# Patient Record
Sex: FEMALE | Race: WHITE | NOT HISPANIC OR LATINO | Employment: OTHER | URBAN - NONMETROPOLITAN AREA
[De-identification: names, ages, dates, MRNs, and addresses within clinical notes are randomized per-mention and may not be internally consistent; named-entity substitution may affect disease eponyms.]

---

## 2017-01-24 ENCOUNTER — OFFICE VISIT (OUTPATIENT)
Dept: FAMILY MEDICINE | Facility: CLINIC | Age: 77
End: 2017-01-24
Payer: COMMERCIAL

## 2017-01-24 VITALS
SYSTOLIC BLOOD PRESSURE: 162 MMHG | HEART RATE: 89 BPM | WEIGHT: 169 LBS | OXYGEN SATURATION: 98 % | TEMPERATURE: 97.6 F | BODY MASS INDEX: 28.85 KG/M2 | HEIGHT: 64 IN | DIASTOLIC BLOOD PRESSURE: 90 MMHG

## 2017-01-24 DIAGNOSIS — N64.4 BREAST PAIN: ICD-10-CM

## 2017-01-24 DIAGNOSIS — M94.0 COSTOCHONDRITIS: Primary | ICD-10-CM

## 2017-01-24 DIAGNOSIS — I10 ESSENTIAL HYPERTENSION: ICD-10-CM

## 2017-01-24 DIAGNOSIS — R07.89 CHEST WALL PAIN: ICD-10-CM

## 2017-01-24 PROCEDURE — 99214 OFFICE O/P EST MOD 30 MIN: CPT | Performed by: FAMILY MEDICINE

## 2017-01-24 NOTE — PROGRESS NOTES
SUBJECTIVE:                                                    Yudelka Beckett is a 76 year old female who presents to clinic today for the following health issues:      Hypertension Follow-up      Outpatient blood pressures are being checked at home.  Results are normal.    Low Salt Diet: not monitoring salt     Is on diltiazem.   BP Readings from Last 6 Encounters:   01/24/17 168/96   11/10/16 158/86   10/03/16 132/80   08/22/16 139/80   07/07/16 148/86   06/29/16 177/86   She reports that her BPs at home are usually in the 130s/80s. She thinks her machine is correctly calibrated.       Amount of exercise or physical activity: active    Problems taking medications regularly: No    Medication side effects: none  Diet: regular (no restrictions)    Follow up rt sided chest pain    CT scan was negative in November.   FINDINGS: No bony or soft tissue abnormality is seen involving the  right chest wall. Mild atelectasis is seen at the lung bases. There is  a 3 mm noncalcified right middle lobe pulmonary nodule on image #36.  There is a 4 mm noncalcified subpleural area of nodularity at the left  lung base on image #42. Both of these noncalcified nodules are stable  dating back to 5/1/2013 which suggests a benign entity. No enlarged  lymph nodes are demonstrated. The thoracic aorta is normal in caliber.  A subcentimeter area of amorphus calcification involving the cortex of  the upper pole of the right kidney is unchanged.                                                                     IMPRESSION:  Stable chest CT with no specific reason for right chest  wall pain seen.     LAWRENCE CANTU MD    She rolled onto her back from her left side the other night she felt a very intense pain, but when she returned to her original position, the pain subsided. The pain is in her rib area. She says that the pain is almost constant now, but the severity waxes and wanes. It huts when she pushes on her ribs, especially just  "to the right of her sternum. Feels like she was \"punched from inside.\"  She has not rested the muscles in her chest for a long time. She is lifting wood and other heavy objects year round daily. She also has a history of trauma to her chest when her dog jumped on her a year ago.   She takes 1 ibuprofen twice a day, but sometimes takes 2 if the pain is particularly bad that day.     Mole on face-  She says she has had it since last spring and thinks it is getting better. We have discussed biopsying it before, but she just wants to let it be. She says that it occasionally bleeds when she lays on it.    Problem list and histories reviewed & adjusted, as indicated.  Additional history: as documented    BP Readings from Last 3 Encounters:   01/24/17 168/96   11/10/16 158/86   10/03/16 132/80    Wt Readings from Last 3 Encounters:   01/24/17 76.658 kg (169 lb)   11/10/16 77.565 kg (171 lb)   10/03/16 77.565 kg (171 lb)           ROS:  C: NEGATIVE for fever, chills, change in weight  E/M: NEGATIVE for ear, mouth and throat problems  R: NEGATIVE for significant cough or SOB  CV: NEGATIVE for chest pain, palpitations or peripheral edema  GI: NEGATIVE for nausea, abdominal pain, heartburn, or change in bowel habits  : negative for, dysuria, frequency, hematuria, hesitancy and incontinence  MS: POSITIVE for chest pain, particularly on the right side.     OBJECTIVE:                                                    /96 mmHg  Pulse 89  Temp(Src) 97.6  F (36.4  C) (Tympanic)  Ht 1.613 m (5' 3.5\")  Wt 76.658 kg (169 lb)  BMI 29.46 kg/m2  SpO2 98%  Body mass index is 29.46 kg/(m^2).  GENERAL: healthy, alert and no distress  RESP: lungs clear to auscultation - no rales, rhonchi or wheezes  BREAST: normal without masses or nipple discharge and no palpable axillary masses or adenopathy. Tenderness just under the nipple line bilaterally that seems to be in the chest wall underneath the breast.   CV: regular rate and " rhythm, normal S1 S2, no S3 or S4, 1-2/6 systolic ejection murmer best heard at the upper sternal boarder, no click or rub,   ABDOMEN: soft, nontender, no hepatosplenomegaly, no masses   MS: no gross musculoskeletal defects noted, no edema  SKIN: 1.2cm x 8mm raised erythematous dome on left cheek.  HENT:hearing aids in place, upper and lower plates.     ASSESSMENT/PLAN:                                                      ASSESSMENT:  1. Costochondritis    2. Chest wall pain    3. Breast pain    4. Essential hypertension        PLAN:  Orders Placed This Encounter     MA Diagnostic Digital Bilateral   Discussed possibilities of biopsying and removing lesion on cheek. Discussed risks of skin cancer. Patient is undetermined on a plan of action, but will consider all possibilities discussed.     The pain seems to be musculoskeletal, but I have ordered a mammogram just in case. I also discussed possibility of doing a bone scan after mammogram if it comes back normal. She seemed hesitant, but will consider it.   Start: 2:03 PM  End: 2:42 PM  30 min spent with patient, greater than 50% in counseling and coordination of care.      Patient Instructions     Diagnostic mammogram and ultrasound in [place]  [phone]  Consider bone scan  Information on costochondritis given.     This document serves as a record of the services and decisions personally performed and made by Azalea Tobar MD. It was created on her behalf by Nadira Mcgill, a trained medical scribe. The creation of this document is based the provider's statements to the medical scribe.  Nadira Mcgill 2:03 PM 1/24/2017    Provider:   The information in this document, created by the medical scribe for me, accurately reflects the services I personally performed and the decisions made by me. I have reviewed and approved this document for accuracy prior to leaving the patient care area.  Azalea Tobar MD 2:03 PM 1/24/2017    Azalea Tobar MD  [place]  [facility]

## 2017-01-24 NOTE — PATIENT INSTRUCTIONS
Diagnostic mammogram and ultrasound in [place]  [phone]  Consider bone scan    Chest Wall Pain: Costochondritis    The chest pain that you have had today is caused by costochondritis. This condition is caused by an inflammation of the cartilage joining your ribs to your breastbone. It is not caused by heart or lung problems. The inflammation may have been brought on by a blow to the chest, lifting heavy objects, intense exercise, or an illness that made you cough and sneeze. It often occurs during times of emotional stress. It can be painful, but it is not dangerous. It usually goes away in 1 to 2 weeks. But it may happen again. Rarely, a more serious condition may cause symptoms similar to costochondritis. That s why it s important to watch for the warning signs listed below.  Home care  Follow these guidelines when caring for yourself at home:    If you feel that emotional stress is a cause of your condition, try to figure out the sources of that stress. It may not be obvious! Learn ways to deal with the stress in your life. This can include regular exercise, muscle relaxation, meditation, or simply taking time out for yourself. For more information about this, talk with your health care provider. Or go to your local library and look at books on  stress reduction.     You may use acetaminophen or ibuprofen to control pain, unless another pain medicine was prescribed. If you have liver disease or ever had a stomach ulcer, talk with your health care provider before using these medicines.    You can also help ease pain by using a hot, wet compress or heating pad. Use this with or without a medicated skin cream that helps relieves pain.    Do stretching exercise as advised by your provider.    Take any prescribed medicines as directed.  Follow-up care  Follow up with your health care provider, or as advised, if you do not start to get better in the next 2 days.  When to seek medical advice  Call your health  care provider right away if any of these occur:    A change in the type of pain. Call if it feels different, becomes more serious, lasts longer, or spreads into your shoulder, arm, neck, jaw, or back.    Shortness of breath or pain gets worse when you breathe    Weakness, dizziness, or fainting    Cough with dark-colored sputum (phlegm) or blood    Abdominal pain    Dark red or black stools    Fever of 100.4 F (38 C) or higher, or as directed by your health care provider    3979-0566 [employer]. [address] [place] [address]. All rights reserved. This information is not intended as a substitute for professional medical care. Always follow your healthcare professional's instructions.        Costochondritis  Costochondritis is inflammation of a rib or the cartilage that connects a rib to your breastbone (sternum). It causes tenderness, and sometimes chest pain may be sharp or aching, or it may feel like pressure. Pain may get worse with deep breathing, movement, or exercise. In some cases, the pain is mistaken for a heart attack. Despite this, the condition is not serious. Read on to learn more about the condition and how it can be treated.    What causes costochondritis?  The cause of costochondritis is not completely clear, but it may happen after a chest injury, chest infection or coughing episode. Some physical activities can sometimes lead to costochondritis. Large-breasted women may be more likely to have the condition. Often, the reason for the inflammation is unknown.  Diagnosing costochondritis  There is no test for costochrondritis. The condition is diagnosed by the symptoms you have. In some cases, tests are done to rule out more serious problems. These tests may include imaging tests such as chest X-ray or CT scan.  Treating costochondritis  If an underlying cause is found, treatment for that will likely relieve the problem. Costochondritis often goes away on its own. The course  of the condition varies from person to person. It usually lasts from weeks to months. In some cases, mild symptoms continue for months to years. To ease symptoms:    Take medications as directed. These relieve pain and swelling. Ibuprofen or other NSAIDs are often recommended. In some cases, you may be given prescription medication, such as muscle relaxants.    Avoid activities that put stress on the chest or spine.    Apply a heating pad (set to warm, not to high, heat) to the breastbone several times a day.    Perform stretching exercises as directed  Call the health care provider right away if you have any of the following:    Pain that is not relieved by medication    Shortness of breath    Lightheadedness, dizziness, or fainting    Feeling of irregular heartbeat or fast pulse  Anyone with chest pain should see a doctor, especially those who are older and may be at risk for heart disease.     8543-3382 [employer]. [address] [place] [address], [place] [address]. All rights reserved. This information is not intended as a substitute for professional medical care. Always follow your healthcare professional's instructions.

## 2017-01-24 NOTE — MR AVS SNAPSHOT
After Visit Summary   1/24/2017    Yudelka Beckett    MRN: [id number]           Patient Information     Date Of Birth          1940        Visit Information        Provider Department      1/24/2017 1:40 PM Azalea Tobar MD [facility]        Today's Diagnoses     Costochondritis    -  1     Chest wall pain         Breast pain           Care Instructions      Diagnostic mammogram and ultrasound in [place]  [phone]  Consider bone scan    Chest Wall Pain: Costochondritis    The chest pain that you have had today is caused by costochondritis. This condition is caused by an inflammation of the cartilage joining your ribs to your breastbone. It is not caused by heart or lung problems. The inflammation may have been brought on by a blow to the chest, lifting heavy objects, intense exercise, or an illness that made you cough and sneeze. It often occurs during times of emotional stress. It can be painful, but it is not dangerous. It usually goes away in 1 to 2 weeks. But it may happen again. Rarely, a more serious condition may cause symptoms similar to costochondritis. That s why it s important to watch for the warning signs listed below.  Home care  Follow these guidelines when caring for yourself at home:    If you feel that emotional stress is a cause of your condition, try to figure out the sources of that stress. It may not be obvious! Learn ways to deal with the stress in your life. This can include regular exercise, muscle relaxation, meditation, or simply taking time out for yourself. For more information about this, talk with your health care provider. Or go to your local library and look at books on  stress reduction.     You may use acetaminophen or ibuprofen to control pain, unless another pain medicine was prescribed. If you have liver disease or ever had a stomach ulcer, talk with your health care provider before using these medicines.    You can also help  ease pain by using a hot, wet compress or heating pad. Use this with or without a medicated skin cream that helps relieves pain.    Do stretching exercise as advised by your provider.    Take any prescribed medicines as directed.  Follow-up care  Follow up with your health care provider, or as advised, if you do not start to get better in the next 2 days.  When to seek medical advice  Call your health care provider right away if any of these occur:    A change in the type of pain. Call if it feels different, becomes more serious, lasts longer, or spreads into your shoulder, arm, neck, jaw, or back.    Shortness of breath or pain gets worse when you breathe    Weakness, dizziness, or fainting    Cough with dark-colored sputum (phlegm) or blood    Abdominal pain    Dark red or black stools    Fever of 100.4 F (38 C) or higher, or as directed by your health care provider    6981-2616 [employer]. [address] [place] [address]. All rights reserved. This information is not intended as a substitute for professional medical care. Always follow your healthcare professional's instructions.        Costochondritis  Costochondritis is inflammation of a rib or the cartilage that connects a rib to your breastbone (sternum). It causes tenderness, and sometimes chest pain may be sharp or aching, or it may feel like pressure. Pain may get worse with deep breathing, movement, or exercise. In some cases, the pain is mistaken for a heart attack. Despite this, the condition is not serious. Read on to learn more about the condition and how it can be treated.    What causes costochondritis?  The cause of costochondritis is not completely clear, but it may happen after a chest injury, chest infection or coughing episode. Some physical activities can sometimes lead to costochondritis. Large-breasted women may be more likely to have the condition. Often, the reason for the inflammation is unknown.  Diagnosing  costochondritis  There is no test for costochrondritis. The condition is diagnosed by the symptoms you have. In some cases, tests are done to rule out more serious problems. These tests may include imaging tests such as chest X-ray or CT scan.  Treating costochondritis  If an underlying cause is found, treatment for that will likely relieve the problem. Costochondritis often goes away on its own. The course of the condition varies from person to person. It usually lasts from weeks to months. In some cases, mild symptoms continue for months to years. To ease symptoms:    Take medications as directed. These relieve pain and swelling. Ibuprofen or other NSAIDs are often recommended. In some cases, you may be given prescription medication, such as muscle relaxants.    Avoid activities that put stress on the chest or spine.    Apply a heating pad (set to warm, not to high, heat) to the breastbone several times a day.    Perform stretching exercises as directed  Call the health care provider right away if you have any of the following:    Pain that is not relieved by medication    Shortness of breath    Lightheadedness, dizziness, or fainting    Feeling of irregular heartbeat or fast pulse  Anyone with chest pain should see a doctor, especially those who are older and may be at risk for heart disease.     4583-5400 [employer]. [address] [place] [address], [place] [id number]. All rights reserved. This information is not intended as a substitute for professional medical care. Always follow your healthcare professional's instructions.              Follow-ups after your visit        Future tests that were ordered for you today     Open Future Orders        Priority Expected Expires Ordered    MA Diagnostic Digital Bilateral Routine  1/24/2018 1/24/2017            Who to contact     If you have questions or need follow up information about today's clinic visit or your schedule please contact [facility]  "directly at [phone].  Normal or non-critical lab and imaging results will be communicated to you by MyChart, letter or phone within 4 business days after the clinic has received the results. If you do not hear from us within 7 days, please contact the clinic through [employer] or phone. If you have a critical or abnormal lab result, we will notify you by phone as soon as possible.  Submit refill requests through [employer] or call your pharmacy and they will forward the refill request to us. Please allow 3 business days for your refill to be completed.          Additional Information About Your Visit        [employer] Information     [employer] lets you send messages to your doctor, view your test results, renew your prescriptions, schedule appointments and more. To sign up, go to [email or web address] . Click on \"Log in\" on the left side of the screen, which will take you to the Welcome page. Then click on \"Sign up Now\" on the right side of the page.     You will be asked to enter the access code listed below, as well as some personal information. Please follow the directions to create your username and password.     Your access code is: [email or web address]  Expires: 2017  2:32 PM     Your access code will  in 90 days. If you need help or a new code, please call your [place] clinic or [phone].        Care EveryWhere ID     This is your Care EveryWhere ID. This could be used by other organizations to access your [facility] medical records  [id number]        Your Vitals Were     Pulse Temperature Height BMI (Body Mass Index) Pulse Oximetry       89 97.6  F (36.4  C) (Tympanic) 5' 3.5\" (1.613 m) 29.46 kg/m2 98%        Blood Pressure from Last 3 Encounters:   17 168/96   11/10/16 158/86   10/03/16 132/80    Weight from Last 3 Encounters:   17 169 lb (76.658 kg)   11/10/16 171 lb (77.565 kg)   10/03/16 171 lb (77.565 kg)               Primary Care Provider Office Phone # Fax #    Azalea Tobar MD " [phone]       [facility] [address] [facility] [address]        Thank you!     Thank you for choosing [facility]  for your care. Our goal is always to provide you with excellent care. Hearing back from our patients is one way we can continue to improve our services. Please take a few minutes to complete the written survey that you may receive in the mail after your visit with us. Thank you!             Your Updated Medication List - Protect others around you: Learn how to safely use, store and throw away your medicines at www.disposemymeds.org.          This list is accurate as of: 1/24/17  2:32 PM.  Always use your most recent med list.                   Brand Name Dispense Instructions for use    albuterol 108 (90 BASE) MCG/ACT Inhaler    PROAIR HFA/PROVENTIL HFA/VENTOLIN HFA    1 Inhaler    Inhale 1-2 puffs into the lungs every 4 hours as needed for shortness of breath / dyspnea       atorvastatin 20 MG tablet    LIPITOR    90 tablet    Take 1 tablet (20 mg) by mouth daily       diltiazem 240 MG 24 hr capsule     90 capsule    Take 1 capsule (240 mg) by mouth daily       esomeprazole 40 MG CR capsule    nexIUM    90 capsule    Take 1 capsule (40 mg) by mouth every morning (before breakfast) One hour before meals fill when needed       fluticasone 110 MCG/ACT Inhaler    FLOVENT HFA    1 Inhaler    2 puffs BID       IBUPROFEN      3 times daily       ipratropium 17 MCG/ACT Inhaler    ATROVENT HFA    1 Inhaler    Inhale 2 puffs into the lungs 4 times daily as needed       levothyroxine 88 MCG tablet    SYNTHROID/LEVOTHROID    90 tablet    Take 1 tablet (88 mcg) by mouth daily       lisinopril 20 MG tablet    PRINIVIL/ZESTRIL    90 tablet    Take 1 tablet (20 mg) by mouth daily Fill when needed

## 2017-01-24 NOTE — NURSING NOTE
"Chief Complaint   Patient presents with     Chest Pain     F/U rt sided chest pain     Hypertension     /96 mmHg  Pulse 89  Temp(Src) 97.6  F (36.4  C) (Tympanic)  Ht 5' 3.5\" (1.613 m)  Wt 169 lb (76.658 kg)  BMI 29.46 kg/m2  SpO2 98% Estimated body mass index is 29.46 kg/(m^2) as calculated from the following:    Height as of this encounter: 5' 3.5\" (1.613 m).    Weight as of this encounter: 169 lb (76.658 kg).  bp completed using cuff size: regular        "

## 2017-01-30 ENCOUNTER — OFFICE VISIT (OUTPATIENT)
Dept: FAMILY MEDICINE | Facility: CLINIC | Age: 77
End: 2017-01-30
Payer: COMMERCIAL

## 2017-01-30 VITALS
WEIGHT: 169 LBS | BODY MASS INDEX: 29.46 KG/M2 | TEMPERATURE: 98 F | HEART RATE: 94 BPM | SYSTOLIC BLOOD PRESSURE: 152 MMHG | OXYGEN SATURATION: 98 % | DIASTOLIC BLOOD PRESSURE: 78 MMHG

## 2017-01-30 DIAGNOSIS — D48.5 NEOPLASM OF UNCERTAIN BEHAVIOR OF SKIN: Primary | ICD-10-CM

## 2017-01-30 PROCEDURE — 88305 TISSUE EXAM BY PATHOLOGIST: CPT | Performed by: FAMILY MEDICINE

## 2017-01-30 PROCEDURE — 11312 SHAVE SKIN LESION 1.1-2.0 CM: CPT | Performed by: FAMILY MEDICINE

## 2017-01-30 NOTE — NURSING NOTE
"Chief Complaint   Patient presents with     Derm Problem     shave biopsy from face     /78 mmHg  Pulse 94  Temp(Src) 98  F (36.7  C) (Tympanic)  Wt 169 lb (76.658 kg)  SpO2 98% Estimated body mass index is 29.46 kg/(m^2) as calculated from the following:    Height as of 1/24/17: 5' 3.5\" (1.613 m).    Weight as of this encounter: 169 lb (76.658 kg).  bp completed using cuff size: regular        "

## 2017-01-30 NOTE — PROGRESS NOTES
SUBJECTIVE:                                                    Yudelka Beckett is a 76 year old female who presents to clinic today for the following health issues:    Shave biopsy on face- large raised dome on left check    /78 mmHg  Pulse 94  Temp(Src) 98  F (36.7  C) (Tympanic)  Wt 76.658 kg (169 lb)  SpO2 98%  Body mass index is 29.46 kg/(m^2).   General: healthy, alert, no distress  Skin: 1.1cm x. 7 cm raised dome on left check.      Procedure note: Shave biopsy: Gone over benefits and risks, including bleeding, infection and scarring, patient wishes to proceed. The area(s) were then injected with 1% lidocaine 0% epi for anesthesia using a 30 gauge needle. Then, employing the usual sterile preparation and technique, a partial shave biopsy was taken of the lesion. Good hemostasis was achieved with gauze and a pressure Band-Aid.. Patient tolerated procedure well without any complications. Wound care discussed. Pathology pending.       This document serves as a record of the services and decisions personally performed and made by Azalea Tobar MD. It was created on her behalf by Nadira Mcgill, a trained medical scribe. The creation of this document is based the provider's statements to the medical scribe.  Nadira Mcgill 1:00 PM 1/30/2017    Provider:   The information in this document, created by the medical scribe for me, accurately reflects the services I personally performed and the decisions made by me. I have reviewed and approved this document for accuracy prior to leaving the patient care area.  Azalea Tobar MD 1:00 PM 1/30/2017    Azalea Tobar MD  [facility]

## 2017-01-30 NOTE — MR AVS SNAPSHOT
After Visit Summary   1/30/2017    Yudelka Beckett    MRN: [id number]           Patient Information     Date Of Birth          1940        Visit Information        Provider Department      1/30/2017 12:40 PM Azalea Tobar MD [facility]        Care Instructions    Wound Care Instructions    1.  Keep area dry today.    2.  Starting tomorrow wash gently with soap and water once daily.      3.  Apply Vaseline or antibiotic ointment to the area and cover with a bandage if desired.  Do not let it dry out and form a scab as this will make the resulting scar more noticeable.    4. Protect the area from sun for up to one year afterward as the scar is continuing to remodel.  Sun exposure will also make the resulting scar more noticeable.    5.  Call if the area is very red, tender, has a discharge or is very itchy while healing, or if you have any other questions.  These may be signs of early infection or allergy.          Follow-ups after your visit        Your next 10 appointments already scheduled     Feb 07, 2017  1:30 PM   MA DIAGNOSTIC DIGITAL BILATERAL with WYMA1   [facility] ([facility])    [address] [place] [address]   [phone]           Do not use any powder, lotion or deodorant under your arms or on your breast. If you do, we will ask you to remove it before your exam.  Wear comfortable, two-piece clothing.  If you have any allergies, tell your care team.  Bring any previous mammograms from other facilities or have them mailed to the breast center.            Feb 07, 2017  2:00 PM   US BREAST LUKAS CMPL 4 QUAD with WYUS1   [facility] ([facility])    [address] [place] [address] [phone]           Please bring a list of your medicines (including vitamins, minerals and over-the-counter drugs). Also, tell your doctor about any allergies you may have. Wear comfortable  "clothes and leave your valuables at home.  You do not need to do anything special to prepare for your exam.  Please call the Imaging Department at your exam site with any questions.              Who to contact     If you have questions or need follow up information about today's clinic visit or your schedule please contact [facility] directly at [phone].  Normal or non-critical lab and imaging results will be communicated to you by MyChart, letter or phone within 4 business days after the clinic has received the results. If you do not hear from us within 7 days, please contact the clinic through [employer] or phone. If you have a critical or abnormal lab result, we will notify you by phone as soon as possible.  Submit refill requests through [employer] or call your pharmacy and they will forward the refill request to us. Please allow 3 business days for your refill to be completed.          Additional Information About Your Visit        [employer] Information     [employer] lets you send messages to your doctor, view your test results, renew your prescriptions, schedule appointments and more. To sign up, go to [email or web address] . Click on \"Log in\" on the left side of the screen, which will take you to the Welcome page. Then click on \"Sign up Now\" on the right side of the page.     You will be asked to enter the access code listed below, as well as some personal information. Please follow the directions to create your username and password.     Your access code is: [email or web address]  Expires: 2017  2:32 PM     Your access code will  in 90 days. If you need help or a new code, please call your [place] clinic or [phone].        Care EveryWhere ID     This is your Care EveryWhere ID. This could be used by other organizations to access your [place] medical records  [id number]        Your Vitals Were     Pulse Temperature Pulse Oximetry             94 98  F (36.7  C) (Tympanic) 98%          " Blood Pressure from Last 3 Encounters:   01/30/17 152/78   01/24/17 162/90   11/10/16 158/86    Weight from Last 3 Encounters:   01/30/17 169 lb (76.658 kg)   01/24/17 169 lb (76.658 kg)   11/10/16 171 lb (77.565 kg)              Today, you had the following     No orders found for display       Primary Care Provider Office Phone # Fax #    Azalea Tobar [phone]       [facility] [address] [facility] [address]        Thank you!     Thank you for choosing [facility]  for your care. Our goal is always to provide you with excellent care. Hearing back from our patients is one way we can continue to improve our services. Please take a few minutes to complete the written survey that you may receive in the mail after your visit with us. Thank you!             Your Updated Medication List - Protect others around you: Learn how to safely use, store and throw away your medicines at www.disposemymeds.org.          This list is accurate as of: 1/30/17  1:18 PM.  Always use your most recent med list.                   Brand Name Dispense Instructions for use    albuterol 108 (90 BASE) MCG/ACT Inhaler    PROAIR HFA/PROVENTIL HFA/VENTOLIN HFA    1 Inhaler    Inhale 1-2 puffs into the lungs every 4 hours as needed for shortness of breath / dyspnea       atorvastatin 20 MG tablet    LIPITOR    90 tablet    Take 1 tablet (20 mg) by mouth daily       diltiazem 240 MG 24 hr capsule     90 capsule    Take 1 capsule (240 mg) by mouth daily       esomeprazole 40 MG CR capsule    nexIUM    90 capsule    Take 1 capsule (40 mg) by mouth every morning (before breakfast) One hour before meals fill when needed       fluticasone 110 MCG/ACT Inhaler    FLOVENT HFA    1 Inhaler    2 puffs BID       IBUPROFEN      3 times daily       ipratropium 17 MCG/ACT Inhaler    ATROVENT HFA    1 Inhaler    Inhale 2 puffs into the lungs 4 times daily as needed       levothyroxine 88 MCG tablet     SYNTHROID/LEVOTHROID    90 tablet    Take 1 tablet (88 mcg) by mouth daily       lisinopril 20 MG tablet    PRINIVIL/ZESTRIL    90 tablet    Take 1 tablet (20 mg) by mouth daily Fill when needed

## 2017-01-30 NOTE — PATIENT INSTRUCTIONS
Wound Care Instructions    1.  Keep area dry today.    2.  Starting tomorrow wash gently with soap and water once daily.      3.  Apply Vaseline or antibiotic ointment to the area and cover with a bandage if desired.  Do not let it dry out and form a scab as this will make the resulting scar more noticeable.    4. Protect the area from sun for up to one year afterward as the scar is continuing to remodel.  Sun exposure will also make the resulting scar more noticeable.    5.  Call if the area is very red, tender, has a discharge or is very itchy while healing, or if you have any other questions.  These may be signs of early infection or allergy.

## 2017-02-01 LAB — COPATH REPORT: NORMAL

## 2017-02-27 ENCOUNTER — HOSPITAL ENCOUNTER (OUTPATIENT)
Dept: MAMMOGRAPHY | Facility: CLINIC | Age: 77
Discharge: HOME | End: 2017-02-27
Attending: FAMILY MEDICINE | Admitting: FAMILY MEDICINE
Payer: MEDICARE

## 2017-02-27 DIAGNOSIS — N64.4 BREAST PAIN: ICD-10-CM

## 2017-02-27 PROCEDURE — G0204 DX MAMMO INCL CAD BI: HCPCS

## 2017-03-01 ENCOUNTER — TELEPHONE (OUTPATIENT)
Dept: FAMILY MEDICINE | Facility: CLINIC | Age: 77
End: 2017-03-01

## 2017-03-01 DIAGNOSIS — J45.20 MILD INTERMITTENT ASTHMA, UNCOMPLICATED: ICD-10-CM

## 2017-03-01 RX ORDER — ALBUTEROL SULFATE 90 UG/1
2 AEROSOL, METERED RESPIRATORY (INHALATION) EVERY 6 HOURS
Qty: 1 INHALER | Refills: 5 | Status: SHIPPED | OUTPATIENT
Start: 2017-03-01 | End: 2017-10-20

## 2017-03-01 NOTE — TELEPHONE ENCOUNTER
[facility] is requesting a Formulary alternative for Pro Air  Ventolin  Please Call [phone]  [facility]

## 2017-03-02 NOTE — TELEPHONE ENCOUNTER
Pt is calling back.  She said that she needs an exemption for the Pro Air Inhaler.  She don't want no other inhaler.  The number to [facility] is [phone].  Ofe Light

## 2017-03-02 NOTE — TELEPHONE ENCOUNTER
Pt calling back to see what is going on with her inhaler.  She has not heard anything yet.  Ofe Light

## 2017-03-03 NOTE — TELEPHONE ENCOUNTER
Pt notified and understood. It was taken care of. Pt does have the medication.  [facility]

## 2017-03-29 ENCOUNTER — ALLIED HEALTH/NURSE VISIT (OUTPATIENT)
Dept: FAMILY MEDICINE | Facility: CLINIC | Age: 77
End: 2017-03-29
Payer: COMMERCIAL

## 2017-03-29 VITALS — SYSTOLIC BLOOD PRESSURE: 138 MMHG | HEART RATE: 88 BPM | DIASTOLIC BLOOD PRESSURE: 74 MMHG

## 2017-03-29 DIAGNOSIS — I10 ESSENTIAL HYPERTENSION: Primary | ICD-10-CM

## 2017-03-29 PROCEDURE — 99207 ZZC NO CHARGE NURSE ONLY: CPT

## 2017-03-29 NOTE — MR AVS SNAPSHOT
"              After Visit Summary   3/29/2017    Yudelka Beckett    MRN: [id number]           Patient Information     Date Of Birth          1940        Visit Information        Provider Department      3/29/2017 11:00 AM SACHA WELLINGTON RN [facility]        Today's Diagnoses     Essential hypertension    -  1       Follow-ups after your visit        Who to contact     If you have questions or need follow up information about today's clinic visit or your schedule please contact [facility] directly at [phone].  Normal or non-critical lab and imaging results will be communicated to you by MyChart, letter or phone within 4 business days after the clinic has received the results. If you do not hear from us within 7 days, please contact the clinic through [employer] or phone. If you have a critical or abnormal lab result, we will notify you by phone as soon as possible.  Submit refill requests through [employer] or call your pharmacy and they will forward the refill request to us. Please allow 3 business days for your refill to be completed.          Additional Information About Your Visit        MyChart Information     [employer] lets you send messages to your doctor, view your test results, renew your prescriptions, schedule appointments and more. To sign up, go to [email or web address] . Click on \"Log in\" on the left side of the screen, which will take you to the Welcome page. Then click on \"Sign up Now\" on the right side of the page.     You will be asked to enter the access code listed below, as well as some personal information. Please follow the directions to create your username and password.     Your access code is: [email or web address]  Expires: 2017  3:32 PM     Your access code will  in 90 days. If you need help or a new code, please call your [facility] or [phone].        Care EveryWhere ID     This is your Care EveryWhere ID. This could be used by other " organizations to access your [place] medical records  [id number]        Your Vitals Were     Pulse                   88            Blood Pressure from Last 3 Encounters:   03/29/17 138/74   01/30/17 152/78   01/24/17 162/90    Weight from Last 3 Encounters:   01/30/17 169 lb (76.7 kg)   01/24/17 169 lb (76.7 kg)   11/10/16 171 lb (77.6 kg)              Today, you had the following     No orders found for display       Primary Care Provider Office Phone # Fax #    Azalea Tobar [phone]       [facility] [address] [facility] [address]        Thank you!     Thank you for choosing [facility]  for your care. Our goal is always to provide you with excellent care. Hearing back from our patients is one way we can continue to improve our services. Please take a few minutes to complete the written survey that you may receive in the mail after your visit with us. Thank you!             Your Updated Medication List - Protect others around you: Learn how to safely use, store and throw away your medicines at www.disposemymeds.org.          This list is accurate as of: 3/29/17 11:19 AM.  Always use your most recent med list.                   Brand Name Dispense Instructions for use    albuterol 108 (90 BASE) MCG/ACT Inhaler    PROAIR HFA/PROVENTIL HFA/VENTOLIN HFA    1 Inhaler    Inhale 2 puffs into the lungs every 6 hours       atorvastatin 20 MG tablet    LIPITOR    90 tablet    Take 1 tablet (20 mg) by mouth daily       diltiazem 240 MG 24 hr capsule     90 capsule    Take 1 capsule (240 mg) by mouth daily       esomeprazole 40 MG CR capsule    nexIUM    90 capsule    Take 1 capsule (40 mg) by mouth every morning (before breakfast) One hour before meals fill when needed       fluticasone 110 MCG/ACT Inhaler    FLOVENT HFA    1 Inhaler    2 puffs BID       IBUPROFEN      3 times daily       ipratropium 17 MCG/ACT Inhaler    ATROVENT HFA    1 Inhaler    Inhale 2 puffs  into the lungs 4 times daily as needed       levothyroxine 88 MCG tablet    SYNTHROID/LEVOTHROID    90 tablet    Take 1 tablet (88 mcg) by mouth daily       lisinopril 20 MG tablet    PRINIVIL/ZESTRIL    90 tablet    Take 1 tablet (20 mg) by mouth daily Fill when needed

## 2017-03-29 NOTE — NURSING NOTE
Yudelka Beckett is a 76 year old female who comes in today for a Blood Pressure check because of ongoing blood pressure monitoring.    Brought machine from home and it matches exactly what we get here in clinic today    Vitals /74, pulse 88 a regular cuff was used.    Patient is taking medication as prescribed  Patient is tolerating medications well.  Patient is monitoring Blood Pressure at home.  Average readings if yes are 130/76    Current complaints: none    Disposition: follow-up as indicated by MD/AP

## 2017-05-03 ENCOUNTER — OFFICE VISIT (OUTPATIENT)
Dept: DERMATOLOGY | Facility: CLINIC | Age: 77
End: 2017-05-03
Payer: COMMERCIAL

## 2017-05-03 ENCOUNTER — TELEPHONE (OUTPATIENT)
Dept: DERMATOLOGY | Facility: CLINIC | Age: 77
End: 2017-05-03

## 2017-05-03 VITALS — SYSTOLIC BLOOD PRESSURE: 150 MMHG | DIASTOLIC BLOOD PRESSURE: 83 MMHG | HEART RATE: 96 BPM | OXYGEN SATURATION: 99 %

## 2017-05-03 DIAGNOSIS — C44.111: ICD-10-CM

## 2017-05-03 DIAGNOSIS — D18.00 ANGIOMA: ICD-10-CM

## 2017-05-03 DIAGNOSIS — L82.1 SK (SEBORRHEIC KERATOSIS): ICD-10-CM

## 2017-05-03 DIAGNOSIS — L81.4 LENTIGO: ICD-10-CM

## 2017-05-03 DIAGNOSIS — C44.319 BASAL CELL CARCINOMA OF LEFT CHEEK: Primary | ICD-10-CM

## 2017-05-03 PROCEDURE — 88331 PATH CONSLTJ SURG 1 BLK 1SPC: CPT | Mod: 59 | Performed by: DERMATOLOGY

## 2017-05-03 PROCEDURE — 99203 OFFICE O/P NEW LOW 30 MIN: CPT | Mod: 25 | Performed by: DERMATOLOGY

## 2017-05-03 PROCEDURE — 11100 ZZHC BIOPSY SKIN/SUBQ/MUC MEM, SINGLE LESION: CPT | Mod: 59 | Performed by: DERMATOLOGY

## 2017-05-03 PROCEDURE — 13132 CMPLX RPR F/C/C/M/N/AX/G/H/F: CPT | Performed by: DERMATOLOGY

## 2017-05-03 PROCEDURE — 17311 MOHS 1 STAGE H/N/HF/G: CPT | Performed by: DERMATOLOGY

## 2017-05-03 NOTE — PATIENT INSTRUCTIONS
Sutured Wound Care     [facility]: [phone]    [facility]: [phone]    Lower left cheek          ? No strenuous activity for 48 hours. Resume moderate activity in 48 hours. No heavy exercising until you are seen for follow up in one week.     ? Take Tylenol as needed for discomfort.                         ? Do not drink alcoholic beverages for 48 hours.     ? Keep the pressure bandage in place for 24 hours. If the bandage becomes blood tinged or loose, reinforce it with gauze and tape.        (Refer to the reverse side of this page for management of bleeding).    ? Remove pressure bandage in 24 hours     ? Leave the flat bandage in place until your follow up appointment.    ? Keep the bandage dry. Wash around it carefully.    ? If the tape becomes soiled or starts to come off, reinforce it with additional paper tape.    ? Do not smoke for 3 weeks; smoking is detrimental to wound healing.    ? It is normal to have swelling and bruising around the surgical site. The bruising will fade in approximately 10-14 days. Elevate the area to reduce swelling.    ? Numbness, itchiness and sensitivity to temperature changes can occur after surgery and may take up to 18 months to normalize.      POSSIBLE COMPLICATIONS    BLEEDIN. Leave the bandage in place.  2. Use tightly rolled up gauze or a cloth to apply direct pressure over the bandage for 20   minutes.  3. Reapply pressure for an additional 20 minutes if necessary  4. Call the office or go to the nearest emergency room if pressure fails to stop the bleeding.  5. Use additional gauze and tape to maintain pressure once the bleeding has stopped.        PAIN:    1. Post operative pain should slowly get better, never worse.  2. A severe increase in pain may indicate a problem. Call the office if this occurs.    In case of emergency phone:Dr [place] [phone]      Wound Care Instructions     FOR SUPERFICIAL WOUNDS     [facility]  [place] [phone]    [facility] [phone]  Top of left cheek                       AFTER 24 HOURS YOU SHOULD REMOVE THE BANDAGE AND BEGIN DAILY DRESSING CHANGES AS FOLLOWS:     1) Remove Dressing.     2) Clean and dry the area with tap water using a Q-tip or sterile gauze pad.     3) Apply Vaseline, Aquaphor, Polysporin ointment or Bacitracin ointment over entire wound.  Do NOT use Neosporin ointment.     4) Cover the wound with a band-aid, or a sterile non-stick gauze pad and micropore paper tape      REPEAT THESE INSTRUCTIONS AT LEAST ONCE A DAY UNTIL THE WOUND HAS COMPLETELY HEALED.    It is an old wives tale that a wound heals better when it is exposed to air and allowed to dry out. The wound will heal faster with a better cosmetic result if it is kept moist with ointment and covered with a bandage.    **Do not let the wound dry out.**      Supplies Needed:      *Cotton tipped applicators (Q-tips)    *Polysporin Ointment or Bacitracin Ointment (NOT NEOSPORIN)    *Band-aids or non-stick gauze pads and micropore paper tape.      PATIENT INFORMATION:    During the healing process you will notice a number of changes. All wounds develop a small halo of redness surrounding the wound.  This means healing is occurring. Severe itching with extensive redness usually indicates sensitivity to the ointment or bandage tape used to dress the wound.  You should call our office if this develops.      Swelling  and/or discoloration around your surgical site is common, particularly when performed around the eye.    All wounds normally drain.  The larger the wound the more drainage there will be.  After 7-10 days, you will notice the wound beginning to shrink and new skin will begin to grow.  The wound is healed when you can see skin has formed over the entire area.  A healed wound has a healthy, shiny look to the surface and is red to dark pink in color to normalize.  Wounds may take approximately 4-6 weeks to  heal.  Larger wounds may take 6-8 weeks.  After the wound is healed you may discontinue dressing changes.    You may experience a sensation of tightness as your wound heals. This is normal and will gradually subside.    Your healed wound may be sensitive to temperature changes. This sensitivity improves with time, but if you re having a lot of discomfort, try to avoid temperature extremes.    Patients frequently experience itching after their wound appears to have healed because of the continue healing under the skin.  Plain Vaseline will help relieve the itching.        POSSIBLE COMPLICATIONS    BLEEDIN. Leave the bandage in place.  7. Use tightly rolled up gauze or a cloth to apply direct pressure over the bandage for 30  minutes.  8. Reapply pressure for an additional 30 minutes if necessary  9. Use additional gauze and tape to maintain pressure once the bleeding has stopped.

## 2017-05-03 NOTE — TELEPHONE ENCOUNTER
"Patient notified. Patient verbalized understanding. She stated: \"I was told that at the appointment and I need to schedule Mohs surgery..\" She will need to call back to schedule Mohs surgery. Advised she will need a  due to location of lesion. Sharmila Ramos RN    "

## 2017-05-03 NOTE — MR AVS SNAPSHOT
After Visit Summary   5/3/2017    Yudelka Beckett    MRN: [id number]           Patient Information     Date Of Birth          1940        Visit Information        Provider Department      5/3/2017 8:00 AM Wilmer Mcclure MD [facility]        Today's Diagnoses     Basal cell carcinoma of left cheek    -  1    Basal cell carcinoma of left orbit        SK (seborrheic keratosis)        Lentigo        Angioma          Care Instructions        Sutured Wound Care     [facility]: [phone]    [facility]: [phone]    Lower left cheek          ? No strenuous activity for 48 hours. Resume moderate activity in 48 hours. No heavy exercising until you are seen for follow up in one week.     ? Take Tylenol as needed for discomfort.                         ? Do not drink alcoholic beverages for 48 hours.     ? Keep the pressure bandage in place for 24 hours. If the bandage becomes blood tinged or loose, reinforce it with gauze and tape.        (Refer to the reverse side of this page for management of bleeding).    ? Remove pressure bandage in 24 hours     ? Leave the flat bandage in place until your follow up appointment.    ? Keep the bandage dry. Wash around it carefully.    ? If the tape becomes soiled or starts to come off, reinforce it with additional paper tape.    ? Do not smoke for 3 weeks; smoking is detrimental to wound healing.    ? It is normal to have swelling and bruising around the surgical site. The bruising will fade in approximately 10-14 days. Elevate the area to reduce swelling.    ? Numbness, itchiness and sensitivity to temperature changes can occur after surgery and may take up to 18 months to normalize.      POSSIBLE COMPLICATIONS    BLEEDIN. Leave the bandage in place.  2. Use tightly rolled up gauze or a cloth to apply direct pressure over the bandage for 20   minutes.  3. Reapply pressure for an additional 20 minutes if  necessary  4. Call the office or go to the nearest emergency room if pressure fails to stop the bleeding.  5. Use additional gauze and tape to maintain pressure once the bleeding has stopped.        PAIN:    1. Post operative pain should slowly get better, never worse.  2. A severe increase in pain may indicate a problem. Call the office if this occurs.    In case of emergency phone:Dr Mcclure [phone]      Wound Care Instructions     FOR SUPERFICIAL WOUNDS     [facility] [phone]    [facility] [phone]  Top of left cheek                       AFTER 24 HOURS YOU SHOULD REMOVE THE BANDAGE AND BEGIN DAILY DRESSING CHANGES AS FOLLOWS:     1) Remove Dressing.     2) Clean and dry the area with tap water using a Q-tip or sterile gauze pad.     3) Apply Vaseline, Aquaphor, Polysporin ointment or Bacitracin ointment over entire wound.  Do NOT use Neosporin ointment.     4) Cover the wound with a band-aid, or a sterile non-stick gauze pad and micropore paper tape      REPEAT THESE INSTRUCTIONS AT LEAST ONCE A DAY UNTIL THE WOUND HAS COMPLETELY HEALED.    It is an old wives tale that a wound heals better when it is exposed to air and allowed to dry out. The wound will heal faster with a better cosmetic result if it is kept moist with ointment and covered with a bandage.    **Do not let the wound dry out.**      Supplies Needed:      *Cotton tipped applicators (Q-tips)    *Polysporin Ointment or Bacitracin Ointment (NOT NEOSPORIN)    *Band-aids or non-stick gauze pads and micropore paper tape.      PATIENT INFORMATION:    During the healing process you will notice a number of changes. All wounds develop a small halo of redness surrounding the wound.  This means healing is occurring. Severe itching with extensive redness usually indicates sensitivity to the ointment or bandage tape used to dress the wound.  You should call our office if this develops.      Swelling  and/or discoloration  around your surgical site is common, particularly when performed around the eye.    All wounds normally drain.  The larger the wound the more drainage there will be.  After 7-10 days, you will notice the wound beginning to shrink and new skin will begin to grow.  The wound is healed when you can see skin has formed over the entire area.  A healed wound has a healthy, shiny look to the surface and is red to dark pink in color to normalize.  Wounds may take approximately 4-6 weeks to heal.  Larger wounds may take 6-8 weeks.  After the wound is healed you may discontinue dressing changes.    You may experience a sensation of tightness as your wound heals. This is normal and will gradually subside.    Your healed wound may be sensitive to temperature changes. This sensitivity improves with time, but if you re having a lot of discomfort, try to avoid temperature extremes.    Patients frequently experience itching after their wound appears to have healed because of the continue healing under the skin.  Plain Vaseline will help relieve the itching.        POSSIBLE COMPLICATIONS    BLEEDIN. Leave the bandage in place.  7. Use tightly rolled up gauze or a cloth to apply direct pressure over the bandage for 30  minutes.  8. Reapply pressure for an additional 30 minutes if necessary  9. Use additional gauze and tape to maintain pressure once the bleeding has stopped.          Follow-ups after your visit        Who to contact     If you have questions or need follow up information about today's clinic visit or your schedule please contact [facility] directly at [phone].  Normal or non-critical lab and imaging results will be communicated to you by MyChart, letter or phone within 4 business days after the clinic has received the results. If you do not hear from us within 7 days, please contact the clinic through MyChart or phone. If you have a critical or abnormal lab result, we will notify you by phone  "as soon as possible.  Submit refill requests through [employer] or call your pharmacy and they will forward the refill request to us. Please allow 3 business days for your refill to be completed.          Additional Information About Your Visit        [employer] Information     [employer] lets you send messages to your doctor, view your test results, renew your prescriptions, schedule appointments and more. To sign up, go to [email or web address] . Click on \"Log in\" on the left side of the screen, which will take you to the Welcome page. Then click on \"Sign up Now\" on the right side of the page.     You will be asked to enter the access code listed below, as well as some personal information. Please follow the directions to create your username and password.     Your access code is: [email or web address]  Expires: 2017 10:50 AM     Your access code will  in 90 days. If you need help or a new code, please call your [facility] clinic or [phone].        Care EveryWhere ID     This is your Care EveryWhere ID. This could be used by other organizations to access your [facility] medical records  [id number]        Your Vitals Were     Pulse Pulse Oximetry                96 99%           Blood Pressure from Last 3 Encounters:   17 150/83   17 138/74   17 152/78    Weight from Last 3 Encounters:   17 76.7 kg (169 lb)   17 76.7 kg (169 lb)   11/10/16 77.6 kg (171 lb)              We Performed the Following     BIOPSY SKIN/SUBQ/MUC MEM, SINGLE LESION     CL FROZEN SECTION FIRST SPEC     MOHS HEAD/NCK/HND/FT/GEN 1ST STAGE UP T0 5 BLOCKS     REPAIR COMPLEX, WOUND HEAD/AXIL/GEN/HND/FT 2.6-7.5 CM        Primary Care Provider Office Phone # Fax #    Azalea Tobar [phone]       [facility] [address] [facility] [address]        Thank you!     Thank you for choosing [facility]  for your care. Our goal is always to provide you with excellent care. Hearing " back from our patients is one way we can continue to improve our services. Please take a few minutes to complete the written survey that you may receive in the mail after your visit with us. Thank you!             Your Updated Medication List - Protect others around you: Learn how to safely use, store and throw away your medicines at www.disposemymeds.org.          This list is accurate as of: 5/3/17 10:55 AM.  Always use your most recent med list.                   Brand Name Dispense Instructions for use    albuterol 108 (90 BASE) MCG/ACT Inhaler    PROAIR HFA/PROVENTIL HFA/VENTOLIN HFA    1 Inhaler    Inhale 2 puffs into the lungs every 6 hours       atorvastatin 20 MG tablet    LIPITOR    90 tablet    Take 1 tablet (20 mg) by mouth daily       diltiazem 240 MG 24 hr capsule     90 capsule    Take 1 capsule (240 mg) by mouth daily       esomeprazole 40 MG CR capsule    nexIUM    90 capsule    Take 1 capsule (40 mg) by mouth every morning (before breakfast) One hour before meals fill when needed       fluticasone 110 MCG/ACT Inhaler    FLOVENT HFA    1 Inhaler    2 puffs BID       IBUPROFEN      3 times daily       ipratropium 17 MCG/ACT Inhaler    ATROVENT HFA    1 Inhaler    Inhale 2 puffs into the lungs 4 times daily as needed       levothyroxine 88 MCG tablet    SYNTHROID/LEVOTHROID    90 tablet    Take 1 tablet (88 mcg) by mouth daily       lisinopril 20 MG tablet    PRINIVIL/ZESTRIL    90 tablet    Take 1 tablet (20 mg) by mouth daily Fill when needed

## 2017-05-03 NOTE — TELEPHONE ENCOUNTER
----- Message from Wilmer Mcclure MD sent at 5/3/2017 10:50 AM CDT -----    L inferior orbit basal cell carcinoma schedule

## 2017-05-03 NOTE — PROGRESS NOTES
Yudelka Beckett is a 76 year old year old female patient here today in consultation for basal cell carcinoma on left cheek by Dr. Tobar.  Patient states this has been present for a while.  Location  Cheek .  Patient reports the following symptoms:  Not healing .  Patient reports the following previous treatments none.  Patient reports the following modifying factors none.  Associated symptoms: none.  Patient has no other skin complaints today.  Remainder of the HPI, Meds, PMH, Allergies, FH, and SH was reviewed in chart.    Pertinent Hx:   Non-melanoma skin cancer   Past Medical History:   Diagnosis Date     Basal cell carcinoma      Esophageal reflux      Other and unspecified hyperlipidemia      Unspecified essential hypertension      Unspecified hypothyroidism        Past Surgical History:   Procedure Laterality Date     COLONOSCOPY  2/7/2011    COLONOSCOPY performed by BHARTI DUNHAM at [facility]        Family History   Problem Relation Age of Onset     CEREBROVASCULAR DISEASE Mother      HEART DISEASE Father      CANCER Maternal Grandmother      DIABETES Paternal Grandmother        Social History     Social History     Marital status:      Spouse name: N/A     Number of children: N/A     Years of education: N/A     Occupational History     Not on file.     Social History Main Topics     Smoking status: Former Smoker     Years: 15.00     Types: Cigarettes     Quit date: 1/1/1985     Smokeless tobacco: Never Used     Alcohol use No     Drug use: No     Sexual activity: No     Other Topics Concern      Service No     Blood Transfusions No     Caffeine Concern No     Occupational Exposure No     Hobby Hazards No     Sleep Concern No     Stress Concern Yes     just todays visit     Weight Concern No     Special Diet No     Back Care No     Exercise Yes     lots of farm work     Bike Helmet No     NA     Seat Belt No     Self-Exams Yes     sometimes     Parent/Sibling W/ Cabg, Mi Or  Angioplasty Before 65f 55m? No     Social History Narrative       Outpatient Encounter Prescriptions as of 5/3/2017   Medication Sig Dispense Refill     albuterol (PROAIR HFA/PROVENTIL HFA/VENTOLIN HFA) 108 (90 BASE) MCG/ACT Inhaler Inhale 2 puffs into the lungs every 6 hours 1 Inhaler 5     ipratropium (ATROVENT HFA) 17 MCG/ACT inhaler Inhale 2 puffs into the lungs 4 times daily as needed 1 Inhaler 11     diltiazem 240 MG 24 hr ER capsule Take 1 capsule (240 mg) by mouth daily 90 capsule 3     atorvastatin (LIPITOR) 20 MG tablet Take 1 tablet (20 mg) by mouth daily 90 tablet 3     esomeprazole (NEXIUM) 40 MG capsule Take 1 capsule (40 mg) by mouth every morning (before breakfast) One hour before meals fill when needed 90 capsule 3     levothyroxine (SYNTHROID, LEVOTHROID) 88 MCG tablet Take 1 tablet (88 mcg) by mouth daily 90 tablet 3     lisinopril (PRINIVIL,ZESTRIL) 20 MG tablet Take 1 tablet (20 mg) by mouth daily Fill when needed 90 tablet 3     fluticasone (FLOVENT HFA) 110 MCG/ACT inhaler 2 puffs BID 1 Inhaler prn     IBUPROFEN 3 times daily        No facility-administered encounter medications on file as of 5/3/2017.              Review Of Systems  Skin: As above  Eyes: negative  Ears/Nose/Throat: negative  Respiratory: No shortness of breath, dyspnea on exertion, cough, or hemoptysis  Cardiovascular: negative  Gastrointestinal: negative  Genitourinary: negative  Musculoskeletal: negative  Neurologic: negative  Psychiatric: negative  Hematologic/Lymphatic/Immunologic: negative  Endocrine: negative      O:   NAD, WDWN, Alert & Oriented, Mood & Affect wnl, Vitals stable   Here today alone   /83  Pulse 96  SpO2 99%   General appearance azeem ii   Vitals stable   Alert, oriented and in no acute distress      Following lymph nodes palpated: Occipital, Cervical, Supraclavicular no lad   L cheek 1.1cm red plaque   L inferior orbit 5mm pink scaly papule    Stuck on papules and brown macules on trunk and ext     Red papules on trunk         The remainder of the full exam was unremarkable; the following areas were examined:  conjunctiva/lids, oral mucosa, neck, peripheral vascular system, abdomen, lymph nodes, digits/nails, eccrine and apocrine glands, scalp/hair, face, neck, chest, abdomen, buttocks, back, RUE, LUE, RLE, LLE       Eyes: Conjunctivae/lids:Normal     ENT: Lips, buccal mucosa, tongue: normal    MSK:Normal    Cardiovascular: peripheral edema none    Pulm: Breathing Normal    Lymph Nodes: No Head and Neck Lymphadenopathy     Neuro/Psych: Orientation:Normal; Mood/Affect:Normal      MICRO:   L inferior orbit:Orthokeratosis of epidermis with a proliferation of nests of basaloid cells, with peripheral palisading and a haphazard arrangement in the center extending into the dermis, forming nodules.  The tumor cells have hyperchromatic nuclei. Poor cytoplasm and intercellular bridging.    A/P:  1. L inf orbit r/o basal cell carcinoma   TANGENTIAL BIOPSY IN HOUSE:  After consent, anesthesia with LEC and prep, tangential excision performed and dx above confirmed with frozen section histology.  No complications and routine wound care.  Patient told result basal cell carcinoma schedule    2. Basal cell carcinoma cheek  2. Seborrheic keratosis, lentigo, angioma  .      BENIGN LESIONS DISCUSSED WITH PATIENT:  I discussed the specifics of tumor, prognosis, and genetics of benign lesions.  I explained that treatment of these lesions would be purely cosmetic and not medically neccessary.  I discussed with patient different removal options including excision, cautery and /or laser.      Nature and genetics of benign skin lesions dicussed with patient.  Signs and Symptoms of skin cancer discussed with patient.  ABCDEs of melanoma reviewed with patient.  Patient encouraged to perform monthly skin exams.  UV precautions reviewed with patient.  Skin care regimen reviewed with patient: Eliminate harsh soaps, i.e. Dial, zest, irsih  spring; Mild soaps such as Cetaphil or Dove sensitive skin, avoid hot or cold showers, aggressive use of emollients including vanicream, cetaphil or cerave discussed with patient.    Risks of non-melanoma skin cancer discussed with patient   Return to clinic bext appt    PROCEDURE NOTE  L cheek basal cell carcinoma   MOHS:   Location    After PGACAC discussed with patient, decision for Mohs surgery was made. Indication for Mohs was Location. Patient confirmed the site with Dr. Mcclure.  After anesthesia with LEC, the tumor was excised using standard Mohs technique in 1 stages(s).  CLEAR MARGINS OBTAINED and Final defect size was 2 cm.       REPAIR COMPLEX: Because of the tightness of the surrounding skin and Because of the size and full thickness nature of the defect, a complex closure was planned. After LEC anesthesia and prep, Burow's triangles were excised in the relaxed skin tension lines. The wound edges were widely undermined by dissection in the subcutaneous plane until adequate tissue mobility was obtained. Hemostasis was obtained. The wound edges were closed in a layered fashion using Vicryl and Fast Absorbing Plain Gut sutures. Postoperative length was 4.9 cm.   EBL minimal; complications none; wound care routine.  The patient was discharged in good condition and will return in one week for wound evaluation.

## 2017-05-03 NOTE — NURSING NOTE
"Initial /83  Pulse 96  SpO2 99% Estimated body mass index is 29.47 kg/(m^2) as calculated from the following:    Height as of 1/24/17: 1.613 m (5' 3.5\").    Weight as of 1/30/17: 76.7 kg (169 lb). .      "

## 2017-05-10 ENCOUNTER — ALLIED HEALTH/NURSE VISIT (OUTPATIENT)
Dept: DERMATOLOGY | Facility: CLINIC | Age: 77
End: 2017-05-10
Payer: COMMERCIAL

## 2017-05-10 DIAGNOSIS — Z48.01 ENCOUNTER FOR CHANGE OR REMOVAL OF SURGICAL WOUND DRESSING: Primary | ICD-10-CM

## 2017-05-10 PROCEDURE — 99207 ZZC NO CHARGE NURSE ONLY: CPT

## 2017-05-10 NOTE — NURSING NOTE
Pt returned to clinic for post surgery 1 week follow up bandage change. Pt has no complaints, denies pain. Bandage removed from left cheek, area cleansed with normal saline. Site is healing and wound edges approximating well. Reapplied new steri strips and paper tape.    Advised to watch for signs/sx of infection; spreading redness, drainage, odor, fever. Call or report promptly to clinic. Pt given written instructions and informed to rtc as needed. Patient verbalized understanding.

## 2017-05-10 NOTE — PATIENT INSTRUCTIONS
WOUND CARE INSTRUCTIONS  for  ONE WEEK AFTER SURGERY          1) Leave flat bandage on your skin for one week after today s bandage change.  2) In one week when you remove the bandage, you may resume your regular skin care routine, including washing with mild soap and water, applying moisturizer, make-up and sunscreen.    3) If there are any open or bleeding areas at the incision/graft site you should begin to cover the area with a bandage daily as follows:    1) Clean and dry the area with plain tap water using a Q-tip or sterile gauze pad.  2) Apply Polysporin or Bacitracin ointment to the open area.  3) Cover the wound with a band-aid or a sterile non-stick gauze pad and micropore paper tape.             *Once the bandages are removed, the scar will be red and firm (especially in the lip/chin area). This is normal and will fade in time. It might take 6-12 months for this to happen.     *Massaging the area will help the scar soften and fade quicker. Begin to massage the area one month after the bandages have been removed. To massage apply pressure directly and firmly over the scar with the fingertips and move in a circular motion. Massage the area for a few minutes several times a day. Continue to massage the site for several months.    *Approximately 6-8 weeks after surgery it is not uncommon to see the formation of  tender pimple-like  bump along the scar. This is normal. As the scar continues to mature and the stitches underneath the skin begin to dissolve, this might occur. Do not pick or squeeze, this will resolve on it s own. Should one break open producing a small amount of drainage, apply Polysporin or Bacitracin ointment a few times a day until the wound is completely healed.    *Numbness in the surgical area is expected. It might take 12-18 months for the feeling to return to normal. During this time sensations of itchiness, tingling and occasional sharp pains might be noted. These feelings are normal  and will subside once the nerves have completely healed.         IN CASE OF EMERGENCY: Dr Mcclure [phone]     If you were seen in [place] call: [phone]    If you were seen in [place] call: [phone]

## 2017-05-10 NOTE — MR AVS SNAPSHOT
After Visit Summary   5/10/2017    Yudelka Beckett    MRN: [id number]           Patient Information     Date Of Birth          1940        Visit Information        Provider Department      5/10/2017 1:45 PM Ede Alvarado [facility]        Care Instructions    WOUND CARE INSTRUCTIONS  for  ONE WEEK AFTER SURGERY          1) Leave flat bandage on your skin for one week after today s bandage change.  2) In one week when you remove the bandage, you may resume your regular skin care routine, including washing with mild soap and water, applying moisturizer, make-up and sunscreen.    3) If there are any open or bleeding areas at the incision/graft site you should begin to cover the area with a bandage daily as follows:    1) Clean and dry the area with plain tap water using a Q-tip or sterile gauze pad.  2) Apply Polysporin or Bacitracin ointment to the open area.  3) Cover the wound with a band-aid or a sterile non-stick gauze pad and micropore paper tape.             *Once the bandages are removed, the scar will be red and firm (especially in the lip/chin area). This is normal and will fade in time. It might take 6-12 months for this to happen.     *Massaging the area will help the scar soften and fade quicker. Begin to massage the area one month after the bandages have been removed. To massage apply pressure directly and firmly over the scar with the fingertips and move in a circular motion. Massage the area for a few minutes several times a day. Continue to massage the site for several months.    *Approximately 6-8 weeks after surgery it is not uncommon to see the formation of  tender pimple-like  bump along the scar. This is normal. As the scar continues to mature and the stitches underneath the skin begin to dissolve, this might occur. Do not pick or squeeze, this will resolve on it s own. Should one break open producing a small amount of drainage, apply Polysporin or  "Bacitracin ointment a few times a day until the wound is completely healed.    *Numbness in the surgical area is expected. It might take 12-18 months for the feeling to return to normal. During this time sensations of itchiness, tingling and occasional sharp pains might be noted. These feelings are normal and will subside once the nerves have completely healed.         IN CASE OF EMERGENCY: Dr Mcclure [phone]     If you were seen in [place] call: [phone]    If you were seen in [place] call: [phone]            Follow-ups after your visit        Your next 10 appointments already scheduled     May 19, 2017  1:20 PM CDT   SHORT with Azalea Tobar MD   [facility] ([facility])    [facility] [address] [place] [id number] [phone]              Who to contact     If you have questions or need follow up information about today's clinic visit or your schedule please contact [facility] directly at [phone].  Normal or non-critical lab and imaging results will be communicated to you by Advestigohart, letter or phone within 4 business days after the clinic has received the results. If you do not hear from us within 7 days, please contact the clinic through Advestigohart or phone. If you have a critical or abnormal lab result, we will notify you by phone as soon as possible.  Submit refill requests through Walldress or call your pharmacy and they will forward the refill request to us. Please allow 3 business days for your refill to be completed.          Additional Information About Your Visit        Walldress Information     Walldress lets you send messages to your doctor, view your test results, renew your prescriptions, schedule appointments and more. To sign up, go to [email or web address] . Click on \"Log in\" on the left side of the screen, which will take you to the Welcome page. Then click on \"Sign up Now\" on the right side of the page.     You will be asked to " enter the access code listed below, as well as some personal information. Please follow the directions to create your username and password.     Your access code is: [email or web address]  Expires: 2017 10:50 AM     Your access code will  in 90 days. If you need help or a new code, please call your [facility] clinic or [phone].        Care EveryWhere ID     This is your Care EveryWhere ID. This could be used by other organizations to access your [facility] medical records  [id number]         Blood Pressure from Last 3 Encounters:   17 150/83   17 138/74   17 152/78    Weight from Last 3 Encounters:   17 76.7 kg (169 lb)   17 76.7 kg (169 lb)   11/10/16 77.6 kg (171 lb)              Today, you had the following     No orders found for display       Primary Care Provider Office Phone # Fax #    Azalea Tobar [phone]       [facility] [address] [place] [address]        Thank you!     Thank you for choosing [facility]  for your care. Our goal is always to provide you with excellent care. Hearing back from our patients is one way we can continue to improve our services. Please take a few minutes to complete the written survey that you may receive in the mail after your visit with us. Thank you!             Your Updated Medication List - Protect others around you: Learn how to safely use, store and throw away your medicines at www.disposemymeds.org.          This list is accurate as of: 5/10/17  1:57 PM.  Always use your most recent med list.                   Brand Name Dispense Instructions for use    albuterol 108 (90 BASE) MCG/ACT Inhaler    PROAIR HFA/PROVENTIL HFA/VENTOLIN HFA    1 Inhaler    Inhale 2 puffs into the lungs every 6 hours       atorvastatin 20 MG tablet    LIPITOR    90 tablet    Take 1 tablet (20 mg) by mouth daily       diltiazem 240 MG 24 hr capsule     90 capsule    Take 1 capsule (240 mg) by mouth daily        esomeprazole 40 MG CR capsule    nexIUM    90 capsule    Take 1 capsule (40 mg) by mouth every morning (before breakfast) One hour before meals fill when needed       fluticasone 110 MCG/ACT Inhaler    FLOVENT HFA    1 Inhaler    2 puffs BID       IBUPROFEN      3 times daily       ipratropium 17 MCG/ACT Inhaler    ATROVENT HFA    1 Inhaler    Inhale 2 puffs into the lungs 4 times daily as needed       levothyroxine 88 MCG tablet    SYNTHROID/LEVOTHROID    90 tablet    Take 1 tablet (88 mcg) by mouth daily       lisinopril 20 MG tablet    PRINIVIL/ZESTRIL    90 tablet    Take 1 tablet (20 mg) by mouth daily Fill when needed

## 2017-05-19 ENCOUNTER — OFFICE VISIT (OUTPATIENT)
Dept: FAMILY MEDICINE | Facility: CLINIC | Age: 77
End: 2017-05-19
Payer: COMMERCIAL

## 2017-05-19 VITALS
HEART RATE: 73 BPM | DIASTOLIC BLOOD PRESSURE: 69 MMHG | OXYGEN SATURATION: 99 % | BODY MASS INDEX: 29.99 KG/M2 | WEIGHT: 172 LBS | SYSTOLIC BLOOD PRESSURE: 139 MMHG | TEMPERATURE: 97.4 F

## 2017-05-19 DIAGNOSIS — E03.9 ACQUIRED HYPOTHYROIDISM: ICD-10-CM

## 2017-05-19 DIAGNOSIS — I10 ESSENTIAL HYPERTENSION WITH GOAL BLOOD PRESSURE LESS THAN 140/90: ICD-10-CM

## 2017-05-19 DIAGNOSIS — J45.20 MILD INTERMITTENT ASTHMA, UNCOMPLICATED: ICD-10-CM

## 2017-05-19 DIAGNOSIS — K21.9 GASTROESOPHAGEAL REFLUX DISEASE WITHOUT ESOPHAGITIS: ICD-10-CM

## 2017-05-19 DIAGNOSIS — E78.5 HYPERLIPIDEMIA LDL GOAL <130: ICD-10-CM

## 2017-05-19 LAB
ALBUMIN SERPL-MCNC: 3.9 G/DL (ref 3.4–5)
ALP SERPL-CCNC: 165 U/L (ref 40–150)
ALT SERPL W P-5'-P-CCNC: 18 U/L (ref 0–50)
ANION GAP SERPL CALCULATED.3IONS-SCNC: 10 MMOL/L (ref 3–14)
AST SERPL W P-5'-P-CCNC: 16 U/L (ref 0–45)
BILIRUB SERPL-MCNC: 0.4 MG/DL (ref 0.2–1.3)
BUN SERPL-MCNC: 22 MG/DL (ref 7–30)
CALCIUM SERPL-MCNC: 9.3 MG/DL (ref 8.5–10.1)
CHLORIDE SERPL-SCNC: 104 MMOL/L (ref 94–109)
CO2 SERPL-SCNC: 24 MMOL/L (ref 20–32)
CREAT SERPL-MCNC: 0.76 MG/DL (ref 0.52–1.04)
ERYTHROCYTE [DISTWIDTH] IN BLOOD BY AUTOMATED COUNT: 13.6 % (ref 10–15)
GFR SERPL CREATININE-BSD FRML MDRD: 74 ML/MIN/1.7M2
GLUCOSE SERPL-MCNC: 102 MG/DL (ref 70–99)
HCT VFR BLD AUTO: 37 % (ref 35–47)
HGB BLD-MCNC: 12.3 G/DL (ref 11.7–15.7)
MCH RBC QN AUTO: 29.4 PG (ref 26.5–33)
MCHC RBC AUTO-ENTMCNC: 33.2 G/DL (ref 31.5–36.5)
MCV RBC AUTO: 89 FL (ref 78–100)
PLATELET # BLD AUTO: 364 10E9/L (ref 150–450)
POTASSIUM SERPL-SCNC: 3.7 MMOL/L (ref 3.4–5.3)
PROT SERPL-MCNC: 8.1 G/DL (ref 6.8–8.8)
RBC # BLD AUTO: 4.18 10E12/L (ref 3.8–5.2)
SODIUM SERPL-SCNC: 138 MMOL/L (ref 133–144)
TSH SERPL DL<=0.05 MIU/L-ACNC: 3.35 MU/L (ref 0.4–4)
WBC # BLD AUTO: 9.3 10E9/L (ref 4–11)

## 2017-05-19 PROCEDURE — 99214 OFFICE O/P EST MOD 30 MIN: CPT | Performed by: FAMILY MEDICINE

## 2017-05-19 PROCEDURE — 36415 COLL VENOUS BLD VENIPUNCTURE: CPT | Performed by: FAMILY MEDICINE

## 2017-05-19 PROCEDURE — 85027 COMPLETE CBC AUTOMATED: CPT | Performed by: FAMILY MEDICINE

## 2017-05-19 PROCEDURE — 84443 ASSAY THYROID STIM HORMONE: CPT | Performed by: FAMILY MEDICINE

## 2017-05-19 PROCEDURE — 80053 COMPREHEN METABOLIC PANEL: CPT | Performed by: FAMILY MEDICINE

## 2017-05-19 RX ORDER — FLUTICASONE PROPIONATE 110 UG/1
AEROSOL, METERED RESPIRATORY (INHALATION)
Qty: 1 INHALER | Status: SHIPPED | OUTPATIENT
Start: 2017-05-19 | End: 2018-04-12

## 2017-05-19 RX ORDER — DILTIAZEM HYDROCHLORIDE 240 MG/1
240 CAPSULE, EXTENDED RELEASE ORAL DAILY
Qty: 90 CAPSULE | Refills: 3 | Status: SHIPPED | OUTPATIENT
Start: 2017-05-19 | End: 2018-04-12

## 2017-05-19 RX ORDER — ATORVASTATIN CALCIUM 20 MG/1
20 TABLET, FILM COATED ORAL DAILY
Qty: 90 TABLET | Refills: 3 | Status: SHIPPED | OUTPATIENT
Start: 2017-05-19 | End: 2018-04-12

## 2017-05-19 RX ORDER — LISINOPRIL 20 MG/1
20 TABLET ORAL DAILY
Qty: 90 TABLET | Refills: 3 | Status: SHIPPED | OUTPATIENT
Start: 2017-05-19 | End: 2018-04-12

## 2017-05-19 RX ORDER — LEVOTHYROXINE SODIUM 88 UG/1
88 TABLET ORAL DAILY
Qty: 90 TABLET | Refills: 3 | Status: SHIPPED | OUTPATIENT
Start: 2017-05-19 | End: 2018-04-12

## 2017-05-19 RX ORDER — ESOMEPRAZOLE MAGNESIUM 40 MG/1
40 CAPSULE, DELAYED RELEASE ORAL
Qty: 90 CAPSULE | Refills: 3 | Status: SHIPPED | OUTPATIENT
Start: 2017-05-19 | End: 2018-04-12

## 2017-05-19 NOTE — PATIENT INSTRUCTIONS
Control Inhaler: Flovent (take daily)  Rescue Inhaler: Albuterol (take as needed)  Rescue or Control: Atrovent (may take daily during bad periods)    Labs today

## 2017-05-19 NOTE — PROGRESS NOTES
SUBJECTIVE:                                                    Yudelka Beckett is a 76 year old female who presents to clinic today for the following health issues:    Hyperlipidemia Follow-Up      Rate your low fat/cholesterol diet?: not monitoring fat    Taking statin?  Yes, no muscle aches from statin    Other lipid medications/supplements?:  none     Recent Labs   Lab Test  12/05/16   1057  10/27/15   1103  09/25/14   1102   CHOL  171  177  189   HDL  47*  50*  48*   LDL  88  90  101   TRIG  180*  184*  201*   CHOLHDLRATIO   --   3.5  3.9   Atorvastatin 20 mg      Hypertension Follow-up      Outpatient blood pressures are being checked at home.  Results are 131/78.    Low Salt Diet: not monitoring salt     BP Readings from Last 6 Encounters:   05/19/17 139/69   05/03/17 150/83   03/29/17 138/74   01/30/17 152/78   01/24/17 162/90   11/10/16 158/86   Diltiazem, lisinopril 20 mg      Asthma Follow-Up  Was ACT completed today?    Yes    ACT Total Scores 11/18/2016   ACT TOTAL SCORE -   ASTHMA ER VISITS -   ASTHMA HOSPITALIZATIONS -   ACT TOTAL SCORE (Goal Greater than or Equal to 20) 18   In the past 12 months, how many times did you visit the emergency room for your asthma without being admitted to the hospital? 0   In the past 12 months, how many times were you hospitalized overnight because of your asthma? 0   Recent asthma triggers that patient is dealing with: smoke, pollens and mold    Albuterol inhaler, Atrovent , Flovent  Her asthma is bad when it is humid and when the pollens are out, but since it has been raining, she feels much better.   She uses her Atrovent and albuterol daily. She only uses her Flovent if she gets really sick or her asthma is really bad.       Hypothyroidism Follow-up      Since last visit, patient describes the following symptoms: Weight stable, no hair loss, no skin changes, no constipation, no loose stools     TSH   Date Value Ref Range Status   05/19/2017 3.35 0.40 -  4.00 mU/L Final   05/20/2016 2.93 0.40 - 4.00 mU/L Final   05/14/2015 2.46 0.40 - 4.00 mU/L Final   05/20/2014 3.90 0.4 - 5.0 mU/L Final   02/11/2013 2.54 0.4 - 5.0 mU/L Final   Levothyroxine 88 mcg    Reflux- esomeprazole 40 mg  Her stomach has been doing well.      Amount of exercise or physical activity: None    Problems taking medications regularly: No    Medication side effects: none    Diet: regular (no restrictions)    Basal carcinoma-  She saw dermatology and had her carcinoma removed. She has a large scar, but that spot it gone. However, the dermatologist identified another cancer spot below her left eye that she will have removed.    Problem list and histories reviewed & adjusted, as indicated.  Additional history: as documented    BP Readings from Last 3 Encounters:   05/19/17 139/69   05/03/17 150/83   03/29/17 138/74    Wt Readings from Last 3 Encounters:   05/19/17 172 lb (78 kg)   01/30/17 169 lb (76.7 kg)   01/24/17 169 lb (76.7 kg)         Reviewed and updated as needed this visit by clinical staff  Tobacco  Allergies  Med Hx  Surg Hx  Fam Hx  Soc Hx      ROS:  CONSTITUTIONAL:NEGATIVE for fever, chills, change in weight  INTEGUMENTARY/SKIN: NEGATIVE for scar on face, basal cell below left eye  RESP:NEGATIVE for significant cough or SOB, POSITIVE for Hx asthma  CV: NEGATIVE for chest pain, palpitations or peripheral edema  GI: NEGATIVE for nausea, abdominal pain, heartburn, or change in bowel habits  : negative for, dysuria, frequency, hematuria, hesitancy and incontinence  MUSCULOSKELETAL: POSITIVE  for knee pain    OBJECTIVE:                                                    /69  Pulse 73  Temp 97.4  F (36.3  C) (Tympanic)  Wt 172 lb (78 kg)  SpO2 99%  BMI 29.99 kg/m2  Body mass index is 29.99 kg/(m^2).  GENERAL: healthy, alert and no distress  NECK: no adenopathy, no asymmetry, masses, or scars and thyroid normal to palpation  RESP: lungs clear to auscultation - no rales,  rhonchi or wheezes  CV: regular rate and rhythm, normal S1 S2, no S3 or S4, 1-2/6 systolic ejection murmur, no click or rub  ABDOMEN: soft, nontender, no hepatosplenomegaly, no masses  MS: no gross musculoskeletal defects noted, no edema  SKIN: 5 mm x 4 mm pink, slightly domed shape with a little bit of a pitunder left eye.        ASSESSMENT/PLAN:                                                    Yudelka was seen today for hypertension, thyroid disease and asthma.    Diagnoses and all orders for this visit:    Essential hypertension with goal blood pressure less than 140/90  -     lisinopril (PRINIVIL/ZESTRIL) 20 MG tablet; Take 1 tablet (20 mg) by mouth daily Fill when needed  -     diltiazem 240 MG 24 hr capsule; Take 1 capsule (240 mg) by mouth daily  -     Comprehensive metabolic panel  -     CBC with platelets    Mild intermittent asthma, uncomplicated  -     fluticasone (FLOVENT HFA) 110 MCG/ACT Inhaler; 2 puffs BID    Gastroesophageal reflux disease without esophagitis  -     esomeprazole (NEXIUM) 40 MG CR capsule; Take 1 capsule (40 mg) by mouth every morning (before breakfast) One hour before meals fill when needed    Acquired hypothyroidism  -     levothyroxine (SYNTHROID/LEVOTHROID) 88 MCG tablet; Take 1 tablet (88 mcg) by mouth daily  -     TSH    Hyperlipidemia LDL goal <130  -     atorvastatin (LIPITOR) 20 MG tablet; Take 1 tablet (20 mg) by mouth daily  -     Comprehensive metabolic panel      Discussed the benefits of Singulair for her asthma and allergies, but she does not want to take any more pills at this time.     Patient Instructions   Control Inhaler: Flovent (take daily)  Rescue Inhaler: Albuterol (take as needed)  Rescue or Control: Atrovent (may take daily during bad periods)    Labs today      This document serves as a record of the services and decisions personally performed and made by Azalea Tobar MD. It was created on her behalf by Nadira Mcgill, a trained medical scribe. The  creation of this document is based the provider's statements to the medical scribe.  Nadira Artem 1:44 PM 5/19/2017    Provider:   The information in this document, created by the medical scribe for me, accurately reflects the services I personally performed and the decisions made by me. I have reviewed and approved this document for accuracy prior to leaving the patient care area.  Azalea Tobar MD 1:44 PM 5/19/2017    Azalea Tobar MD  Formerly named [facility]

## 2017-05-19 NOTE — LETTER
[facility]  [address] [facility] [address]  Phone: [phone]    May 23, 2017    Yudelka YOUNG Sujit  [address] [facility] [address]    Dear Ms. Beckett,    I am writing to inform you of the results of the laboratory tests you had done recently.     Your glucose, or blood sugar, a test for diabetes, was 102. Normal is 60-99 if fasting.  Hemoglobin measures the amount of red blood cells carrying oxygen to the body's tissues. Yours was normal.  Electrolytes, including sodium, potassium, chloride, bicarbonate and calcium are all normal salts in the bloodstream. Yours are all normal.  Urea nitrogen and creatinine are kidney function tests. Yours are normal.  ALT and AST are liver function tests. Yours are normal. Another liver function test is the alkaline phosphatase, yours is usually mildly elevated, but less than it was, so we'll keep watching that.   The TSH looks at thyroid function.  Yours was normal.    It is my pleasure to participate in your health care needs. Please feel free to call if any questions or concerns.      Sincerely,        Azalea Meehan MD

## 2017-05-19 NOTE — NURSING NOTE
"Chief Complaint   Patient presents with     Hypertension     recheck     Thyroid Disease     recheck     Asthma     recheck       Initial /69  Pulse 73  Temp 97.4  F (36.3  C) (Tympanic)  Wt 172 lb (78 kg)  SpO2 99%  BMI 29.99 kg/m2 Estimated body mass index is 29.99 kg/(m^2) as calculated from the following:    Height as of 1/24/17: 5' 3.5\" (1.613 m).    Weight as of this encounter: 172 lb (78 kg).  Medication Reconciliation: complete    "

## 2017-05-19 NOTE — MR AVS SNAPSHOT
"              After Visit Summary   5/19/2017    Yudelka Beckett    MRN: [id number]           Patient Information     Date Of Birth          1940        Visit Information        Provider Department      5/19/2017 1:20 PM Azalea Tobar MD [facility]        Today's Diagnoses     Essential hypertension with goal blood pressure less than 140/90        Mild intermittent asthma, uncomplicated        Gastroesophageal reflux disease without esophagitis        Acquired hypothyroidism        Hyperlipidemia LDL goal <130          Care Instructions    Control Inhaler: Flovent (take daily)  Rescue Inhaler: Albuterol (take as needed)  Rescue or Control: Atrovent (may take daily during bad periods)    Labs today          Follow-ups after your visit        Who to contact     If you have questions or need follow up information about today's clinic visit or your schedule please contact [facility] directly at [phone].  Normal or non-critical lab and imaging results will be communicated to you by ReVerahart, letter or phone within 4 business days after the clinic has received the results. If you do not hear from us within 7 days, please contact the clinic through MyChart or phone. If you have a critical or abnormal lab result, we will notify you by phone as soon as possible.  Submit refill requests through DBi Services or call your pharmacy and they will forward the refill request to us. Please allow 3 business days for your refill to be completed.          Additional Information About Your Visit        ReVerahart Information     DBi Services lets you send messages to your doctor, view your test results, renew your prescriptions, schedule appointments and more. To sign up, go to [email or web address] . Click on \"Log in\" on the left side of the screen, which will take you to the Welcome page. Then click on \"Sign up Now\" on the right side of the page.     You will be asked to enter the access " code listed below, as well as some personal information. Please follow the directions to create your username and password.     Your access code is: [email or web address]  Expires: 2017 10:50 AM     Your access code will  in 90 days. If you need help or a new code, please call your [facility] or [phone].        Care EveryWhere ID     This is your Care EveryWhere ID. This could be used by other organizations to access your [facility] medical records  [id number]        Your Vitals Were     Pulse Temperature Pulse Oximetry BMI (Body Mass Index)          73 97.4  F (36.3  C) (Tympanic) 99% 29.99 kg/m2         Blood Pressure from Last 3 Encounters:   17 139/69   17 150/83   17 138/74    Weight from Last 3 Encounters:   17 172 lb (78 kg)   17 169 lb (76.7 kg)   17 169 lb (76.7 kg)              We Performed the Following     Comprehensive metabolic panel     TSH          Where to get your medicines      These medications were sent to [facility] [address] [address]     Phone:  [phone]     atorvastatin 20 MG tablet    diltiazem 240 MG 24 hr capsule    esomeprazole 40 MG CR capsule    fluticasone 110 MCG/ACT Inhaler    levothyroxine 88 MCG tablet    lisinopril 20 MG tablet          Primary Care Provider Office Phone # Fax #    Azalea Tobar [phone]       [address] [address]        Thank you!     Thank you for choosing [facility]  for your care. Our goal is always to provide you with excellent care. Hearing back from our patients is one way we can continue to improve our services. Please take a few minutes to complete the written survey that you may receive in the mail after your visit with us. Thank you!             Your Updated Medication List - Protect others around you: Learn how to safely use, store and throw away your  medicines at www.disposemymeds.org.          This list is accurate as of: 5/19/17  2:00 PM.  Always use your most recent med list.                   Brand Name Dispense Instructions for use    albuterol 108 (90 BASE) MCG/ACT Inhaler    PROAIR HFA/PROVENTIL HFA/VENTOLIN HFA    1 Inhaler    Inhale 2 puffs into the lungs every 6 hours       atorvastatin 20 MG tablet    LIPITOR    90 tablet    Take 1 tablet (20 mg) by mouth daily       diltiazem 240 MG 24 hr capsule     90 capsule    Take 1 capsule (240 mg) by mouth daily       esomeprazole 40 MG CR capsule    nexIUM    90 capsule    Take 1 capsule (40 mg) by mouth every morning (before breakfast) One hour before meals fill when needed       fluticasone 110 MCG/ACT Inhaler    FLOVENT HFA    1 Inhaler    2 puffs BID       IBUPROFEN      3 times daily       ipratropium 17 MCG/ACT Inhaler    ATROVENT HFA    1 Inhaler    Inhale 2 puffs into the lungs 4 times daily as needed       levothyroxine 88 MCG tablet    SYNTHROID/LEVOTHROID    90 tablet    Take 1 tablet (88 mcg) by mouth daily       lisinopril 20 MG tablet    PRINIVIL/ZESTRIL    90 tablet    Take 1 tablet (20 mg) by mouth daily Fill when needed

## 2017-10-20 DIAGNOSIS — J45.20 MILD INTERMITTENT ASTHMA, UNCOMPLICATED: ICD-10-CM

## 2017-10-20 RX ORDER — IPRATROPIUM BROMIDE 17 UG/1
AEROSOL, METERED RESPIRATORY (INHALATION)
Qty: 12.9 G | Refills: 1 | Status: SHIPPED | OUTPATIENT
Start: 2017-10-20 | End: 2018-03-01

## 2017-10-20 NOTE — TELEPHONE ENCOUNTER
Refilled per RN protocol and per Dr. Tobar's last office visit on 5-19-17 with approval for patient ok to use during bad periods. RYAN Acuña

## 2017-10-20 NOTE — TELEPHONE ENCOUNTER
Pt is completely out requesting to be filled today.    Atrovent       Last Written Prescription Date: 8/22/16  Last Fill Quantity: 1, # refills: 11    Last Office Visit with G, P or [facility] prescribing provider:  5/19/17 Dr. Shefali Torres [facility]     Future Office Visit:       Date of Last Asthma Action Plan Letter:   Asthma Action Plan Q1 Year    Topic Date Due     Asthma Action Plan - yearly  06/29/2017      Asthma Control Test:   ACT Total Scores 11/18/2016   ACT TOTAL SCORE -   ASTHMA ER VISITS -   ASTHMA HOSPITALIZATIONS -   ACT TOTAL SCORE (Goal Greater than or Equal to 20) 18   In the past 12 months, how many times did you visit the emergency room for your asthma without being admitted to the hospital? 0   In the past 12 months, how many times were you hospitalized overnight because of your asthma? 0       Date of Last Spirometry Test:   No results found for this or any previous visit.

## 2017-10-23 ENCOUNTER — OFFICE VISIT (OUTPATIENT)
Dept: DERMATOLOGY | Facility: CLINIC | Age: 77
End: 2017-10-23
Payer: COMMERCIAL

## 2017-10-23 VITALS — HEART RATE: 87 BPM | OXYGEN SATURATION: 96 % | DIASTOLIC BLOOD PRESSURE: 73 MMHG | SYSTOLIC BLOOD PRESSURE: 131 MMHG

## 2017-10-23 DIAGNOSIS — C44.111: Primary | ICD-10-CM

## 2017-10-23 PROCEDURE — 13152 CMPLX RPR E/N/E/L 2.6-7.5 CM: CPT | Performed by: DERMATOLOGY

## 2017-10-23 PROCEDURE — 17311 MOHS 1 STAGE H/N/HF/G: CPT | Performed by: DERMATOLOGY

## 2017-10-23 NOTE — MR AVS SNAPSHOT
After Visit Summary   10/23/2017    Yudelka Beckett    MRN: [id number]           Patient Information     Date Of Birth          1940        Visit Information        Provider Department      10/23/2017 8:00 AM Wilmer Mcclure MD [facility]        Today's Diagnoses     Basal cell carcinoma of orbit, left    -  1      Care Instructions      Sutured Wound Care     [facility]: [phone]    [facility]: [phone]          ? No strenuous activity for 48 hours. Resume moderate activity in 48 hours. No heavy exercising until you are seen for follow up in one week.     ? Take Tylenol as needed for discomfort.                         ? Do not drink alcoholic beverages for 48 hours.     ? Keep the pressure bandage in place for 24 hours. If the bandage becomes blood tinged or loose, reinforce it with gauze and tape.        (Refer to the reverse side of this page for management of bleeding).    ? Remove pressure bandage in 24 hours     ? Leave the flat bandage in place until your follow up appointment.    ? Keep the bandage dry. Wash around it carefully.    ? If the tape becomes soiled or starts to come off, reinforce it with additional paper tape.    ? Do not smoke for 3 weeks; smoking is detrimental to wound healing.    ? It is normal to have swelling and bruising around the surgical site. The bruising will fade in approximately 10-14 days. Elevate the area to reduce swelling.    ? Numbness, itchiness and sensitivity to temperature changes can occur after surgery and may take up to 18 months to normalize.      POSSIBLE COMPLICATIONS    BLEEDIN. Leave the bandage in place.  2. Use tightly rolled up gauze or a cloth to apply direct pressure over the bandage for 20   minutes.  3. Reapply pressure for an additional 20 minutes if necessary  4. Call the office or go to the nearest emergency room if pressure fails to stop the bleeding.  5. Use additional  "gauze and tape to maintain pressure once the bleeding has stopped.        PAIN:    1. Post operative pain should slowly get better, never worse.  2. A severe increase in pain may indicate a problem. Call the office if this occurs.    In case of emergency phone:Dr Mcclure [phone]              Follow-ups after your visit        Who to contact     If you have questions or need follow up information about today's clinic visit or your schedule please contact [facility] directly at [phone].  Normal or non-critical lab and imaging results will be communicated to you by [employer], letter or phone within 4 business days after the clinic has received the results. If you do not hear from us within 7 days, please contact the clinic through [employer] or phone. If you have a critical or abnormal lab result, we will notify you by phone as soon as possible.  Submit refill requests through [employer] or call your pharmacy and they will forward the refill request to us. Please allow 3 business days for your refill to be completed.          Additional Information About Your Visit        [employer] Information     [employer] lets you send messages to your doctor, view your test results, renew your prescriptions, schedule appointments and more. To sign up, go to [email or web address] . Click on \"Log in\" on the left side of the screen, which will take you to the Welcome page. Then click on \"Sign up Now\" on the right side of the page.     You will be asked to enter the access code listed below, as well as some personal information. Please follow the directions to create your username and password.     Your access code is: [email or web address]  Expires: 2018 10:33 AM     Your access code will  in 90 days. If you need help or a new code, please call your [facility] or [phone].        Care EveryWhere ID     This is your Care EveryWhere ID. This could be used by other organizations to access your [facility] medical " records  [id number]        Your Vitals Were     Pulse Pulse Oximetry                87 96%           Blood Pressure from Last 3 Encounters:   10/23/17 131/73   05/19/17 139/69   05/03/17 150/83    Weight from Last 3 Encounters:   05/19/17 78 kg (172 lb)   01/30/17 76.7 kg (169 lb)   01/24/17 76.7 kg (169 lb)              We Performed the Following     MOHS HEAD/NCK/HND/FT/GEN 1ST STAGE UP T0 5 BLOCKS     REPAIR COMPLEX, WOUND LID/NOSE/EAR/LIP 2.6-7.5 CM        Primary Care Provider Office Phone # Fax #    Azalea Tobar [phone]       [address] [place] [address]        Equal Access to Services     MISHA WU : Hadii nolan lundberg hadasho Soomaali, waaxda luqadaha, qaybta kaalmada adeegyada, eileen hampton . So [facility] [phone].    ATENCIÓN: Si habla español, tiene a pa disposición servicios gratuitos de asistencia lingüística. Llame al [phone].    We comply with applicable federal civil rights laws and [place] laws. We do not discriminate on the basis of race, color, national origin, age, disability, sex, sexual orientation, or gender identity.            Thank you!     Thank you for choosing [facility]  for your care. Our goal is always to provide you with excellent care. Hearing back from our patients is one way we can continue to improve our services. Please take a few minutes to complete the written survey that you may receive in the mail after your visit with us. Thank you!             Your Updated Medication List - Protect others around you: Learn how to safely use, store and throw away your medicines at www.disposemymeds.org.          This list is accurate as of: 10/23/17 11:15 AM.  Always use your most recent med list.                   Brand Name Dispense Instructions for use Diagnosis    albuterol 108 (90 BASE) MCG/ACT Inhaler    PROAIR HFA/PROVENTIL HFA/VENTOLIN HFA    1 Inhaler    Inhale 2 puffs into the lungs every 6 hours    Mild  intermittent asthma, uncomplicated       atorvastatin 20 MG tablet    LIPITOR    90 tablet    Take 1 tablet (20 mg) by mouth daily    Hyperlipidemia LDL goal <130       ATROVENT HFA 17 MCG/ACT Inhaler   Generic drug:  ipratropium     12.9 g    INHALE 2 PUFFS INTO THE LUNGS FOUR TIMES A DAY AS NEEDED    Mild intermittent asthma, uncomplicated       diltiazem 240 MG 24 hr capsule     90 capsule    Take 1 capsule (240 mg) by mouth daily    Essential hypertension with goal blood pressure less than 140/90       esomeprazole 40 MG CR capsule    nexIUM    90 capsule    Take 1 capsule (40 mg) by mouth every morning (before breakfast) One hour before meals fill when needed    Gastroesophageal reflux disease without esophagitis       fluticasone 110 MCG/ACT Inhaler    FLOVENT HFA    1 Inhaler    2 puffs BID    Mild intermittent asthma, uncomplicated       IBUPROFEN      3 times daily        levothyroxine 88 MCG tablet    SYNTHROID/LEVOTHROID    90 tablet    Take 1 tablet (88 mcg) by mouth daily    Acquired hypothyroidism       lisinopril 20 MG tablet    PRINIVIL/ZESTRIL    90 tablet    Take 1 tablet (20 mg) by mouth daily Fill when needed    Essential hypertension with goal blood pressure less than 140/90

## 2017-10-23 NOTE — PATIENT INSTRUCTIONS
Sutured Wound Care     [facility]: [phone]    [facility]: [phone]          ? No strenuous activity for 48 hours. Resume moderate activity in 48 hours. No heavy exercising until you are seen for follow up in one week.     ? Take Tylenol as needed for discomfort.                         ? Do not drink alcoholic beverages for 48 hours.     ? Keep the pressure bandage in place for 24 hours. If the bandage becomes blood tinged or loose, reinforce it with gauze and tape.        (Refer to the reverse side of this page for management of bleeding).    ? Remove pressure bandage in 24 hours     ? Leave the flat bandage in place until your follow up appointment.    ? Keep the bandage dry. Wash around it carefully.    ? If the tape becomes soiled or starts to come off, reinforce it with additional paper tape.    ? Do not smoke for 3 weeks; smoking is detrimental to wound healing.    ? It is normal to have swelling and bruising around the surgical site. The bruising will fade in approximately 10-14 days. Elevate the area to reduce swelling.    ? Numbness, itchiness and sensitivity to temperature changes can occur after surgery and may take up to 18 months to normalize.      POSSIBLE COMPLICATIONS    BLEEDIN. Leave the bandage in place.  2. Use tightly rolled up gauze or a cloth to apply direct pressure over the bandage for 20   minutes.  3. Reapply pressure for an additional 20 minutes if necessary  4. Call the office or go to the nearest emergency room if pressure fails to stop the bleeding.  5. Use additional gauze and tape to maintain pressure once the bleeding has stopped.        PAIN:    1. Post operative pain should slowly get better, never worse.  2. A severe increase in pain may indicate a problem. Call the office if this occurs.    In case of emergency phone:Dr Mcclure [phone]

## 2017-10-23 NOTE — LETTER
10/23/2017         RE: Yudelka Beckett  [address] [facility] [id number]        Dear Colleague,    Thank you for referring your patient, Yudelka Beckett, to the [facility]. Please see a copy of my visit note below.    Yudelka Beckett is a 77 year old year old female patient here today for evaluation and managment of basal cell carcinoma onleft orbit. Patient reports the following modifying factors none.  Associated symptoms: none.  Patient has no other skin complaints today.  Remainder of the HPI, Meds, PMH, Allergies, FH, and SH was reviewed in chart.      Past Medical History:   Diagnosis Date     Basal cell carcinoma      Esophageal reflux      Other and unspecified hyperlipidemia      Unspecified essential hypertension      Unspecified hypothyroidism        Past Surgical History:   Procedure Laterality Date     COLONOSCOPY  2/7/2011    COLONOSCOPY performed by BHARTI DUNHAM at [facility]        Family History   Problem Relation Age of Onset     CEREBROVASCULAR DISEASE Mother      HEART DISEASE Father      CANCER Maternal Grandmother      DIABETES Paternal Grandmother        Social History     Social History     Marital status:      Spouse name: N/A     Number of children: N/A     Years of education: N/A     Occupational History     Not on file.     Social History Main Topics     Smoking status: Former Smoker     Years: 15.00     Types: Cigarettes     Quit date: 1/1/1985     Smokeless tobacco: Never Used     Alcohol use No     Drug use: No     Sexual activity: No     Other Topics Concern      Service No     Blood Transfusions No     Caffeine Concern No     Occupational Exposure No     Hobby Hazards No     Sleep Concern No     Stress Concern Yes     just todays visit     Weight Concern No     Special Diet No     Back Care No     Exercise Yes     lots of farm work     Bike Helmet No     NA     Seat Belt No     Self-Exams Yes     sometimes      Parent/Sibling W/ Cabg, Mi Or Angioplasty Before 65f 55m? No     Social History Narrative       Outpatient Encounter Prescriptions as of 10/23/2017   Medication Sig Dispense Refill     ATROVENT HFA 17 MCG/ACT Inhaler INHALE 2 PUFFS INTO THE LUNGS FOUR TIMES A DAY AS NEEDED 12.9 g 1     fluticasone (FLOVENT HFA) 110 MCG/ACT Inhaler 2 puffs BID 1 Inhaler prn     lisinopril (PRINIVIL/ZESTRIL) 20 MG tablet Take 1 tablet (20 mg) by mouth daily Fill when needed 90 tablet 3     esomeprazole (NEXIUM) 40 MG CR capsule Take 1 capsule (40 mg) by mouth every morning (before breakfast) One hour before meals fill when needed 90 capsule 3     levothyroxine (SYNTHROID/LEVOTHROID) 88 MCG tablet Take 1 tablet (88 mcg) by mouth daily 90 tablet 3     atorvastatin (LIPITOR) 20 MG tablet Take 1 tablet (20 mg) by mouth daily 90 tablet 3     diltiazem 240 MG 24 hr capsule Take 1 capsule (240 mg) by mouth daily 90 capsule 3     albuterol (PROAIR HFA/PROVENTIL HFA/VENTOLIN HFA) 108 (90 BASE) MCG/ACT Inhaler Inhale 2 puffs into the lungs every 6 hours 1 Inhaler 5     IBUPROFEN 3 times daily        No facility-administered encounter medications on file as of 10/23/2017.              Review Of Systems  Skin: As above  Eyes: negative  Ears/Nose/Throat: negative  Respiratory: No shortness of breath, dyspnea on exertion, cough, or hemoptysis  Cardiovascular: negative  Gastrointestinal: negative  Genitourinary: negative  Musculoskeletal: negative  Neurologic: negative  Psychiatric: negative  Hematologic/Lymphatic/Immunologic: negative  Endocrine: negative      O:   NAD, WDWN, Alert & Oriented, Mood & Affect wnl, Vitals stable   Here today alone   /73  Pulse 87  SpO2 96%   General appearance normal   Vitals stable   Alert, oriented and in no acute distress     L OFS 5mm pink pearly papule      Eyes: Conjunctivae/lids:Normal     ENT: Lips, buccal mucosa, tongue: normal    MSK:Normal    Cardiovascular: peripheral edema none    Pulm: Breathing  Normal    Neuro/Psych: Orientation:Normal; Mood/Affect:Normal      A/P:  1. L orbit basal cell carcinoma   MOHS:   Location    After PGACAC discussed with patient, decision for Mohs surgery was made. Indication for Mohs was Location. Patient confirmed the site with Dr. Mcclure.  After anesthesia with LEC, the tumor was excised using standard Mohs technique in 1 stages(s).  CLEAR MARGINS OBTAINED and Final defect size was 1 cm.       REPAIR COMPLEX: Because of the tightness of the surrounding skin and Because of the size and full thickness nature of the defect, a complex closure was planned. After LEC anesthesia and prep, Burow's triangles were excised in the relaxed skin tension lines. The wound edges were widely undermined by dissection in the subcutaneous plane until adequate tissue mobility was obtained. Hemostasis was obtained. The wound edges were closed in a layered fashion using Vicryl and Fast Absorbing Plain Gut sutures. Postoperative length was 3.2 cm.   EBL minimal; complications none; wound care routine.  The patient was discharged in good condition and will return in one week for wound evaluation.    BENIGN LESIONS DISCUSSED WITH PATIENT:  I discussed the specifics of tumor, prognosis, and genetics of benign lesions.  I explained that treatment of these lesions would be purely cosmetic and not medically neccessary.  I discussed with patient different removal options including excision, cautery and /or laser.      Nature and genetics of benign skin lesions dicussed with patient.  Signs and Symptoms of skin cancer discussed with patient.  Patient encouraged to perform monthly skin exams.  UV precautions reviewed with patient.  Skin care regimen reviewed with patient: Eliminate harsh soaps, i.e. Dial, zest, irsih spring; Mild soaps such as Cetaphil or Dove sensitive skin, avoid hot or cold showers, aggressive use of emollients including vanicream, cetaphil or cerave discussed with patient.    Risks of  non-melanoma skin cancer discussed with patient   Return to clinic 6 months      Again, thank you for allowing me to participate in the care of your patient.        Sincerely,        Wilmer Mcclure MD

## 2017-10-23 NOTE — NURSING NOTE
"Initial /73  Pulse 87  SpO2 96% Estimated body mass index is 29.99 kg/(m^2) as calculated from the following:    Height as of 1/24/17: 1.613 m (5' 3.5\").    Weight as of 5/19/17: 78 kg (172 lb). .    Farzaneh Holt LPN    "

## 2017-10-23 NOTE — PROGRESS NOTES
Yudelka Beckett is a 77 year old year old female patient here today for evaluation and managment of basal cell carcinoma onleft orbit. Patient reports the following modifying factors none.  Associated symptoms: none.  Patient has no other skin complaints today.  Remainder of the HPI, Meds, PMH, Allergies, FH, and SH was reviewed in chart.      Past Medical History:   Diagnosis Date     Basal cell carcinoma      Esophageal reflux      Other and unspecified hyperlipidemia      Unspecified essential hypertension      Unspecified hypothyroidism        Past Surgical History:   Procedure Laterality Date     COLONOSCOPY  2/7/2011    COLONOSCOPY performed by BHARTI DUNHAM at [facility]        Family History   Problem Relation Age of Onset     CEREBROVASCULAR DISEASE Mother      HEART DISEASE Father      CANCER Maternal Grandmother      DIABETES Paternal Grandmother        Social History     Social History     Marital status:      Spouse name: N/A     Number of children: N/A     Years of education: N/A     Occupational History     Not on file.     Social History Main Topics     Smoking status: Former Smoker     Years: 15.00     Types: Cigarettes     Quit date: 1/1/1985     Smokeless tobacco: Never Used     Alcohol use No     Drug use: No     Sexual activity: No     Other Topics Concern      Service No     Blood Transfusions No     Caffeine Concern No     Occupational Exposure No     Hobby Hazards No     Sleep Concern No     Stress Concern Yes     just todays visit     Weight Concern No     Special Diet No     Back Care No     Exercise Yes     lots of farm work     Bike Helmet No     NA     Seat Belt No     Self-Exams Yes     sometimes     Parent/Sibling W/ Cabg, Mi Or Angioplasty Before 65f 55m? No     Social History Narrative       Outpatient Encounter Prescriptions as of 10/23/2017   Medication Sig Dispense Refill     ATROVENT HFA 17 MCG/ACT Inhaler INHALE 2 PUFFS INTO THE LUNGS FOUR TIMES A DAY  AS NEEDED 12.9 g 1     fluticasone (FLOVENT HFA) 110 MCG/ACT Inhaler 2 puffs BID 1 Inhaler prn     lisinopril (PRINIVIL/ZESTRIL) 20 MG tablet Take 1 tablet (20 mg) by mouth daily Fill when needed 90 tablet 3     esomeprazole (NEXIUM) 40 MG CR capsule Take 1 capsule (40 mg) by mouth every morning (before breakfast) One hour before meals fill when needed 90 capsule 3     levothyroxine (SYNTHROID/LEVOTHROID) 88 MCG tablet Take 1 tablet (88 mcg) by mouth daily 90 tablet 3     atorvastatin (LIPITOR) 20 MG tablet Take 1 tablet (20 mg) by mouth daily 90 tablet 3     diltiazem 240 MG 24 hr capsule Take 1 capsule (240 mg) by mouth daily 90 capsule 3     albuterol (PROAIR HFA/PROVENTIL HFA/VENTOLIN HFA) 108 (90 BASE) MCG/ACT Inhaler Inhale 2 puffs into the lungs every 6 hours 1 Inhaler 5     IBUPROFEN 3 times daily        No facility-administered encounter medications on file as of 10/23/2017.              Review Of Systems  Skin: As above  Eyes: negative  Ears/Nose/Throat: negative  Respiratory: No shortness of breath, dyspnea on exertion, cough, or hemoptysis  Cardiovascular: negative  Gastrointestinal: negative  Genitourinary: negative  Musculoskeletal: negative  Neurologic: negative  Psychiatric: negative  Hematologic/Lymphatic/Immunologic: negative  Endocrine: negative      O:   NAD, WDWN, Alert & Oriented, Mood & Affect wnl, Vitals stable   Here today alone   /73  Pulse 87  SpO2 96%   General appearance normal   Vitals stable   Alert, oriented and in no acute distress     L OFS 5mm pink pearly papule      Eyes: Conjunctivae/lids:Normal     ENT: Lips, buccal mucosa, tongue: normal    MSK:Normal    Cardiovascular: peripheral edema none    Pulm: Breathing Normal    Neuro/Psych: Orientation:Normal; Mood/Affect:Normal      A/P:  1. L orbit basal cell carcinoma   MOHS:   Location    After PGACAC discussed with patient, decision for Mohs surgery was made. Indication for Mohs was Location. Patient confirmed the site  with Dr. Mcclure.  After anesthesia with LEC, the tumor was excised using standard Mohs technique in 1 stages(s).  CLEAR MARGINS OBTAINED and Final defect size was 1 cm.       REPAIR COMPLEX: Because of the tightness of the surrounding skin and Because of the size and full thickness nature of the defect, a complex closure was planned. After LEC anesthesia and prep, Burow's triangles were excised in the relaxed skin tension lines. The wound edges were widely undermined by dissection in the subcutaneous plane until adequate tissue mobility was obtained. Hemostasis was obtained. The wound edges were closed in a layered fashion using Vicryl and Fast Absorbing Plain Gut sutures. Postoperative length was 3.2 cm.   EBL minimal; complications none; wound care routine.  The patient was discharged in good condition and will return in one week for wound evaluation.    BENIGN LESIONS DISCUSSED WITH PATIENT:  I discussed the specifics of tumor, prognosis, and genetics of benign lesions.  I explained that treatment of these lesions would be purely cosmetic and not medically neccessary.  I discussed with patient different removal options including excision, cautery and /or laser.      Nature and genetics of benign skin lesions dicussed with patient.  Signs and Symptoms of skin cancer discussed with patient.  Patient encouraged to perform monthly skin exams.  UV precautions reviewed with patient.  Skin care regimen reviewed with patient: Eliminate harsh soaps, i.e. Dial, zest, irsih spring; Mild soaps such as Cetaphil or Dove sensitive skin, avoid hot or cold showers, aggressive use of emollients including vanicream, cetaphil or cerave discussed with patient.    Risks of non-melanoma skin cancer discussed with patient   Return to clinic 6 months

## 2017-10-30 ENCOUNTER — ALLIED HEALTH/NURSE VISIT (OUTPATIENT)
Dept: DERMATOLOGY | Facility: CLINIC | Age: 77
End: 2017-10-30
Payer: COMMERCIAL

## 2017-10-30 DIAGNOSIS — Z48.01 ENCOUNTER FOR CHANGE OR REMOVAL OF SURGICAL WOUND DRESSING: Primary | ICD-10-CM

## 2017-10-30 PROCEDURE — 99207 ZZC NO CHARGE NURSE ONLY: CPT

## 2017-10-30 NOTE — MR AVS SNAPSHOT
After Visit Summary   10/30/2017    Yudelka Beckett    MRN: [id number]           Patient Information     Date Of Birth          1940        Visit Information        Provider Department      10/30/2017 1:30 PM [facility]        Care Instructions    WOUND CARE INSTRUCTIONS  for  ONE WEEK AFTER SURGERY          1) Leave flat bandage on your skin for one week after today s bandage change.  2) In one week when you remove the bandage, you may resume your regular skin care routine, including washing with mild soap and water, applying moisturizer, make-up and sunscreen.    3) If there are any open or bleeding areas at the incision/graft site you should begin to cover the area with a bandage daily as follows:    1) Clean and dry the area with plain tap water using a Q-tip or sterile gauze pad.  2) Apply Polysporin or Bacitracin ointment to the open area.  3) Cover the wound with a band-aid or a sterile non-stick gauze pad and micropore paper tape.             *Once the bandages are removed, the scar will be red and firm (especially in the lip/chin area). This is normal and will fade in time. It might take 6-12 months for this to happen.     *Massaging the area will help the scar soften and fade quicker. Begin to massage the area one month after the bandages have been removed. To massage apply pressure directly and firmly over the scar with the fingertips and move in a circular motion. Massage the area for a few minutes several times a day. Continue to massage the site for several months.    *Approximately 6-8 weeks after surgery it is not uncommon to see the formation of  tender pimple-like  bump along the scar. This is normal. As the scar continues to mature and the stitches underneath the skin begin to dissolve, this might occur. Do not pick or squeeze, this will resolve on it s own. Should one break open producing a small amount of drainage, apply Polysporin or  "Bacitracin ointment a few times a day until the wound is completely healed.    *Numbness in the surgical area is expected. It might take 12-18 months for the feeling to return to normal. During this time sensations of itchiness, tingling and occasional sharp pains might be noted. These feelings are normal and will subside once the nerves have completely healed.         IN CASE OF EMERGENCY: Dr Mcclure [phone]     If you were seen in [place] call: [phone]    If you were seen in [place] call: [phone]                  Follow-ups after your visit        Who to contact     If you have questions or need follow up information about today's clinic visit or your schedule please contact [facility] directly at [phone].  Normal or non-critical lab and imaging results will be communicated to you by [employer], letter or phone within 4 business days after the clinic has received the results. If you do not hear from us within 7 days, please contact the clinic through MyChart or phone. If you have a critical or abnormal lab result, we will notify you by phone as soon as possible.  Submit refill requests through [employer] or call your pharmacy and they will forward the refill request to us. Please allow 3 business days for your refill to be completed.          Additional Information About Your Visit        [employer] Information     [employer] lets you send messages to your doctor, view your test results, renew your prescriptions, schedule appointments and more. To sign up, go to [email or web address] . Click on \"Log in\" on the left side of the screen, which will take you to the Welcome page. Then click on \"Sign up Now\" on the right side of the page.     You will be asked to enter the access code listed below, as well as some personal information. Please follow the directions to create your username and password.     Your access code is: [email or web address]  Expires: 1/21/2018 10:33 AM     Your access code will "  in 90 days. If you need help or a new code, please call your [place] clinic or [phone].        Care EveryWhere ID     This is your Care EveryWhere ID. This could be used by other organizations to access your [place] medical records  [id number]         Blood Pressure from Last 3 Encounters:   10/23/17 131/73   17 139/69   17 150/83    Weight from Last 3 Encounters:   17 78 kg (172 lb)   17 76.7 kg (169 lb)   17 76.7 kg (169 lb)              Today, you had the following     No orders found for display       Primary Care Provider Office Phone # Fax #    Azalea Tobar [phone] [address] [place] [address]        Equal Access to Services     MISHA WU : Hadii nolan lundberg hadasho Soomaali, waaxda luqadaha, qaybta kaalmada adeegyada, eileen hampton . So [facility] [phone].    ATENCIÓN: Si habla español, tiene a pa disposición servicios gratuitos de asistencia lingüística. Kim al [phone].    We comply with applicable federal civil rights laws and [place] laws. We do not discriminate on the basis of race, color, national origin, age, disability, sex, sexual orientation, or gender identity.            Thank you!     Thank you for choosing [facility]  for your care. Our goal is always to provide you with excellent care. Hearing back from our patients is one way we can continue to improve our services. Please take a few minutes to complete the written survey that you may receive in the mail after your visit with us. Thank you!             Your Updated Medication List - Protect others around you: Learn how to safely use, store and throw away your medicines at www.disposemymeds.org.          This list is accurate as of: 10/30/17  1:47 PM.  Always use your most recent med list.                   Brand Name Dispense Instructions for use Diagnosis    atorvastatin 20 MG tablet    LIPITOR    90 tablet    Take  1 tablet (20 mg) by mouth daily    Hyperlipidemia LDL goal <130       ATROVENT HFA 17 MCG/ACT Inhaler   Generic drug:  ipratropium     12.9 g    INHALE 2 PUFFS INTO THE LUNGS FOUR TIMES A DAY AS NEEDED    Mild intermittent asthma, uncomplicated       diltiazem 240 MG 24 hr capsule     90 capsule    Take 1 capsule (240 mg) by mouth daily    Essential hypertension with goal blood pressure less than 140/90       esomeprazole 40 MG CR capsule    nexIUM    90 capsule    Take 1 capsule (40 mg) by mouth every morning (before breakfast) One hour before meals fill when needed    Gastroesophageal reflux disease without esophagitis       fluticasone 110 MCG/ACT Inhaler    FLOVENT HFA    1 Inhaler    2 puffs BID    Mild intermittent asthma, uncomplicated       IBUPROFEN      3 times daily        levothyroxine 88 MCG tablet    SYNTHROID/LEVOTHROID    90 tablet    Take 1 tablet (88 mcg) by mouth daily    Acquired hypothyroidism       lisinopril 20 MG tablet    PRINIVIL/ZESTRIL    90 tablet    Take 1 tablet (20 mg) by mouth daily Fill when needed    Essential hypertension with goal blood pressure less than 140/90       VENTOLIN  (90 BASE) MCG/ACT Inhaler   Generic drug:  albuterol     18 g    INHALE TWO PUFFS  INTO THE LUNGS EVERY 6 HOURS    Mild intermittent asthma, uncomplicated

## 2017-10-30 NOTE — PATIENT INSTRUCTIONS
WOUND CARE INSTRUCTIONS  for  ONE WEEK AFTER SURGERY          1) Leave flat bandage on your skin for one week after today s bandage change.  2) In one week when you remove the bandage, you may resume your regular skin care routine, including washing with mild soap and water, applying moisturizer, make-up and sunscreen.    3) If there are any open or bleeding areas at the incision/graft site you should begin to cover the area with a bandage daily as follows:    1) Clean and dry the area with plain tap water using a Q-tip or sterile gauze pad.  2) Apply Polysporin or Bacitracin ointment to the open area.  3) Cover the wound with a band-aid or a sterile non-stick gauze pad and micropore paper tape.             *Once the bandages are removed, the scar will be red and firm (especially in the lip/chin area). This is normal and will fade in time. It might take 6-12 months for this to happen.     *Massaging the area will help the scar soften and fade quicker. Begin to massage the area one month after the bandages have been removed. To massage apply pressure directly and firmly over the scar with the fingertips and move in a circular motion. Massage the area for a few minutes several times a day. Continue to massage the site for several months.    *Approximately 6-8 weeks after surgery it is not uncommon to see the formation of  tender pimple-like  bump along the scar. This is normal. As the scar continues to mature and the stitches underneath the skin begin to dissolve, this might occur. Do not pick or squeeze, this will resolve on it s own. Should one break open producing a small amount of drainage, apply Polysporin or Bacitracin ointment a few times a day until the wound is completely healed.    *Numbness in the surgical area is expected. It might take 12-18 months for the feeling to return to normal. During this time sensations of itchiness, tingling and occasional sharp pains might be noted. These feelings are normal  and will subside once the nerves have completely healed.         IN CASE OF EMERGENCY: Dr Mcclure [phone]     If you were seen in [place] call: [phone]    If you were seen in [place] call: [phone]

## 2017-10-30 NOTE — PROGRESS NOTES
Pt returned to clinic for post surgery 1 week follow up bandage change. Pt has no complaints, denies pain. Bandage removed left orbit, area cleansed with normal saline. Site is healing and wound edges approximating well. Reapplied new steri strips and paper tape.    Advised to watch for signs/sx of infection; spreading redness, drainage, odor, fever. Call or report promptly to clinic. Pt given written instructions and informed to rtc as needed. Patient verbalized understanding.     Kasey Marroquin, CMA

## 2018-01-12 DIAGNOSIS — J45.20 MILD INTERMITTENT ASTHMA, UNCOMPLICATED: ICD-10-CM

## 2018-01-12 RX ORDER — ALBUTEROL SULFATE 90 UG/1
AEROSOL, METERED RESPIRATORY (INHALATION)
Qty: 18 G | Refills: 0 | Status: SHIPPED | OUTPATIENT
Start: 2018-01-12 | End: 2018-02-22

## 2018-01-12 NOTE — TELEPHONE ENCOUNTER
"Requested Prescriptions   Pending Prescriptions Disp Refills     VENTOLIN  (90 BASE) MCG/ACT Inhaler [Pharmacy Med Name: VENTOLIN HFA 108MCG/ACT AERS] 18 g 1     Sig: INHALE TWO PUFFS  INTO THE LUNGS EVERY 6 HOURS    Asthma Maintenance Inhalers - Anticholinergics Failed    1/12/2018  2:46 PM       Failed - Asthma control test score is 20 or greater in last 6 months    Please review ACT score.          Failed - Recent (6 mo) or future visit with authorizing provider's specialty    Patient had office visit in the last 6 months or has a visit in the next 30 days with authorizing provider.  See \"Patient Info\" tab in inbasket, or \"Choose Columns\" in Meds & Orders section of the refill encounter.          Passed - Patient is age 12 years or older        ACT Total Scores 7/3/2012 5/20/2016 11/18/2016   ACT TOTAL SCORE 19 - -   ASTHMA ER VISITS 0 = None - -   ASTHMA HOSPITALIZATIONS 0 = None - -   ACT TOTAL SCORE (Goal Greater than or Equal to 20) - 17 18   In the past 12 months, how many times did you visit the emergency room for your asthma without being admitted to the hospital? - 0 0   In the past 12 months, how many times were you hospitalized overnight because of your asthma? - 0 0     Last Written Prescription Date:  10/23/2017  Last Fill Quantity: 18,  # refills: 1   Last Office Visit with [facility], [facility] or  [facility] prescribing provider:  5/19/2017   Future Office Visit:       "

## 2018-02-22 DIAGNOSIS — J45.20 MILD INTERMITTENT ASTHMA, UNCOMPLICATED: ICD-10-CM

## 2018-02-22 NOTE — TELEPHONE ENCOUNTER
"Requested Prescriptions   Pending Prescriptions Disp Refills     VENTOLIN  (90 BASE) MCG/ACT Inhaler [Pharmacy Med Name: VENTOLIN HFA 108MCG/ACT AERS] 18 g 0     Sig: INHALE 2 PUFFS INTO THE LUNGS EVERY 6 HOURS    Asthma Maintenance Inhalers - Anticholinergics Failed    2/22/2018 12:05 PM       Failed - Asthma control test score is 20 or greater in last 6 months    Please review ACT score.     ACT Total Scores 7/3/2012 5/20/2016 11/18/2016   ACT TOTAL SCORE 19 - -   ASTHMA ER VISITS 0 = None - -   ASTHMA HOSPITALIZATIONS 0 = None - -   ACT TOTAL SCORE (Goal Greater than or Equal to 20) - 17 18   In the past 12 months, how many times did you visit the emergency room for your asthma without being admitted to the hospital? - 0 0   In the past 12 months, how many times were you hospitalized overnight because of your asthma? - 0 0              Failed - Recent (6 mo) or future visit with authorizing provider's specialty    Patient had office visit in the last 6 months or has a visit in the next 30 days with authorizing provider.  See \"Patient Info\" tab in inbasket, or \"Choose Columns\" in Meds & Orders section of the refill encounter.      Last Written Prescription Date:  1/12/18  Last Fill Quantity: 18g,  # refills: 0  Last office visit: 5/19/2017 with prescribing provider:  5/19/17   Future Office Visit:           Passed - Patient is age 12 years or older          "

## 2018-02-23 RX ORDER — ALBUTEROL SULFATE 90 UG/1
AEROSOL, METERED RESPIRATORY (INHALATION)
Qty: 18 G | Refills: 0 | Status: SHIPPED | OUTPATIENT
Start: 2018-02-23 | End: 2018-04-12

## 2018-03-01 DIAGNOSIS — J45.20 MILD INTERMITTENT ASTHMA, UNCOMPLICATED: ICD-10-CM

## 2018-04-12 ENCOUNTER — OFFICE VISIT (OUTPATIENT)
Dept: FAMILY MEDICINE | Facility: CLINIC | Age: 78
End: 2018-04-12
Payer: COMMERCIAL

## 2018-04-12 VITALS
TEMPERATURE: 98.7 F | RESPIRATION RATE: 16 BRPM | WEIGHT: 169 LBS | BODY MASS INDEX: 28.85 KG/M2 | SYSTOLIC BLOOD PRESSURE: 154 MMHG | HEART RATE: 88 BPM | HEIGHT: 64 IN | DIASTOLIC BLOOD PRESSURE: 76 MMHG

## 2018-04-12 DIAGNOSIS — I10 ESSENTIAL HYPERTENSION WITH GOAL BLOOD PRESSURE LESS THAN 140/90: ICD-10-CM

## 2018-04-12 DIAGNOSIS — J45.20 MILD INTERMITTENT ASTHMA, UNCOMPLICATED: ICD-10-CM

## 2018-04-12 DIAGNOSIS — K21.9 GASTROESOPHAGEAL REFLUX DISEASE WITHOUT ESOPHAGITIS: ICD-10-CM

## 2018-04-12 DIAGNOSIS — N90.7 LABIAL CYST: ICD-10-CM

## 2018-04-12 DIAGNOSIS — R01.1 HEART MURMUR: ICD-10-CM

## 2018-04-12 DIAGNOSIS — E78.5 HYPERLIPIDEMIA LDL GOAL <130: ICD-10-CM

## 2018-04-12 DIAGNOSIS — E03.9 ACQUIRED HYPOTHYROIDISM: ICD-10-CM

## 2018-04-12 DIAGNOSIS — Z00.00 ROUTINE GENERAL MEDICAL EXAMINATION AT A HEALTH CARE FACILITY: Primary | ICD-10-CM

## 2018-04-12 LAB
ALBUMIN SERPL-MCNC: 3.8 G/DL (ref 3.4–5)
ALP SERPL-CCNC: 176 U/L (ref 40–150)
ALT SERPL W P-5'-P-CCNC: 18 U/L (ref 0–50)
ANION GAP SERPL CALCULATED.3IONS-SCNC: 8 MMOL/L (ref 3–14)
AST SERPL W P-5'-P-CCNC: 13 U/L (ref 0–45)
BILIRUB SERPL-MCNC: 0.4 MG/DL (ref 0.2–1.3)
BUN SERPL-MCNC: 24 MG/DL (ref 7–30)
CALCIUM SERPL-MCNC: 9.2 MG/DL (ref 8.5–10.1)
CHLORIDE SERPL-SCNC: 104 MMOL/L (ref 94–109)
CO2 SERPL-SCNC: 25 MMOL/L (ref 20–32)
CREAT SERPL-MCNC: 0.76 MG/DL (ref 0.52–1.04)
ERYTHROCYTE [DISTWIDTH] IN BLOOD BY AUTOMATED COUNT: 13.3 % (ref 10–15)
GFR SERPL CREATININE-BSD FRML MDRD: 73 ML/MIN/1.7M2
GLUCOSE SERPL-MCNC: 102 MG/DL (ref 70–99)
HCT VFR BLD AUTO: 38.2 % (ref 35–47)
HGB BLD-MCNC: 12.3 G/DL (ref 11.7–15.7)
MCH RBC QN AUTO: 29.1 PG (ref 26.5–33)
MCHC RBC AUTO-ENTMCNC: 32.2 G/DL (ref 31.5–36.5)
MCV RBC AUTO: 90 FL (ref 78–100)
PLATELET # BLD AUTO: 341 10E9/L (ref 150–450)
POTASSIUM SERPL-SCNC: 3.8 MMOL/L (ref 3.4–5.3)
PROT SERPL-MCNC: 7.9 G/DL (ref 6.8–8.8)
RBC # BLD AUTO: 4.23 10E12/L (ref 3.8–5.2)
SODIUM SERPL-SCNC: 137 MMOL/L (ref 133–144)
TSH SERPL DL<=0.005 MIU/L-ACNC: 1.7 MU/L (ref 0.4–4)
WBC # BLD AUTO: 8.1 10E9/L (ref 4–11)

## 2018-04-12 PROCEDURE — 85027 COMPLETE CBC AUTOMATED: CPT | Performed by: FAMILY MEDICINE

## 2018-04-12 PROCEDURE — 84443 ASSAY THYROID STIM HORMONE: CPT | Performed by: FAMILY MEDICINE

## 2018-04-12 PROCEDURE — 80053 COMPREHEN METABOLIC PANEL: CPT | Performed by: FAMILY MEDICINE

## 2018-04-12 PROCEDURE — G0438 PPPS, INITIAL VISIT: HCPCS | Performed by: FAMILY MEDICINE

## 2018-04-12 PROCEDURE — 36415 COLL VENOUS BLD VENIPUNCTURE: CPT | Performed by: FAMILY MEDICINE

## 2018-04-12 PROCEDURE — 99213 OFFICE O/P EST LOW 20 MIN: CPT | Mod: 25 | Performed by: FAMILY MEDICINE

## 2018-04-12 RX ORDER — FLUTICASONE PROPIONATE 110 UG/1
AEROSOL, METERED RESPIRATORY (INHALATION)
Qty: 1 INHALER | Status: SHIPPED | OUTPATIENT
Start: 2018-04-12 | End: 2019-04-11

## 2018-04-12 RX ORDER — ALBUTEROL SULFATE 90 UG/1
AEROSOL, METERED RESPIRATORY (INHALATION)
Qty: 18 G | Refills: 11 | Status: SHIPPED | OUTPATIENT
Start: 2018-04-12 | End: 2019-04-11

## 2018-04-12 RX ORDER — LISINOPRIL 20 MG/1
20 TABLET ORAL DAILY
Qty: 90 TABLET | Refills: 3 | Status: SHIPPED | OUTPATIENT
Start: 2018-04-12 | End: 2019-04-11

## 2018-04-12 RX ORDER — ATORVASTATIN CALCIUM 20 MG/1
20 TABLET, FILM COATED ORAL DAILY
Qty: 90 TABLET | Refills: 3 | Status: SHIPPED | OUTPATIENT
Start: 2018-04-12 | End: 2019-04-11

## 2018-04-12 RX ORDER — ESOMEPRAZOLE MAGNESIUM 40 MG/1
40 CAPSULE, DELAYED RELEASE ORAL
Qty: 90 CAPSULE | Refills: 3 | Status: SHIPPED | OUTPATIENT
Start: 2018-04-12 | End: 2019-07-30

## 2018-04-12 RX ORDER — DILTIAZEM HYDROCHLORIDE 240 MG/1
240 CAPSULE, EXTENDED RELEASE ORAL DAILY
Qty: 90 CAPSULE | Refills: 3 | Status: SHIPPED | OUTPATIENT
Start: 2018-04-12 | End: 2019-04-11

## 2018-04-12 RX ORDER — LEVOTHYROXINE SODIUM 88 UG/1
88 TABLET ORAL DAILY
Qty: 90 TABLET | Refills: 3 | Status: SHIPPED | OUTPATIENT
Start: 2018-04-12 | End: 2019-04-11

## 2018-04-12 NOTE — LETTER
My Asthma Action Plan  Name: Yudelka Beckett   YOB: 1940  Date: 4/12/2018   My doctor: Azalea Tobar MD   My clinic: [facility]        My Control Medicine: { :016128}  My Rescue Medicine: { :122213}  {AAP include Oral Steroid:402077} My Asthma Severity: { :803544}  Avoid your asthma triggers: { :787071}  smoke  pollens  mold     {Is patient a child or adult?:930903}       GREEN ZONE   Good Control    I feel good    No cough or wheeze    Can work, sleep and play without asthma symptoms       Take your asthma control medicine every day.     1. If exercise triggers your asthma, take your rescue medication    15 minutes before exercise or sports, and    During exercise if you have asthma symptoms  2. Spacer to use with inhaler: If you have a spacer, make sure to use it with your inhaler             YELLOW ZONE Getting Worse  I have ANY of these:    I do not feel good    Cough or wheeze    Chest feels tight    Wake up at night   1. Keep taking your Green Zone medications  2. Start taking your rescue medicine:    every 20 minutes for up to 1 hour. Then every 4 hours for 24-48 hours.  3. If you stay in the Yellow Zone for more than 12-24 hours, contact your doctor.  4. If you do not return to the Green Zone in 12-24 hours or you get worse, start taking your oral steroid medicine if prescribed by your provider.           RED ZONE Medical Alert - Get Help  I have ANY of these:    I feel awful    Medicine is not helping    Breathing getting harder    Trouble walking or talking    Nose opens wide to breathe       1. Take your rescue medicine NOW  2. If your provider has prescribed an oral steroid medicine, start taking it NOW  3. Call your doctor NOW  4. If you are still in the Red Zone after 20 minutes and you have not reached your doctor:    Take your rescue medicine again and    Call 911 or go to the emergency room right away    See your regular doctor within 2 weeks of an  Emergency Room or Urgent Care visit for follow-up treatment.          Annual Reminders:  Meet with Asthma Educator,  Flu Shot in the Fall, consider Pneumonia Vaccination for patients with asthma (aged 19 and older).    Pharmacy: [facility] [address]                      Asthma Triggers  How To Control Things That Make Your Asthma Worse    Triggers are things that make your asthma worse.  Look at the list below to help you find your triggers and what you can do about them.  You can help prevent asthma flare-ups by staying away from your triggers.      Trigger                                                          What you can do   Cigarette Smoke  Tobacco smoke can make asthma worse. Do not allow smoking in your home, car or around you.  Be sure no one smokes at a child s day care or school.  If you smoke, ask your health care provider for ways to help you quit.  Ask family members to quit too.  Ask your health care provider for a referral to Quit Plan to help you quit smoking, or call 2-007-730-PLAN.     Colds, Flu, Bronchitis  These are common triggers of asthma. Wash your hands often.  Don t touch your eyes, nose or mouth.  Get a flu shot every year.     Dust Mites  These are tiny bugs that live in cloth or carpet. They are too small to see. Wash sheets and blankets in hot water every week.   Encase pillows and mattress in dust mite proof covers.  Avoid having carpet if you can. If you have carpet, vacuum weekly.   Use a dust mask and HEPA vacuum.   Pollen and Outdoor Mold  Some people are allergic to trees, grass, or weed pollen, or molds. Try to keep your windows closed.  Limit time out doors when pollen count is high.   Ask you health care provider about taking medicine during allergy season.     Animal Dander  Some people are allergic to skin flakes, urine or saliva from pets with fur or feathers. Keep pets with fur or feathers out of your home.    If you can t keep  the pet outdoors, then keep the pet out of your bedroom.  Keep the bedroom door closed.  Keep pets off cloth furniture and away from stuffed toys.     Mice, Rats, and Cockroaches  Some people are allergic to the waste from these pests.   Cover food and garbage.  Clean up spills and food crumbs.  Store grease in the refrigerator.   Keep food out of the bedroom.   Indoor Mold  This can be a trigger if your home has high moisture. Fix leaking faucets, pipes, or other sources of water.   Clean moldy surfaces.  Dehumidify basement if it is damp and smelly.   Smoke, Strong Odors, and Sprays  These can reduce air quality. Stay away from strong odors and sprays, such as perfume, powder, hair spray, paints, smoke incense, paint, cleaning products, candles and new carpet.   Exercise or Sports  Some people with asthma have this trigger. Be active!  Ask your doctor about taking medicine before sports or exercise to prevent symptoms.    Warm up for 5-10 minutes before and after sports or exercise.     Other Triggers of Asthma  Cold air:  Cover your nose and mouth with a scarf.  Sometimes laughing or crying can be a trigger.  Some medicines and food can trigger asthma.

## 2018-04-12 NOTE — MR AVS SNAPSHOT
After Visit Summary   4/12/2018    Yudelka Beckett    MRN: [id number]           Patient Information     Date Of Birth          1940        Visit Information        Provider Department      4/12/2018 1:00 PM Azalea Tobar MD [facility]        Today's Diagnoses     Routine general medical examination at a health care facility    -  1    Mild intermittent asthma, uncomplicated        Essential hypertension with goal blood pressure less than 140/90        Gastroesophageal reflux disease without esophagitis        Acquired hypothyroidism        Hyperlipidemia LDL goal <130        Heart murmur        Labial cyst          Care Instructions    Consider taking a baby aspirin     echocardiogram [phone]    Preventive Health Recommendations    Female Ages 65 +    Yearly exam:     See your health care provider every year in order to  o Review health changes.   o Discuss preventive care.    o Review your medicines if your doctor has prescribed any.      You no longer need a yearly Pap test unless you've had an abnormal Pap test in the past 10 years. If you have vaginal symptoms, such as bleeding or discharge, be sure to talk with your provider about a Pap test.      Every 1 to 2 years, have a mammogram.  If you are over 69, talk with your health care provider about whether or not you want to continue having screening mammograms.      Every 10 years, have a colonoscopy. Or, have a yearly FIT test (stool test). These exams will check for colon cancer.       Have a cholesterol test every 5 years, or more often if your doctor advises it.       Have a diabetes test (fasting glucose) every three years. If you are at risk for diabetes, you should have this test more often.       At age 65, have a bone density scan (DEXA) to check for osteoporosis (brittle bone disease).    Shots:    Get a flu shot each year.    Get a tetanus shot every 10 years.    Talk to your doctor about your  pneumonia vaccines. There are now two you should receive - Pneumovax (PPSV 23) and Prevnar (PCV 13).    Talk to your doctor about the shingles vaccine.    Talk to your doctor about the hepatitis B vaccine.    Nutrition:     Eat at least 5 servings of fruits and vegetables each day.      Eat whole-grain bread, whole-wheat pasta and brown rice instead of white grains and rice.      Talk to your provider about Calcium and Vitamin D.     Lifestyle    Exercise at least 150 minutes a week (30 minutes a day, 5 days a week). This will help you control your weight and prevent disease.      Limit alcohol to one drink per day.      No smoking.       Wear sunscreen to prevent skin cancer.       See your dentist twice a year for an exam and cleaning.      See your eye doctor every 1 to 2 years to screen for conditions such as glaucoma, macular degeneration and cataracts.          Follow-ups after your visit        Future tests that were ordered for you today     Open Future Orders        Priority Expected Expires Ordered    Echocardiogram Complete Routine  4/12/2019 4/12/2018            Who to contact     If you have questions or need follow up information about today's clinic visit or your schedule please contact [facility] directly at [phone].  Normal or non-critical lab and imaging results will be communicated to you by [employer], letter or phone within 4 business days after the clinic has received the results. If you do not hear from us within 7 days, please contact the clinic through [employer] or phone. If you have a critical or abnormal lab result, we will notify you by phone as soon as possible.  Submit refill requests through [employer] or call your pharmacy and they will forward the refill request to us. Please allow 3 business days for your refill to be completed.          Additional Information About Your Visit        [employer] Information     [employer] lets you send messages to your doctor, view your test  "results, renew your prescriptions, schedule appointments and more. To sign up, go to [email or web address] . Click on \"Log in\" on the left side of the screen, which will take you to the Welcome page. Then click on \"Sign up Now\" on the right side of the page.     You will be asked to enter the access code listed below, as well as some personal information. Please follow the directions to create your username and password.     Your access code is: [email or web address]  Expires: 2018  1:50 PM     Your access code will  in 90 days. If you need help or a new code, please call your [facility] clinic or [phone].        Care EveryWhere ID     This is your Care EveryWhere ID. This could be used by other organizations to access your [facility] medical records  [id number]        Your Vitals Were     Pulse Temperature Respirations Height BMI (Body Mass Index)       88 98.7  F (37.1  C) (Tympanic) 16 5' 4\" (1.626 m) 29.01 kg/m2        Blood Pressure from Last 3 Encounters:   18 154/76   10/23/17 131/73   17 139/69    Weight from Last 3 Encounters:   18 169 lb (76.7 kg)   17 172 lb (78 kg)   17 169 lb (76.7 kg)              We Performed the Following     CBC with platelets     Comprehensive metabolic panel (BMP + Alb, Alk Phos, ALT, AST, Total. Bili, TP)     Lipid panel reflex to direct LDL Fasting     TSH          Today's Medication Changes          These changes are accurate as of 18  1:50 PM.  If you have any questions, ask your nurse or doctor.               These medicines have changed or have updated prescriptions.        Dose/Directions    albuterol 108 (90 Base) MCG/ACT Inhaler   Commonly known as:  VENTOLIN HFA   This may have changed:  See the new instructions.   Used for:  Mild intermittent asthma, uncomplicated   Changed by:  Azalea Tobar MD        INHALE 2 PUFFS INTO THE LUNGS EVERY 6 HOURS   Quantity:  18 g   Refills:  11            Where to get your medicines    "   These medications were sent to [facility] [place] - [address] [address]     Phone:  [phone]     albuterol 108 (90 Base) MCG/ACT Inhaler    atorvastatin 20 MG tablet    diltiazem 240 MG 24 hr capsule    esomeprazole 40 MG CR capsule    fluticasone 110 MCG/ACT Inhaler    levothyroxine 88 MCG tablet    lisinopril 20 MG tablet                Primary Care Provider Office Phone # Fax #    Azalea Tobar [phone]       [address] [place] [address]        Equal Access to Services     [facility]: Hadii aad ku hadasho Soomaali, waaxda luqadaha, qaybta kaalmada adeegyada, eileen william hayaan vanessa hampton . So [facility] [phone].    ATENCIÓN: Si habla español, tiene a pa disposición servicios gratuitos de asistencia lingüística. [facility] [phone].    We comply with applicable federal civil rights laws and [place] laws. We do not discriminate on the basis of race, color, national origin, age, disability, sex, sexual orientation, or gender identity.            Thank you!     Thank you for choosing [facility]  for your care. Our goal is always to provide you with excellent care. Hearing back from our patients is one way we can continue to improve our services. Please take a few minutes to complete the written survey that you may receive in the mail after your visit with us. Thank you!             Your Updated Medication List - Protect others around you: Learn how to safely use, store and throw away your medicines at www.disposemymeds.org.          This list is accurate as of 4/12/18  1:50 PM.  Always use your most recent med list.                   Brand Name Dispense Instructions for use Diagnosis    albuterol 108 (90 Base) MCG/ACT Inhaler    VENTOLIN HFA    18 g    INHALE 2 PUFFS INTO THE LUNGS EVERY 6 HOURS    Mild intermittent asthma, uncomplicated       atorvastatin 20 MG tablet    LIPITOR    90 tablet     Take 1 tablet (20 mg) by mouth daily    Hyperlipidemia LDL goal <130       diltiazem 240 MG 24 hr capsule     90 capsule    Take 1 capsule (240 mg) by mouth daily    Essential hypertension with goal blood pressure less than 140/90       esomeprazole 40 MG CR capsule    nexIUM    90 capsule    Take 1 capsule (40 mg) by mouth every morning (before breakfast) One hour before meals fill when needed    Gastroesophageal reflux disease without esophagitis       fluticasone 110 MCG/ACT Inhaler    FLOVENT HFA    1 Inhaler    2 puffs BID    Mild intermittent asthma, uncomplicated       IBUPROFEN      3 times daily        ipratropium 17 MCG/ACT Inhaler    ATROVENT HFA    12.9 g    Inhale 2 puffs into the lungs 4 times daily Prn. DUE FOR APPOINTMENT MAY 2018. NO FURTHER REFILLS    Mild intermittent asthma, uncomplicated       levothyroxine 88 MCG tablet    SYNTHROID/LEVOTHROID    90 tablet    Take 1 tablet (88 mcg) by mouth daily    Acquired hypothyroidism       lisinopril 20 MG tablet    PRINIVIL/ZESTRIL    90 tablet    Take 1 tablet (20 mg) by mouth daily Fill when needed    Essential hypertension with goal blood pressure less than 140/90

## 2018-04-12 NOTE — PATIENT INSTRUCTIONS
Consider taking a baby aspirin     echocardiogram [phone]    Preventive Health Recommendations    Female Ages 65 +    Yearly exam:     See your health care provider every year in order to  o Review health changes.   o Discuss preventive care.    o Review your medicines if your doctor has prescribed any.      You no longer need a yearly Pap test unless you've had an abnormal Pap test in the past 10 years. If you have vaginal symptoms, such as bleeding or discharge, be sure to talk with your provider about a Pap test.      Every 1 to 2 years, have a mammogram.  If you are over 69, talk with your health care provider about whether or not you want to continue having screening mammograms.      Every 10 years, have a colonoscopy. Or, have a yearly FIT test (stool test). These exams will check for colon cancer.       Have a cholesterol test every 5 years, or more often if your doctor advises it.       Have a diabetes test (fasting glucose) every three years. If you are at risk for diabetes, you should have this test more often.       At age 65, have a bone density scan (DEXA) to check for osteoporosis (brittle bone disease).    Shots:    Get a flu shot each year.    Get a tetanus shot every 10 years.    Talk to your doctor about your pneumonia vaccines. There are now two you should receive - Pneumovax (PPSV 23) and Prevnar (PCV 13).    Talk to your doctor about the shingles vaccine.    Talk to your doctor about the hepatitis B vaccine.    Nutrition:     Eat at least 5 servings of fruits and vegetables each day.      Eat whole-grain bread, whole-wheat pasta and brown rice instead of white grains and rice.      Talk to your provider about Calcium and Vitamin D.     Lifestyle    Exercise at least 150 minutes a week (30 minutes a day, 5 days a week). This will help you control your weight and prevent disease.      Limit alcohol to one drink per day.      No smoking.       Wear sunscreen to prevent skin cancer.       See  your dentist twice a year for an exam and cleaning.      See your eye doctor every 1 to 2 years to screen for conditions such as glaucoma, macular degeneration and cataracts.

## 2018-04-12 NOTE — NURSING NOTE
"Chief Complaint   Patient presents with     Wellness Visit       Initial /76  Pulse 88  Temp 98.7  F (37.1  C) (Tympanic)  Resp 16  Ht 5' 4\" (1.626 m)  Wt 169 lb (76.7 kg)  BMI 29.01 kg/m2 Estimated body mass index is 29.01 kg/(m^2) as calculated from the following:    Height as of this encounter: 5' 4\" (1.626 m).    Weight as of this encounter: 169 lb (76.7 kg).  Medication Reconciliation: complete    "

## 2018-04-12 NOTE — PROGRESS NOTES
SUBJECTIVE:   Yudelka Beckett is a 77 year old female who presents for Preventive Visit.    Are you in the first 12 months of your Medicare Part B coverage?  No    Healthy Habits:    Do you get at least three servings of calcium containing foods daily (dairy, green leafy vegetables, etc.)? yes    Amount of exercise or daily activities, outside of work: 6-7 day(s) per week    Problems taking medications regularly No    Medication side effects: No    Have you had an eye exam in the past two years? yes    Do you see a dentist twice per year? yes    Do you have sleep apnea, excessive snoring or daytime drowsiness?no      Ability to successfully perform activities of daily living: Yes, no assistance needed    Home safety:  none identified     Hearing impairment: No    Fall risk:  Fallen 2 or more times in the past year?: No  Any fall with injury in the past year?: No      COGNITIVE SCREEN  1) Repeat 3 items (Banana, Sunrise, Chair)    2) Clock draw: NORMAL  3) 3 item recall: Recalls 3 objects  Results: 3 items recalled: COGNITIVE IMPAIRMENT LESS LIKELY    Mini-CogTM Copyright [employer]. Licensed by the author for use in [employer]; reprinted with permission (nuris@[facility]). All rights reserved.        Hyperlipidemia Follow-Up      Rate your low fat/cholesterol diet?: not monitoring fat    Taking statin?  Yes, no muscle aches from statin    Other lipid medications/supplements?:  none  Recent Labs   Lab Test  12/05/16   1057  10/27/15   1103  09/25/14   1102   CHOL  171  177  189   HDL  47*  50*  48*   LDL  88  90  101   TRIG  180*  184*  201*   CHOLHDLRATIO   --   3.5  3.9        Hypertension Follow-up      Outpatient blood pressures are not being checked.    Low Salt Diet: no added salt  On lisinopril, diltiazem  BP Readings from Last 6 Encounters:   04/12/18 154/76   10/23/17 131/73   05/19/17 139/69   05/03/17 150/83   03/29/17 138/74   01/30/17 152/78      Asthma Follow-Up    Was ACT completed  today?    Yes    ACT Total Scores 4/12/2018   ACT TOTAL SCORE -   ASTHMA ER VISITS -   ASTHMA HOSPITALIZATIONS -   ACT TOTAL SCORE (Goal Greater than or Equal to 20) 15   In the past 12 months, how many times did you visit the emergency room for your asthma without being admitted to the hospital? 0   In the past 12 months, how many times were you hospitalized overnight because of your asthma? 0       Recent asthma triggers that patient is dealing with: None    gastroesophageal reflux disease-in Nexium, has tried off of it, did not do well.     Hypothyroidism Follow-up      Since last visit, patient describes the following symptoms: Weight stable, no hair loss, no skin changes, no constipation, no loose stools  TSH   Date Value Ref Range Status   04/12/2018 1.70 0.40 - 4.00 mU/L Final   ]   On replacement    Vaginal pimple-a few weeks of a pimple on her vagina, can get irritated    palpitations summer she had palpitations for a night, nothing since no chest pain or shortness of breath with it.       Reviewed and updated as needed this visit by clinical staff  Tobacco  Allergies  Meds  Med Hx  Surg Hx  Fam Hx  Soc Hx        Reviewed and updated as needed this visit by Provider        Social History   Substance Use Topics     Smoking status: Former Smoker     Years: 15.00     Types: Cigarettes     Quit date: 1/1/1985     Smokeless tobacco: Never Used     Alcohol use No       If you drink alcohol do you typically have >3 drinks per day or >7 drinks per week? Not Applicable                        Today's PHQ-2 Score:   PHQ-2 ( 1999 [employer]) 4/12/2018 5/19/2017   Q1: Little interest or pleasure in doing things 0 0   Q2: Feeling down, depressed or hopeless 0 0   PHQ-2 Score 0 0       Do you feel safe in your environment - Yes    Do you have a Health Care Directive?: No: Advance care planning was reviewed with patient; patient declined at this time.    Current providers sharing in care for this patient include:  "  Patient Care Team:  Azalea Tobar MD as PCP - General (Family Practice)    The following health maintenance items are reviewed in Epic and correct as of today:  Health Maintenance   Topic Date Due     PNEUMOCOCCAL (1 of 2 - PCV13) 09/24/2006     ASTHMA ACTION PLAN Q1 YR  06/29/2017     ADVANCE DIRECTIVE PLANNING Q5 YRS  02/11/2018     INFLUENZA VACCINE (SYSTEM ASSIGNED)  09/01/2018     ASTHMA CONTROL TEST Q6 MOS  10/12/2018     TSH Q1 YEAR  04/12/2019     FALL RISK ASSESSMENT  04/12/2019     LIPID SCREEN Q5 YR FEMALE (SYSTEM ASSIGNED)  12/05/2021     TETANUS IMMUNIZATION (SYSTEM ASSIGNED)  05/20/2026     DEXA SCAN SCREENING (SYSTEM ASSIGNED)  Completed     BP Readings from Last 3 Encounters:   04/12/18 154/76   10/23/17 131/73   05/19/17 139/69    Wt Readings from Last 3 Encounters:   04/12/18 169 lb (76.7 kg)   05/19/17 172 lb (78 kg)   01/30/17 169 lb (76.7 kg)                    Mammogram Screening: Patient over age 75, has elected to continue with mammography screening.    ROS:  CONSTITUTIONAL: NEGATIVE for fever, chills, change in weight  ENT/MOUTH: NEGATIVE for ear, mouth and throat problems  RESP: NEGATIVE for significant cough or SOB  CV: NEGATIVE for chest pain, palpitations or peripheral edema  GI: NEGATIVE for nausea, abdominal pain, heartburn, or change in bowel habits  : No dysuria, urinary frequency or urgency. Positive for lesion.    OBJECTIVE:   /76  Pulse 88  Temp 98.7  F (37.1  C) (Tympanic)  Resp 16  Ht 5' 4\" (1.626 m)  Wt 169 lb (76.7 kg)  BMI 29.01 kg/m2 Estimated body mass index is 29.01 kg/(m^2) as calculated from the following:    Height as of this encounter: 5' 4\" (1.626 m).    Weight as of this encounter: 169 lb (76.7 kg).  EXAM:   GENERAL: healthy, alert and no distress  EYES: Eyes grossly normal to inspection, PERRL and conjunctivae and sclerae normal  HENT: ear canals and TM's normal, nose and mouth without ulcers or lesions  NECK: no adenopathy, no asymmetry, " masses, or scars and thyroid normal to palpation  RESP: lungs clear to auscultation - no rales, rhonchi or wheezes  BREAST: normal without masses, tenderness or nipple discharge and no palpable axillary masses or adenopathy  CV: regular rate and rhythm, normal S1 S2, no S3 or S4, 2/6 systolic ejection murmur LUSB, click or rub, no peripheral edema and peripheral pulses strong  ABDOMEN: soft, nontender, no hepatosplenomegaly, no masses and bowel sounds normal   (female): normal female external genitalia with small white firm subcutaneous cyst in left labia  MS: no gross musculoskeletal defects noted, no edema  SKIN: no suspicious lesions or rashes  NEURO: Normal strength and tone, mentation intact and speech normal  PSYCH: mentation appears normal, affect normal/bright    ASSESSMENT / PLAN:   Yudelka was seen today for wellness visit.    Diagnoses and all orders for this visit:    Routine general medical examination at a health care facility    Mild intermittent asthma, uncomplicated  -     albuterol (VENTOLIN HFA) 108 (90 Base) MCG/ACT Inhaler; INHALE 2 PUFFS INTO THE LUNGS EVERY 6 HOURS  -     fluticasone (FLOVENT HFA) 110 MCG/ACT Inhaler; 2 puffs BID    Essential hypertension with goal blood pressure less than 140/90  -     lisinopril (PRINIVIL/ZESTRIL) 20 MG tablet; Take 1 tablet (20 mg) by mouth daily Fill when needed  -     diltiazem 240 MG 24 hr capsule; Take 1 capsule (240 mg) by mouth daily  -     Comprehensive metabolic panel (BMP + Alb, Alk Phos, ALT, AST, Total. Bili, TP)  -     CBC with platelets    Gastroesophageal reflux disease without esophagitis  -     esomeprazole (NEXIUM) 40 MG CR capsule; Take 1 capsule (40 mg) by mouth every morning (before breakfast) One hour before meals fill when needed    Acquired hypothyroidism  -     levothyroxine (SYNTHROID/LEVOTHROID) 88 MCG tablet; Take 1 tablet (88 mcg) by mouth daily  -     TSH    Hyperlipidemia LDL goal <130  -     atorvastatin (LIPITOR) 20 MG  "tablet; Take 1 tablet (20 mg) by mouth daily  -     Cancel: Lipid panel reflex to direct LDL Fasting  -     Lipid panel reflex to direct LDL Fasting; Future    Heart murmur  -     Echocardiogram Complete; Future    Labial cyst  Reassurance given      End of Life Planning:  Patient currently has an advanced directive: Yes.  Practitioner is supportive of decision.    COUNSELING:  Reviewed preventive health counseling, as reflected in patient instructions       Regular exercise       Healthy diet/nutrition       Vision screening       Hearing screening       Dental care       Osteoporosis Prevention/Bone Health        Estimated body mass index is 29.01 kg/(m^2) as calculated from the following:    Height as of this encounter: 5' 4\" (1.626 m).    Weight as of this encounter: 169 lb (76.7 kg).       reports that she quit smoking about 33 years ago. Her smoking use included Cigarettes. She quit after 15.00 years of use. She has never used smokeless tobacco.      Appropriate preventive services were discussed with this patient, including applicable screening as appropriate for cardiovascular disease, diabetes, osteopenia/osteoporosis, and glaucoma.  As appropriate for age/gender, discussed screening for colorectal cancer, prostate cancer, breast cancer, and cervical cancer. Checklist reviewing preventive services available has been given to the patient.    Reviewed patients plan of care and provided an AVS. The Basic Care Plan (routine screening as documented in Health Maintenance) for Yudelka meets the Care Plan requirement. This Care Plan has been established and reviewed with the Patient.    Counseling Resources:  ATP IV Guidelines  Pooled Cohorts Equation Calculator  Breast Cancer Risk Calculator  FRAX Risk Assessment  ICSI Preventive Guidelines  Dietary Guidelines for Americans, 2010  USDA's MyPlate  ASA Prophylaxis  Lung CA Screening    Azalea Tobar MD  [facility]  "

## 2018-04-13 ASSESSMENT — ASTHMA QUESTIONNAIRES: ACT_TOTALSCORE: 15

## 2018-05-17 DIAGNOSIS — E78.5 HYPERLIPIDEMIA LDL GOAL <130: ICD-10-CM

## 2018-05-17 PROCEDURE — 36415 COLL VENOUS BLD VENIPUNCTURE: CPT | Performed by: FAMILY MEDICINE

## 2018-05-17 PROCEDURE — 80061 LIPID PANEL: CPT | Performed by: FAMILY MEDICINE

## 2018-05-18 LAB
CHOLEST SERPL-MCNC: 193 MG/DL
HDLC SERPL-MCNC: 50 MG/DL
LDLC SERPL CALC-MCNC: 114 MG/DL
NONHDLC SERPL-MCNC: 143 MG/DL
TRIGL SERPL-MCNC: 146 MG/DL

## 2018-05-25 ENCOUNTER — HOSPITAL ENCOUNTER (OUTPATIENT)
Dept: CARDIOLOGY | Facility: CLINIC | Age: 78
Discharge: HOME | End: 2018-05-25
Attending: FAMILY MEDICINE | Admitting: FAMILY MEDICINE
Payer: MEDICARE

## 2018-05-25 DIAGNOSIS — R01.1 HEART MURMUR: ICD-10-CM

## 2018-05-25 PROCEDURE — 93306 TTE W/DOPPLER COMPLETE: CPT

## 2018-05-25 PROCEDURE — 93306 TTE W/DOPPLER COMPLETE: CPT | Mod: 26 | Performed by: INTERNAL MEDICINE

## 2018-07-22 DIAGNOSIS — J45.20 MILD INTERMITTENT ASTHMA, UNCOMPLICATED: ICD-10-CM

## 2018-07-23 NOTE — TELEPHONE ENCOUNTER
"Requested Prescriptions   Pending Prescriptions Disp Refills     ATROVENT HFA 17 MCG/ACT Inhaler [Pharmacy Med Name: ATROVENT HFA 17MCG/ACT AERS] 12.9 g 1     Sig: INHALE 2 PUFFS INTO THE LUNGS FOUR TIMES DAILY AS NEEDED DUE FOR APPOINTMENT MAY 2018 NO FURTHER REFILLS    Asthma Maintenance Inhalers - Anticholinergics Failed    7/22/2018  4:53 PM       Failed - Asthma control assessment score within normal limits in last 6 months    Please review ACT score.     ACT Total Scores 5/20/2016 11/18/2016 4/12/2018   ACT TOTAL SCORE - - -   ASTHMA ER VISITS - - -   ASTHMA HOSPITALIZATIONS - - -   ACT TOTAL SCORE (Goal Greater than or Equal to 20) 17 18 15   In the past 12 months, how many times did you visit the emergency room for your asthma without being admitted to the hospital? 0 0 0   In the past 12 months, how many times were you hospitalized overnight because of your asthma? 0 0 0              Passed - Patient is age 12 years or older       Passed - Recent (6 mo) or future (30 days) visit within the authorizing provider's specialty    Patient had office visit in the last 6 months or has a visit in the next 30 days with authorizing provider or within the authorizing provider's specialty.  See \"Patient Info\" tab in inbasket, or \"Choose Columns\" in Meds & Orders section of the refill encounter.            Last Written Prescription Date:  3/1/18  Last Fill Quantity: 12.9g ,  # refills: 1   Last office visit: 4/12/2018 with prescribing provider:     Future Office Visit:      "

## 2018-07-24 RX ORDER — IPRATROPIUM BROMIDE 17 UG/1
AEROSOL, METERED RESPIRATORY (INHALATION)
Qty: 1 INHALER | Refills: 0 | Status: SHIPPED | OUTPATIENT
Start: 2018-07-24 | End: 2018-07-31

## 2018-07-25 ENCOUNTER — TELEPHONE (OUTPATIENT)
Dept: FAMILY MEDICINE | Facility: CLINIC | Age: 78
End: 2018-07-25

## 2018-07-25 NOTE — TELEPHONE ENCOUNTER
Central scheduling transferred patient to get apt with Dr Tobar. Pt needs 40 minute apt. Pt says she is having heart irregularities. Please call back to triage.

## 2018-07-25 NOTE — TELEPHONE ENCOUNTER
Pt States her palpitations have been going on for several months. In the last month it has been worse. No chest pain or other symptoms but pt would like to talk to Dr Ochoa about it. She would like an appointment with Dr Ochoa. Schedule is full next week. Pt would like to be seen sooner. Please advise. Veena Day RN

## 2018-07-31 ENCOUNTER — OFFICE VISIT (OUTPATIENT)
Dept: FAMILY MEDICINE | Facility: CLINIC | Age: 78
End: 2018-07-31
Payer: COMMERCIAL

## 2018-07-31 VITALS
SYSTOLIC BLOOD PRESSURE: 124 MMHG | WEIGHT: 169 LBS | BODY MASS INDEX: 29.01 KG/M2 | OXYGEN SATURATION: 98 % | HEART RATE: 84 BPM | DIASTOLIC BLOOD PRESSURE: 78 MMHG | TEMPERATURE: 97.7 F | RESPIRATION RATE: 16 BRPM

## 2018-07-31 DIAGNOSIS — E03.8 OTHER SPECIFIED HYPOTHYROIDISM: ICD-10-CM

## 2018-07-31 DIAGNOSIS — J45.20 MILD INTERMITTENT ASTHMA, UNCOMPLICATED: ICD-10-CM

## 2018-07-31 DIAGNOSIS — R00.2 HEART PALPITATIONS: Primary | ICD-10-CM

## 2018-07-31 PROCEDURE — 99214 OFFICE O/P EST MOD 30 MIN: CPT | Performed by: FAMILY MEDICINE

## 2018-07-31 PROCEDURE — 93000 ELECTROCARDIOGRAM COMPLETE: CPT | Performed by: FAMILY MEDICINE

## 2018-07-31 NOTE — PROGRESS NOTES
"  SUBJECTIVE:   Yudelka Beckett is a 77 year old female who presents to clinic today for the following health issues:      Asthma Follow-Up    Was ACT completed today?    Yes    ACT Total Scores 4/12/2018   ACT TOTAL SCORE -   ASTHMA ER VISITS -   ASTHMA HOSPITALIZATIONS -   ACT TOTAL SCORE (Goal Greater than or Equal to 20) 15   In the past 12 months, how many times did you visit the emergency room for your asthma without being admitted to the hospital? 0   In the past 12 months, how many times were you hospitalized overnight because of your asthma? 0       Recent asthma triggers that patient is dealing with: humidity        Amount of exercise or physical activity: yard work    Problems taking medications regularly: No    Medication side effects: none    Diet: regular (no restrictions)    Heart palpitations      Onset: 1 month    Description (location/character/radiation/duration): heart palpitations on and off daily    Intensity:  moderate    Accompanying signs and symptoms:        Shortness of breath: YES       Sweating: no        Nausea/vomitting: no        Palpitations: YES       Other (fevers/chills/cough/heartburn/lightheadedness): YES-     History (similar episodes/previous evaluation): yes    Precipitating or alleviating factors:       Worse with exertion: no        Worse with breathing: no        Related to eating: no        Better with burping: no     Therapies tried and outcome: None    Several weeks of palpitations. No chest pain. Some shortness of breath as her asthma is worse but thinks that is a separate issue. Palpitations vary in length and timing.   Can have a second of where things look, feel, and sound different. \"Hard to explain\".   She remembers having afib on a monitor years ago.   Is on diltiazem and lisinopril.     Echocardiogram done 5/25/2018 shows an EF normal at 55-60%.  Aortic valve with some aortic valve sclerosis but no significant aortic stenosis.    Hypothyroidism-on " replacement  TSH   Date Value Ref Range Status   04/12/2018 1.70 0.40 - 4.00 mU/L Final        Problem list and histories reviewed & adjusted, as indicated.  Additional history: as documented    BP Readings from Last 3 Encounters:   07/31/18 124/78   04/12/18 154/76   10/23/17 131/73    Wt Readings from Last 3 Encounters:   07/31/18 169 lb (76.7 kg)   04/12/18 169 lb (76.7 kg)   05/19/17 172 lb (78 kg)               Reviewed and updated as needed this visit by clinical staff  Tobacco  Allergies  Meds       Reviewed and updated as needed this visit by Provider         ROS:  CONSTITUTIONAL: NEGATIVE for fever, chills, change in weight  ENT/MOUTH: NEGATIVE for ear, mouth and throat problems  RESP: NEGATIVE for significant cough or SOB  CV: NEGATIVE for chest pain, palpitations or peripheral edema  GI: NEGATIVE for nausea, abdominal pain, heartburn, or change in bowel habits  : No dysuria, urinary frequency or urgency.     OBJECTIVE:                                                    /78  Pulse 84  Temp 97.7  F (36.5  C) (Tympanic)  Resp 16  Wt 169 lb (76.7 kg)  SpO2 98%  BMI 29.01 kg/m2  Body mass index is 29.01 kg/(m^2).  GENERAL: healthy, alert, well nourished, well hydrated, no distress  NECK: no tenderness, no adenopathy, no asymmetry, no masses, no stiffness; thyroid- normal to palpation  RESP: lungs clear to auscultation - no rales, no rhonchi, no wheezes  CV: regular rates and rhythm, normal S1 S2, no S3 or S4 and no murmur, no click or rub -  MS: extremities- no gross deformities noted, no edema    EKG-NSR. Some LVH nonspecific ST changes     ASSESSMENT/PLAN:                                                      ASSESSMENT:  1. Heart palpitations    2. Mild intermittent asthma, uncomplicated    3. Other specified hypothyroidism        PLAN:  Orders Placed This Encounter     ipratropium (ATROVENT HFA) 17 MCG/ACT Inhaler     zio patch, consider cardiology consult    Patient Instructions   Let's do  the Zio patch next  [phone]         Azalea Tobar MD  [facility]

## 2018-07-31 NOTE — NURSING NOTE
"Chief Complaint   Patient presents with     Palpitations     steady heart palpitations x 1 month     Asthma     getting worse        Initial /78  Pulse 84  Temp 97.7  F (36.5  C) (Tympanic)  Resp 16  Wt 169 lb (76.7 kg)  SpO2 98%  BMI 29.01 kg/m2 Estimated body mass index is 29.01 kg/(m^2) as calculated from the following:    Height as of 4/12/18: 5' 4\" (1.626 m).    Weight as of this encounter: 169 lb (76.7 kg).      Health Maintenance that is potentially due pending provider review:  NONE    n/a    Is there anyone who you would like to be able to receive your results? No  If yes have patient fill out LIGIA    "

## 2018-07-31 NOTE — MR AVS SNAPSHOT
After Visit Summary   7/31/2018    Yudelka Beckett    MRN: [id number]           Patient Information     Date Of Birth          1940        Visit Information        Provider Department      7/31/2018 2:20 PM Azalea Tobar MD [facility]        Today's Diagnoses     Heart palpitations    -  1    Mild intermittent asthma, uncomplicated        Other specified hypothyroidism          Care Instructions    Let's do the Zio patch next  [phone]    Cut down on the caffeine          Follow-ups after your visit        Future tests that were ordered for you today     Open Future Orders        Priority Expected Expires Ordered    Zio Patch Holter Routine  9/14/2018 7/31/2018            Who to contact     If you have questions or need follow up information about today's clinic visit or your schedule please contact [facility] directly at [phone].  Normal or non-critical lab and imaging results will be communicated to you by "[employer]hart, letter or phone within 4 business days after the clinic has received the results. If you do not hear from us within 7 days, please contact the clinic through "[employer]hart or phone. If you have a critical or abnormal lab result, we will notify you by phone as soon as possible.  Submit refill requests through [employer] or call your pharmacy and they will forward the refill request to us. Please allow 3 business days for your refill to be completed.          Additional Information About Your Visit        MyChart Information     [employer] gives you secure access to your electronic health record. If you see a primary care provider, you can also send messages to your care team and make appointments. If you have questions, please call your primary care clinic.  If you do not have a primary care provider, please call [phone] and they will assist you.        Care EveryWhere ID     This is your Care EveryWhere ID. This could be used by  other organizations to access your [place] medical records  [id number]        Your Vitals Were     Pulse Temperature Respirations Pulse Oximetry BMI (Body Mass Index)       84 97.7  F (36.5  C) (Tympanic) 16 98% 29.01 kg/m2        Blood Pressure from Last 3 Encounters:   07/31/18 124/78   04/12/18 154/76   10/23/17 131/73    Weight from Last 3 Encounters:   07/31/18 169 lb (76.7 kg)   04/12/18 169 lb (76.7 kg)   05/19/17 172 lb (78 kg)              We Performed the Following     EKG 12-lead complete w/read - Clinics          Today's Medication Changes          These changes are accurate as of 7/31/18  2:52 PM.  If you have any questions, ask your nurse or doctor.               These medicines have changed or have updated prescriptions.        Dose/Directions    ipratropium 17 MCG/ACT Inhaler   Commonly known as:  ATROVENT HFA   This may have changed:  See the new instructions.   Used for:  Mild intermittent asthma, uncomplicated   Changed by:  Azalea Tobar MD        Dose:  2 puff   Inhale 2 puffs into the lungs 4 times daily as needed for wheezing   Quantity:  1 Inhaler   Refills:  3            Where to get your medicines      These medications were sent to [facility] [address] [address]     Phone:  [phone]     ipratropium 17 MCG/ACT Inhaler                Primary Care Provider Office Phone # Fax #    Azalea Tobar [phone]       [address] [place] [address]        Equal Access to Services     MISHA WU : Hadii nolan ku hadasho Soomaali, waaxda luqadaha, qaybta kaalmada adeegyada, waxay ididayan garcia. So [facility] [phone].    ATENCIÓN: Si habla español, tiene a pa disposición servicios gratuitos de asistencia lingüística. Llame al [phone].    We comply with applicable federal civil rights laws and [place] laws. We do not discriminate on the basis of race, color, national  origin, age, disability, sex, sexual orientation, or gender identity.            Thank you!     Thank you for choosing [facility]  for your care. Our goal is always to provide you with excellent care. Hearing back from our patients is one way we can continue to improve our services. Please take a few minutes to complete the written survey that you may receive in the mail after your visit with us. Thank you!             Your Updated Medication List - Protect others around you: Learn how to safely use, store and throw away your medicines at www.disposemymeds.org.          This list is accurate as of 7/31/18  2:52 PM.  Always use your most recent med list.                   Brand Name Dispense Instructions for use Diagnosis    albuterol 108 (90 Base) MCG/ACT Inhaler    VENTOLIN HFA    18 g    INHALE 2 PUFFS INTO THE LUNGS EVERY 6 HOURS    Mild intermittent asthma, uncomplicated       atorvastatin 20 MG tablet    LIPITOR    90 tablet    Take 1 tablet (20 mg) by mouth daily    Hyperlipidemia LDL goal <130       diltiazem 240 MG 24 hr capsule     90 capsule    Take 1 capsule (240 mg) by mouth daily    Essential hypertension with goal blood pressure less than 140/90       esomeprazole 40 MG CR capsule    nexIUM    90 capsule    Take 1 capsule (40 mg) by mouth every morning (before breakfast) One hour before meals fill when needed    Gastroesophageal reflux disease without esophagitis       fluticasone 110 MCG/ACT Inhaler    FLOVENT HFA    1 Inhaler    2 puffs BID    Mild intermittent asthma, uncomplicated       IBUPROFEN      3 times daily        ipratropium 17 MCG/ACT Inhaler    ATROVENT HFA    1 Inhaler    Inhale 2 puffs into the lungs 4 times daily as needed for wheezing    Mild intermittent asthma, uncomplicated       levothyroxine 88 MCG tablet    SYNTHROID/LEVOTHROID    90 tablet    Take 1 tablet (88 mcg) by mouth daily    Acquired hypothyroidism       lisinopril 20 MG tablet    PRINIVIL/ZESTRIL     90 tablet    Take 1 tablet (20 mg) by mouth daily Fill when needed    Essential hypertension with goal blood pressure less than 140/90

## 2018-08-01 ASSESSMENT — ASTHMA QUESTIONNAIRES: ACT_TOTALSCORE: 12

## 2018-08-03 ENCOUNTER — HOSPITAL ENCOUNTER (OUTPATIENT)
Dept: CARDIOLOGY | Facility: CLINIC | Age: 78
Discharge: HOME | End: 2018-08-03
Attending: FAMILY MEDICINE | Admitting: FAMILY MEDICINE
Payer: MEDICARE

## 2018-08-03 DIAGNOSIS — R00.2 HEART PALPITATIONS: ICD-10-CM

## 2018-08-03 PROCEDURE — 0296T ZIO PATCH HOLTER: CPT

## 2018-08-03 PROCEDURE — 0298T ZZC EXT ECG > 48HR TO 21 DAY REVIEW AND INTERPRETATN: CPT | Performed by: INTERNAL MEDICINE

## 2019-04-11 ENCOUNTER — OFFICE VISIT (OUTPATIENT)
Dept: FAMILY MEDICINE | Facility: CLINIC | Age: 79
End: 2019-04-11
Payer: COMMERCIAL

## 2019-04-11 VITALS
BODY MASS INDEX: 29.41 KG/M2 | HEIGHT: 63 IN | HEART RATE: 90 BPM | OXYGEN SATURATION: 98 % | DIASTOLIC BLOOD PRESSURE: 80 MMHG | TEMPERATURE: 99.4 F | SYSTOLIC BLOOD PRESSURE: 139 MMHG | RESPIRATION RATE: 16 BRPM | WEIGHT: 166 LBS

## 2019-04-11 DIAGNOSIS — G62.9 PERIPHERAL POLYNEUROPATHY: ICD-10-CM

## 2019-04-11 DIAGNOSIS — E03.9 ACQUIRED HYPOTHYROIDISM: ICD-10-CM

## 2019-04-11 DIAGNOSIS — M54.9 UPPER BACK PAIN ON RIGHT SIDE: ICD-10-CM

## 2019-04-11 DIAGNOSIS — E78.5 HYPERLIPIDEMIA LDL GOAL <130: ICD-10-CM

## 2019-04-11 DIAGNOSIS — Z00.01 ENCOUNTER FOR GENERAL ADULT MEDICAL EXAMINATION WITH ABNORMAL FINDINGS: Primary | ICD-10-CM

## 2019-04-11 DIAGNOSIS — K21.9 GASTROESOPHAGEAL REFLUX DISEASE WITHOUT ESOPHAGITIS: ICD-10-CM

## 2019-04-11 DIAGNOSIS — I10 ESSENTIAL HYPERTENSION WITH GOAL BLOOD PRESSURE LESS THAN 140/90: ICD-10-CM

## 2019-04-11 DIAGNOSIS — J45.20 MILD INTERMITTENT ASTHMA, UNCOMPLICATED: ICD-10-CM

## 2019-04-11 LAB
ALBUMIN SERPL-MCNC: 4.1 G/DL (ref 3.4–5)
ALP SERPL-CCNC: 187 U/L (ref 40–150)
ALT SERPL W P-5'-P-CCNC: 19 U/L (ref 0–50)
ANION GAP SERPL CALCULATED.3IONS-SCNC: 7 MMOL/L (ref 3–14)
AST SERPL W P-5'-P-CCNC: 10 U/L (ref 0–45)
BILIRUB SERPL-MCNC: 0.4 MG/DL (ref 0.2–1.3)
BUN SERPL-MCNC: 21 MG/DL (ref 7–30)
CALCIUM SERPL-MCNC: 9.8 MG/DL (ref 8.5–10.1)
CHLORIDE SERPL-SCNC: 105 MMOL/L (ref 94–109)
CHOLEST SERPL-MCNC: 213 MG/DL
CO2 SERPL-SCNC: 27 MMOL/L (ref 20–32)
CREAT SERPL-MCNC: 0.8 MG/DL (ref 0.52–1.04)
ERYTHROCYTE [DISTWIDTH] IN BLOOD BY AUTOMATED COUNT: 13.2 % (ref 10–15)
GFR SERPL CREATININE-BSD FRML MDRD: 71 ML/MIN/{1.73_M2}
GLUCOSE SERPL-MCNC: 101 MG/DL (ref 70–99)
HCT VFR BLD AUTO: 39.4 % (ref 35–47)
HDLC SERPL-MCNC: 47 MG/DL
HGB BLD-MCNC: 12.7 G/DL (ref 11.7–15.7)
LDLC SERPL CALC-MCNC: 118 MG/DL
MCH RBC QN AUTO: 29 PG (ref 26.5–33)
MCHC RBC AUTO-ENTMCNC: 32.2 G/DL (ref 31.5–36.5)
MCV RBC AUTO: 90 FL (ref 78–100)
NONHDLC SERPL-MCNC: 166 MG/DL
PLATELET # BLD AUTO: 355 10E9/L (ref 150–450)
POTASSIUM SERPL-SCNC: 4.1 MMOL/L (ref 3.4–5.3)
PROT SERPL-MCNC: 8.5 G/DL (ref 6.8–8.8)
RBC # BLD AUTO: 4.38 10E12/L (ref 3.8–5.2)
SODIUM SERPL-SCNC: 139 MMOL/L (ref 133–144)
TRIGL SERPL-MCNC: 238 MG/DL
TSH SERPL DL<=0.005 MIU/L-ACNC: 2.02 MU/L (ref 0.4–4)
VIT B12 SERPL-MCNC: 453 PG/ML (ref 193–986)
WBC # BLD AUTO: 9 10E9/L (ref 4–11)

## 2019-04-11 PROCEDURE — 82607 VITAMIN B-12: CPT | Performed by: FAMILY MEDICINE

## 2019-04-11 PROCEDURE — 85027 COMPLETE CBC AUTOMATED: CPT | Performed by: FAMILY MEDICINE

## 2019-04-11 PROCEDURE — 00000402 ZZHCL STATISTIC TOTAL PROTEIN: Performed by: FAMILY MEDICINE

## 2019-04-11 PROCEDURE — 80053 COMPREHEN METABOLIC PANEL: CPT | Performed by: FAMILY MEDICINE

## 2019-04-11 PROCEDURE — 80061 LIPID PANEL: CPT | Performed by: FAMILY MEDICINE

## 2019-04-11 PROCEDURE — 84443 ASSAY THYROID STIM HORMONE: CPT | Performed by: FAMILY MEDICINE

## 2019-04-11 PROCEDURE — 84165 PROTEIN E-PHORESIS SERUM: CPT | Performed by: FAMILY MEDICINE

## 2019-04-11 PROCEDURE — 99214 OFFICE O/P EST MOD 30 MIN: CPT | Mod: 25 | Performed by: FAMILY MEDICINE

## 2019-04-11 PROCEDURE — 99397 PER PM REEVAL EST PAT 65+ YR: CPT | Performed by: FAMILY MEDICINE

## 2019-04-11 PROCEDURE — 36415 COLL VENOUS BLD VENIPUNCTURE: CPT | Performed by: FAMILY MEDICINE

## 2019-04-11 RX ORDER — ALBUTEROL SULFATE 90 UG/1
AEROSOL, METERED RESPIRATORY (INHALATION)
Qty: 18 G | Refills: 11 | Status: SHIPPED | OUTPATIENT
Start: 2019-04-11 | End: 2020-05-20

## 2019-04-11 RX ORDER — ATORVASTATIN CALCIUM 20 MG/1
20 TABLET, FILM COATED ORAL DAILY
Qty: 90 TABLET | Refills: 3 | Status: SHIPPED | OUTPATIENT
Start: 2019-04-11 | End: 2020-04-24

## 2019-04-11 RX ORDER — LISINOPRIL 20 MG/1
20 TABLET ORAL DAILY
Qty: 90 TABLET | Refills: 3 | Status: SHIPPED | OUTPATIENT
Start: 2019-04-11 | End: 2020-04-24

## 2019-04-11 RX ORDER — LEVOTHYROXINE SODIUM 88 UG/1
88 TABLET ORAL DAILY
Qty: 90 TABLET | Refills: 3 | Status: SHIPPED | OUTPATIENT
Start: 2019-04-11 | End: 2020-04-24

## 2019-04-11 RX ORDER — FLUTICASONE PROPIONATE 110 UG/1
AEROSOL, METERED RESPIRATORY (INHALATION)
Qty: 1 INHALER | Status: SHIPPED | OUTPATIENT
Start: 2019-04-11 | End: 2020-08-25

## 2019-04-11 RX ORDER — ESOMEPRAZOLE MAGNESIUM 40 MG/1
40 CAPSULE, DELAYED RELEASE ORAL
Qty: 90 CAPSULE | Refills: 3 | Status: CANCELLED | OUTPATIENT
Start: 2019-04-11

## 2019-04-11 RX ORDER — DILTIAZEM HYDROCHLORIDE 240 MG/1
240 CAPSULE, EXTENDED RELEASE ORAL DAILY
Qty: 90 CAPSULE | Refills: 3 | Status: SHIPPED | OUTPATIENT
Start: 2019-04-11 | End: 2020-04-24

## 2019-04-11 ASSESSMENT — ASTHMA QUESTIONNAIRES
QUESTION_5 LAST FOUR WEEKS HOW WOULD YOU RATE YOUR ASTHMA CONTROL: WELL CONTROLLED
QUESTION_4 LAST FOUR WEEKS HOW OFTEN HAVE YOU USED YOUR RESCUE INHALER OR NEBULIZER MEDICATION (SUCH AS ALBUTEROL): ONE OR TWO TIMES PER DAY
QUESTION_1 LAST FOUR WEEKS HOW MUCH OF THE TIME DID YOUR ASTHMA KEEP YOU FROM GETTING AS MUCH DONE AT WORK, SCHOOL OR AT HOME: NONE OF THE TIME
ACUTE_EXACERBATION_TODAY: NO
QUESTION_3 LAST FOUR WEEKS HOW OFTEN DID YOUR ASTHMA SYMPTOMS (WHEEZING, COUGHING, SHORTNESS OF BREATH, CHEST TIGHTNESS OR PAIN) WAKE YOU UP AT NIGHT OR EARLIER THAN USUAL IN THE MORNING: NOT AT ALL
QUESTION_2 LAST FOUR WEEKS HOW OFTEN HAVE YOU HAD SHORTNESS OF BREATH: ONCE OR TWICE A WEEK
ACT_TOTALSCORE: 20

## 2019-04-11 ASSESSMENT — ENCOUNTER SYMPTOMS
BREAST MASS: 0
WEAKNESS: 0
FREQUENCY: 0
HEMATOCHEZIA: 0
PARESTHESIAS: 0
DIZZINESS: 0
NUMBNESS: 1
SHORTNESS OF BREATH: 0
HEADACHES: 0
HEMATURIA: 0
NERVOUS/ANXIOUS: 0
SORE THROAT: 0
PALPITATIONS: 0
MYALGIAS: 0
DIARRHEA: 0
FEVER: 0
DYSURIA: 0
ABDOMINAL PAIN: 0
NAUSEA: 0
CONSTIPATION: 0
CHILLS: 0
COUGH: 0
EYE PAIN: 0

## 2019-04-11 ASSESSMENT — MIFFLIN-ST. JEOR: SCORE: 1207.1

## 2019-04-11 ASSESSMENT — ACTIVITIES OF DAILY LIVING (ADL): CURRENT_FUNCTION: NO ASSISTANCE NEEDED

## 2019-04-11 NOTE — NURSING NOTE
"Chief Complaint   Patient presents with     Physical     Derm Problem     spot on back     Numbness     Toes       Initial /80 (BP Location: Right arm)   Pulse 90   Temp 99.4  F (37.4  C) (Tympanic)   Resp 16   Ht 1.6 m (5' 3\")   Wt 75.3 kg (166 lb)   SpO2 98%   BMI 29.41 kg/m   Estimated body mass index is 29.41 kg/m  as calculated from the following:    Height as of this encounter: 1.6 m (5' 3\").    Weight as of this encounter: 75.3 kg (166 lb).    Patient presents to the clinic using No DME    Health Maintenance that is potentially due pending provider review:  NONE    n/a    Is there anyone who you would like to be able to receive your results? No  If yes have patient fill out LIGIA    "

## 2019-04-11 NOTE — PATIENT INSTRUCTIONS
Use one of these over the counter products instead of Nexium:     Omeprazole (Prilosec) 20 mg   Lansoprazole (Prevacid)15 mg  Pantoprazole (Prontoniix) 20 mg    Nexium may be over the counter as well, you can try the lower dose first of 20 mg    All of the above can be taken one in am, one at night if one doesn't work for you    Labs today    Consider chest x-ray

## 2019-04-11 NOTE — PROGRESS NOTES
"SUBJECTIVE:   Yudelka Beckett is a 78 year old female who presents for Preventive Visit.      Are you in the first 12 months of your Medicare coverage?  No    Healthy Habits:     In general, how would you rate your overall health?  Good    Frequency of exercise:  6-7 days/week    Duration of exercise:  45-60 minutes    Do you usually eat at least 4 servings of fruit and vegetables a day, include whole grains    & fiber and avoid regularly eating high fat or \"junk\" foods?  No    Taking medications regularly:  Yes    Medication side effects:  None    Ability to successfully perform activities of daily living:  No assistance needed    Home Safety:  No safety concerns identified    Hearing Impairment:  No hearing concerns    In the past 6 months, have you been bothered by leaking of urine?  No    In general, how would you rate your overall mental or emotional health?  Good      PHQ-2 Total Score: 0    Additional concerns today:  No  Numbness   Associated symptoms include numbness. Pertinent negatives include no abdominal pain, chest pain, chills, congestion, coughing, fever, headaches, myalgias, nausea, rash, sore throat or weakness.     Do you feel safe in your environment? Yes    Do you have a Health Care Directive? No: Advance care planning was reviewed with patient; patient declined at this time.      Fall risk       Cognitive Screening   1) Repeat 3 items (Leader, Season, Table)    2) Clock draw: NORMAL  3) 3 item recall: Recalls 3 objects  Results: 3 items recalled: COGNITIVE IMPAIRMENT LESS LIKELY    Mini-CogTM Copyright [place]. Licensed by the author for use in [facility]; reprinted with permission (nuris@.[facility]). All rights reserved.      Do you have sleep apnea, excessive snoring or daytime drowsiness?: no    Reviewed and updated as needed this visit by clinical staff  Tobacco  Allergies  Meds  Med Hx  Surg Hx  Fam Hx  Soc Hx        Reviewed and updated as needed this visit by " Provider  Tobacco  Allergies  Meds  Problems  Med Hx  Surg Hx  Fam Hx        Social History     Tobacco Use     Smoking status: Former Smoker     Years: 15.00     Types: Cigarettes     Last attempt to quit: 1985     Years since quittin.3     Smokeless tobacco: Never Used   Substance Use Topics     Alcohol use: No         Alcohol Use 2019   Prescreen: >3 drinks/day or >7 drinks/week? Not Applicable   Prescreen: >3 drinks/day or >7 drinks/week? -       Hypertension-  On lisinopril, diltiazem.   BP Readings from Last 6 Encounters:   19 139/80   18 124/78   18 154/76   10/23/17 131/73   17 139/69   17 150/83      Hyperlipidemia-on lipitor  Recent Labs   Lab Test 18  1108 16  1057 10/27/15  1103 14  1102   CHOL 193 171 177 189   HDL 50 47* 50* 48*   * 88 90 101   TRIG 146 180* 184* 201*   CHOLHDLRATIO  --   --  3.5 3.9     hypothyroidism -on replacement  TSH   Date Value Ref Range Status   2019 2.02 0.40 - 4.00 mU/L Final      Area on back-  Has numbness in to her right arm and hand. Happens mostly with laying down, doesn't wake her. Into the whole hand. When gets up it goes away. Also has carpal tunnel.   No neck pain. Has an area on her back to the right of the spine below shoulder blade. Was very pruritic, then moved to higher area, still pruritic, and skin hurts to touch. That has been like that for a few weeks. Sometimes itches so terribly, has used lotion and hydrocortisone cream.     Toes are numb-all the time. Started years ago with the right big toe, then has slowly progressed to be all the toes    Legs can feel that they are vibrating inside and feel cold, although they feel warm to the touch.     Has a bad shoulder on the right as well. That is long standing.     Current providers sharing in care for this patient include:   Patient Care Team:  Azalea Tobar MD as PCP - General (Family Practice)  Azalea Tobar MD as  Assigned PCP    The following health maintenance items are reviewed in Epic and correct as of today:  Health Maintenance   Topic Date Due     ZOSTER IMMUNIZATION (1 of 2) 09/24/1991     ASTHMA ACTION PLAN Q1 YR  06/29/2017     ADVANCE DIRECTIVE PLANNING Q5 YRS  02/11/2018     INFLUENZA VACCINE (1) 09/01/2018     MEDICARE ANNUAL WELLNESS VISIT  04/12/2019     FALL RISK ASSESSMENT  04/12/2019     ASTHMA CONTROL TEST Q6 MOS  10/11/2019     TSH Q1 YEAR  04/11/2020     PHQ-2 Q1 YR  04/11/2020     LIPID SCREEN Q5 YR FEMALE (SYSTEM ASSIGNED)  04/11/2024     DTAP/TDAP/TD IMMUNIZATION (4 - Td) 05/20/2026     DEXA SCAN SCREENING (SYSTEM ASSIGNED)  Completed     IPV IMMUNIZATION  Aged Out     MENINGITIS IMMUNIZATION  Aged Out     BP Readings from Last 3 Encounters:   04/11/19 139/80   07/31/18 124/78   04/12/18 154/76    Wt Readings from Last 3 Encounters:   04/11/19 75.3 kg (166 lb)   07/31/18 76.7 kg (169 lb)   04/12/18 76.7 kg (169 lb)             Review of Systems   Constitutional: Negative for chills and fever.   HENT: Negative for congestion, ear pain, hearing loss and sore throat.    Eyes: Negative for pain and visual disturbance.   Respiratory: Negative for cough and shortness of breath.    Cardiovascular: Negative for chest pain, palpitations and peripheral edema.   Gastrointestinal: Negative for abdominal pain, constipation, diarrhea, hematochezia and nausea.   Breasts:  Negative for tenderness, breast mass and discharge.   Genitourinary: Negative for dysuria, frequency, genital sores, hematuria, pelvic pain, urgency, vaginal bleeding and vaginal discharge.   Musculoskeletal: Negative for myalgias.   Skin: Negative for rash.   Neurological: Positive for numbness. Negative for dizziness, weakness, headaches and paresthesias.   Psychiatric/Behavioral: Negative for mood changes. The patient is not nervous/anxious.        OBJECTIVE:   /80 (BP Location: Right arm)   Pulse 90   Temp 99.4  F (37.4  C) (Tympanic)    "Resp 16   Ht 1.6 m (5' 3\")   Wt 75.3 kg (166 lb)   SpO2 98%   BMI 29.41 kg/m   Estimated body mass index is 29.41 kg/m  as calculated from the following:    Height as of this encounter: 1.6 m (5' 3\").    Weight as of this encounter: 75.3 kg (166 lb).  Physical Exam  GENERAL: healthy, alert and no distress  EYES: Eyes grossly normal to inspection, PERRL and conjunctivae and sclerae normal  HENT: ear canals and TM's normal, nose and mouth without ulcers or lesions  NECK: no adenopathy, no asymmetry, masses, or scars and thyroid normal to palpation  RESP: lungs clear to auscultation - no rales, rhonchi or wheezes  CV: regular rate and rhythm, normal S1 S2, no S3 or S4, no murmur, click or rub, no peripheral edema and peripheral pulses strong  ABDOMEN: soft, nontender, no hepatosplenomegaly, no masses and bowel sounds normal  MS: no gross musculoskeletal defects noted, no edema  SKIN: no suspicious lesions or rashes, area of pruritis on upper back is normal  NEURO: Normal strength and tone, mentation intact and speech normal, she has reduced sensation to monofilament exam of toes  PSYCH: mentation appears normal, affect normal/bright      ASSESSMENT / PLAN:   Yudelka was seen today for physical, derm problem and numbness.    Diagnoses and all orders for this visit:    Encounter for general adult medical examination with abnormal findings    Mild intermittent asthma, uncomplicated  -     albuterol (VENTOLIN HFA) 108 (90 Base) MCG/ACT inhaler; INHALE 2 PUFFS INTO THE LUNGS EVERY 6 HOURS  -     fluticasone (FLOVENT HFA) 110 MCG/ACT inhaler; 2 puffs BID  -     ipratropium (ATROVENT HFA) 17 MCG/ACT inhaler; Inhale 2 puffs into the lungs 4 times daily as needed for wheezing    Hyperlipidemia LDL goal <130  -     atorvastatin (LIPITOR) 20 MG tablet; Take 1 tablet (20 mg) by mouth daily  -     Lipid panel reflex to direct LDL Non-fasting    Essential hypertension with goal blood pressure less than 140/90  -     diltiazem ER " "(DILT-XR) 240 MG 24 hr ER beaded capsule; Take 1 capsule (240 mg) by mouth daily  -     lisinopril (PRINIVIL/ZESTRIL) 20 MG tablet; Take 1 tablet (20 mg) by mouth daily Fill when needed  -     CBC with platelets  -     Comprehensive metabolic panel    Gastroesophageal reflux disease without esophagitis    Acquired hypothyroidism  -     levothyroxine (SYNTHROID/LEVOTHROID) 88 MCG tablet; Take 1 tablet (88 mcg) by mouth daily  -     TSH    Peripheral polyneuropathy  -     Vitamin B12  -     Protein electrophoresis    Upper back pain on right side  -     XR Chest 2 Views; Future      Recommended chest x-ray but she had to be somewhere, so will do it in [place]  [facility]   Consider neurology consult  Not sure what the pruritis is about, nothing seen on exam. Can contniue cortisone cream, will check labs    End of Life Planning:  Patient currently has an advanced directive: Yes.  Practitioner is supportive of decision.    COUNSELING:  Reviewed preventive health counseling, as reflected in patient instructions    BP Readings from Last 1 Encounters:   04/11/19 139/80     Estimated body mass index is 29.41 kg/m  as calculated from the following:    Height as of this encounter: 1.6 m (5' 3\").    Weight as of this encounter: 75.3 kg (166 lb).       reports that she quit smoking about 34 years ago. Her smoking use included cigarettes. She quit after 15.00 years of use. She has never used smokeless tobacco.      Appropriate preventive services were discussed with this patient, including applicable screening as appropriate for cardiovascular disease, diabetes, osteopenia/osteoporosis, and glaucoma.  As appropriate for age/gender, discussed screening for colorectal cancer, prostate cancer, breast cancer, and cervical cancer. Checklist reviewing preventive services available has been given to the patient.    Reviewed patients plan of care and provided an AVS. The Basic Care Plan (routine screening as documented in Health " Maintenance) for Yudelka meets the Care Plan requirement. This Care Plan has been established and reviewed with the Patient.    Counseling Resources:  ATP IV Guidelines  Pooled Cohorts Equation Calculator  Breast Cancer Risk Calculator  FRAX Risk Assessment  ICSI Preventive Guidelines  Dietary Guidelines for Americans, 2010  USDA's MyPlate  ASA Prophylaxis  Lung CA Screening    zAalea Tobar MD  [facility]    Identified Health Risks:

## 2019-04-12 LAB
ALBUMIN SERPL ELPH-MCNC: 4.4 G/DL (ref 3.7–5.1)
ALPHA1 GLOB SERPL ELPH-MCNC: 0.3 G/DL (ref 0.2–0.4)
ALPHA2 GLOB SERPL ELPH-MCNC: 0.9 G/DL (ref 0.5–0.9)
B-GLOBULIN SERPL ELPH-MCNC: 1.1 G/DL (ref 0.6–1)
GAMMA GLOB SERPL ELPH-MCNC: 1.1 G/DL (ref 0.7–1.6)
M PROTEIN SERPL ELPH-MCNC: 0 G/DL
PROT PATTERN SERPL ELPH-IMP: ABNORMAL

## 2019-04-12 ASSESSMENT — ASTHMA QUESTIONNAIRES: ACT_TOTALSCORE: 20

## 2019-07-30 ENCOUNTER — OFFICE VISIT (OUTPATIENT)
Dept: FAMILY MEDICINE | Facility: CLINIC | Age: 79
End: 2019-07-30
Payer: COMMERCIAL

## 2019-07-30 VITALS
RESPIRATION RATE: 20 BRPM | TEMPERATURE: 98.9 F | HEART RATE: 91 BPM | DIASTOLIC BLOOD PRESSURE: 80 MMHG | SYSTOLIC BLOOD PRESSURE: 128 MMHG | OXYGEN SATURATION: 96 % | BODY MASS INDEX: 28.7 KG/M2 | WEIGHT: 162 LBS | HEIGHT: 63 IN

## 2019-07-30 DIAGNOSIS — E78.5 HYPERLIPIDEMIA LDL GOAL <130: ICD-10-CM

## 2019-07-30 DIAGNOSIS — E03.9 ACQUIRED HYPOTHYROIDISM: ICD-10-CM

## 2019-07-30 DIAGNOSIS — K21.9 GASTROESOPHAGEAL REFLUX DISEASE WITHOUT ESOPHAGITIS: ICD-10-CM

## 2019-07-30 DIAGNOSIS — H25.89 OTHER AGE-RELATED CATARACT OF LEFT EYE: ICD-10-CM

## 2019-07-30 DIAGNOSIS — J45.20 MILD INTERMITTENT ASTHMA, UNCOMPLICATED: ICD-10-CM

## 2019-07-30 DIAGNOSIS — Z01.818 PREOP GENERAL PHYSICAL EXAM: Primary | ICD-10-CM

## 2019-07-30 DIAGNOSIS — I10 ESSENTIAL HYPERTENSION WITH GOAL BLOOD PRESSURE LESS THAN 140/90: ICD-10-CM

## 2019-07-30 PROCEDURE — 99397 PER PM REEVAL EST PAT 65+ YR: CPT | Performed by: FAMILY MEDICINE

## 2019-07-30 PROCEDURE — 99214 OFFICE O/P EST MOD 30 MIN: CPT | Mod: 25 | Performed by: FAMILY MEDICINE

## 2019-07-30 PROCEDURE — 99207 C PAF COMPLETED  NO CHARGE: CPT | Performed by: FAMILY MEDICINE

## 2019-07-30 ASSESSMENT — MIFFLIN-ST. JEOR: SCORE: 1188.96

## 2019-07-30 NOTE — PROGRESS NOTES
[facility]  [address] [place] [id number]  [phone]  Dept: [phone]    PRE-OP EVALUATION:  Today's date: 2019    Yudelka Beckett (: 1940) presents for pre-operative evaluation assessment as requested by Dr. Song.  She requires evaluation and anesthesia risk assessment prior to undergoing surgery/procedure for treatment of left eye catarct .    Proposed Surgery/ Procedure: Left Cataract  Date of Surgery/ Procedure: 2019  Time of Surgery/ Procedure:   Hospital/Surgical Facility: [facility]  Fax number for surgical facility: [phone]  Primary Physician: Azalea Tobar  Type of Anesthesia Anticipated: to be determined    Patient has a Health Care Directive or Living Will:  NO    1. NO - Do you have a history of heart attack, stroke, stent, bypass or surgery on an artery in the head, neck, heart or legs?  2. YES- Do you ever have any pain or discomfort in your chest? rare  3. NO - Do you have a history of  Heart Failure?  4. NO - Are you troubled by shortness of breath when: walking on the level, up a slight hill or at night?  5. NO - Do you currently have a cold, bronchitis or other respiratory infection?  6. NO - Do you have a cough, shortness of breath or wheezing?  7. NO - Do you sometimes get pains in the calves of your legs when you walk?  8. NO - Do you or anyone in your family have previous history of blood clots?  9. NO - Do you or does anyone in your family have a serious bleeding problem such as prolonged bleeding following surgeries or cuts?  10. NO - Have you ever had problems with anemia or been told to take iron pills?  11. NO - Have you had any abnormal blood loss such as black, tarry or bloody stools, or abnormal vaginal bleeding?  12. NO - Have you ever had a blood transfusion?  13. NO - Have you or any of your relatives ever had problems with anesthesia?  14. NO - Do you have sleep apnea, excessive snoring or  daytime drowsiness?  15. NO - Do you have any prosthetic heart valves?  16. NO - Do you have prosthetic joints?  17. NO - Is there any chance that you may be pregnant?      HPI:     HPI related to upcoming procedure: blurry vision in left eye for years, progressively worsening, last year much worse      HYPERLIPIDEMIA - Patient has a long history of significant Hyperlipidemia requiring medication for treatment with recent good control. Patient reports no problems or side effects with the medication.   Recent Labs   Lab Test 04/11/19  1415 05/17/18  1108  10/27/15  1103 09/25/14  1102   CHOL 213* 193   < > 177 189   HDL 47* 50   < > 50* 48*   * 114*   < > 90 101   TRIG 238* 146   < > 184* 201*   CHOLHDLRATIO  --   --   --  3.5 3.9    < > = values in this interval not displayed.      HYPERTENSION - Patient has longstanding history of HTN , currently denies any symptoms referable to elevated blood pressure. Specifically denies chest pain, palpitations, dyspnea, orthopnea, PND or peripheral edema. Blood pressure readings have been in normal range. Current medication regimen is as listed below. Patient denies any side effects of medication.   On diltiazem and lisinopril    HYPOTHYROIDISM - Patient has a longstanding history of chronic Hypothyroidism. Patient has been doing well, noting no tremor, insomnia, hair loss or changes in skin texture. Continues to take medications as directed, without adverse reactions or side effects. Last TSH   Lab Results   Component Value Date    TSH 2.02 04/11/2019   .    gastroesophageal reflux disease-on Nexium, no problems.     Asthma-has been well controlled. On Atrovent and flovent and albuterol, uses less than before    MEDICAL HISTORY:     Patient Active Problem List    Diagnosis Date Noted     Other idiopathic scoliosis 08/22/2016     Priority: Medium     Varicose veins of both lower extremities 07/07/2016     Priority: Medium     Undiagnosed cardiac murmurs 05/14/2015      "Priority: Medium     Advanced directives, counseling/discussion 02/11/2013     Priority: Medium     Discussed advance care planning with patient; however, patient declined at this time. 2/11/2013          Scoliosis 02/11/2013     Priority: Medium     Carpal tunnel syndrome 08/25/2011     Priority: Medium     Positive EMG 10/2011, saw Dr Galindo who recommended surgical release. Patient did not go through with it.        Cervical radiculopathy 08/25/2011     Priority: Medium     PMR (polymyalgia rheumatica) (H) 08/25/2011     Priority: Medium     Colon stricture (H) 02/15/2011     Priority: Medium     HYPERLIPIDEMIA LDL GOAL <130 10/31/2010     Priority: Medium     S/P hysterectomy 02/10/2010     Priority: Medium     Done for irregular bleeding in the 1970's.  No cancer.  Has had one ovary removed due to tubal pregnancy, she thinks it was the R.  She thinks she still has the L ovary.  \"they left one, I think it was the left.\"       Elevated serum alkaline phosphatase level 08/27/2009     Priority: Medium     Has had work-up with normal RUQ ultrasound and negative ELP, Hep A, Hep C ab, Hep B Ag, normal LFTs- 2004 by Internal medicine       Nephrolithiasis 06/11/2008     Priority: Medium     8/07- ureteroscopy with lasar lithotripsy for large 15 mm stone, stent placed at [facility]. Has 6 mm left lower kidney pole calculus- pt has elected to watch. Sees Dr. Lutz at [facility].        Abnormal glucose 09/03/2007     Priority: Medium     Problem list name updated by automated process. Provider to review       Essential hypertension 05/18/2005     Priority: Medium     Problem list name updated by automated process. Provider to review       Esophageal reflux 05/18/2005     Priority: Medium     EGD 11/02 showing small hiatal hernia       Hypothyroidism 05/18/2005     Priority: Medium     Problem list name updated by automated process. Provider to review       Mild intermittent asthma 05/18/2005     Priority: Medium      Past " "Medical History:   Diagnosis Date     Basal cell carcinoma      Esophageal reflux      Other and unspecified hyperlipidemia      Unspecified essential hypertension      Unspecified hypothyroidism      Past Surgical History:   Procedure Laterality Date     COLONOSCOPY  2011    COLONOSCOPY performed by BHARTI DUNHAM at [facility]     Current Outpatient Medications   Medication Sig Dispense Refill     albuterol (VENTOLIN HFA) 108 (90 Base) MCG/ACT inhaler INHALE 2 PUFFS INTO THE LUNGS EVERY 6 HOURS 18 g 11     atorvastatin (LIPITOR) 20 MG tablet Take 1 tablet (20 mg) by mouth daily 90 tablet 3     diltiazem ER (DILT-XR) 240 MG 24 hr ER beaded capsule Take 1 capsule (240 mg) by mouth daily 90 capsule 3     esomeprazole (NEXIUM) 40 MG CR capsule Take 1 capsule (40 mg) by mouth every morning (before breakfast) One hour before meals fill when needed 90 capsule 3     fluticasone (FLOVENT HFA) 110 MCG/ACT inhaler 2 puffs BID 1 Inhaler prn     IBUPROFEN 3 times daily        ipratropium (ATROVENT HFA) 17 MCG/ACT inhaler Inhale 2 puffs into the lungs 4 times daily as needed for wheezing 1 Inhaler 3     levothyroxine (SYNTHROID/LEVOTHROID) 88 MCG tablet Take 1 tablet (88 mcg) by mouth daily 90 tablet 3     lisinopril (PRINIVIL/ZESTRIL) 20 MG tablet Take 1 tablet (20 mg) by mouth daily Fill when needed 90 tablet 3     OTC products: None, except as noted above    Allergies   Allergen Reactions     Epinephrine Palpitations     Gets \"cold, jittery, feels weird\"     Ancef [Cefazolin Sodium]      Augmentin [Aspartame]      Levaquin [Levofloxacin Hemihydrate]      Seasonal Allergies      Sulfa Drugs       Latex Allergy: NO    Social History     Tobacco Use     Smoking status: Former Smoker     Years: 15.00     Types: Cigarettes     Last attempt to quit: 1985     Years since quittin.5     Smokeless tobacco: Never Used   Substance Use Topics     Alcohol use: No     History   Drug Use No       Yudelka Beckett is a " 78 year old female who presents for Preventive Visit.      Are you in the first 12 months of your Medicare coverage?  No    HPI  Do you feel safe in your environment? Yes    Do you have a Health Care Directive? No: Advance care planning was reviewed with patient; patient declined at this time.      Fall risk  Fallen 2 or more times in the past year?: No  Any fall with injury in the past year?: No    Cognitive Screening   1) Repeat 3 items (Leader, Season, Table)    2) Clock draw: NORMAL  3) 3 item recall: Recalls 2 objects   Results: NORMAL clock, 1-2 items recalled: COGNITIVE IMPAIRMENT LESS LIKELY    Mini-CogTM Copyright S [place]. Licensed by the author for use in [facility]; reprinted with permission (nuris@[facility]). All rights reserved.      Do you have sleep apnea, excessive snoring or daytime drowsiness?: no    Reviewed and updated as needed this visit by clinical staff  Tobacco  Allergies  Meds  Problems  Med Hx  Surg Hx  Fam Hx  Soc Hx          Reviewed and updated as needed this visit by Provider  Tobacco  Allergies  Meds  Problems  Med Hx  Surg Hx  Fam Hx        Social History     Tobacco Use     Smoking status: Former Smoker     Years: 15.00     Types: Cigarettes     Last attempt to quit: 1985     Years since quittin.5     Smokeless tobacco: Never Used   Substance Use Topics     Alcohol use: No         Alcohol Use 2019   Prescreen: >3 drinks/day or >7 drinks/week? Not Applicable   Prescreen: >3 drinks/day or >7 drinks/week? -       Current providers sharing in care for this patient include:   Patient Care Team:  Azalea Tobar MD as PCP - General (Family Practice)  Azalea Tobar MD as Assigned PCP    The following health maintenance items are reviewed in Epic and correct as of today:  Health Maintenance   Topic Date Due     ZOSTER IMMUNIZATION (1 of 2) 1991     ASTHMA ACTION PLAN  2017     ADVANCE CARE PLANNING  2018     FALL RISK  "ASSESSMENT  04/12/2019     INFLUENZA VACCINE (1) 09/01/2019     ASTHMA CONTROL TEST  10/11/2019     MEDICARE ANNUAL WELLNESS VISIT  04/11/2020     TSH W/FREE T4 REFLEX  04/11/2020     LIPID  04/11/2024     DTAP/TDAP/TD IMMUNIZATION (4 - Td) 05/20/2026     DEXA  Completed     PHQ-2  Completed     IPV IMMUNIZATION  Aged Out     MENINGITIS IMMUNIZATION  Aged Out     BP Readings from Last 3 Encounters:   07/30/19 128/80   04/11/19 139/80   07/31/18 124/78    Wt Readings from Last 3 Encounters:   07/30/19 73.5 kg (162 lb)   04/11/19 75.3 kg (166 lb)   07/31/18 76.7 kg (169 lb)           Review of Systems  CONSTITUTIONAL: NEGATIVE for fever, chills, change in weight  ENT/MOUTH: NEGATIVE for ear, mouth and throat problems  RESP: NEGATIVE for significant cough or SOB  CV: NEGATIVE for recent chest pain, palpitations or peripheral edema  Twice a year gets a little pain in left chest for a minute. Is able to walk and care for horses without trouble  GI: NEGATIVE for nausea, abdominal pain, heartburn, or change in bowel habits  : No dysuria, urinary frequency or urgency.     OBJECTIVE:   /80 (BP Location: Right arm)   Pulse 91   Temp 98.9  F (37.2  C) (Tympanic)   Resp 20   Ht 1.6 m (5' 3\")   Wt 73.5 kg (162 lb)   SpO2 96%   BMI 28.70 kg/m   Estimated body mass index is 28.7 kg/m  as calculated from the following:    Height as of this encounter: 1.6 m (5' 3\").    Weight as of this encounter: 73.5 kg (162 lb).  Physical Exam  General: appears well, no distress  HEENT: TMs and canals negative bilaterally, oropharynx with no erythema, no exudate  Neck: supple, no adenopathy  Heart: regular rate and rhythm, normal S1S2, no murmur  Lungs: clear to ascultation   Abd: soft, nontender, no HSM, no mass or distention   Ext: no edema    Diagnostic Test Results:  Labs reviewed in Epic      End of Life Planning:  Patient currently has an advanced directive: Yes.  Practitioner is supportive of " "decision.    COUNSELING:  Reviewed preventive health counseling, as reflected in patient instructions    Estimated body mass index is 28.7 kg/m  as calculated from the following:    Height as of this encounter: 1.6 m (5' 3\").    Weight as of this encounter: 73.5 kg (162 lb).         reports that she quit smoking about 34 years ago. Her smoking use included cigarettes. She quit after 15.00 years of use. She has never used smokeless tobacco.      Appropriate preventive services were discussed with this patient, including applicable screening as appropriate for cardiovascular disease, diabetes, osteopenia/osteoporosis, and glaucoma.  As appropriate for age/gender, discussed screening for colorectal cancer, prostate cancer, breast cancer, and cervical cancer. Checklist reviewing preventive services available has been given to the patient.    Reviewed patients plan of care and provided an AVS. The Basic Care Plan (routine screening as documented in Health Maintenance) for Yudelka meets the Care Plan requirement. This Care Plan has been established and reviewed with the Patient.    DIAGNOSTICS:   No labs or EKG required for low risk surgery (cataract, skin procedure, breast biopsy, etc)    Recent Labs   Lab Test 04/11/19  1415 04/12/18  1408  08/11/11  1109   HGB 12.7 12.3   < >  --     341   < >  --     137   < >  --    POTASSIUM 4.1 3.8   < >  --    CR 0.80 0.76   < >  --    A1C  --   --   --  6.4*    < > = values in this interval not displayed.        IMPRESSION:   Reason for surgery/procedure: cataract    The proposed surgical procedure is considered LOW risk.    REVISED CARDIAC RISK INDEX  The patient has the following serious cardiovascular risks for perioperative complications such as (MI, PE, VFib and 3  AV Block):  No serious cardiac risks  INTERPRETATION: 0 risks: Class I (very low risk - 0.4% complication rate)    The patient has the following additional risks for perioperative complications:  No " identified additional risks      ICD-10-CM    1. Preop general physical exam Z01.818    2. Other age-related cataract of left eye H25.89    3. Gastroesophageal reflux disease without esophagitis K21.9 esomeprazole (NEXIUM) 40 MG DR capsule   4. Mild intermittent asthma, uncomplicated J45.20    5. Hyperlipidemia LDL goal <130 E78.5    6. Essential hypertension with goal blood pressure less than 140/90 I10    7. Acquired hypothyroidism E03.9        RECOMMENDATIONS:       --Patient is to take all scheduled medications on the day of surgery EXCEPT for modifications listed below.    APPROVAL GIVEN to proceed with proposed procedure, without further diagnostic evaluation       Signed Electronically by: Azalea Tobar MD    Copy of this evaluation report is provided to requesting physician.    [facility] Preop Guidelines    Revised Cardiac Risk Index

## 2019-07-30 NOTE — NURSING NOTE
"Chief Complaint   Patient presents with     Pre-Op Exam     cataracts left eye       Initial /80 (BP Location: Right arm)   Pulse 91   Temp 98.9  F (37.2  C) (Tympanic)   Resp 20   Ht 1.6 m (5' 3\")   Wt 73.5 kg (162 lb)   SpO2 96%   BMI 28.70 kg/m   Estimated body mass index is 28.7 kg/m  as calculated from the following:    Height as of this encounter: 1.6 m (5' 3\").    Weight as of this encounter: 73.5 kg (162 lb).    Patient presents to the clinic using No DME    Health Maintenance that is potentially due pending provider review:  NONE    n/a    Is there anyone who you would like to be able to receive your results? No  If yes have patient fill out LIGIA    "

## 2019-11-05 ENCOUNTER — APPOINTMENT (OUTPATIENT)
Dept: GENERAL RADIOLOGY | Facility: CLINIC | Age: 79
End: 2019-11-05
Attending: NURSE PRACTITIONER
Payer: COMMERCIAL

## 2019-11-05 ENCOUNTER — HOSPITAL ENCOUNTER (EMERGENCY)
Facility: CLINIC | Age: 79
Discharge: HOME | End: 2019-11-05
Attending: NURSE PRACTITIONER | Admitting: NURSE PRACTITIONER
Payer: COMMERCIAL

## 2019-11-05 VITALS
DIASTOLIC BLOOD PRESSURE: 92 MMHG | RESPIRATION RATE: 16 BRPM | TEMPERATURE: 98.9 F | HEIGHT: 64 IN | OXYGEN SATURATION: 97 % | HEART RATE: 99 BPM | BODY MASS INDEX: 27.81 KG/M2 | SYSTOLIC BLOOD PRESSURE: 143 MMHG

## 2019-11-05 DIAGNOSIS — S80.11XA HEMATOMA OF RIGHT LOWER EXTREMITY, INITIAL ENCOUNTER: ICD-10-CM

## 2019-11-05 DIAGNOSIS — S80.12XA HEMATOMA OF LEFT LOWER EXTREMITY, INITIAL ENCOUNTER: ICD-10-CM

## 2019-11-05 PROCEDURE — 99213 OFFICE O/P EST LOW 20 MIN: CPT | Mod: Z6 | Performed by: NURSE PRACTITIONER

## 2019-11-05 PROCEDURE — G0463 HOSPITAL OUTPT CLINIC VISIT: HCPCS | Performed by: NURSE PRACTITIONER

## 2019-11-05 PROCEDURE — 73590 X-RAY EXAM OF LOWER LEG: CPT | Mod: 50

## 2019-11-05 ASSESSMENT — ENCOUNTER SYMPTOMS
WEAKNESS: 0
NUMBNESS: 0

## 2019-11-05 NOTE — ED AVS SNAPSHOT
[facility]  [address] [facility] [address]  Phone:  [phone]  Fax:  [phone]                                    Yudelka Beckett   MRN: [id number]    Department:  [facility] Emergency Department   Date of Visit:  11/5/2019           After Visit Summary Signature Page    I have received my discharge instructions, and my questions have been answered. I have discussed any challenges I see with this plan with the nurse or doctor.    ..........................................................................................................................................  Patient/Patient Representative Signature      ..........................................................................................................................................  Patient Representative Print Name and Relationship to Patient    ..................................................               ................................................  Date                                   Time    ..........................................................................................................................................  Reviewed by Signature/Title    ...................................................              ..............................................  Date                                               Time          22EPIC Rev 08/18

## 2019-11-06 NOTE — DISCHARGE INSTRUCTIONS
Ice packs to lower legs (on for 10-15 minutes, off for at least 60 minutes between).  Elevate legs as much as possible.  Ibuprofen 600 mg every 6 hours as needed for pain. (take with food).  Tylenol 650 mg every 6 hours as needed for pain.  Ok to use topical creams.    Return to the emergency department if you are not tolerating the pain, unable to weight bear, or getting worse in any way.

## 2019-11-06 NOTE — ED PROVIDER NOTES
"  History     Chief Complaint   Patient presents with     Fall     HPI  Yudelka Beckett is a 79 year old female who presents to urgent care for evaluation of injury to bilateral lower extremities.  Patient was in the horse barn at home and stumbled over a rake landing on her knees/shins.  This occurred 4 hours ago. Denies any LOC or hitting her head. She was able to get up and ambulate into the house.  Increased swelling and pain of the proximal bilateral shins.  Patient denies being on any blood thinner medication.  Patient is able to bear weight and ambulate.    Allergies:  Allergies   Allergen Reactions     Epinephrine Palpitations     Gets \"cold, jittery, feels weird\"     Ancef [Cefazolin Sodium]      Augmentin [Aspartame]      Levaquin [Levofloxacin Hemihydrate]      Prednisolone      Seasonal Allergies      Sulfa Drugs        Problem List:    Patient Active Problem List    Diagnosis Date Noted     Other idiopathic scoliosis 08/22/2016     Priority: Medium     Varicose veins of both lower extremities 07/07/2016     Priority: Medium     Undiagnosed cardiac murmurs 05/14/2015     Priority: Medium     Advanced directives, counseling/discussion 02/11/2013     Priority: Medium     Discussed advance care planning with patient; however, patient declined at this time. 2/11/2013          Scoliosis 02/11/2013     Priority: Medium     Carpal tunnel syndrome 08/25/2011     Priority: Medium     Positive EMG 10/2011, saw Dr Galindo who recommended surgical release. Patient did not go through with it.        Cervical radiculopathy 08/25/2011     Priority: Medium     PMR (polymyalgia rheumatica) (H) 08/25/2011     Priority: Medium     Colon stricture (H) 02/15/2011     Priority: Medium     HYPERLIPIDEMIA LDL GOAL <130 10/31/2010     Priority: Medium     S/P hysterectomy 02/10/2010     Priority: Medium     Done for irregular bleeding in the 1970's.  No cancer.  Has had one ovary removed due to tubal pregnancy, she " "thinks it was the R.  She thinks she still has the L ovary.  \"they left one, I think it was the left.\"       Elevated serum alkaline phosphatase level 2009     Priority: Medium     Has had work-up with normal RUQ ultrasound and negative ELP, Hep A, Hep C ab, Hep B Ag, normal LFTs- 2004 by Internal medicine       Nephrolithiasis 2008     Priority: Medium     - ureteroscopy with lasar lithotripsy for large 15 mm stone, stent placed at [facility]. Has 6 mm left lower kidney pole calculus- pt has elected to watch. Sees Dr. Lutz at [facility].        Abnormal glucose 2007     Priority: Medium     Problem list name updated by automated process. Provider to review       Essential hypertension 2005     Priority: Medium     Problem list name updated by automated process. Provider to review       Esophageal reflux 2005     Priority: Medium     EGD  showing small hiatal hernia       Hypothyroidism 2005     Priority: Medium     Problem list name updated by automated process. Provider to review       Mild intermittent asthma 2005     Priority: Medium        Past Medical History:    Past Medical History:   Diagnosis Date     Basal cell carcinoma      Esophageal reflux      Other and unspecified hyperlipidemia      Unspecified essential hypertension      Unspecified hypothyroidism        Past Surgical History:    Past Surgical History:   Procedure Laterality Date     COLONOSCOPY  2011    COLONOSCOPY performed by BHARTI DUNHAM at [facility]       Family History:    Family History   Problem Relation Age of Onset     Cerebrovascular Disease Mother      Heart Disease Father      Cancer Maternal Grandmother      Diabetes Paternal Grandmother        Social History:  Marital Status:   [2]  Social History     Tobacco Use     Smoking status: Former Smoker     Years: 15.00     Types: Cigarettes     Last attempt to quit: 1985     Years since quittin.8     Smokeless tobacco: " "Never Used   Substance Use Topics     Alcohol use: No     Drug use: No        Medications:    albuterol (VENTOLIN HFA) 108 (90 Base) MCG/ACT inhaler  atorvastatin (LIPITOR) 20 MG tablet  diltiazem ER (DILT-XR) 240 MG 24 hr ER beaded capsule  esomeprazole (NEXIUM) 20 MG DR capsule  fluticasone (FLOVENT HFA) 110 MCG/ACT inhaler  IBUPROFEN  ipratropium (ATROVENT HFA) 17 MCG/ACT inhaler  levothyroxine (SYNTHROID/LEVOTHROID) 88 MCG tablet  lisinopril (PRINIVIL/ZESTRIL) 20 MG tablet          Review of Systems   Musculoskeletal:        Lower extremity swelling and pain bilaterally.   Neurological: Negative for weakness and numbness.       Physical Exam   BP: (!) 143/92  Pulse: 99  Temp: 98.9  F (37.2  C)  Resp: 16  Height: 162.6 cm (5' 4\")  SpO2: 97 %      Physical Exam  Constitutional:       General: She is in acute distress.      Appearance: She is not ill-appearing.   HENT:      Head: Normocephalic and atraumatic.   Cardiovascular:      Rate and Rhythm: Normal rate.   Pulmonary:      Effort: Pulmonary effort is normal.   Musculoskeletal:      Right knee: She exhibits normal range of motion and no swelling. No tenderness found.      Left knee: She exhibits normal range of motion and no swelling. No tenderness found.      Right lower leg: She exhibits tenderness and swelling.      Left lower leg: She exhibits tenderness and swelling.        Legs:       Comments: significant swelling and tenderness (hematomas) to the proximal bilateral lower extremities (tib/fib)   Neurological:      General: No focal deficit present.      Mental Status: She is alert and oriented to person, place, and time.         ED Course        Procedures             Results for orders placed or performed during the hospital encounter of 11/05/19 (from the past 24 hour(s))   XR Tibia & Fibula Bilateral 2 Views    Narrative    TIBIA AND FIBULA BILATERAL TWO VIEW 11/5/2019 8:25 PM     HISTORY: Fall. Proximal anterior pain and swelling.    COMPARISON: " None.      Impression    IMPRESSION:     Right tibia/fibula: No acute bony abnormality. Degenerative changes at  the right knee.    Left tibia/fibula: No acute or significant chronic bony abnormality.    IBRAHIMA MONIQUE MD       Medications - No data to display    Assessments & Plan (with Medical Decision Making)   79 year old female who fell in her horse barn today landing on her knees/shins. Presented to urgent care for evaluation of pain and swelling of bilateral proximal shins. Pt is ambulatory. Very prominent hematomas to both lower extremities just beneath both knees. Bilateral tib/fib reveals no acute bony abnormality. Discussed treatment of contusions/hematomas with patient. Patient declines ace wraps/compression as she does not think she can tolerate pressure over the area of swelling.      Plan as follows:  Ice packs to lower legs (on for 10-15 minutes, off for at least 60 minutes between).  Elevate legs as much as possible.  Ibuprofen 600 mg every 6 hours as needed for pain. (take with food).  Tylenol 650 mg every 6 hours as needed for pain.  Ok to use topical creams.    Return to the emergency department if you are not tolerating the pain, unable to weight bear, or getting worse in any way.    I have reviewed the nursing notes.    I have reviewed the findings, diagnosis, plan and need for follow up with the patient.      Discharge Medication List as of 11/5/2019  8:45 PM          Final diagnoses:   Hematoma of right lower extremity, initial encounter   Hematoma of left lower extremity, initial encounter       11/5/2019   [facility] EMERGENCY DEPARTMENT     Ginny Noonan APRN CNP  11/05/19 [id number]

## 2020-02-08 ENCOUNTER — HEALTH MAINTENANCE LETTER (OUTPATIENT)
Age: 80
End: 2020-02-08

## 2020-05-18 DIAGNOSIS — J45.20 MILD INTERMITTENT ASTHMA, UNCOMPLICATED: ICD-10-CM

## 2020-05-18 NOTE — LETTER
[facility]  [address] [place] [address]  Phone: [phone]  Fax: [phone]       May 20, 2020         Yudelka Beckett  [address] [facility] [address]            Dear Yudelka:    We are concerned about your health care.  We recently provided you with medication refills.  Many medications require routine follow-up with your doctor.    Your prescription(s) have been refilled for albuterol inhaler so you may have time for the above noted follow-up. Please call to schedule soon so we can assure you have an appointment before your next refills are needed.    Thank you,      Azalea Meehan MD/ Sandra Gibson RN

## 2020-05-20 RX ORDER — ALBUTEROL SULFATE 90 UG/1
AEROSOL, METERED RESPIRATORY (INHALATION)
Qty: 1 INHALER | Refills: 0 | Status: SHIPPED | OUTPATIENT
Start: 2020-05-20 | End: 2020-07-15

## 2020-06-25 ENCOUNTER — OFFICE VISIT (OUTPATIENT)
Dept: FAMILY MEDICINE | Facility: CLINIC | Age: 80
End: 2020-06-25
Payer: COMMERCIAL

## 2020-06-25 VITALS
DIASTOLIC BLOOD PRESSURE: 82 MMHG | SYSTOLIC BLOOD PRESSURE: 164 MMHG | RESPIRATION RATE: 20 BRPM | TEMPERATURE: 98.4 F | HEIGHT: 63 IN | HEART RATE: 91 BPM | BODY MASS INDEX: 28.17 KG/M2 | OXYGEN SATURATION: 97 % | WEIGHT: 159 LBS

## 2020-06-25 DIAGNOSIS — K21.9 GASTROESOPHAGEAL REFLUX DISEASE WITHOUT ESOPHAGITIS: ICD-10-CM

## 2020-06-25 DIAGNOSIS — M54.42 ACUTE LEFT-SIDED LOW BACK PAIN WITH LEFT-SIDED SCIATICA: ICD-10-CM

## 2020-06-25 DIAGNOSIS — E78.5 HYPERLIPIDEMIA LDL GOAL <130: ICD-10-CM

## 2020-06-25 DIAGNOSIS — I10 ESSENTIAL HYPERTENSION WITH GOAL BLOOD PRESSURE LESS THAN 140/90: Primary | ICD-10-CM

## 2020-06-25 DIAGNOSIS — E03.9 ACQUIRED HYPOTHYROIDISM: ICD-10-CM

## 2020-06-25 LAB
ALBUMIN SERPL-MCNC: 3.8 G/DL (ref 3.4–5)
ALP SERPL-CCNC: 188 U/L (ref 40–150)
ALT SERPL W P-5'-P-CCNC: 18 U/L (ref 0–50)
ANION GAP SERPL CALCULATED.3IONS-SCNC: 4 MMOL/L (ref 3–14)
AST SERPL W P-5'-P-CCNC: 13 U/L (ref 0–45)
BASOPHILS # BLD AUTO: 0.1 10E9/L (ref 0–0.2)
BASOPHILS NFR BLD AUTO: 0.9 %
BILIRUB SERPL-MCNC: 0.4 MG/DL (ref 0.2–1.3)
BUN SERPL-MCNC: 25 MG/DL (ref 7–30)
CALCIUM SERPL-MCNC: 9.7 MG/DL (ref 8.5–10.1)
CHLORIDE SERPL-SCNC: 104 MMOL/L (ref 94–109)
CHOLEST SERPL-MCNC: 202 MG/DL
CO2 SERPL-SCNC: 28 MMOL/L (ref 20–32)
CREAT SERPL-MCNC: 0.9 MG/DL (ref 0.52–1.04)
DIFFERENTIAL METHOD BLD: NORMAL
EOSINOPHIL # BLD AUTO: 0.5 10E9/L (ref 0–0.7)
EOSINOPHIL NFR BLD AUTO: 6.7 %
ERYTHROCYTE [DISTWIDTH] IN BLOOD BY AUTOMATED COUNT: 13.5 % (ref 10–15)
GFR SERPL CREATININE-BSD FRML MDRD: 61 ML/MIN/{1.73_M2}
GLUCOSE SERPL-MCNC: 100 MG/DL (ref 70–99)
HCT VFR BLD AUTO: 37.6 % (ref 35–47)
HDLC SERPL-MCNC: 49 MG/DL
HGB BLD-MCNC: 12.1 G/DL (ref 11.7–15.7)
LDLC SERPL CALC-MCNC: 106 MG/DL
LYMPHOCYTES # BLD AUTO: 2.3 10E9/L (ref 0.8–5.3)
LYMPHOCYTES NFR BLD AUTO: 29 %
MCH RBC QN AUTO: 28.7 PG (ref 26.5–33)
MCHC RBC AUTO-ENTMCNC: 32.2 G/DL (ref 31.5–36.5)
MCV RBC AUTO: 89 FL (ref 78–100)
MONOCYTES # BLD AUTO: 0.8 10E9/L (ref 0–1.3)
MONOCYTES NFR BLD AUTO: 9.5 %
NEUTROPHILS # BLD AUTO: 4.4 10E9/L (ref 1.6–8.3)
NEUTROPHILS NFR BLD AUTO: 53.9 %
NONHDLC SERPL-MCNC: 153 MG/DL
PLATELET # BLD AUTO: 362 10E9/L (ref 150–450)
POTASSIUM SERPL-SCNC: 4.4 MMOL/L (ref 3.4–5.3)
PROT SERPL-MCNC: 8.2 G/DL (ref 6.8–8.8)
RBC # BLD AUTO: 4.22 10E12/L (ref 3.8–5.2)
SODIUM SERPL-SCNC: 136 MMOL/L (ref 133–144)
TRIGL SERPL-MCNC: 235 MG/DL
TSH SERPL DL<=0.005 MIU/L-ACNC: 3.51 MU/L (ref 0.4–4)
WBC # BLD AUTO: 8.1 10E9/L (ref 4–11)

## 2020-06-25 PROCEDURE — 80053 COMPREHEN METABOLIC PANEL: CPT | Performed by: FAMILY MEDICINE

## 2020-06-25 PROCEDURE — 85025 COMPLETE CBC W/AUTO DIFF WBC: CPT | Performed by: FAMILY MEDICINE

## 2020-06-25 PROCEDURE — 99214 OFFICE O/P EST MOD 30 MIN: CPT | Performed by: FAMILY MEDICINE

## 2020-06-25 PROCEDURE — 36415 COLL VENOUS BLD VENIPUNCTURE: CPT | Performed by: FAMILY MEDICINE

## 2020-06-25 PROCEDURE — 80061 LIPID PANEL: CPT | Performed by: FAMILY MEDICINE

## 2020-06-25 PROCEDURE — 84443 ASSAY THYROID STIM HORMONE: CPT | Performed by: FAMILY MEDICINE

## 2020-06-25 RX ORDER — DILTIAZEM HYDROCHLORIDE 240 MG/1
240 CAPSULE, EXTENDED RELEASE ORAL DAILY
Qty: 90 CAPSULE | Refills: 3 | Status: SHIPPED | OUTPATIENT
Start: 2020-06-25 | End: 2021-08-09

## 2020-06-25 RX ORDER — LEVOTHYROXINE SODIUM 88 UG/1
88 TABLET ORAL DAILY
Qty: 90 TABLET | Refills: 3 | Status: SHIPPED | OUTPATIENT
Start: 2020-06-25 | End: 2021-08-31

## 2020-06-25 RX ORDER — LISINOPRIL 20 MG/1
20 TABLET ORAL DAILY
Qty: 90 TABLET | Refills: 3 | Status: SHIPPED | OUTPATIENT
Start: 2020-06-25 | End: 2021-08-02

## 2020-06-25 RX ORDER — ATORVASTATIN CALCIUM 20 MG/1
20 TABLET, FILM COATED ORAL DAILY
Qty: 90 TABLET | Refills: 3 | Status: SHIPPED | OUTPATIENT
Start: 2020-06-25 | End: 2020-07-02

## 2020-06-25 ASSESSMENT — MIFFLIN-ST. JEOR: SCORE: 1170.35

## 2020-06-25 NOTE — PROGRESS NOTES
Subjective     Yudelka Beckett is a 79 year old female who presents to clinic today for the following health issues:    HPI   Hypertension Follow-up      Do you check your blood pressure regularly outside of the clinic? No     Are you following a low salt diet? Yes    Are your blood pressures ever more than 140 on the top number (systolic) OR more   than 90 on the bottom number (diastolic), for example 140/90? No  BP Readings from Last 6 Encounters:   06/25/20 (!) 164/82   11/05/19 (!) 143/92   07/30/19 128/80   04/11/19 139/80   07/31/18 124/78   04/12/18 154/76      On diltiazem, lisinopril  At home blood pressures are better  She has white coat hypertension   Doesn't want to change her doses    Hypothyroidism Follow-up      Since last visit, patient describes the following symptoms: Weight stable, no hair loss, no skin changes, no constipation, no loose stools  On levothyroxine   TSH   Date Value Ref Range Status   06/25/2020 3.51 0.40 - 4.00 mU/L Final        hyperlipidemia   On statin  Recent Labs   Lab Test 04/11/19  1415 05/17/18  1108  10/27/15  1103 09/25/14  1102   CHOL 213* 193   < > 177 189   HDL 47* 50   < > 50* 48*   * 114*   < > 90 101   TRIG 238* 146   < > 184* 201*   CHOLHDLRATIO  --   --   --  3.5 3.9    < > = values in this interval not displayed.        Left buttock/back pain x 1 month  Left buttock pain, moves around from low back to bottom of buttock, can go down back of leg  Sitting bothers the most  Ibuprofen helps, takes 400 mg bid  Last xray in 2011 showed scoliosis and degenerative disc disease then    gastroesophageal reflux disease   On PPI, doing well    BP Readings from Last 3 Encounters:   06/25/20 (!) 164/82   11/05/19 (!) 143/92   07/30/19 128/80    Wt Readings from Last 3 Encounters:   06/25/20 72.1 kg (159 lb)   07/30/19 73.5 kg (162 lb)   04/11/19 75.3 kg (166 lb)            Reviewed and updated as needed this visit by Provider  Tobacco  Allergies  Meds   "Problems  Med Hx  Surg Hx  Fam Hx            Review of Systems   ROS: 5 point ROS negative except as noted above in HPI, including Gen., Resp., CV, GI &  system review.         Objective    BP (!) 164/82 (BP Location: Right arm)   Pulse 91   Temp 98.4  F (36.9  C) (Tympanic)   Resp 20   Ht 1.6 m (5' 3\")   Wt 72.1 kg (159 lb)   SpO2 97%   BMI 28.17 kg/m    Body mass index is 28.17 kg/m .  Physical Exam   GENERAL: healthy, alert and no distress  NECK: no adenopathy, no asymmetry, masses, or scars and thyroid normal to palpation  RESP: lungs clear to auscultation - no rales, rhonchi or wheezes  CV: regular rate and rhythm, normal S1 S2, no S3 or S4, no murmur, click or rub, no peripheral edema and peripheral pulses strong  ABDOMEN: soft, nontender, no hepatosplenomegaly, no masses and bowel sounds normal  MS: antalgic gait, right knee deformity, full range of motion of left hip, has good range of motion of back, pain is in low back to posterior thigh, no weakness in lower extremities    Diagnostic Test Results:  Labs reviewed in Epic        Assessment & Plan     1. Essential hypertension with goal blood pressure less than 140/90  Uncertain control  - lisinopril (ZESTRIL) 20 MG tablet; Take 1 tablet (20 mg) by mouth daily Fill when needed  Dispense: 90 tablet; Refill: 3  - diltiazem ER (DILT-XR) 240 MG 24 hr ER beaded capsule; Take 1 capsule (240 mg) by mouth daily  Dispense: 90 capsule; Refill: 3  - Comprehensive metabolic panel  - CBC with platelets and differential    2. Acquired hypothyroidism  controlled  - levothyroxine (SYNTHROID/LEVOTHROID) 88 MCG tablet; Take 1 tablet (88 mcg) by mouth daily  Dispense: 90 tablet; Refill: 3  - TSH    3. Hyperlipidemia LDL goal <130  On statin  - atorvastatin (LIPITOR) 20 MG tablet; Take 1 tablet (20 mg) by mouth daily  Dispense: 90 tablet; Refill: 3  - Lipid panel reflex to direct LDL Non-fasting    4. Gastroesophageal reflux disease without esophagitis  Doing well " "on PPI  Continue omeprazole    5. Acute left-sided low back pain with left-sided sciatica  New  Offered PHYSICAL THERAPY, she declines.   Lidoderm patches, ibuprofen and tylemol  Will monitor for now       Patient Instructions   Try the lidoderm patches 4%, on 12 hrs    Try tylenol 500 mg 2 up to three times a day if needed    Consider physical therapy for the back         BMI:   Estimated body mass index is 28.17 kg/m  as calculated from the following:    Height as of this encounter: 1.6 m (5' 3\").    Weight as of this encounter: 72.1 kg (159 lb).       Return in about 6 months (around 12/25/2020) for recheck back pain.    Azalea Tobar MD  [facility]    [email or web address]://dps.[place].gov/divisions/dvs/forms-[email or web address]    "

## 2020-06-25 NOTE — PATIENT INSTRUCTIONS
Try the lidoderm patches 4%, on 12 hrs    Try tylenol 500 mg 2 up to three times a day if needed    Consider physical therapy for the back

## 2020-06-25 NOTE — LETTER
July 2, 2020      Yudelka Beckett  [address] [address] [facility] [address]      Dear ,    We are writing to inform you of your test results.    Your glucose, or blood sugar, a test for diabetes, was 100. Normal is 60-99.  Hemoglobin measures the amount of red blood cells carrying oxygen to the body's tissues. Yours was normal.  Electrolytes, including sodium, potassium, chloride, bicarbonate and calcium are all normal salts in the bloodstream. Yours are all normal.  Urea nitrogen and creatinine are kidney function tests. Yours are normal.  ALT and AST are liver function tests. Yours are normal.  The TSH looks at thyroid function.  Yours was normal.  Your cholesterol was fine.      Resulted Orders   Comprehensive metabolic panel   Result Value Ref Range    Sodium 136 133 - 144 mmol/L    Potassium 4.4 3.4 - 5.3 mmol/L    Chloride 104 94 - 109 mmol/L    Carbon Dioxide 28 20 - 32 mmol/L    Anion Gap 4 3 - 14 mmol/L    Glucose 100 (H) 70 - 99 mg/dL      Comment:      Fasting specimen    Urea Nitrogen 25 7 - 30 mg/dL    Creatinine 0.90 0.52 - 1.04 mg/dL    GFR Estimate 61 >60 mL/min/[1.73_m2]      Comment:      Non  GFR Calc  Starting 12/18/2018, serum creatinine based estimated GFR (eGFR) will be   calculated using the Chronic Kidney Disease Epidemiology Collaboration   (CKD-EPI) equation.      GFR Estimate If Black 71 >60 mL/min/[1.73_m2]      Comment:       GFR Calc  Starting 12/18/2018, serum creatinine based estimated GFR (eGFR) will be   calculated using the Chronic Kidney Disease Epidemiology Collaboration   (CKD-EPI) equation.      Calcium 9.7 8.5 - 10.1 mg/dL    Bilirubin Total 0.4 0.2 - 1.3 mg/dL    Albumin 3.8 3.4 - 5.0 g/dL    Protein Total 8.2 6.8 - 8.8 g/dL    Alkaline Phosphatase 188 (H) 40 - 150 U/L    ALT 18 0 - 50 U/L    AST 13 0 - 45 U/L   TSH   Result Value Ref Range    TSH 3.51 0.40 - 4.00 mU/L   Lipid panel reflex to direct LDL Non-fasting    Result Value Ref Range    Cholesterol 202 (H) <200 mg/dL      Comment:      Desirable:       <200 mg/dl    Triglycerides 235 (H) <150 mg/dL      Comment:      Borderline high:  150-199 mg/dl  High:             200-499 mg/dl  Very high:       >499 mg/dl  Fasting specimen      HDL Cholesterol 49 (L) >49 mg/dL    LDL Cholesterol Calculated 106 (H) <100 mg/dL      Comment:      Above desirable:  100-129 mg/dl  Borderline High:  130-159 mg/dL  High:             160-189 mg/dL  Very high:       >189 mg/dl      Non HDL Cholesterol 153 (H) <130 mg/dL      Comment:      Above Desirable:  130-159 mg/dl  Borderline high:  160-189 mg/dl  High:             190-219 mg/dl  Very high:       >219 mg/dl     CBC with platelets and differential   Result Value Ref Range    WBC 8.1 4.0 - 11.0 10e9/L    RBC Count 4.22 3.8 - 5.2 10e12/L    Hemoglobin 12.1 11.7 - 15.7 g/dL    Hematocrit 37.6 35.0 - 47.0 %    MCV 89 78 - 100 fl    MCH 28.7 26.5 - 33.0 pg    MCHC 32.2 31.5 - 36.5 g/dL    RDW 13.5 10.0 - 15.0 %    Platelet Count 362 150 - 450 10e9/L    % Neutrophils 53.9 %    % Lymphocytes 29.0 %    % Monocytes 9.5 %    % Eosinophils 6.7 %    % Basophils 0.9 %    Absolute Neutrophil 4.4 1.6 - 8.3 10e9/L    Absolute Lymphocytes 2.3 0.8 - 5.3 10e9/L    Absolute Monocytes 0.8 0.0 - 1.3 10e9/L    Absolute Eosinophils 0.5 0.0 - 0.7 10e9/L    Absolute Basophils 0.1 0.0 - 0.2 10e9/L    Diff Method Automated Method        If you have any questions or concerns, please call the clinic at the number listed above.       Sincerely,        Azalea Tobar MD

## 2020-06-26 ASSESSMENT — ASTHMA QUESTIONNAIRES: ACT_TOTALSCORE: 21

## 2020-07-02 DIAGNOSIS — E78.5 HYPERLIPIDEMIA LDL GOAL <130: ICD-10-CM

## 2020-07-02 RX ORDER — ATORVASTATIN CALCIUM 40 MG/1
40 TABLET, FILM COATED ORAL DAILY
Qty: 90 TABLET | Refills: 3 | Status: SHIPPED | OUTPATIENT
Start: 2020-07-02 | End: 2021-08-02

## 2020-07-14 DIAGNOSIS — J45.20 MILD INTERMITTENT ASTHMA, UNCOMPLICATED: ICD-10-CM

## 2020-07-15 RX ORDER — ALBUTEROL SULFATE 90 UG/1
AEROSOL, METERED RESPIRATORY (INHALATION)
Qty: 8.5 G | Refills: 3 | Status: SHIPPED | OUTPATIENT
Start: 2020-07-15 | End: 2021-01-26

## 2020-08-25 ENCOUNTER — OFFICE VISIT (OUTPATIENT)
Dept: FAMILY MEDICINE | Facility: CLINIC | Age: 80
End: 2020-08-25
Payer: COMMERCIAL

## 2020-08-25 VITALS
BODY MASS INDEX: 28.17 KG/M2 | HEIGHT: 63 IN | SYSTOLIC BLOOD PRESSURE: 136 MMHG | OXYGEN SATURATION: 97 % | WEIGHT: 159 LBS | TEMPERATURE: 98.1 F | DIASTOLIC BLOOD PRESSURE: 80 MMHG | RESPIRATION RATE: 20 BRPM | HEART RATE: 90 BPM

## 2020-08-25 DIAGNOSIS — I10 ESSENTIAL HYPERTENSION WITH GOAL BLOOD PRESSURE LESS THAN 140/90: ICD-10-CM

## 2020-08-25 DIAGNOSIS — E03.9 ACQUIRED HYPOTHYROIDISM: ICD-10-CM

## 2020-08-25 DIAGNOSIS — H25.019 CORTICAL AGE-RELATED CATARACT, UNSPECIFIED LATERALITY: ICD-10-CM

## 2020-08-25 DIAGNOSIS — Z01.818 PREOP GENERAL PHYSICAL EXAM: Primary | ICD-10-CM

## 2020-08-25 DIAGNOSIS — E78.5 HYPERLIPIDEMIA LDL GOAL <130: ICD-10-CM

## 2020-08-25 DIAGNOSIS — J45.20 MILD INTERMITTENT ASTHMA, UNCOMPLICATED: ICD-10-CM

## 2020-08-25 PROCEDURE — 99214 OFFICE O/P EST MOD 30 MIN: CPT | Performed by: FAMILY MEDICINE

## 2020-08-25 ASSESSMENT — MIFFLIN-ST. JEOR: SCORE: 1170.35

## 2020-08-25 NOTE — PROGRESS NOTES
[facility]  [address] [place] [address]  Phone: [phone]  Fax: [phone]  Primary Provider: Aga Ochoa  Pre-op Performing Provider: AGA OCHOA    PREOPERATIVE EVALUATION:  Today's date: 8/25/2020    Yudelka Beckett is a 79 year old female who presents for a preoperative evaluation.    Surgical Information:  No flowsheet data found.  Fax number for surgical facility: ***  Type of Anesthesia Anticipated: to be determined    Subjective     HPI related to upcoming procedure: ***  No flowsheet data found.  Patient does not have a Health Care Directive or Living Will: Discussed advance care planning with patient; however, patient declined at this time.    RX monitoring program ([place]) reviewed:  reviewed- no concerns  {Review [place] for all patients per [employer]: [email or web address]}  {Chronic problem details (Optional):122851}    Review of Systems  {ROS Preop Choices:847270}    Patient Active Problem List    Diagnosis Date Noted     Other idiopathic scoliosis 08/22/2016     Priority: Medium     Varicose veins of both lower extremities 07/07/2016     Priority: Medium     Undiagnosed cardiac murmurs 05/14/2015     Priority: Medium     Advanced directives, counseling/discussion 02/11/2013     Priority: Medium     Discussed advance care planning with patient; however, patient declined at this time. 2/11/2013          Scoliosis 02/11/2013     Priority: Medium     Carpal tunnel syndrome 08/25/2011     Priority: Medium     Positive EMG 10/2011, saw Dr Galindo who recommended surgical release. Patient did not go through with it.        Cervical radiculopathy 08/25/2011     Priority: Medium     Colon stricture (H) 02/15/2011     Priority: Medium     HYPERLIPIDEMIA LDL GOAL <130 10/31/2010     Priority: Medium     S/P hysterectomy 02/10/2010     Priority: Medium     Done for irregular bleeding in the 1970's.  No cancer.  Has had one ovary  "removed due to tubal pregnancy, she thinks it was the R.  She thinks she still has the L ovary.  \"they left one, I think it was the left.\"       Elevated serum alkaline phosphatase level 08/27/2009     Priority: Medium     Has had work-up with normal RUQ ultrasound and negative ELP, Hep A, Hep C ab, Hep B Ag, normal LFTs- 2004 by Internal medicine       Nephrolithiasis 06/11/2008     Priority: Medium     8/07- ureteroscopy with lasar lithotripsy for large 15 mm stone, stent placed at [facility]. Has 6 mm left lower kidney pole calculus- pt has elected to watch. Sees Dr. Lutz at [facility].        Abnormal glucose 09/03/2007     Priority: Medium     Problem list name updated by automated process. Provider to review       Essential hypertension with goal blood pressure less than 140/90 05/18/2005     Priority: Medium     Problem list name updated by automated process. Provider to review       Esophageal reflux 05/18/2005     Priority: Medium     EGD 11/02 showing small hiatal hernia       Hypothyroidism 05/18/2005     Priority: Medium     Problem list name updated by automated process. Provider to review       Mild intermittent asthma 05/18/2005     Priority: Medium      Past Medical History:   Diagnosis Date     Basal cell carcinoma      Esophageal reflux      Other and unspecified hyperlipidemia      Unspecified essential hypertension      Unspecified hypothyroidism      Past Surgical History:   Procedure Laterality Date     COLONOSCOPY  2/7/2011    COLONOSCOPY performed by BHARTI DUNHAM at [facility]     Current Outpatient Medications   Medication Sig Dispense Refill     albuterol (PROAIR HFA) 108 (90 Base) MCG/ACT inhaler INHALE TWO PUFFS BY MOUTH EVERY 6 HOURS (NEED TO BE SEEN IN CLINIC FOR FURTHER REFILLS) 8.5 g 3     atorvastatin (LIPITOR) 40 MG tablet Take 1 tablet (40 mg) by mouth daily 90 tablet 3     diltiazem ER (DILT-XR) 240 MG 24 hr ER beaded capsule Take 1 capsule (240 mg) by mouth daily 90 capsule 3     " "esomeprazole (NEXIUM) 20 MG DR capsule Take 1 capsule (20 mg) by mouth every morning (before breakfast) One hour before meals fill when needed       IBUPROFEN 3 times daily        ipratropium (ATROVENT HFA) 17 MCG/ACT inhaler Inhale 2 puffs into the lungs 4 times daily as needed for wheezing 1 Inhaler 3     levothyroxine (SYNTHROID/LEVOTHROID) 88 MCG tablet Take 1 tablet (88 mcg) by mouth daily 90 tablet 3     lisinopril (ZESTRIL) 20 MG tablet Take 1 tablet (20 mg) by mouth daily Fill when needed 90 tablet 3       Allergies   Allergen Reactions     Epinephrine Palpitations     Gets \"cold, jittery, feels weird\"     Ancef [Cefazolin Sodium]      Augmentin [Aspartame]      Levaquin [Levofloxacin Hemihydrate]      Prednisolone      Seasonal Allergies      Sulfa Drugs         Social History     Tobacco Use     Smoking status: Former Smoker     Years: 15.00     Types: Cigarettes     Last attempt to quit: 1985     Years since quittin.6     Smokeless tobacco: Never Used   Substance Use Topics     Alcohol use: No     {FAMILY HISTORY (Optional):657367956}  History   Drug Use No            Objective   /80 (BP Location: Right arm)   Pulse 90   Temp 98.1  F (36.7  C) (Tympanic)   Resp 20   Ht 1.6 m (5' 3\")   Wt 72.1 kg (159 lb)   SpO2 97%   BMI 28.17 kg/m    Physical Exam  {EXAM Preop Choices:115532}    Recent Labs   Lab Test 20  1450 19  1415   HGB 12.1 12.7    355    139   POTASSIUM 4.4 4.1   CR 0.90 0.80        PRE-OP Diagnostics:  {LABS:598679}  {EK}    {Provider  Link to PREOP SmartSet :780829}     Assessment & Plan   The proposed surgical procedure is considered {HIGH=major cardiovascular or procedures requiring prolonged anesthesia >4 hours or large fluid shifts;    INTERMEDIATE=abdominal, most orthopedic and intrathoracic surgery; LOW= endoscopy, cataract and breast surgery:500891} risk.    REVISED CARDIAC RISK INDEX  The patient has the following serious " "cardiovascular risks for perioperative complications:  {PREOP REVISED CARDIAC RISK INDEX (RCRI):162839::\"No serious cardiac risks = 0 points\"}    INTERPRETATION: {REVISED CARDIAC RISK INTERPRETATION:536127}    {Dia Picklist :592320}    The patient has the following additional risks and recommendations for perioperative complications:    {IMPORTANT - Conditions - complete carefully!!:230890}     MEDICATION INSTRUCTIONS:  {IMPORTANT - Medications:948341}    RECOMMENDATION:  {IMPORTANT - Approval:885504::\"APPROVAL GIVEN to proceed with proposed procedure, without further diagnostic evaluation.\"}    No follow-ups on file.    Signed Electronically by: Azalea Tobar MD    Copy of this evaluation report is provided to requesting physician.    Preop [facility] Preop Guidelines    Revised Cardiac Risk Index  "

## 2020-08-25 NOTE — NURSING NOTE
"Chief Complaint   Patient presents with     Pre-Op Exam     Cataracts       Initial /80 (BP Location: Right arm)   Pulse 90   Temp 98.1  F (36.7  C) (Tympanic)   Resp 20   Ht 1.6 m (5' 3\")   Wt 72.1 kg (159 lb)   SpO2 97%   BMI 28.17 kg/m   Estimated body mass index is 28.17 kg/m  as calculated from the following:    Height as of this encounter: 1.6 m (5' 3\").    Weight as of this encounter: 72.1 kg (159 lb).    Patient presents to the clinic using No DME    Health Maintenance that is potentially due pending provider review:  NONE    n/a    Is there anyone who you would like to be able to receive your results? No  If yes have patient fill out LIGIA    "

## 2020-08-25 NOTE — PROGRESS NOTES
[facility]  [address] [place] [id number]  [phone]  Dept: [phone]    PRE-OP EVALUATION:  Today's date: 2020    Yudelka Beckett (: 1940) presents for pre-operative evaluation assessment as requested by [facility] .  She requires evaluation and anesthesia risk assessment prior to undergoing surgery/procedure for treatment of Cataracts .    Proposed Surgery/ Procedure: Cataracts  Date of Surgery/ Procedure: 9/15/2020  Time of Surgery/ Procedure:   Hospital/Surgical Facility: [facility]  Surgery Fax Number: [phone]  Primary Physician: Azalea Ochoa  Type of Anesthesia Anticipated: Topical    Preoperative Questionnaire:   No - Have you ever had a heart attack or stroke?  No - Have you ever had surgery on your heart or blood vessels, such as a stent, coronary (heart) bypass, or surgery on an artery in the head, neck, heart, or legs?  No - Do you have chest pain when you are physically active?  No - Do you have a history of heart failure?  No - Do you currently have a cold, bronchitis, or symptoms of other respiratory (head and chest) infections?  No - Do you have a cough, shortness of breath, or wheezing?  No - Do you or anyone in your family have a history of blood clots?  No - Do you or anyone in your family have a serious bleeding problem, such as long-lasting bleeding after surgeries or cuts?  No - Have you ever had anemia or been told to take iron pills?  No - Have you had any abnormal blood loss such as black, tarry or bloody stools, or abnormal vaginal bleeding?  No - Have you ever had a blood transfusion?  Yes - Are you willing to have a blood transfusion if it is medically needed before, during, or after your surgery?  No - Have you or anyone in your family ever had problems with anesthesia (sedation for surgery)?  No - Do you have sleep apnea, excessive snoring, or daytime drowsiness?   No - Do you have any artifical heart valves  or other implanted medical devices, such as a pacemaker, defibrillator, or continuous glucose monitor?  No - Do you have any artifical joints?  No - Are you allergic to latex?  No - Is there any chance that you may be pregnant?    Patient has a Health Care Directive or Living Will:  NO    HPI:     HPI related to upcoming procedure: had left done last Sept, now blurry vision x months in the right       ASTHMA - Patient has a longstanding history of mild Asthma . Patient has been doing well overall noting NO SYMPTOMS and uses albuterol and ipratropium as needed, she stopped her Flovent, is without adverse reactions or side effects.     HYPERLIPIDEMIA - Patient has a long history of significant Hyperlipidemia requiring medication for treatment with recent good control. Patient reports no problems or side effects with the medication.   Recent Labs   Lab Test 06/25/20  1450 04/11/19  1415  10/27/15  1103 09/25/14  1102   CHOL 202* 213*   < > 177 189   HDL 49* 47*   < > 50* 48*   * 118*   < > 90 101   TRIG 235* 238*   < > 184* 201*   CHOLHDLRATIO  --   --   --  3.5 3.9    < > = values in this interval not displayed.      HYPERTENSION - Patient has longstanding history of HTN , currently denies any symptoms referable to elevated blood pressure. Specifically denies chest pain, palpitations, dyspnea, orthopnea, PND or peripheral edema. Blood pressure readings have been in normal range. Current medication regimen is as listed below. Patient denies any side effects of medication.   On diltiazem and lisinopril  BP Readings from Last 6 Encounters:   08/25/20 136/80   06/25/20 (!) 164/82   11/05/19 (!) 143/92   07/30/19 128/80   04/11/19 139/80   07/31/18 124/78        HYPOTHYROIDISM - Patient has a longstanding history of chronic Hypothyroidism. Patient has been doing well, noting no tremor, insomnia, hair loss or changes in skin texture. Continues to take medications as directed, without adverse reactions or side  "effects. Last TSH   Lab Results   Component Value Date    TSH 3.51 06/25/2020   .        MEDICAL HISTORY:     Patient Active Problem List    Diagnosis Date Noted     Other idiopathic scoliosis 08/22/2016     Priority: Medium     Varicose veins of both lower extremities 07/07/2016     Priority: Medium     Undiagnosed cardiac murmurs 05/14/2015     Priority: Medium     Advanced directives, counseling/discussion 02/11/2013     Priority: Medium     Discussed advance care planning with patient; however, patient declined at this time. 2/11/2013          Scoliosis 02/11/2013     Priority: Medium     Carpal tunnel syndrome 08/25/2011     Priority: Medium     Positive EMG 10/2011, saw Dr Galindo who recommended surgical release. Patient did not go through with it.        Cervical radiculopathy 08/25/2011     Priority: Medium     Colon stricture (H) 02/15/2011     Priority: Medium     HYPERLIPIDEMIA LDL GOAL <130 10/31/2010     Priority: Medium     S/P hysterectomy 02/10/2010     Priority: Medium     Done for irregular bleeding in the 1970's.  No cancer.  Has had one ovary removed due to tubal pregnancy, she thinks it was the R.  She thinks she still has the L ovary.  \"they left one, I think it was the left.\"       Elevated serum alkaline phosphatase level 08/27/2009     Priority: Medium     Has had work-up with normal RUQ ultrasound and negative ELP, Hep A, Hep C ab, Hep B Ag, normal LFTs- 2004 by Internal medicine       Nephrolithiasis 06/11/2008     Priority: Medium     8/07- ureteroscopy with lasar lithotripsy for large 15 mm stone, stent placed at [facility]. Has 6 mm left lower kidney pole calculus- pt has elected to watch. Sees Dr. Lutz at [facility].        Abnormal glucose 09/03/2007     Priority: Medium     Problem list name updated by automated process. Provider to review       Essential hypertension with goal blood pressure less than 140/90 05/18/2005     Priority: Medium     Problem list name updated by automated " "process. Provider to review       Esophageal reflux 05/18/2005     Priority: Medium     EGD 11/02 showing small hiatal hernia       Hypothyroidism 05/18/2005     Priority: Medium     Problem list name updated by automated process. Provider to review       Mild intermittent asthma 05/18/2005     Priority: Medium      Past Medical History:   Diagnosis Date     Basal cell carcinoma      Esophageal reflux      Other and unspecified hyperlipidemia      Unspecified essential hypertension      Unspecified hypothyroidism      Past Surgical History:   Procedure Laterality Date     COLONOSCOPY  2/7/2011    COLONOSCOPY performed by BHARTI DUNHAM at [facility]     Current Outpatient Medications   Medication Sig Dispense Refill     albuterol (PROAIR HFA) 108 (90 Base) MCG/ACT inhaler INHALE TWO PUFFS BY MOUTH EVERY 6 HOURS (NEED TO BE SEEN IN CLINIC FOR FURTHER REFILLS) 8.5 g 3     atorvastatin (LIPITOR) 40 MG tablet Take 1 tablet (40 mg) by mouth daily 90 tablet 3     diltiazem ER (DILT-XR) 240 MG 24 hr ER beaded capsule Take 1 capsule (240 mg) by mouth daily 90 capsule 3     esomeprazole (NEXIUM) 20 MG DR capsule Take 1 capsule (20 mg) by mouth every morning (before breakfast) One hour before meals fill when needed       IBUPROFEN 3 times daily        ipratropium (ATROVENT HFA) 17 MCG/ACT inhaler Inhale 2 puffs into the lungs 4 times daily as needed for wheezing 1 Inhaler 3     levothyroxine (SYNTHROID/LEVOTHROID) 88 MCG tablet Take 1 tablet (88 mcg) by mouth daily 90 tablet 3     lisinopril (ZESTRIL) 20 MG tablet Take 1 tablet (20 mg) by mouth daily Fill when needed 90 tablet 3     OTC products: None, except as noted above    Allergies   Allergen Reactions     Epinephrine Palpitations     Gets \"cold, jittery, feels weird\"     Ancef [Cefazolin Sodium]      Augmentin [Aspartame]      Levaquin [Levofloxacin Hemihydrate]      Prednisolone      Seasonal Allergies      Sulfa Drugs       Latex Allergy: NO    Social History " "    Tobacco Use     Smoking status: Former Smoker     Years: 15.00     Types: Cigarettes     Last attempt to quit: 1985     Years since quittin.6     Smokeless tobacco: Never Used   Substance Use Topics     Alcohol use: No     History   Drug Use No       REVIEW OF SYSTEMS:   CONSTITUTIONAL: NEGATIVE for fever, chills, change in weight  ENT/MOUTH: NEGATIVE for ear, mouth and throat problems  RESP: NEGATIVE for significant cough or SOB  CV: NEGATIVE for chest pain, palpitations or peripheral edema  GI: NEGATIVE for nausea, abdominal pain, heartburn, or change in bowel habits  : No dysuria, urinary frequency or urgency.     EXAM:   /80 (BP Location: Right arm)   Pulse 90   Temp 98.1  F (36.7  C) (Tympanic)   Resp 20   Ht 1.6 m (5' 3\")   Wt 72.1 kg (159 lb)   SpO2 97%   BMI 28.17 kg/m    GENERAL APPEARANCE: healthy, alert and no distress  EYES: Eyes grossly normal to inspection, PERRL and conjunctivae and sclerae normal  HENT: ear canals and TM's normal and nose and mouth without ulcers or lesions  RESP: lungs clear to auscultation - no rales, rhonchi or wheezes  CV: regular rate and rhythm, normal S1 S2, no S3 or S4 and no murmur, click or rub   ABDOMEN: soft, nontender, no HSM or masses and bowel sounds normal  NEURO: Normal strength and tone, sensory exam grossly normal, mentation intact and speech normal    DIAGNOSTICS:   No labs or EKG required for low risk surgery (cataract, skin procedure, breast biopsy, etc)    Recent Labs   Lab Test 20  1450 19  1415   HGB 12.1 12.7    355    139   POTASSIUM 4.4 4.1   CR 0.90 0.80        IMPRESSION:   Reason for surgery/procedure: cataract    The proposed surgical procedure is considered LOW risk.    REVISED CARDIAC RISK INDEX  The patient has the following serious cardiovascular risks for perioperative complications such as (MI, PE, VFib and 3  AV Block):  No serious cardiac risks  INTERPRETATION: 0 risks: Class I (very low risk " - 0.4% complication rate)    The patient has the following additional risks for perioperative complications:  No identified additional risks      ICD-10-CM    1. Preop general physical exam  Z01.818    2. Cortical age-related cataract, unspecified laterality  H25.019    3. Mild intermittent asthma, uncomplicated  J45.20    4. Essential hypertension with goal blood pressure less than 140/90  I10    5. Acquired hypothyroidism  E03.9    6. Hyperlipidemia LDL goal <130  E78.5        RECOMMENDATIONS:         --Patient is to take all scheduled medications on the day of surgery     APPROVAL GIVEN to proceed with proposed procedure, without further diagnostic evaluation       Signed Electronically by: Azalea Tobar MD    Copy of this evaluation report is provided to requesting physician.    [place] Preop Guidelines    Revised Cardiac Risk Index

## 2020-08-25 NOTE — PATIENT INSTRUCTIONS
"  Preparing for Your Surgery  Getting started  A surgery nurse will call you to review your health history and instructions. They will give you an arrival time based on your scheduled surgery time.  Please be ready to share the following:    Your doctor's clinic name and phone number    Your medical, surgical and anesthesia history    A list of allergies and sensitivities    A list of medicines, including herbal treatments and over-the-counter drugs    Whether the patient has a legal guardian (ask how to send us the papers in advance)  If your child is having surgery, please ask for a copy of Preparing for Your Child's Surgery.    Preparing for surgery    Within 30 days of surgery: Have an exam at your family clinic (primary care clinic), or go to a pre-operative clinic. This exam is called a \"History and Physical,\" or H&P.    At your H&P exam, talk to your care team about all medicines you take. If you need to stop any medicines before surgery, ask when to start taking them again.  ? We do this for your safety. Many medicines can make you bleed too much during surgery. Some change how well surgery (anesthesia) drugs work.    Call your insurance company to see what it will and won't pay for. Ask if they need to pre-approve the surgery. (If no insurance, call [phone].)    Call your surgeon's clinic if there's any change in your health. This includes signs of a cold or flu (sore throat, runny nose, cough, rash, fever). It also includes a scrape or scratch near the surgery site.    If you have questions on the day of surgery, call your surgery center.  Eating and drinking guidelines  For your safety: Unless your surgeon tells you otherwise, follow the guidelines below.    Eat and drink as usual until 8 hours before surgery. After that, no food or milk.    Drink clear liquids until 2 hours before surgery. These are liquids you can see through, like water, Gatorade and Propel Water. You may also have black coffee " and tea (no cream or milk).    Nothing by mouth within 2 hours of surgery. This includes gum, candy and breath mints.    Stop alcohol the midnight before surgery.    If your family clinic tells you to take medicine on the morning of surgery, it's okay to take it with a sip of water.  Preventing infection    Shower or bathe the night before and morning of your surgery. Follow the instructions your clinic gave you. (If no instructions, use regular soap.)    Don't shave or clip hair near your surgery site. This can lead to skin infection.    Don't smoke the morning of surgery. Smoking increases the risk of infection. You may chew nicotine gum up to 2 hours before surgery. A nicotine patch is okay.  ? Note: Some surgeries require you to completely quit smoking and nicotine. Check with your surgeon.    Your care team will make every effort to keep you safe from infection. We will:  ? Clean our hands often with soap and water (or an alcohol-based hand rub).  ? Clean the skin at your surgery site with a special soap that kills germs. We'll also remove hair from the site as needed.  ? Wear special hair covers, masks, gowns and gloves during surgery.  ? Give antibiotic medicine, if prescribed. Not all surgeries need antibiotics.  What to bring on the day of surgery    Photo ID and insurance card    Copy of your health care directive, if you have one    Glasses and hearing aides (bring cases)  ? You can't wear contacts during surgery    Inhaler and eye drops, if you use them (tell us about these when you arrive)    CPAP machine or breathing device, if you use them    A few personal items, if spending the night    If you have . . .  ? A pacemaker or ICD (cardiac defibrillator): Bring the ID card.  ? An implanted stimulator: Bring the remote control.  ? A legal guardian: Bring a copy of the certified (court-stamped) guardianship papers.  Please remove any jewelry, including body piercings. Leave jewelry and other valuables at  "home.  If you're going home the day of surgery  Important: If you don't follow the rules below, we must cancel your surgery.     Arrange for someone to drive you home after surgery. You may not drive, take a taxi or take public transportation by yourself (unless you'll have local anesthesia only).    Arrange for a responsible adult to stay with you overnight. If you don't, we may keep you in the hospital overnight, and you may need to pay the costs yourself.  Questions?   If you have any questions for your care team, list them here: _________________________________________________________________________________________________________________________________________________________________________________________________________________________________________________________________________________________________________________________  For informational purposes only. Not to replace the advice of your health care provider. Copyright   7457-2863 [employer] All rights reserved. Clinically reviewed by Spring Chiu MD. [employer] [id number] - REV 07/19.    Preparing for Your Surgery  Getting started  A surgery nurse will call you to review your health history and instructions. They will give you an arrival time based on your scheduled surgery time.  Please be ready to share the following:    Your doctor's clinic name and phone number    Your medical, surgical and anesthesia history    A list of allergies and sensitivities    A list of medicines, including herbal treatments and over-the-counter drugs    Whether the patient has a legal guardian (ask how to send us the papers in advance)  If your child is having surgery, please ask for a copy of Preparing for Your Child's Surgery.    Preparing for surgery    Within 30 days of surgery: Have an exam at your family clinic (primary care clinic), or go to a pre-operative clinic. This exam is called a \"History and Physical,\" or H&P.    At your H&P exam, talk to " your care team about all medicines you take. If you need to stop any medicines before surgery, ask when to start taking them again.  ? We do this for your safety. Many medicines can make you bleed too much during surgery. Some change how well surgery (anesthesia) drugs work.    Call your insurance company to see what it will and won't pay for. Ask if they need to pre-approve the surgery. (If no insurance, call [phone].)    Call your surgeon's clinic if there's any change in your health. This includes signs of a cold or flu (sore throat, runny nose, cough, rash, fever). It also includes a scrape or scratch near the surgery site.    If you have questions on the day of surgery, call your surgery center.  Eating and drinking guidelines  For your safety: Unless your surgeon tells you otherwise, follow the guidelines below.    Eat and drink as usual until 8 hours before surgery. After that, no food or milk.    Drink clear liquids until 2 hours before surgery. These are liquids you can see through, like water, Gatorade and Propel Water. You may also have black coffee and tea (no cream or milk).    Nothing by mouth within 2 hours of surgery. This includes gum, candy and breath mints.    Stop alcohol the midnight before surgery.    If your family clinic tells you to take medicine on the morning of surgery, it's okay to take it with a sip of water.  Preventing infection    Shower or bathe the night before and morning of your surgery. Follow the instructions your clinic gave you. (If no instructions, use regular soap.)    Don't shave or clip hair near your surgery site. This can lead to skin infection.    Don't smoke the morning of surgery. Smoking increases the risk of infection. You may chew nicotine gum up to 2 hours before surgery. A nicotine patch is okay.  ? Note: Some surgeries require you to completely quit smoking and nicotine. Check with your surgeon.    Your care team will make every effort to keep you safe  from infection. We will:  ? Clean our hands often with soap and water (or an alcohol-based hand rub).  ? Clean the skin at your surgery site with a special soap that kills germs. We'll also remove hair from the site as needed.  ? Wear special hair covers, masks, gowns and gloves during surgery.  ? Give antibiotic medicine, if prescribed. Not all surgeries need antibiotics.  What to bring on the day of surgery    Photo ID and insurance card    Copy of your health care directive, if you have one    Glasses and hearing aides (bring cases)  ? You can't wear contacts during surgery    Inhaler and eye drops, if you use them (tell us about these when you arrive)    CPAP machine or breathing device, if you use them    A few personal items, if spending the night    If you have . . .  ? A pacemaker or ICD (cardiac defibrillator): Bring the ID card.  ? An implanted stimulator: Bring the remote control.  ? A legal guardian: Bring a copy of the certified (court-stamped) guardianship papers.  Please remove any jewelry, including body piercings. Leave jewelry and other valuables at home.  If you're going home the day of surgery  Important: If you don't follow the rules below, we must cancel your surgery.     Arrange for someone to drive you home after surgery. You may not drive, take a taxi or take public transportation by yourself (unless you'll have local anesthesia only).    Arrange for a responsible adult to stay with you overnight. If you don't, we may keep you in the hospital overnight, and you may need to pay the costs yourself.  Questions?   If you have any questions for your care team, list them here: _________________________________________________________________________________________________________________________________________________________________________________________________________________________________________________________________________________________________________________________  For  "informational purposes only. Not to replace the advice of your health care provider. Copyright   3944-6641 [employer] [facility]. All rights reserved. Clinically reviewed by Spring Chiu MD. [employer] [id number] - REV 07/19.    Preparing for Your Surgery  Getting started  A surgery nurse will call you to review your health history and instructions. They will give you an arrival time based on your scheduled surgery time.  Please be ready to share the following:    Your doctor's clinic name and phone number    Your medical, surgical and anesthesia history    A list of allergies and sensitivities    A list of medicines, including herbal treatments and over-the-counter drugs    Whether the patient has a legal guardian (ask how to send us the papers in advance)  If your child is having surgery, please ask for a copy of Preparing for Your Child's Surgery.    Preparing for surgery    Within 30 days of surgery: Have an exam at your family clinic (primary care clinic), or go to a pre-operative clinic. This exam is called a \"History and Physical,\" or H&P.    At your H&P exam, talk to your care team about all medicines you take. If you need to stop any medicines before surgery, ask when to start taking them again.  ? We do this for your safety. Many medicines can make you bleed too much during surgery. Some change how well surgery (anesthesia) drugs work.    Call your insurance company to see what it will and won't pay for. Ask if they need to pre-approve the surgery. (If no insurance, call [phone].)    Call your surgeon's clinic if there's any change in your health. This includes signs of a cold or flu (sore throat, runny nose, cough, rash, fever). It also includes a scrape or scratch near the surgery site.    If you have questions on the day of surgery, call your surgery center.  Eating and drinking guidelines  For your safety: Unless your surgeon tells you otherwise, follow the guidelines below.    Eat and drink as " usual until 8 hours before surgery. After that, no food or milk.    Drink clear liquids until 2 hours before surgery. These are liquids you can see through, like water, Gatorade and Propel Water. You may also have black coffee and tea (no cream or milk).    Nothing by mouth within 2 hours of surgery. This includes gum, candy and breath mints.    Stop alcohol the midnight before surgery.    If your family clinic tells you to take medicine on the morning of surgery, it's okay to take it with a sip of water.  Preventing infection    Shower or bathe the night before and morning of your surgery. Follow the instructions your clinic gave you. (If no instructions, use regular soap.)    Don't shave or clip hair near your surgery site. This can lead to skin infection.    Don't smoke the morning of surgery. Smoking increases the risk of infection. You may chew nicotine gum up to 2 hours before surgery. A nicotine patch is okay.  ? Note: Some surgeries require you to completely quit smoking and nicotine. Check with your surgeon.    Your care team will make every effort to keep you safe from infection. We will:  ? Clean our hands often with soap and water (or an alcohol-based hand rub).  ? Clean the skin at your surgery site with a special soap that kills germs. We'll also remove hair from the site as needed.  ? Wear special hair covers, masks, gowns and gloves during surgery.  ? Give antibiotic medicine, if prescribed. Not all surgeries need antibiotics.  What to bring on the day of surgery    Photo ID and insurance card    Copy of your health care directive, if you have one    Glasses and hearing aides (bring cases)  ? You can't wear contacts during surgery    Inhaler and eye drops, if you use them (tell us about these when you arrive)    CPAP machine or breathing device, if you use them    A few personal items, if spending the night    If you have . . .  ? A pacemaker or ICD (cardiac defibrillator): Bring the ID card.  ? An  implanted stimulator: Bring the remote control.  ? A legal guardian: Bring a copy of the certified (court-stamped) guardianship papers.  Please remove any jewelry, including body piercings. Leave jewelry and other valuables at home.  If you're going home the day of surgery  Important: If you don't follow the rules below, we must cancel your surgery.     Arrange for someone to drive you home after surgery. You may not drive, take a taxi or take public transportation by yourself (unless you'll have local anesthesia only).    Arrange for a responsible adult to stay with you overnight. If you don't, we may keep you in the hospital overnight, and you may need to pay the costs yourself.  Questions?   If you have any questions for your care team, list them here: _________________________________________________________________________________________________________________________________________________________________________________________________________________________________________________________________________________________________________________________  For informational purposes only. Not to replace the advice of your health care provider. Copyright   6481-0289 [facility]. All rights reserved. Clinically reviewed by Spring Chiu MD. [employer] [id number] - REV 07/19.    Preparing for Your Surgery  Getting started  A surgery nurse will call you to review your health history and instructions. They will give you an arrival time based on your scheduled surgery time.  Please be ready to share the following:    Your doctor's clinic name and phone number    Your medical, surgical and anesthesia history    A list of allergies and sensitivities    A list of medicines, including herbal treatments and over-the-counter drugs    Whether the patient has a legal guardian (ask how to send us the papers in advance)  If your child is having surgery, please ask for a copy of Preparing for Your Child's Surgery.   "  Preparing for surgery    Within 30 days of surgery: Have an exam at your family clinic (primary care clinic), or go to a pre-operative clinic. This exam is called a \"History and Physical,\" or H&P.    At your H&P exam, talk to your care team about all medicines you take. If you need to stop any medicines before surgery, ask when to start taking them again.  ? We do this for your safety. Many medicines can make you bleed too much during surgery. Some change how well surgery (anesthesia) drugs work.    Call your insurance company to see what it will and won't pay for. Ask if they need to pre-approve the surgery. (If no insurance, call [phone].)    Call your surgeon's clinic if there's any change in your health. This includes signs of a cold or flu (sore throat, runny nose, cough, rash, fever). It also includes a scrape or scratch near the surgery site.    If you have questions on the day of surgery, call your surgery center.  Eating and drinking guidelines  For your safety: Unless your surgeon tells you otherwise, follow the guidelines below.    Eat and drink as usual until 8 hours before surgery. After that, no food or milk.    Drink clear liquids until 2 hours before surgery. These are liquids you can see through, like water, Gatorade and Propel Water. You may also have black coffee and tea (no cream or milk).    Nothing by mouth within 2 hours of surgery. This includes gum, candy and breath mints.    Stop alcohol the midnight before surgery.    If your family clinic tells you to take medicine on the morning of surgery, it's okay to take it with a sip of water.  Preventing infection    Shower or bathe the night before and morning of your surgery. Follow the instructions your clinic gave you. (If no instructions, use regular soap.)    Don't shave or clip hair near your surgery site. This can lead to skin infection.    Don't smoke the morning of surgery. Smoking increases the risk of infection. You may chew " nicotine gum up to 2 hours before surgery. A nicotine patch is okay.  ? Note: Some surgeries require you to completely quit smoking and nicotine. Check with your surgeon.    Your care team will make every effort to keep you safe from infection. We will:  ? Clean our hands often with soap and water (or an alcohol-based hand rub).  ? Clean the skin at your surgery site with a special soap that kills germs. We'll also remove hair from the site as needed.  ? Wear special hair covers, masks, gowns and gloves during surgery.  ? Give antibiotic medicine, if prescribed. Not all surgeries need antibiotics.  What to bring on the day of surgery    Photo ID and insurance card    Copy of your health care directive, if you have one    Glasses and hearing aides (bring cases)  ? You can't wear contacts during surgery    Inhaler and eye drops, if you use them (tell us about these when you arrive)    CPAP machine or breathing device, if you use them    A few personal items, if spending the night    If you have . . .  ? A pacemaker or ICD (cardiac defibrillator): Bring the ID card.  ? An implanted stimulator: Bring the remote control.  ? A legal guardian: Bring a copy of the certified (court-stamped) guardianship papers.  Please remove any jewelry, including body piercings. Leave jewelry and other valuables at home.  If you're going home the day of surgery  Important: If you don't follow the rules below, we must cancel your surgery.     Arrange for someone to drive you home after surgery. You may not drive, take a taxi or take public transportation by yourself (unless you'll have local anesthesia only).    Arrange for a responsible adult to stay with you overnight. If you don't, we may keep you in the hospital overnight, and you may need to pay the costs yourself.  Questions?   If you have any questions for your care team, list them here:  "_________________________________________________________________________________________________________________________________________________________________________________________________________________________________________________________________________________________________________________________  For informational purposes only. Not to replace the advice of your health care provider. Copyright   8188-2132 [facility]. All rights reserved. Clinically reviewed by Spring Chiu MD. [employer] [id number] - REV 07/19.    Preparing for Your Surgery  Getting started  A surgery nurse will call you to review your health history and instructions. They will give you an arrival time based on your scheduled surgery time.  Please be ready to share the following:    Your doctor's clinic name and phone number    Your medical, surgical and anesthesia history    A list of allergies and sensitivities    A list of medicines, including herbal treatments and over-the-counter drugs    Whether the patient has a legal guardian (ask how to send us the papers in advance)  If your child is having surgery, please ask for a copy of Preparing for Your Child's Surgery.    Preparing for surgery    Within 30 days of surgery: Have an exam at your family clinic (primary care clinic), or go to a pre-operative clinic. This exam is called a \"History and Physical,\" or H&P.    At your H&P exam, talk to your care team about all medicines you take. If you need to stop any medicines before surgery, ask when to start taking them again.  ? We do this for your safety. Many medicines can make you bleed too much during surgery. Some change how well surgery (anesthesia) drugs work.    Call your insurance company to see what it will and won't pay for. Ask if they need to pre-approve the surgery. (If no insurance, call [phone].)    Call your surgeon's clinic if there's any change in your health. This includes signs of a cold or flu (sore " throat, runny nose, cough, rash, fever). It also includes a scrape or scratch near the surgery site.    If you have questions on the day of surgery, call your surgery center.  Eating and drinking guidelines  For your safety: Unless your surgeon tells you otherwise, follow the guidelines below.    Eat and drink as usual until 8 hours before surgery. After that, no food or milk.    Drink clear liquids until 2 hours before surgery. These are liquids you can see through, like water, Gatorade and Propel Water. You may also have black coffee and tea (no cream or milk).    Nothing by mouth within 2 hours of surgery. This includes gum, candy and breath mints.    Stop alcohol the midnight before surgery.    If your family clinic tells you to take medicine on the morning of surgery, it's okay to take it with a sip of water.  Preventing infection    Shower or bathe the night before and morning of your surgery. Follow the instructions your clinic gave you. (If no instructions, use regular soap.)    Don't shave or clip hair near your surgery site. This can lead to skin infection.    Don't smoke the morning of surgery. Smoking increases the risk of infection. You may chew nicotine gum up to 2 hours before surgery. A nicotine patch is okay.  ? Note: Some surgeries require you to completely quit smoking and nicotine. Check with your surgeon.    Your care team will make every effort to keep you safe from infection. We will:  ? Clean our hands often with soap and water (or an alcohol-based hand rub).  ? Clean the skin at your surgery site with a special soap that kills germs. We'll also remove hair from the site as needed.  ? Wear special hair covers, masks, gowns and gloves during surgery.  ? Give antibiotic medicine, if prescribed. Not all surgeries need antibiotics.  What to bring on the day of surgery    Photo ID and insurance card    Copy of your health care directive, if you have one    Glasses and hearing aides (bring  cases)  ? You can't wear contacts during surgery    Inhaler and eye drops, if you use them (tell us about these when you arrive)    CPAP machine or breathing device, if you use them    A few personal items, if spending the night    If you have . . .  ? A pacemaker or ICD (cardiac defibrillator): Bring the ID card.  ? An implanted stimulator: Bring the remote control.  ? A legal guardian: Bring a copy of the certified (court-stamped) guardianship papers.  Please remove any jewelry, including body piercings. Leave jewelry and other valuables at home.  If you're going home the day of surgery  Important: If you don't follow the rules below, we must cancel your surgery.     Arrange for someone to drive you home after surgery. You may not drive, take a taxi or take public transportation by yourself (unless you'll have local anesthesia only).    Arrange for a responsible adult to stay with you overnight. If you don't, we may keep you in the hospital overnight, and you may need to pay the costs yourself.  Questions?   If you have any questions for your care team, list them here: _________________________________________________________________________________________________________________________________________________________________________________________________________________________________________________________________________________________________________________________  For informational purposes only. Not to replace the advice of your health care provider. Copyright   8972-7558 [facility]. All rights reserved. Clinically reviewed by Spring Chiu MD. [employer] [id number] - REV 07/19.    Preparing for Your Surgery  Getting started  A surgery nurse will call you to review your health history and instructions. They will give you an arrival time based on your scheduled surgery time.  Please be ready to share the following:    Your doctor's clinic name and phone number    Your medical, surgical and  "anesthesia history    A list of allergies and sensitivities    A list of medicines, including herbal treatments and over-the-counter drugs    Whether the patient has a legal guardian (ask how to send us the papers in advance)  If your child is having surgery, please ask for a copy of Preparing for Your Child's Surgery.    Preparing for surgery    Within 30 days of surgery: Have an exam at your family clinic (primary care clinic), or go to a pre-operative clinic. This exam is called a \"History and Physical,\" or H&P.    At your H&P exam, talk to your care team about all medicines you take. If you need to stop any medicines before surgery, ask when to start taking them again.  ? We do this for your safety. Many medicines can make you bleed too much during surgery. Some change how well surgery (anesthesia) drugs work.    Call your insurance company to see what it will and won't pay for. Ask if they need to pre-approve the surgery. (If no insurance, call [phone].)    Call your surgeon's clinic if there's any change in your health. This includes signs of a cold or flu (sore throat, runny nose, cough, rash, fever). It also includes a scrape or scratch near the surgery site.    If you have questions on the day of surgery, call your surgery center.  Eating and drinking guidelines  For your safety: Unless your surgeon tells you otherwise, follow the guidelines below.    Eat and drink as usual until 8 hours before surgery. After that, no food or milk.    Drink clear liquids until 2 hours before surgery. These are liquids you can see through, like water, Gatorade and Propel Water. You may also have black coffee and tea (no cream or milk).    Nothing by mouth within 2 hours of surgery. This includes gum, candy and breath mints.    Stop alcohol the midnight before surgery.    If your family clinic tells you to take medicine on the morning of surgery, it's okay to take it with a sip of water.  Preventing infection    Shower " or bathe the night before and morning of your surgery. Follow the instructions your clinic gave you. (If no instructions, use regular soap.)    Don't shave or clip hair near your surgery site. This can lead to skin infection.    Don't smoke the morning of surgery. Smoking increases the risk of infection. You may chew nicotine gum up to 2 hours before surgery. A nicotine patch is okay.  ? Note: Some surgeries require you to completely quit smoking and nicotine. Check with your surgeon.    Your care team will make every effort to keep you safe from infection. We will:  ? Clean our hands often with soap and water (or an alcohol-based hand rub).  ? Clean the skin at your surgery site with a special soap that kills germs. We'll also remove hair from the site as needed.  ? Wear special hair covers, masks, gowns and gloves during surgery.  ? Give antibiotic medicine, if prescribed. Not all surgeries need antibiotics.  What to bring on the day of surgery    Photo ID and insurance card    Copy of your health care directive, if you have one    Glasses and hearing aides (bring cases)  ? You can't wear contacts during surgery    Inhaler and eye drops, if you use them (tell us about these when you arrive)    CPAP machine or breathing device, if you use them    A few personal items, if spending the night    If you have . . .  ? A pacemaker or ICD (cardiac defibrillator): Bring the ID card.  ? An implanted stimulator: Bring the remote control.  ? A legal guardian: Bring a copy of the certified (court-stamped) guardianship papers.  Please remove any jewelry, including body piercings. Leave jewelry and other valuables at home.  If you're going home the day of surgery  Important: If you don't follow the rules below, we must cancel your surgery.     Arrange for someone to drive you home after surgery. You may not drive, take a taxi or take public transportation by yourself (unless you'll have local anesthesia only).    Arrange for a  "responsible adult to stay with you overnight. If you don't, we may keep you in the hospital overnight, and you may need to pay the costs yourself.  Questions?   If you have any questions for your care team, list them here: _________________________________________________________________________________________________________________________________________________________________________________________________________________________________________________________________________________________________________________________  For informational purposes only. Not to replace the advice of your health care provider. Copyright   9370-7471 [employer]. All rights reserved. Clinically reviewed by Spring Chiu MD. [employer] [id number] - REV 07/19.    Preparing for Your Surgery  Getting started  A surgery nurse will call you to review your health history and instructions. They will give you an arrival time based on your scheduled surgery time.  Please be ready to share the following:    Your doctor's clinic name and phone number    Your medical, surgical and anesthesia history    A list of allergies and sensitivities    A list of medicines, including herbal treatments and over-the-counter drugs    Whether the patient has a legal guardian (ask how to send us the papers in advance)  If your child is having surgery, please ask for a copy of Preparing for Your Child's Surgery.    Preparing for surgery    Within 30 days of surgery: Have an exam at your family clinic (primary care clinic), or go to a pre-operative clinic. This exam is called a \"History and Physical,\" or H&P.    At your H&P exam, talk to your care team about all medicines you take. If you need to stop any medicines before surgery, ask when to start taking them again.  ? We do this for your safety. Many medicines can make you bleed too much during surgery. Some change how well surgery (anesthesia) drugs work.    Call your insurance company to see " what it will and won't pay for. Ask if they need to pre-approve the surgery. (If no insurance, call [phone].)    Call your surgeon's clinic if there's any change in your health. This includes signs of a cold or flu (sore throat, runny nose, cough, rash, fever). It also includes a scrape or scratch near the surgery site.    If you have questions on the day of surgery, call your surgery center.  Eating and drinking guidelines  For your safety: Unless your surgeon tells you otherwise, follow the guidelines below.    Eat and drink as usual until 8 hours before surgery. After that, no food or milk.    Drink clear liquids until 2 hours before surgery. These are liquids you can see through, like water, Gatorade and Propel Water. You may also have black coffee and tea (no cream or milk).    Nothing by mouth within 2 hours of surgery. This includes gum, candy and breath mints.    Stop alcohol the midnight before surgery.    If your family clinic tells you to take medicine on the morning of surgery, it's okay to take it with a sip of water.  Preventing infection    Shower or bathe the night before and morning of your surgery. Follow the instructions your clinic gave you. (If no instructions, use regular soap.)    Don't shave or clip hair near your surgery site. This can lead to skin infection.    Don't smoke the morning of surgery. Smoking increases the risk of infection. You may chew nicotine gum up to 2 hours before surgery. A nicotine patch is okay.  ? Note: Some surgeries require you to completely quit smoking and nicotine. Check with your surgeon.    Your care team will make every effort to keep you safe from infection. We will:  ? Clean our hands often with soap and water (or an alcohol-based hand rub).  ? Clean the skin at your surgery site with a special soap that kills germs. We'll also remove hair from the site as needed.  ? Wear special hair covers, masks, gowns and gloves during surgery.  ? Give antibiotic  medicine, if prescribed. Not all surgeries need antibiotics.  What to bring on the day of surgery    Photo ID and insurance card    Copy of your health care directive, if you have one    Glasses and hearing aides (bring cases)  ? You can't wear contacts during surgery    Inhaler and eye drops, if you use them (tell us about these when you arrive)    CPAP machine or breathing device, if you use them    A few personal items, if spending the night    If you have . . .  ? A pacemaker or ICD (cardiac defibrillator): Bring the ID card.  ? An implanted stimulator: Bring the remote control.  ? A legal guardian: Bring a copy of the certified (court-stamped) guardianship papers.  Please remove any jewelry, including body piercings. Leave jewelry and other valuables at home.  If you're going home the day of surgery  Important: If you don't follow the rules below, we must cancel your surgery.     Arrange for someone to drive you home after surgery. You may not drive, take a taxi or take public transportation by yourself (unless you'll have local anesthesia only).    Arrange for a responsible adult to stay with you overnight. If you don't, we may keep you in the hospital overnight, and you may need to pay the costs yourself.  Questions?   If you have any questions for your care team, list them here: _________________________________________________________________________________________________________________________________________________________________________________________________________________________________________________________________________________________________________________________  For informational purposes only. Not to replace the advice of your health care provider. Copyright   3666-9527 [employer]. All rights reserved. Clinically reviewed by Spring Chiu MD. [employer] [id number] - REV 07/19.

## 2020-08-25 NOTE — PROGRESS NOTES
[facility]  [address] [place] [address]  Phone: [phone]  Fax: [phone]  Primary Provider: Aga Ochoa  Pre-op Performing Provider: AGA OCHOA    PREOPERATIVE EVALUATION:  Today's date: 8/25/2020    Yudelka Beckett is a 79 year old female who presents for a preoperative evaluation.    Surgical Information:  No flowsheet data found.  Fax number for surgical facility: {SURGERY FAX NUMBER:[id number]}  Type of Anesthesia Anticipated: {ANESTHESIA:244778}    Subjective     HPI related to upcoming procedure: ***  No flowsheet data found.  Patient does not have a Health Care Directive or Living Will: {ADVANCE_DIRECTIVE_STATUS:472786}    RX monitoring program (MNPMP) reviewed: {Mnpmpreport:012599}  {Review MNPMP for all patients per ICSI: [email or web address]}  {Chronic problem details (Optional):777313}    Review of Systems  {ROS Preop Choices:234375}    Patient Active Problem List    Diagnosis Date Noted     Other idiopathic scoliosis 08/22/2016     Priority: Medium     Varicose veins of both lower extremities 07/07/2016     Priority: Medium     Undiagnosed cardiac murmurs 05/14/2015     Priority: Medium     Advanced directives, counseling/discussion 02/11/2013     Priority: Medium     Discussed advance care planning with patient; however, patient declined at this time. 2/11/2013          Scoliosis 02/11/2013     Priority: Medium     Carpal tunnel syndrome 08/25/2011     Priority: Medium     Positive EMG 10/2011, saw Dr Galindo who recommended surgical release. Patient did not go through with it.        Cervical radiculopathy 08/25/2011     Priority: Medium     Colon stricture (H) 02/15/2011     Priority: Medium     HYPERLIPIDEMIA LDL GOAL <130 10/31/2010     Priority: Medium     S/P hysterectomy 02/10/2010     Priority: Medium     Done for irregular bleeding in the 1970's.  No cancer.  Has had one ovary removed due to tubal pregnancy,  "she thinks it was the R.  She thinks she still has the L ovary.  \"they left one, I think it was the left.\"       Elevated serum alkaline phosphatase level 08/27/2009     Priority: Medium     Has had work-up with normal RUQ ultrasound and negative ELP, Hep A, Hep C ab, Hep B Ag, normal LFTs- 2004 by Internal medicine       Nephrolithiasis 06/11/2008     Priority: Medium     8/07- ureteroscopy with lasar lithotripsy for large 15 mm stone, stent placed at [facility]. Has 6 mm left lower kidney pole calculus- pt has elected to watch. Sees Dr. Lutz at [facility].        Abnormal glucose 09/03/2007     Priority: Medium     Problem list name updated by automated process. Provider to review       Essential hypertension with goal blood pressure less than 140/90 05/18/2005     Priority: Medium     Problem list name updated by automated process. Provider to review       Esophageal reflux 05/18/2005     Priority: Medium     EGD 11/02 showing small hiatal hernia       Hypothyroidism 05/18/2005     Priority: Medium     Problem list name updated by automated process. Provider to review       Mild intermittent asthma 05/18/2005     Priority: Medium      Past Medical History:   Diagnosis Date     Basal cell carcinoma      Esophageal reflux      Other and unspecified hyperlipidemia      Unspecified essential hypertension      Unspecified hypothyroidism      Past Surgical History:   Procedure Laterality Date     COLONOSCOPY  2/7/2011    COLONOSCOPY performed by BHARTI DUNHAM at [facility]     Current Outpatient Medications   Medication Sig Dispense Refill     albuterol (PROAIR HFA) 108 (90 Base) MCG/ACT inhaler INHALE TWO PUFFS BY MOUTH EVERY 6 HOURS (NEED TO BE SEEN IN CLINIC FOR FURTHER REFILLS) 8.5 g 3     atorvastatin (LIPITOR) 40 MG tablet Take 1 tablet (40 mg) by mouth daily 90 tablet 3     diltiazem ER (DILT-XR) 240 MG 24 hr ER beaded capsule Take 1 capsule (240 mg) by mouth daily 90 capsule 3     esomeprazole (NEXIUM) 20 MG DR" "capsule Take 1 capsule (20 mg) by mouth every morning (before breakfast) One hour before meals fill when needed       IBUPROFEN 3 times daily        ipratropium (ATROVENT HFA) 17 MCG/ACT inhaler Inhale 2 puffs into the lungs 4 times daily as needed for wheezing 1 Inhaler 3     levothyroxine (SYNTHROID/LEVOTHROID) 88 MCG tablet Take 1 tablet (88 mcg) by mouth daily 90 tablet 3     lisinopril (ZESTRIL) 20 MG tablet Take 1 tablet (20 mg) by mouth daily Fill when needed 90 tablet 3       Allergies   Allergen Reactions     Epinephrine Palpitations     Gets \"cold, jittery, feels weird\"     Ancef [Cefazolin Sodium]      Augmentin [Aspartame]      Levaquin [Levofloxacin Hemihydrate]      Prednisolone      Seasonal Allergies      Sulfa Drugs         Social History     Tobacco Use     Smoking status: Former Smoker     Years: 15.00     Types: Cigarettes     Last attempt to quit: 1985     Years since quittin.6     Smokeless tobacco: Never Used   Substance Use Topics     Alcohol use: No     {FAMILY HISTORY (Optional):337539197}  History   Drug Use No            Objective   /80 (BP Location: Right arm)   Pulse 90   Temp 98.1  F (36.7  C) (Tympanic)   Resp 20   Ht 1.6 m (5' 3\")   Wt 72.1 kg (159 lb)   SpO2 97%   BMI 28.17 kg/m    Physical Exam  {EXAM Preop Choices:921276}    Recent Labs   Lab Test 20  1450 19  1415   HGB 12.1 12.7    355    139   POTASSIUM 4.4 4.1   CR 0.90 0.80        PRE-OP Diagnostics:  {LABS:321806}  {EK}    {Provider  Link to PREOP SmartSet :838700}     Assessment & Plan   The proposed surgical procedure is considered {HIGH=major cardiovascular or procedures requiring prolonged anesthesia >4 hours or large fluid shifts;    INTERMEDIATE=abdominal, most orthopedic and intrathoracic surgery; LOW= endoscopy, cataract and breast surgery:095036} risk.    REVISED CARDIAC RISK INDEX  The patient has the following serious cardiovascular risks for perioperative " "complications:  {PREOP REVISED CARDIAC RISK INDEX (RCRI):610498::\"No serious cardiac risks = 0 points\"}    INTERPRETATION: {REVISED CARDIAC RISK INTERPRETATION:245721}    {Diag Picklist :260337}    The patient has the following additional risks and recommendations for perioperative complications:    {IMPORTANT - Conditions - complete carefully!!:947374}     MEDICATION INSTRUCTIONS:  {IMPORTANT - Medications:405745}    RECOMMENDATION:  {IMPORTANT - Approval:359663::\"APPROVAL GIVEN to proceed with proposed procedure, without further diagnostic evaluation.\"}    No follow-ups on file.    Signed Electronically by: Azalea Tobar MD    Copy of this evaluation report is provided to requesting physician.    [facility] Pre[facility] Guidelines    Revised Cardiac Risk Index  "

## 2020-08-25 NOTE — PROGRESS NOTES
[facility]  [address] [place] [address]  Phone: [phone]  Fax: [phone]  Primary Provider: Azalea Ochoa  {FV AMB Performing Provider (Optional):137631}    PREOPERATIVE EVALUATION:  Today's date: 8/25/2020    Yudelka Beckett is a 79 year old female who presents for a preoperative evaluation.    Surgical Information:  No flowsheet data found.  Fax number for surgical facility: {SURGERY FAX NUMBER:[id number]}  Type of Anesthesia Anticipated: {ANESTHESIA:391595}    Subjective     HPI related to upcoming procedure: ***  No flowsheet data found.  Patient does not have a Health Care Directive or Living Will: {ADVANCE_DIRECTIVE_STATUS:057875}    RX monitoring program (MNPMP) reviewed: {Mnpmpreport:544017}  {Review MNPMP for all patients per I[employer]: [email or web address]}  {Chronic problem details (Optional):882677}    Review of Systems  {ROS Preop Choices:606324}    Patient Active Problem List    Diagnosis Date Noted     Other idiopathic scoliosis 08/22/2016     Priority: Medium     Varicose veins of both lower extremities 07/07/2016     Priority: Medium     Undiagnosed cardiac murmurs 05/14/2015     Priority: Medium     Advanced directives, counseling/discussion 02/11/2013     Priority: Medium     Discussed advance care planning with patient; however, patient declined at this time. 2/11/2013          Scoliosis 02/11/2013     Priority: Medium     Carpal tunnel syndrome 08/25/2011     Priority: Medium     Positive EMG 10/2011, saw Dr Galindo who recommended surgical release. Patient did not go through with it.        Cervical radiculopathy 08/25/2011     Priority: Medium     Colon stricture (H) 02/15/2011     Priority: Medium     HYPERLIPIDEMIA LDL GOAL <130 10/31/2010     Priority: Medium     S/P hysterectomy 02/10/2010     Priority: Medium     Done for irregular bleeding in the 1970's.  No cancer.  Has had one ovary removed due to tubal pregnancy,  "she thinks it was the R.  She thinks she still has the L ovary.  \"they left one, I think it was the left.\"       Elevated serum alkaline phosphatase level 08/27/2009     Priority: Medium     Has had work-up with normal RUQ ultrasound and negative ELP, Hep A, Hep C ab, Hep B Ag, normal LFTs- 2004 by Internal medicine       Nephrolithiasis 06/11/2008     Priority: Medium     8/07- ureteroscopy with lasar lithotripsy for large 15 mm stone, stent placed at [facility]. Has 6 mm left lower kidney pole calculus- pt has elected to watch. Sees Dr. Lutz at [facility].        Abnormal glucose 09/03/2007     Priority: Medium     Problem list name updated by automated process. Provider to review       Essential hypertension with goal blood pressure less than 140/90 05/18/2005     Priority: Medium     Problem list name updated by automated process. Provider to review       Esophageal reflux 05/18/2005     Priority: Medium     EGD 11/02 showing small hiatal hernia       Hypothyroidism 05/18/2005     Priority: Medium     Problem list name updated by automated process. Provider to review       Mild intermittent asthma 05/18/2005     Priority: Medium      Past Medical History:   Diagnosis Date     Basal cell carcinoma      Esophageal reflux      Other and unspecified hyperlipidemia      Unspecified essential hypertension      Unspecified hypothyroidism      Past Surgical History:   Procedure Laterality Date     COLONOSCOPY  2/7/2011    COLONOSCOPY performed by BHARTI DUNHAM at [facility]     Current Outpatient Medications   Medication Sig Dispense Refill     albuterol (PROAIR HFA) 108 (90 Base) MCG/ACT inhaler INHALE TWO PUFFS BY MOUTH EVERY 6 HOURS (NEED TO BE SEEN IN CLINIC FOR FURTHER REFILLS) 8.5 g 3     atorvastatin (LIPITOR) 40 MG tablet Take 1 tablet (40 mg) by mouth daily 90 tablet 3     diltiazem ER (DILT-XR) 240 MG 24 hr ER beaded capsule Take 1 capsule (240 mg) by mouth daily 90 capsule 3     esomeprazole (NEXIUM) 20 MG DR" "capsule Take 1 capsule (20 mg) by mouth every morning (before breakfast) One hour before meals fill when needed       IBUPROFEN 3 times daily        ipratropium (ATROVENT HFA) 17 MCG/ACT inhaler Inhale 2 puffs into the lungs 4 times daily as needed for wheezing 1 Inhaler 3     levothyroxine (SYNTHROID/LEVOTHROID) 88 MCG tablet Take 1 tablet (88 mcg) by mouth daily 90 tablet 3     lisinopril (ZESTRIL) 20 MG tablet Take 1 tablet (20 mg) by mouth daily Fill when needed 90 tablet 3       Allergies   Allergen Reactions     Epinephrine Palpitations     Gets \"cold, jittery, feels weird\"     Ancef [Cefazolin Sodium]      Augmentin [Aspartame]      Levaquin [Levofloxacin Hemihydrate]      Prednisolone      Seasonal Allergies      Sulfa Drugs         Social History     Tobacco Use     Smoking status: Former Smoker     Years: 15.00     Types: Cigarettes     Last attempt to quit: 1985     Years since quittin.6     Smokeless tobacco: Never Used   Substance Use Topics     Alcohol use: No     {FAMILY HISTORY (Optional):166871853}  History   Drug Use No            Objective   /80 (BP Location: Right arm)   Pulse 90   Temp 98.1  F (36.7  C) (Tympanic)   Resp 20   Ht 1.6 m (5' 3\")   Wt 72.1 kg (159 lb)   SpO2 97%   BMI 28.17 kg/m    Physical Exam  {EXAM Preop Choices:187062}    Recent Labs   Lab Test 20  1450 19  1415   HGB 12.1 12.7    355    139   POTASSIUM 4.4 4.1   CR 0.90 0.80        PRE-OP Diagnostics:  {LABS:601454}  {EK}    {Provider  Link to PREOP SmartSet :575336}     Assessment & Plan   The proposed surgical procedure is considered {HIGH=major cardiovascular or procedures requiring prolonged anesthesia >4 hours or large fluid shifts;    INTERMEDIATE=abdominal, most orthopedic and intrathoracic surgery; LOW= endoscopy, cataract and breast surgery:101889} risk.    REVISED CARDIAC RISK INDEX  The patient has the following serious cardiovascular risks for perioperative " "complications:  {PREOP REVISED CARDIAC RISK INDEX (RCRI):771655::\"No serious cardiac risks = 0 points\"}    INTERPRETATION: {REVISED CARDIAC RISK INTERPRETATION:317410}    {Diag Picklist :724611}    The patient has the following additional risks and recommendations for perioperative complications:    {IMPORTANT - Conditions - complete carefully!!:640807}     MEDICATION INSTRUCTIONS:  {IMPORTANT - Medications:400411}    RECOMMENDATION:  {IMPORTANT - Approval:748582::\"APPROVAL GIVEN to proceed with proposed procedure, without further diagnostic evaluation.\"}    No follow-ups on file.    Signed Electronically by: Azalea Tobar MD    Copy of this evaluation report is provided to requesting physician.    [facility] Preop Guidelines    Revised Cardiac Risk Index  "

## 2020-10-15 DIAGNOSIS — J45.20 MILD INTERMITTENT ASTHMA, UNCOMPLICATED: ICD-10-CM

## 2020-10-16 RX ORDER — IPRATROPIUM BROMIDE 17 UG/1
AEROSOL, METERED RESPIRATORY (INHALATION)
Qty: 3 INHALER | Refills: 2 | Status: SHIPPED | OUTPATIENT
Start: 2020-10-16 | End: 2021-04-05

## 2020-11-08 ENCOUNTER — HEALTH MAINTENANCE LETTER (OUTPATIENT)
Age: 80
End: 2020-11-08

## 2021-01-26 ENCOUNTER — TELEPHONE (OUTPATIENT)
Dept: FAMILY MEDICINE | Facility: CLINIC | Age: 81
End: 2021-01-26

## 2021-01-26 DIAGNOSIS — J45.20 MILD INTERMITTENT ASTHMA, UNCOMPLICATED: ICD-10-CM

## 2021-01-28 RX ORDER — ALBUTEROL SULFATE 90 UG/1
AEROSOL, METERED RESPIRATORY (INHALATION)
Qty: 8.5 G | Refills: 0 | Status: SHIPPED | OUTPATIENT
Start: 2021-01-28 | End: 2021-03-05

## 2021-01-28 NOTE — TELEPHONE ENCOUNTER
ACT Total Scores 7/31/2018 4/11/2019 6/25/2020   ACT TOTAL SCORE - - -   ASTHMA ER VISITS - - -   ASTHMA HOSPITALIZATIONS - - -   ACT TOTAL SCORE (Goal Greater than or Equal to 20) 12 20 21   In the past 12 months, how many times did you visit the emergency room for your asthma without being admitted to the hospital? 0 0 0   In the past 12 months, how many times were you hospitalized overnight because of your asthma? 0 0 0     Pt informed ACT sent via [employer] for completion, return.  Advised due for medicare wellness visit.  States asthma has been in good control but almost out of inhaler. Refilled x1.  ELYSSA Fernandez RN

## 2021-03-04 DIAGNOSIS — J45.20 MILD INTERMITTENT ASTHMA, UNCOMPLICATED: ICD-10-CM

## 2021-03-05 RX ORDER — ALBUTEROL SULFATE 90 UG/1
AEROSOL, METERED RESPIRATORY (INHALATION)
Qty: 9 G | Refills: 0 | Status: SHIPPED | OUTPATIENT
Start: 2021-03-05 | End: 2021-04-05

## 2021-03-05 NOTE — TELEPHONE ENCOUNTER
"Medication is being filled for 1 time refill only due to:  Patient needs to be seen because pt is due for 6 month asthma recheck..     ACT updated and score is 18.    Pt states \"I WILL run out before I can be seen.\"      Advised needs appt.  Pt will call back to schedule.  Lucretia Edwards RN      "

## 2021-03-05 NOTE — TELEPHONE ENCOUNTER
Called patient, no answer. Needs Medicare Wellness visit and updated ACT. Left message for patient to return call if she needs albuterol refill before she can get an appt.   Sun CASTANON RN

## 2021-03-06 ASSESSMENT — ASTHMA QUESTIONNAIRES: ACT_TOTALSCORE: 18

## 2021-04-05 ENCOUNTER — OFFICE VISIT (OUTPATIENT)
Dept: FAMILY MEDICINE | Facility: CLINIC | Age: 81
End: 2021-04-05
Payer: COMMERCIAL

## 2021-04-05 VITALS
HEIGHT: 63 IN | WEIGHT: 160 LBS | SYSTOLIC BLOOD PRESSURE: 148 MMHG | BODY MASS INDEX: 28.35 KG/M2 | HEART RATE: 87 BPM | TEMPERATURE: 98.3 F | DIASTOLIC BLOOD PRESSURE: 70 MMHG | OXYGEN SATURATION: 97 %

## 2021-04-05 DIAGNOSIS — M25.561 CHRONIC PAIN OF BOTH KNEES: ICD-10-CM

## 2021-04-05 DIAGNOSIS — E55.9 VITAMIN D DEFICIENCY: ICD-10-CM

## 2021-04-05 DIAGNOSIS — J45.20 MILD INTERMITTENT ASTHMA, UNCOMPLICATED: ICD-10-CM

## 2021-04-05 DIAGNOSIS — E78.5 HYPERLIPIDEMIA LDL GOAL <130: ICD-10-CM

## 2021-04-05 DIAGNOSIS — G56.01 CARPAL TUNNEL SYNDROME OF RIGHT WRIST: ICD-10-CM

## 2021-04-05 DIAGNOSIS — R74.8 ELEVATED ALKALINE PHOSPHATASE LEVEL: ICD-10-CM

## 2021-04-05 DIAGNOSIS — D64.9 NORMOCYTIC ANEMIA: ICD-10-CM

## 2021-04-05 DIAGNOSIS — E03.9 ACQUIRED HYPOTHYROIDISM: ICD-10-CM

## 2021-04-05 DIAGNOSIS — I10 ESSENTIAL HYPERTENSION WITH GOAL BLOOD PRESSURE LESS THAN 140/90: Primary | ICD-10-CM

## 2021-04-05 DIAGNOSIS — G89.29 CHRONIC PAIN OF BOTH KNEES: ICD-10-CM

## 2021-04-05 DIAGNOSIS — M25.562 CHRONIC PAIN OF BOTH KNEES: ICD-10-CM

## 2021-04-05 LAB
ALBUMIN SERPL-MCNC: 3.7 G/DL (ref 3.4–5)
ALP SERPL-CCNC: 198 U/L (ref 40–150)
ALT SERPL W P-5'-P-CCNC: 18 U/L (ref 0–50)
ANION GAP SERPL CALCULATED.3IONS-SCNC: 6 MMOL/L (ref 3–14)
AST SERPL W P-5'-P-CCNC: 17 U/L (ref 0–45)
BASOPHILS # BLD AUTO: 0.1 10E9/L (ref 0–0.2)
BASOPHILS NFR BLD AUTO: 0.6 %
BILIRUB SERPL-MCNC: 0.4 MG/DL (ref 0.2–1.3)
BUN SERPL-MCNC: 23 MG/DL (ref 7–30)
CALCIUM SERPL-MCNC: 9.5 MG/DL (ref 8.5–10.1)
CHLORIDE SERPL-SCNC: 104 MMOL/L (ref 94–109)
CHOLEST SERPL-MCNC: 198 MG/DL
CO2 SERPL-SCNC: 26 MMOL/L (ref 20–32)
CREAT SERPL-MCNC: 0.8 MG/DL (ref 0.52–1.04)
DIFFERENTIAL METHOD BLD: ABNORMAL
EOSINOPHIL # BLD AUTO: 0.5 10E9/L (ref 0–0.7)
EOSINOPHIL NFR BLD AUTO: 6.4 %
ERYTHROCYTE [DISTWIDTH] IN BLOOD BY AUTOMATED COUNT: 13.9 % (ref 10–15)
GFR SERPL CREATININE-BSD FRML MDRD: 70 ML/MIN/{1.73_M2}
GLUCOSE SERPL-MCNC: 98 MG/DL (ref 70–99)
HCT VFR BLD AUTO: 36 % (ref 35–47)
HDLC SERPL-MCNC: 55 MG/DL
HGB BLD-MCNC: 11.4 G/DL (ref 11.7–15.7)
LDLC SERPL CALC-MCNC: 107 MG/DL
LYMPHOCYTES # BLD AUTO: 2.3 10E9/L (ref 0.8–5.3)
LYMPHOCYTES NFR BLD AUTO: 28.4 %
MCH RBC QN AUTO: 27.4 PG (ref 26.5–33)
MCHC RBC AUTO-ENTMCNC: 31.7 G/DL (ref 31.5–36.5)
MCV RBC AUTO: 87 FL (ref 78–100)
MONOCYTES # BLD AUTO: 0.8 10E9/L (ref 0–1.3)
MONOCYTES NFR BLD AUTO: 9.4 %
NEUTROPHILS # BLD AUTO: 4.4 10E9/L (ref 1.6–8.3)
NEUTROPHILS NFR BLD AUTO: 55.2 %
NONHDLC SERPL-MCNC: 143 MG/DL
PLATELET # BLD AUTO: 340 10E9/L (ref 150–450)
POTASSIUM SERPL-SCNC: 4.5 MMOL/L (ref 3.4–5.3)
PROT SERPL-MCNC: 8.1 G/DL (ref 6.8–8.8)
RBC # BLD AUTO: 4.16 10E12/L (ref 3.8–5.2)
SODIUM SERPL-SCNC: 136 MMOL/L (ref 133–144)
TRIGL SERPL-MCNC: 180 MG/DL
TSH SERPL DL<=0.005 MIU/L-ACNC: 3.76 MU/L (ref 0.4–4)
WBC # BLD AUTO: 8 10E9/L (ref 4–11)

## 2021-04-05 PROCEDURE — 84443 ASSAY THYROID STIM HORMONE: CPT | Performed by: FAMILY MEDICINE

## 2021-04-05 PROCEDURE — 99215 OFFICE O/P EST HI 40 MIN: CPT | Performed by: FAMILY MEDICINE

## 2021-04-05 PROCEDURE — 85025 COMPLETE CBC W/AUTO DIFF WBC: CPT | Performed by: FAMILY MEDICINE

## 2021-04-05 PROCEDURE — 80053 COMPREHEN METABOLIC PANEL: CPT | Performed by: FAMILY MEDICINE

## 2021-04-05 PROCEDURE — 80061 LIPID PANEL: CPT | Performed by: FAMILY MEDICINE

## 2021-04-05 PROCEDURE — 36415 COLL VENOUS BLD VENIPUNCTURE: CPT | Performed by: FAMILY MEDICINE

## 2021-04-05 RX ORDER — IPRATROPIUM BROMIDE 17 UG/1
AEROSOL, METERED RESPIRATORY (INHALATION)
Qty: 12.9 G | Refills: 3 | Status: SHIPPED | OUTPATIENT
Start: 2021-04-05 | End: 2023-05-23

## 2021-04-05 RX ORDER — ALBUTEROL SULFATE 90 UG/1
AEROSOL, METERED RESPIRATORY (INHALATION)
Qty: 9 G | Refills: 3 | Status: SHIPPED | OUTPATIENT
Start: 2021-04-05 | End: 2021-11-04

## 2021-04-05 ASSESSMENT — MIFFLIN-ST. JEOR: SCORE: 1169.89

## 2021-04-05 NOTE — PATIENT INSTRUCTIONS
Physical therapy for your knees    Call the insurance and ask if they will pay for Synvisc injections for you knees, and do they require a steroid shot first?   If they do, then let me know and we can order for you.     Labs today

## 2021-04-05 NOTE — PROGRESS NOTES
"    Assessment & Plan     Mild intermittent asthma, uncomplicated  Fair control  Doesn't want to change inhalers, is working for her  - ipratropium (ATROVENT HFA) 17 MCG/ACT inhaler; INHALE 2 PUFFS INTO THE LUNGS FOUR TIMES A DAY AS NEEDED FOR WHEEZING  - albuterol (PROAIR HFA/PROVENTIL HFA/VENTOLIN HFA) 108 (90 Base) MCG/ACT inhaler; INHALE 2 PUFFS BY MOUTH EVERY 6 HOURS    Essential hypertension with goal blood pressure less than 140/90  Well controlled  - CBC with platelets and differential  - Comprehensive metabolic panel    Hyperlipidemia LDL goal <130  Well controlled on statin  - Lipid panel reflex to direct LDL Non-fasting    Acquired hypothyroidism  Well controlled  On levothyroxine, adjust as needed  - TSH    Carpal tunnel syndrome of right wrist  Chronic  Can wear wrist splint as needed  Likely too chronic that surgery would help at this point but offered to see ortho, she declines    Chronic pain of both knees  Worsening osteoarthritis   Offered steroid injections, she declines as she had some trouble with steroid eye drops.  Will try physical therapy  Can consider Synvisc injecitons  - PHYSICAL THERAPY REFERRAL; Future      43 minutes spent on the date of the encounter doing chart review, history and exam, documentation and further activities per the note       BMI:   Estimated body mass index is 28.34 kg/m  as calculated from the following:    Height as of this encounter: 1.6 m (5' 3\").    Weight as of this encounter: 72.6 kg (160 lb).         No follow-ups on file.    Azalea Meehan MD  [facility]    Bacilio Jha is a 80 year old who presents for the following health issues     HPI     Asthma Follow-Up    Was ACT completed today?    Yes    ACT Total Scores 3/5/2021   ACT TOTAL SCORE -   ASTHMA ER VISITS -   ASTHMA HOSPITALIZATIONS -   ACT TOTAL SCORE (Goal Greater than or Equal to 20) 18   In the past 12 months, how many times did you visit the emergency room for " "your asthma without being admitted to the hospital? 0   In the past 12 months, how many times were you hospitalized overnight because of your asthma? 0          How many days per week do you miss taking your asthma controller medication?  I do not have an asthma controller medication    Please describe any recent triggers for your asthma: None    Have you had any Emergency Room Visits, Urgent Care Visits, or Hospital Admissions since your last office visit?  No  Needs refills of her albuterol  Uses Atrovent     hypothyroidism   On levothyroxine   TSH   Date Value Ref Range Status   06/25/2020 3.51 0.40 - 4.00 mU/L Final      hypertension   On lisinopril, diltiazem  BP Readings from Last 6 Encounters:   04/05/21 (!) 146/74   08/25/20 136/80   06/25/20 (!) 164/82   11/05/19 (!) 143/92   07/30/19 128/80   04/11/19 139/80      Fingers 1-3 are numb, has been a year or less  Right handed  Does have a history of carpal tunnel that she didn't get the release done  Was in 2011 that had an EMG by Dr Jenkins    Bilateral knee pain  Right greater than left   She had an xray back in 2016 with mild medial joint space narrowing, xray in fall of 2019 shows worsening of that joint space    Review of Systems   ROS: 5 point ROS negative except as noted above in HPI, including Gen., Resp., CV, GI &  system review.       Objective    BP (!) 146/74 (BP Location: Right arm, Cuff Size: Adult Regular)   Pulse 87   Temp 98.3  F (36.8  C) (Tympanic)   Ht 1.6 m (5' 3\")   Wt 72.6 kg (160 lb)   SpO2 97%   BMI 28.34 kg/m    Body mass index is 28.34 kg/m .  Physical Exam   GENERAL: healthy, alert and no distress  NECK: no adenopathy, no asymmetry, masses, or scars and thyroid normal to palpation  RESP: lungs clear to auscultation - no rales, rhonchi or wheezes  CV: regular rates and rhythm, normal S1 S2, no S3 or S4, no murmur, click or rub and no peripheral edema  ABDOMEN: soft, nontender, no hepatosplenomegaly, no masses and bowel sounds " normal  MS: knee exam:  right  knee has brace in place. negative crepitus with flexion and extension, full ROM. The ACL, PCL, MCL and LCL are intact. . Antalgic gait with some deformity of right knee   Right wrist without erythema or edema,Tinel's vanda was negative

## 2021-04-05 NOTE — LETTER
My Asthma Action Plan    Name: Yudelka Beckett   YOB: 1940  Date: 4/5/2021   My doctor: Azalea Meehan MD   My clinic: [facility]        My Rescue Medicine:   Albuterol inhaler (Proair/Ventolin/Proventil HFA)  2-4 puffs EVERY 4 HOURS as needed. Use a spacer if recommended by your provider.   My Asthma Severity:   Intermittent / Exercise Induced  Know your asthma triggers: Patient is unaware of triggers  humidity          GREEN ZONE   Good Control    I feel good    No cough or wheeze    Can work, sleep and play without asthma symptoms       Take your asthma control medicine every day.     1. If exercise triggers your asthma, take your rescue medication    15 minutes before exercise or sports, and    During exercise if you have asthma symptoms  2. Spacer to use with inhaler: If you have a spacer, make sure to use it with your inhaler             YELLOW ZONE Getting Worse  I have ANY of these:    I do not feel good    Cough or wheeze    Chest feels tight    Wake up at night   1. Keep taking your Green Zone medications  2. Start taking your rescue medicine:    every 20 minutes for up to 1 hour. Then every 4 hours for 24-48 hours.  3. If you stay in the Yellow Zone for more than 12-24 hours, contact your doctor.  4. If you do not return to the Green Zone in 12-24 hours or you get worse, start taking your oral steroid medicine if prescribed by your provider.           RED ZONE Medical Alert - Get Help  I have ANY of these:    I feel awful    Medicine is not helping    Breathing getting harder    Trouble walking or talking    Nose opens wide to breathe       1. Take your rescue medicine NOW  2. If your provider has prescribed an oral steroid medicine, start taking it NOW  3. Call your doctor NOW  4. If you are still in the Red Zone after 20 minutes and you have not reached your doctor:    Take your rescue medicine again and    Call 911 or go to the emergency room right  away    See your regular doctor within 2 weeks of an Emergency Room or Urgent Care visit for follow-up treatment.          Annual Reminders:  Meet with Asthma Educator,  Flu Shot in the Fall, consider Pneumonia Vaccination for patients with asthma (aged 19 and older).    Pharmacy:    [facility] [place] [address] [facility] - [place], [place] - [address] [place] [address]    Electronically signed by Azalea Meehan MD   Date: 04/05/21                    Asthma Triggers  How To Control Things That Make Your Asthma Worse    Triggers are things that make your asthma worse.  Look at the list below to help you find your triggers and   what you can do about them. You can help prevent asthma flare-ups by staying away from your triggers.      Trigger                                                          What you can do   Cigarette Smoke  Tobacco smoke can make asthma worse. Do not allow smoking in your home, car or around you.  Be sure no one smokes at a child s day care or school.  If you smoke, ask your health care provider for ways to help you quit.  Ask family members to quit too.  Ask your health care provider for a referral to Quit Plan to help you quit smoking, or call 2-117-231-PLAN.     Colds, Flu, Bronchitis  These are common triggers of asthma. Wash your hands often.  Don t touch your eyes, nose or mouth.  Get a flu shot every year.     Dust Mites  These are tiny bugs that live in cloth or carpet. They are too small to see. Wash sheets and blankets in hot water every week.   Encase pillows and mattress in dust mite proof covers.  Avoid having carpet if you can. If you have carpet, vacuum weekly.   Use a dust mask and HEPA vacuum.   Pollen and Outdoor Mold  Some people are allergic to trees, grass, or weed pollen, or molds. Try to keep your windows closed.  Limit time out doors when pollen count is high.   Ask you health care provider about taking medicine during allergy season.      Animal Dander  Some people are allergic to skin flakes, urine or saliva from pets with fur or feathers. Keep pets with fur or feathers out of your home.    If you can t keep the pet outdoors, then keep the pet out of your bedroom.  Keep the bedroom door closed.  Keep pets off cloth furniture and away from stuffed toys.     Mice, Rats, and Cockroaches  Some people are allergic to the waste from these pests.   Cover food and garbage.  Clean up spills and food crumbs.  Store grease in the refrigerator.   Keep food out of the bedroom.   Indoor Mold  This can be a trigger if your home has high moisture. Fix leaking faucets, pipes, or other sources of water.   Clean moldy surfaces.  Dehumidify basement if it is damp and smelly.   Smoke, Strong Odors, and Sprays  These can reduce air quality. Stay away from strong odors and sprays, such as perfume, powder, hair spray, paints, smoke incense, paint, cleaning products, candles and new carpet.   Exercise or Sports  Some people with asthma have this trigger. Be active!  Ask your doctor about taking medicine before sports or exercise to prevent symptoms.    Warm up for 5-10 minutes before and after sports or exercise.     Other Triggers of Asthma  Cold air:  Cover your nose and mouth with a scarf.  Sometimes laughing or crying can be a trigger.  Some medicines and food can trigger asthma.

## 2021-04-06 ASSESSMENT — ASTHMA QUESTIONNAIRES: ACT_TOTALSCORE: 20

## 2021-04-24 DIAGNOSIS — R74.8 ELEVATED ALKALINE PHOSPHATASE LEVEL: ICD-10-CM

## 2021-04-24 DIAGNOSIS — D64.9 NORMOCYTIC ANEMIA: ICD-10-CM

## 2021-04-24 DIAGNOSIS — E55.9 VITAMIN D DEFICIENCY: ICD-10-CM

## 2021-04-24 LAB
BASOPHILS # BLD AUTO: 0.1 10E9/L (ref 0–0.2)
BASOPHILS NFR BLD AUTO: 0.6 %
DIFFERENTIAL METHOD BLD: ABNORMAL
EOSINOPHIL # BLD AUTO: 0.4 10E9/L (ref 0–0.7)
EOSINOPHIL NFR BLD AUTO: 5.7 %
ERYTHROCYTE [DISTWIDTH] IN BLOOD BY AUTOMATED COUNT: 13.7 % (ref 10–15)
FERRITIN SERPL-MCNC: 26 NG/ML (ref 8–252)
FOLATE SERPL-MCNC: 14.4 NG/ML
GGT SERPL-CCNC: 9 U/L (ref 0–40)
HCT VFR BLD AUTO: 36.3 % (ref 35–47)
HGB BLD-MCNC: 11.6 G/DL (ref 11.7–15.7)
IRON SATN MFR SERPL: 15 % (ref 15–46)
IRON SERPL-MCNC: 57 UG/DL (ref 35–180)
LYMPHOCYTES # BLD AUTO: 2.8 10E9/L (ref 0.8–5.3)
LYMPHOCYTES NFR BLD AUTO: 35.7 %
MCH RBC QN AUTO: 28.1 PG (ref 26.5–33)
MCHC RBC AUTO-ENTMCNC: 32 G/DL (ref 31.5–36.5)
MCV RBC AUTO: 88 FL (ref 78–100)
MONOCYTES # BLD AUTO: 0.8 10E9/L (ref 0–1.3)
MONOCYTES NFR BLD AUTO: 10.8 %
NEUTROPHILS # BLD AUTO: 3.7 10E9/L (ref 1.6–8.3)
NEUTROPHILS NFR BLD AUTO: 47.2 %
PLATELET # BLD AUTO: 389 10E9/L (ref 150–450)
PTH-INTACT SERPL-MCNC: 55 PG/ML (ref 18–80)
RBC # BLD AUTO: 4.13 10E12/L (ref 3.8–5.2)
RETICS # AUTO: 49.5 10E9/L (ref 25–95)
RETICS/RBC NFR AUTO: 1.1 % (ref 0.5–2)
TIBC SERPL-MCNC: 383 UG/DL (ref 240–430)
VIT B12 SERPL-MCNC: 441 PG/ML (ref 193–986)
WBC # BLD AUTO: 7.8 10E9/L (ref 4–11)

## 2021-04-24 PROCEDURE — 83970 ASSAY OF PARATHORMONE: CPT | Performed by: FAMILY MEDICINE

## 2021-04-24 PROCEDURE — 85045 AUTOMATED RETICULOCYTE COUNT: CPT | Performed by: FAMILY MEDICINE

## 2021-04-24 PROCEDURE — 85025 COMPLETE CBC W/AUTO DIFF WBC: CPT | Performed by: FAMILY MEDICINE

## 2021-04-24 PROCEDURE — 82746 ASSAY OF FOLIC ACID SERUM: CPT | Performed by: FAMILY MEDICINE

## 2021-04-24 PROCEDURE — 82728 ASSAY OF FERRITIN: CPT | Performed by: FAMILY MEDICINE

## 2021-04-24 PROCEDURE — 82977 ASSAY OF GGT: CPT | Performed by: FAMILY MEDICINE

## 2021-04-24 PROCEDURE — 99N1109 PR STATISTIC MORPHOLOGY W/INTERP HISTOLOGY TC 85060: Performed by: FAMILY MEDICINE

## 2021-04-24 PROCEDURE — 82306 VITAMIN D 25 HYDROXY: CPT | Performed by: FAMILY MEDICINE

## 2021-04-24 PROCEDURE — 82607 VITAMIN B-12: CPT | Performed by: FAMILY MEDICINE

## 2021-04-24 PROCEDURE — 83550 IRON BINDING TEST: CPT | Performed by: FAMILY MEDICINE

## 2021-04-24 PROCEDURE — 83540 ASSAY OF IRON: CPT | Performed by: FAMILY MEDICINE

## 2021-04-24 PROCEDURE — 36415 COLL VENOUS BLD VENIPUNCTURE: CPT | Performed by: FAMILY MEDICINE

## 2021-04-26 LAB
COPATH REPORT: NORMAL
DEPRECATED CALCIDIOL+CALCIFEROL SERPL-MC: 25 UG/L (ref 20–75)

## 2021-05-06 ENCOUNTER — IMMUNIZATION (OUTPATIENT)
Dept: FAMILY MEDICINE | Facility: CLINIC | Age: 81
End: 2021-05-06
Payer: COMMERCIAL

## 2021-05-06 PROCEDURE — 0011A PR COVID VAC MODERNA 100 MCG/0.5 ML IM: CPT

## 2021-05-06 PROCEDURE — 91301 PR COVID VAC MODERNA 100 MCG/0.5 ML IM: CPT

## 2021-06-03 ENCOUNTER — IMMUNIZATION (OUTPATIENT)
Dept: FAMILY MEDICINE | Facility: CLINIC | Age: 81
End: 2021-06-03
Attending: FAMILY MEDICINE
Payer: COMMERCIAL

## 2021-06-03 PROCEDURE — 91301 PR COVID VAC MODERNA 100 MCG/0.5 ML IM: CPT

## 2021-06-03 PROCEDURE — 0012A PR COVID VAC MODERNA 100 MCG/0.5 ML IM: CPT

## 2021-06-24 ENCOUNTER — ANCILLARY PROCEDURE (OUTPATIENT)
Dept: GENERAL RADIOLOGY | Facility: CLINIC | Age: 81
End: 2021-06-24
Attending: FAMILY MEDICINE
Payer: COMMERCIAL

## 2021-06-24 ENCOUNTER — OFFICE VISIT (OUTPATIENT)
Dept: FAMILY MEDICINE | Facility: CLINIC | Age: 81
End: 2021-06-24
Payer: COMMERCIAL

## 2021-06-24 VITALS
TEMPERATURE: 98.5 F | WEIGHT: 162 LBS | OXYGEN SATURATION: 96 % | RESPIRATION RATE: 16 BRPM | SYSTOLIC BLOOD PRESSURE: 139 MMHG | DIASTOLIC BLOOD PRESSURE: 79 MMHG | HEART RATE: 89 BPM | BODY MASS INDEX: 28.7 KG/M2

## 2021-06-24 DIAGNOSIS — R74.8 ELEVATED ALKALINE PHOSPHATASE LEVEL: ICD-10-CM

## 2021-06-24 DIAGNOSIS — M25.511 CHRONIC RIGHT SHOULDER PAIN: ICD-10-CM

## 2021-06-24 DIAGNOSIS — D64.9 NORMOCYTIC ANEMIA: ICD-10-CM

## 2021-06-24 DIAGNOSIS — I10 ESSENTIAL HYPERTENSION WITH GOAL BLOOD PRESSURE LESS THAN 140/90: Primary | ICD-10-CM

## 2021-06-24 DIAGNOSIS — M25.561 CHRONIC PAIN OF RIGHT KNEE: ICD-10-CM

## 2021-06-24 DIAGNOSIS — G89.29 CHRONIC PAIN OF RIGHT KNEE: ICD-10-CM

## 2021-06-24 DIAGNOSIS — R27.0 ATAXIA: ICD-10-CM

## 2021-06-24 DIAGNOSIS — G89.29 CHRONIC RIGHT SHOULDER PAIN: ICD-10-CM

## 2021-06-24 DIAGNOSIS — M17.11 PRIMARY OSTEOARTHRITIS OF RIGHT KNEE: ICD-10-CM

## 2021-06-24 LAB
ALBUMIN SERPL-MCNC: 3.9 G/DL (ref 3.4–5)
ALP SERPL-CCNC: 182 U/L (ref 40–150)
ALT SERPL W P-5'-P-CCNC: 25 U/L (ref 0–50)
ANION GAP SERPL CALCULATED.3IONS-SCNC: 9 MMOL/L (ref 3–14)
AST SERPL W P-5'-P-CCNC: 18 U/L (ref 0–45)
BASOPHILS # BLD AUTO: 0.1 10E9/L (ref 0–0.2)
BASOPHILS NFR BLD AUTO: 0.8 %
BILIRUB DIRECT SERPL-MCNC: 0.1 MG/DL (ref 0–0.2)
BILIRUB SERPL-MCNC: 0.5 MG/DL (ref 0.2–1.3)
BUN SERPL-MCNC: 25 MG/DL (ref 7–30)
CALCIUM SERPL-MCNC: 9.4 MG/DL (ref 8.5–10.1)
CHLORIDE SERPL-SCNC: 103 MMOL/L (ref 94–109)
CO2 SERPL-SCNC: 27 MMOL/L (ref 20–32)
CREAT SERPL-MCNC: 0.86 MG/DL (ref 0.52–1.04)
DIFFERENTIAL METHOD BLD: ABNORMAL
EOSINOPHIL # BLD AUTO: 0.5 10E9/L (ref 0–0.7)
EOSINOPHIL NFR BLD AUTO: 6.3 %
ERYTHROCYTE [DISTWIDTH] IN BLOOD BY AUTOMATED COUNT: 14.2 % (ref 10–15)
GFR SERPL CREATININE-BSD FRML MDRD: 64 ML/MIN/{1.73_M2}
GLUCOSE SERPL-MCNC: 126 MG/DL (ref 70–99)
HCT VFR BLD AUTO: 34.6 % (ref 35–47)
HGB BLD-MCNC: 11.3 G/DL (ref 11.7–15.7)
LYMPHOCYTES # BLD AUTO: 2.3 10E9/L (ref 0.8–5.3)
LYMPHOCYTES NFR BLD AUTO: 29.9 %
MCH RBC QN AUTO: 29.4 PG (ref 26.5–33)
MCHC RBC AUTO-ENTMCNC: 32.7 G/DL (ref 31.5–36.5)
MCV RBC AUTO: 90 FL (ref 78–100)
MONOCYTES # BLD AUTO: 0.8 10E9/L (ref 0–1.3)
MONOCYTES NFR BLD AUTO: 10.2 %
NEUTROPHILS # BLD AUTO: 4.1 10E9/L (ref 1.6–8.3)
NEUTROPHILS NFR BLD AUTO: 52.8 %
PLATELET # BLD AUTO: 366 10E9/L (ref 150–450)
POTASSIUM SERPL-SCNC: 4.7 MMOL/L (ref 3.4–5.3)
PROT SERPL-MCNC: 7.8 G/DL (ref 6.8–8.8)
RBC # BLD AUTO: 3.84 10E12/L (ref 3.8–5.2)
SODIUM SERPL-SCNC: 139 MMOL/L (ref 133–144)
WBC # BLD AUTO: 7.7 10E9/L (ref 4–11)

## 2021-06-24 PROCEDURE — 73030 X-RAY EXAM OF SHOULDER: CPT | Mod: RT | Performed by: RADIOLOGY

## 2021-06-24 PROCEDURE — 99215 OFFICE O/P EST HI 40 MIN: CPT | Performed by: FAMILY MEDICINE

## 2021-06-24 PROCEDURE — 85025 COMPLETE CBC W/AUTO DIFF WBC: CPT | Performed by: FAMILY MEDICINE

## 2021-06-24 PROCEDURE — 82248 BILIRUBIN DIRECT: CPT | Performed by: FAMILY MEDICINE

## 2021-06-24 PROCEDURE — 36415 COLL VENOUS BLD VENIPUNCTURE: CPT | Performed by: FAMILY MEDICINE

## 2021-06-24 PROCEDURE — 80053 COMPREHEN METABOLIC PANEL: CPT | Performed by: FAMILY MEDICINE

## 2021-06-24 PROCEDURE — 73565 X-RAY EXAM OF KNEES: CPT | Performed by: RADIOLOGY

## 2021-06-24 NOTE — PROGRESS NOTES
Assessment & Plan     Essential hypertension with goal blood pressure less than 140/90  Well controlled    Normocytic anemia  Uncertain etiology  recheck  - CBC with platelets and differential    Chronic pain of right knee  End stage osteoarthritis   Should have TKA  - XR Knee AP Standing Bilateral; Future  - Orthopedic  Referral; Future    Chronic right shoulder pain  Rotator tendonitis suspected  Declines physical therapy or injection  - XR Shoulder Right 2 Views; Future  - Orthopedic  Referral; Future    Primary osteoarthritis of right knee  Needs TKA  - Orthopedic  Referral; Future    Elevated alkaline phosphatase level  Likely from bone  Patient does not want to pursue  - Comprehensive metabolic panel (BMP + Alb, Alk Phos, ALT, AST, Total. Bili, TP)    Ataxia  Not sure what the episode of ataxia was  Maybe a form of BPPV  Could have been a small stroke and offered patient CT scan of the head, she declines  Will monitor as has resolved  Certain her bad knee could contribute    Patient Instructions   See ortho  Labs today       I spent a total of 63 minutes on the day of the visit.   Time spent doing chart review, history and exam, documentation and further activities per the note         Return in about 2 months (around 8/24/2021).    Azalea Meehan MD  [facility]    Bacilio casanova is a 80 year old who presents for the following health issues     HPI     Knee pain  Right greater than left   She had an xray back in 2016 with mild medial joint space narrowing, xray in fall of 2019 shows worsening of that joint space    Normocytic anemia   Blood smear:   FINAL DIAGNOSIS:   Peripheral blood demonstrating borderline normochromic normocytic anemia     COMMENT:   Causes for normochromic normocytic anemia include chronic   infectious/inflammatory disorders, splenomegaly,   renal disease, nutritional deficiencies, and acute hemorrhage.  Clinical   correlation  is required.     Electronically signed out by:     José Miguel Corbin M.D.      Elevated alk phos  Since 2004 has been about the same  GGT was negative                For a month had ataxia  Suddenly and out of the blue  No fall or head trauma  No vertigo. But felt like she was going to fall if she put her head up, turned her head from side to side.   Had to be holding on to something all the time.   Some allergy symptoms and stuffy nose  Her neck was worse then.   Then after a month it went away.     Right shoulder pain  Hurts more than it used   Sometimes is very sharp pain  Always aches  Hard to sleep at night  Takes ibuprofen at bedtime 400 mg  And tylenol in the middle of the night 500 mg    Review of Systems   ROS: 5 point ROS negative except as noted above in HPI, including Gen., Resp., CV, GI &  system review.       Objective    /79 (BP Location: Right arm)   Pulse 89   Temp 98.5  F (36.9  C) (Tympanic)   Resp 16   Wt 73.5 kg (162 lb)   SpO2 96%   BMI 28.70 kg/m    Body mass index is 28.7 kg/m .  Physical Exam   GENERAL: healthy, alert and no distress  EYES: Eyes grossly normal to inspection, PERRL and conjunctivae and sclerae normal  Ears: TMs and canals normal, no fluid   NECK: no adenopathy, no asymmetry, masses, or scars and thyroid normal to palpation  RESP: lungs clear to auscultation - no rales, rhonchi or wheezes  CV: regular rates and rhythm, normal S1 S2, no S3 or S4, no murmur, click or rub and no peripheral edema  ABDOMEN: soft, nontender, no hepatosplenomegaly, no masses and bowel sounds normal  MS:antalgic pain, right leg has a genu valgus deformity   Neurologic exam reveals: normal without focal findings, mental status, speech normal, alert and oriented x iii, IZA, cranial nerves 2-12 intact, reflexes normal and symmetric, Babinski response negative, sensation grossly normal, gait and station normal, finger to nose and cerebellar exam normal. EOM and fields intact.     I  independently visualized the xray:  Severe osteoarthritis right knee, nothing acute on right shoulder

## 2021-06-24 NOTE — NURSING NOTE
"Chief Complaint   Patient presents with     Arthritis       Initial /79 (BP Location: Right arm)   Pulse 89   Temp 98.5  F (36.9  C) (Tympanic)   Resp 16   Wt 73.5 kg (162 lb)   SpO2 96%   BMI 28.70 kg/m   Estimated body mass index is 28.7 kg/m  as calculated from the following:    Height as of 4/5/21: 1.6 m (5' 3\").    Weight as of this encounter: 73.5 kg (162 lb).    Patient presents to the clinic using No DME    Health Maintenance that is potentially due pending provider review:  NONE    n/a    Is there anyone who you would like to be able to receive your results? No  If yes have patient fill out LIGIA    "

## 2021-07-19 ENCOUNTER — OFFICE VISIT (OUTPATIENT)
Dept: ORTHOPEDICS | Facility: CLINIC | Age: 81
End: 2021-07-19
Attending: FAMILY MEDICINE
Payer: COMMERCIAL

## 2021-07-19 ENCOUNTER — ANCILLARY PROCEDURE (OUTPATIENT)
Dept: GENERAL RADIOLOGY | Facility: CLINIC | Age: 81
End: 2021-07-19
Attending: PEDIATRICS
Payer: COMMERCIAL

## 2021-07-19 VITALS
DIASTOLIC BLOOD PRESSURE: 85 MMHG | HEIGHT: 63 IN | BODY MASS INDEX: 28.7 KG/M2 | WEIGHT: 162 LBS | SYSTOLIC BLOOD PRESSURE: 150 MMHG

## 2021-07-19 DIAGNOSIS — M25.561 CHRONIC PAIN OF RIGHT KNEE: ICD-10-CM

## 2021-07-19 DIAGNOSIS — M17.11 PRIMARY OSTEOARTHRITIS OF RIGHT KNEE: ICD-10-CM

## 2021-07-19 DIAGNOSIS — G89.29 CHRONIC RIGHT SHOULDER PAIN: ICD-10-CM

## 2021-07-19 DIAGNOSIS — G89.29 CHRONIC PAIN OF RIGHT KNEE: ICD-10-CM

## 2021-07-19 DIAGNOSIS — M25.511 CHRONIC RIGHT SHOULDER PAIN: ICD-10-CM

## 2021-07-19 PROCEDURE — 99204 OFFICE O/P NEW MOD 45 MIN: CPT | Performed by: PEDIATRICS

## 2021-07-19 PROCEDURE — 73560 X-RAY EXAM OF KNEE 1 OR 2: CPT | Mod: RT | Performed by: RADIOLOGY

## 2021-07-19 ASSESSMENT — MIFFLIN-ST. JEOR: SCORE: 1178.96

## 2021-07-19 NOTE — PATIENT INSTRUCTIONS
Yudelka to follow up with Primary Care provider regarding elevated blood pressure.    Discussed nature of degenerative arthrosis of the knee. Discussed symptom treatment with over-the-counter medications, ice or heat, topical treatments, and rest if needed. Discussed use of sleeve or wrap for comfort. Discussed benefits of exercise and physical therapy. Discussed injection therapy. Also briefly discussed future consideration of referral to orthopedic surgery for further evaluation and discussion of arthroplasty.  - Given allergy to prednisone, consider Synvisc injection    Plan:  - Today's Plan of Care:  Rehab: [facility] [phone]  Discussed activity considerations and other supportive care including Ice/Heat, OTC and other topical medications as needed.  Consider bracing    -We also discussed other future treatment options:  Synvisc One injection of Right Knee - call first if wanting this injection  Referral to Orthopedic Surgeon    Follow Up: as needed    If you have any further questions for your physician or physician s care team you can call [phone] and use option 3 to leave a voice message. Calls received during business hours will be returned same day.

## 2021-07-19 NOTE — PROGRESS NOTES
ASSESSMENT & PLAN    Yudelka was seen today for pain.    Diagnoses and all orders for this visit:    Chronic pain of right knee  -     Orthopedic  Referral  -     XR Knee Bilateral 1/2 vw; Future  -     Physical Therapy Referral; Future    Primary osteoarthritis of right knee  -     Orthopedic  Referral  -     XR Knee Bilateral 1/2 vw; Future  -     Physical Therapy Referral; Future      This issue is chronic and Worsening.    Yudelka to follow up with Primary Care provider regarding elevated blood pressure.    Discussed nature of degenerative arthrosis of the knee. Discussed symptom treatment with over-the-counter medications, ice or heat, topical treatments, and rest if needed. Discussed use of sleeve or wrap for comfort. Discussed benefits of exercise and physical therapy. Discussed injection therapy. Also briefly discussed future consideration of referral to orthopedic surgery for further evaluation and discussion of arthroplasty.  - Given allergy to prednisone, consider Synvisc injection  - Long discussion about options, patient would like to consider them all. Hesitant to consider surgery.    Plan:  - Today's Plan of Care:  Re[phone]  Discussed activity considerations and other supportive care including Ice/Heat, OTC and other topical medications as needed.  Consider bracing    -We also discussed other future treatment options:  Synvisc One injection of Right Knee - call first if wanting this injection  Referral to Orthopedic Surgeon    Follow Up: as needed    Concerning signs and symptoms were reviewed.  The patient expressed understanding of this management plan and all questions were answered at this time.    Thanks for the opportunity to participate in the care of this patient, I will keep you updated on their progress.    CC: Azalea Lester MD [facility]  Sports Medicine Physician  [facility]  "Orthopedics    -----  Chief Complaint   Patient presents with     Right Knee - Pain       SUBJECTIVE  Yudelka Beckett is a/an 80 year old female who is seen in consultation at the request of  Azalea Ochoa M.D. for evaluation of right knee pain.  Her knee is bothering her, but in the last year her knee has been progressively worse.       The patient is seen by themselves.    Onset: many years(s) ago, but in the last year. Reports insidious onset without acute precipitating event.  Location of Pain: right knee, pain moves around  Worsened by: walking, ADLs, stairs, sit to stand, depends on other leg to do the work  Better with: unsure  Treatments tried: ibuprofen and previous imaging (xray 6/24/21)  Associated symptoms: swelling, weakness of knee/leg, locking or catching and feeling of instability    Orthopedic/Surgical history: YES - Date: chronic knee pain  Social History/Occupation: not working    No family history pertinent to patient's problem today.    REVIEW OF SYSTEMS:  Review of Systems  Skin: no bruising, yes swelling  Musculoskeletal: as above  Neurologic: no numbness, paresthesias  Remainder of review of systems is negative including constitutional, CV, pulmonary, GI, except as noted in HPI or medical history.    OBJECTIVE:  BP (!) 150/85   Ht 1.6 m (5' 3\")   Wt 73.5 kg (162 lb)   BMI 28.70 kg/m     General: healthy, alert and in no distress  HEENT: no scleral icterus or conjunctival erythema  Skin: no suspicious lesions or rash. No jaundice.  CV: distal perfusion intact  Resp: normal respiratory effort without conversational dyspnea   Psych: normal mood and affect  Gait: antalgic, using a cane  Neuro: Normal light sensory exam of lower extremity    Bilateral Knee exam    Inspection:      mild effusion right  - varus knee alignment    Patella:      Crepitus noted in the patellofemoral joint bilateral    Tender:      medial joint line right       lateral joint line right    Non Tender:      " remainder of knee area bilateral    Knee ROM:      Range of motion limited on the right in flexion and extension, 5-90    Strength:      5-/5 with knee extension right    Special Tests:     Pain with Fauzia right       neg (-) varus at 0 deg and 30 deg right       neg (-) valgus at 0 deg and 30 deg right    Gait:      antalgic gait with cane    Neurovascular:      2+ peripheral pulses bilaterally and brisk capillary refill       sensation grossly intact    RADIOLOGY:  I independently ordered, visualized and reviewed these images with the patient  2 XR views of bilateral knees reviewed today with AP bilateral knees from 6/24/2021: no acute bony abnormality, severe right knee degenerative changes, most in lateral compartment  - will follow official read    Reviewed primary care note  Review of the result(s) of each unique test - XR

## 2021-07-19 NOTE — LETTER
7/19/2021         RE: Yudelka Beckett  [address] [id number]        Dear Colleague,    Thank you for referring your patient, Yudelka Beckett, to the [facility]. Please see a copy of my visit note below.    ASSESSMENT & PLAN    Yudelka was seen today for pain.    Diagnoses and all orders for this visit:    Chronic pain of right knee  -     Orthopedic  Referral  -     XR Knee Bilateral 1/2 vw; Future  -     Physical Therapy Referral; Future    Primary osteoarthritis of right knee  -     Orthopedic  Referral  -     XR Knee Bilateral 1/2 vw; Future  -     Physical Therapy Referral; Future      This issue is chronic and Worsening.    Yudelka to follow up with Primary Care provider regarding elevated blood pressure.    Discussed nature of degenerative arthrosis of the knee. Discussed symptom treatment with over-the-counter medications, ice or heat, topical treatments, and rest if needed. Discussed use of sleeve or wrap for comfort. Discussed benefits of exercise and physical therapy. Discussed injection therapy. Also briefly discussed future consideration of referral to orthopedic surgery for further evaluation and discussion of arthroplasty.  - Given allergy to prednisone, consider Synvisc injection  - Long discussion about options, patient would like to consider them all. Hesitant to consider surgery.    Plan:  - Today's Plan of Care:  Rehab: Physical Therapy: [facility] - [phone]  Discussed activity considerations and other supportive care including Ice/Heat, OTC and other topical medications as needed.  Consider bracing    -We also discussed other future treatment options:  Synvisc One injection of Right Knee - call first if wanting this injection  Referral to Orthopedic Surgeon    Follow Up: as needed    Concerning signs and symptoms were reviewed.  The patient expressed understanding of this management plan and  "all questions were answered at this time.    Thanks for the opportunity to participate in the care of this patient, I will keep you updated on their progress.    CC: Azalea Lester MD [facility]  Sports Medicine Physician  [facility]    -----  Chief Complaint   Patient presents with     Right Knee - Pain       SUBJECTIVE  Yudelka Beckett is a/an 80 year old female who is seen in consultation at the request of  Azalea Ochoa M.D. for evaluation of right knee pain.  Her knee is bothering her, but in the last year her knee has been progressively worse.       The patient is seen by themselves.    Onset: many years(s) ago, but in the last year. Reports insidious onset without acute precipitating event.  Location of Pain: right knee, pain moves around  Worsened by: walking, ADLs, stairs, sit to stand, depends on other leg to do the work  Better with: unsure  Treatments tried: ibuprofen and previous imaging (xray 6/24/21)  Associated symptoms: swelling, weakness of knee/leg, locking or catching and feeling of instability    Orthopedic/Surgical history: YES - Date: chronic knee pain  Social History/Occupation: not working    No family history pertinent to patient's problem today.    REVIEW OF SYSTEMS:  Review of Systems  Skin: no bruising, yes swelling  Musculoskeletal: as above  Neurologic: no numbness, paresthesias  Remainder of review of systems is negative including constitutional, CV, pulmonary, GI, except as noted in HPI or medical history.    OBJECTIVE:  BP (!) 150/85   Ht 1.6 m (5' 3\")   Wt 73.5 kg (162 lb)   BMI 28.70 kg/m     General: healthy, alert and in no distress  HEENT: no scleral icterus or conjunctival erythema  Skin: no suspicious lesions or rash. No jaundice.  CV: distal perfusion intact  Resp: normal respiratory effort without conversational dyspnea   Psych: normal mood and affect  Gait: antalgic, using a cane  Neuro: Normal light sensory exam of " lower extremity    Bilateral Knee exam    Inspection:      mild effusion right  - varus knee alignment    Patella:      Crepitus noted in the patellofemoral joint bilateral    Tender:      medial joint line right       lateral joint line right    Non Tender:      remainder of knee area bilateral    Knee ROM:      Range of motion limited on the right in flexion and extension, 5-90    Strength:      5-/5 with knee extension right    Special Tests:     Pain with Fauzia right       neg (-) varus at 0 deg and 30 deg right       neg (-) valgus at 0 deg and 30 deg right    Gait:      antalgic gait with cane    Neurovascular:      2+ peripheral pulses bilaterally and brisk capillary refill       sensation grossly intact    RADIOLOGY:  I independently ordered, visualized and reviewed these images with the patient  2 XR views of bilateral knees reviewed today with AP bilateral knees from 6/24/2021: no acute bony abnormality, severe right knee degenerative changes, most in lateral compartment  - will follow official read    Reviewed primary care note  Review of the result(s) of each unique test - XR           Again, thank you for allowing me to participate in the care of your patient.        Sincerely,        Marissa Lester MD

## 2021-07-22 ENCOUNTER — TELEPHONE (OUTPATIENT)
Dept: FAMILY MEDICINE | Facility: CLINIC | Age: 81
End: 2021-07-22

## 2021-07-22 NOTE — TELEPHONE ENCOUNTER
Patient has failed to return calls to schedule diagnostic upper and lower endoscopy procedures      No further action necessary for procedure  at this time

## 2021-07-28 DIAGNOSIS — I10 ESSENTIAL HYPERTENSION WITH GOAL BLOOD PRESSURE LESS THAN 140/90: ICD-10-CM

## 2021-07-28 DIAGNOSIS — E78.5 HYPERLIPIDEMIA LDL GOAL <130: ICD-10-CM

## 2021-08-02 DIAGNOSIS — E78.5 HYPERLIPIDEMIA LDL GOAL <130: ICD-10-CM

## 2021-08-02 DIAGNOSIS — I10 ESSENTIAL HYPERTENSION WITH GOAL BLOOD PRESSURE LESS THAN 140/90: ICD-10-CM

## 2021-08-02 RX ORDER — ATORVASTATIN CALCIUM 40 MG/1
40 TABLET, FILM COATED ORAL DAILY
Qty: 90 TABLET | Refills: 3 | Status: CANCELLED | OUTPATIENT
Start: 2021-08-02

## 2021-08-02 RX ORDER — LISINOPRIL 20 MG/1
TABLET ORAL
Qty: 90 TABLET | Refills: 3 | Status: SHIPPED | OUTPATIENT
Start: 2021-08-02 | End: 2022-07-29

## 2021-08-02 RX ORDER — ATORVASTATIN CALCIUM 40 MG/1
TABLET, FILM COATED ORAL
Qty: 180 TABLET | Refills: 3 | Status: SHIPPED | OUTPATIENT
Start: 2021-08-02 | End: 2022-10-31

## 2021-08-02 RX ORDER — LISINOPRIL 20 MG/1
20 TABLET ORAL DAILY
Qty: 90 TABLET | Refills: 3 | Status: CANCELLED | OUTPATIENT
Start: 2021-08-02

## 2021-08-02 NOTE — TELEPHONE ENCOUNTER
"Requested Prescriptions   Pending Prescriptions Disp Refills     atorvastatin (LIPITOR) 40 MG tablet [Pharmacy Med Name: Atorvastatin Calcium 40 MG Oral Tablet] 180 tablet 0     Sig: Take 1 tablet by mouth once daily       Statins Protocol Passed - 7/28/2021  6:42 PM        Passed - LDL on file in past 12 months     Recent Labs   Lab Test 04/05/21  1417   *             Passed - No abnormal creatine kinase in past 12 months     No lab results found.             Passed - Recent (12 mo) or future (30 days) visit within the authorizing provider's specialty     Patient has had an office visit with the authorizing provider or a provider within the authorizing providers department within the previous 12 mos or has a future within next 30 days. See \"Patient Info\" tab in inbasket, or \"Choose Columns\" in Meds & Orders section of the refill encounter.              Passed - Medication is active on med list        Passed - Patient is age 18 or older        Passed - No active pregnancy on record        Passed - No positive pregnancy test in past 12 months           lisinopril (ZESTRIL) 20 MG tablet [Pharmacy Med Name: Lisinopril 20 MG Oral Tablet] 90 tablet 0     Sig: Take 1 tablet by mouth once daily       ACE Inhibitors (Including Combos) Protocol Failed - 7/28/2021  6:42 PM        Failed - Blood pressure under 140/90 in past 12 months     BP Readings from Last 3 Encounters:   07/19/21 (!) 150/85   06/24/21 139/79   04/05/21 (!) 148/70                 Passed - Recent (12 mo) or future (30 days) visit within the authorizing provider's specialty     Patient has had an office visit with the authorizing provider or a provider within the authorizing providers department within the previous 12 mos or has a future within next 30 days. See \"Patient Info\" tab in inbasket, or \"Choose Columns\" in Meds & Orders section of the refill encounter.              Passed - Medication is active on med list        Passed - Patient is age 18 " or older        Passed - No active pregnancy on record        Passed - Normal serum creatinine on file in past 12 months     Recent Labs   Lab Test 06/24/21  1434   CR 0.86       Ok to refill medication if creatinine is low          Passed - Normal serum potassium on file in past 12 months     Recent Labs   Lab Test 06/24/21  1434   POTASSIUM 4.7             Passed - No positive pregnancy test within past 12 months

## 2021-08-09 RX ORDER — DILTIAZEM HYDROCHLORIDE 240 MG/1
CAPSULE, COATED, EXTENDED RELEASE ORAL
Qty: 180 CAPSULE | Refills: 0 | OUTPATIENT
Start: 2021-08-09

## 2021-08-09 RX ORDER — DILTIAZEM HYDROCHLORIDE 240 MG/1
240 CAPSULE, EXTENDED RELEASE ORAL DAILY
Qty: 90 CAPSULE | Refills: 0 | Status: SHIPPED | OUTPATIENT
Start: 2021-08-09 | End: 2021-11-04

## 2021-08-09 NOTE — TELEPHONE ENCOUNTER
Forwarding refill request for diltiazem to PCP for review due to last clinic BP elevated. Forwarding as high priority, as patient is out of pills.   Per chart review, atorvastatin and lisinopril refills already done on 8/2/21.  RN attempted to call patient back x 2 and notify her of the two refills above that were already done, but no answer and VM full.     Farideh Valdez RN  [facility]

## 2021-08-09 NOTE — TELEPHONE ENCOUNTER
Patient is calling to follow up medication refill. Patient is out of meds now.    Radha Cross on 8/9/2021 at 9:41 AM

## 2021-08-09 NOTE — TELEPHONE ENCOUNTER
"Requested Prescriptions   Pending Prescriptions Disp Refills     diltiazem ER (DILT-XR) 240 MG 24 hr ER beaded capsule 90 capsule 3     Sig: Take 1 capsule (240 mg) by mouth daily       Calcium Channel Blockers Protocol  Failed - 8/9/2021 10:25 AM        Failed - Blood pressure under 140/90 in past 12 months     BP Readings from Last 3 Encounters:   07/19/21 (!) 150/85   06/24/21 139/79   04/05/21 (!) 148/70                 Passed - Normal ALT in past 12 months     Recent Labs   Lab Test 06/24/21  1434   ALT 25             Passed - Recent (12 mo) or future (30 days) visit within the authorizing provider's specialty     Patient has had an office visit with the authorizing provider or a provider within the authorizing providers department within the previous 12 mos or has a future within next 30 days. See \"Patient Info\" tab in inbasket, or \"Choose Columns\" in Meds & Orders section of the refill encounter.              Passed - Medication is active on med list        Passed - Patient is age 18 or older        Passed - No active pregnancy on record        Passed - Normal serum creatinine on file in past 12 months     Recent Labs   Lab Test 06/24/21  1434   CR 0.86       Ok to refill medication if creatinine is low          Passed - No positive pregnancy test in past 12 months         Refused Prescriptions Disp Refills     diltiazem ER COATED BEADS (CARDIZEM CD/CARTIA XT) 240 MG 24 hr capsule [Pharmacy Med Name: dilTIAZem HCl ER Coated Beads 240 MG Oral Capsule Extended Release 24 Hour] 180 capsule 0     Sig: Take 1 capsule by mouth once daily       Calcium Channel Blockers Protocol  Failed - 8/9/2021 10:25 AM        Failed - Blood pressure under 140/90 in past 12 months     BP Readings from Last 3 Encounters:   07/19/21 (!) 150/85   06/24/21 139/79   04/05/21 (!) 148/70                 Failed - Medication is active on med list        Passed - Normal ALT in past 12 months     Recent Labs   Lab Test 06/24/21  1434   ALT " "25             Passed - Recent (12 mo) or future (30 days) visit within the authorizing provider's specialty     Patient has had an office visit with the authorizing provider or a provider within the authorizing providers department within the previous 12 mos or has a future within next 30 days. See \"Patient Info\" tab in inbasket, or \"Choose Columns\" in Meds & Orders section of the refill encounter.              Passed - Patient is age 18 or older        Passed - No active pregnancy on record        Passed - Normal serum creatinine on file in past 12 months     Recent Labs   Lab Test 06/24/21  1434   CR 0.86       Ok to refill medication if creatinine is low          Passed - No positive pregnancy test in past 12 months             "

## 2021-08-31 ENCOUNTER — OFFICE VISIT (OUTPATIENT)
Dept: FAMILY MEDICINE | Facility: CLINIC | Age: 81
End: 2021-08-31
Payer: COMMERCIAL

## 2021-08-31 VITALS
HEART RATE: 82 BPM | SYSTOLIC BLOOD PRESSURE: 144 MMHG | DIASTOLIC BLOOD PRESSURE: 70 MMHG | RESPIRATION RATE: 24 BRPM | BODY MASS INDEX: 29.38 KG/M2 | WEIGHT: 165.8 LBS | TEMPERATURE: 98.3 F | HEIGHT: 63 IN | OXYGEN SATURATION: 97 %

## 2021-08-31 DIAGNOSIS — M54.2 NECK PAIN: ICD-10-CM

## 2021-08-31 DIAGNOSIS — M25.561 CHRONIC PAIN OF RIGHT KNEE: Primary | ICD-10-CM

## 2021-08-31 DIAGNOSIS — G89.29 CHRONIC PAIN OF RIGHT KNEE: Primary | ICD-10-CM

## 2021-08-31 DIAGNOSIS — G56.01 CARPAL TUNNEL SYNDROME OF RIGHT WRIST: ICD-10-CM

## 2021-08-31 DIAGNOSIS — E03.9 ACQUIRED HYPOTHYROIDISM: ICD-10-CM

## 2021-08-31 DIAGNOSIS — M70.62 TROCHANTERIC BURSITIS OF LEFT HIP: ICD-10-CM

## 2021-08-31 PROCEDURE — 99215 OFFICE O/P EST HI 40 MIN: CPT | Performed by: FAMILY MEDICINE

## 2021-08-31 RX ORDER — LEVOTHYROXINE SODIUM 88 UG/1
88 TABLET ORAL DAILY
Qty: 90 TABLET | Refills: 3 | Status: SHIPPED | OUTPATIENT
Start: 2021-08-31 | End: 2022-08-31

## 2021-08-31 ASSESSMENT — ANXIETY QUESTIONNAIRES
3. WORRYING TOO MUCH ABOUT DIFFERENT THINGS: NOT AT ALL
5. BEING SO RESTLESS THAT IT IS HARD TO SIT STILL: NOT AT ALL
2. NOT BEING ABLE TO STOP OR CONTROL WORRYING: NOT AT ALL
1. FEELING NERVOUS, ANXIOUS, OR ON EDGE: NOT AT ALL
GAD7 TOTAL SCORE: 0
6. BECOMING EASILY ANNOYED OR IRRITABLE: NOT AT ALL
IF YOU CHECKED OFF ANY PROBLEMS ON THIS QUESTIONNAIRE, HOW DIFFICULT HAVE THESE PROBLEMS MADE IT FOR YOU TO DO YOUR WORK, TAKE CARE OF THINGS AT HOME, OR GET ALONG WITH OTHER PEOPLE: NOT DIFFICULT AT ALL
7. FEELING AFRAID AS IF SOMETHING AWFUL MIGHT HAPPEN: NOT AT ALL

## 2021-08-31 ASSESSMENT — PATIENT HEALTH QUESTIONNAIRE - PHQ9
5. POOR APPETITE OR OVEREATING: NOT AT ALL
SUM OF ALL RESPONSES TO PHQ QUESTIONS 1-9: 2

## 2021-08-31 ASSESSMENT — MIFFLIN-ST. JEOR: SCORE: 1196.19

## 2021-08-31 NOTE — PATIENT INSTRUCTIONS
See Dr Peters in [place] for the knee    See a hand doctor in [place] for carpal tunnel    Get CT scan done for your neck    Physical therapy for the neck and shoulder and knee

## 2021-08-31 NOTE — PROGRESS NOTES
Assessment & Plan     Chronic pain of right knee  Recommend TKA  - Orthopedic  Referral; Future  - Physical Therapy Referral; Future    Acquired hypothyroidism  On replacement  - levothyroxine (SYNTHROID/LEVOTHROID) 88 MCG tablet; Take 1 tablet (88 mcg) by mouth daily    Trochanteric bursitis of left hip  Declines steroid shot  - Physical Therapy Referral; Future    Carpal tunnel syndrome of right wrist  See ortho   - Orthopedic  Referral; Future    Neck pain  Likely arthritis  Possible radiculopathy  - Physical Therapy Referral; Future  - CT Cervical Spine w/o Contrast; Future      I spent a total of 60 minutes on the day of the visit.   Time spent doing chart review, history and exam, documentation and further activities per the note           Return in about 3 months (around 11/30/2021).    Azalea Meehan MD  [facility]    Bacilio casanova is a 80 year old who presents for the following health issues     HPI   Chief Complaint   Patient presents with     Pain     hip, knee, back, shoulder       Pain History:  When did you first notice your pain? - More than 6 weeks   Have you seen this provider for your pain in the past?   Yes   Where in your body do you have pain? R knee, L hip, R shoulder, back  Are you seeing anyone else for your pain? N    Left hip  Hurt for a long time, along the side. Got better for a while, then got better, and then got worse again    Right shoulder  I saw her in June and thought it was tendonitis. But now she tells me that at times her arm will get numb, numbness in her thumb and first and second fingers. If lays down feels a weird sensation.   She does have a history of carpal tunnel, saw neurologist with an EMG in 2011 and had carpal tunnel . Never did have it fixxed.     Right knee  IMPRESSION:      Right knee: No acute fracture or malalignment. Severe lateral  compartment degenerative changes with bone-on-bone  "articulation.  Moderate patellofemoral compartment degenerative changes. Small knee  joint effusion. Chondrocalcinosis. Osteopenia.     Left knee: No acute fracture or malalignment. Mild patellofemoral  compartment degenerative changes. No knee joint effusion. Osteopenia.     JABIER HENRIQUEZ MD      Unfortunately when referred to ortho she saw sports medicine instead of surgeon so she feels she wasted her time.   She waited to see me to get another referral.     hypothyroidism   On levothyroxine   TSH   Date Value Ref Range Status   04/05/2021 3.76 0.40 - 4.00 mU/L Final        PHQ-9 SCORE 8/31/2021   PHQ-9 Total Score 2     NANCY-7 SCORE 8/31/2021   Total Score 0     PEG Score 8/31/2021   PEG Total Score 1.33       Review of Systems   ROS: 5 point ROS negative except as noted above in HPI, including Gen., Resp., CV, GI &  system review.       Objective    BP (!) 144/70 (BP Location: Right arm, Patient Position: Sitting, Cuff Size: Adult Regular)   Pulse 82   Temp 98.3  F (36.8  C) (Tympanic)   Resp 24   Ht 1.6 m (5' 3\")   Wt 75.2 kg (165 lb 12.8 oz)   SpO2 97%   Breastfeeding No   BMI 29.37 kg/m    Body mass index is 29.37 kg/m .  Physical Exam   GENERAL: healthy, alert and no distress  NECK: no adenopathy, no asymmetry, masses, or scars and thyroid normal to palpation  RESP: lungs clear to auscultation - no rales, rhonchi or wheezes  CV: regular rate and rhythm, normal S1 S2, no S3 or S4, no murmur, click or rub, no peripheral edema and peripheral pulses strong  ABDOMEN: soft, nontender, no hepatosplenomegaly, no masses and bowel sounds normal  MS: tender over the left trochanteric bursa  Neck: poor range of motion, no pain along the spine, paraspinal muscles are tight, spastic and tender, Spurling's is negative, normal strength and sensation in upper extremeties  Right knee has genu valgum, antalgic gait          "

## 2021-09-01 ASSESSMENT — ANXIETY QUESTIONNAIRES: GAD7 TOTAL SCORE: 0

## 2021-09-11 ENCOUNTER — HEALTH MAINTENANCE LETTER (OUTPATIENT)
Age: 81
End: 2021-09-11

## 2021-10-11 ENCOUNTER — HOSPITAL ENCOUNTER (EMERGENCY)
Facility: CLINIC | Age: 81
Discharge: LEFT BEFORE DISCHARGE | End: 2021-10-11
Payer: COMMERCIAL

## 2021-10-11 VITALS
DIASTOLIC BLOOD PRESSURE: 88 MMHG | OXYGEN SATURATION: 97 % | WEIGHT: 160 LBS | RESPIRATION RATE: 16 BRPM | SYSTOLIC BLOOD PRESSURE: 150 MMHG | HEART RATE: 96 BPM | HEIGHT: 64 IN | BODY MASS INDEX: 27.31 KG/M2 | TEMPERATURE: 97.5 F

## 2021-10-11 ASSESSMENT — MIFFLIN-ST. JEOR: SCORE: 1172.82

## 2021-10-11 NOTE — ED TRIAGE NOTES
2 bites on front of torso; incomplete removal, believes it to be a tick, has brought it along in a plastic bag.

## 2021-10-31 DIAGNOSIS — I10 ESSENTIAL HYPERTENSION WITH GOAL BLOOD PRESSURE LESS THAN 140/90: ICD-10-CM

## 2021-10-31 DIAGNOSIS — J45.20 MILD INTERMITTENT ASTHMA, UNCOMPLICATED: ICD-10-CM

## 2021-11-04 RX ORDER — ALBUTEROL SULFATE 90 UG/1
AEROSOL, METERED RESPIRATORY (INHALATION)
Qty: 9 G | Refills: 0 | Status: SHIPPED | OUTPATIENT
Start: 2021-11-04 | End: 2022-04-19

## 2021-11-04 RX ORDER — DILTIAZEM HYDROCHLORIDE 240 MG/1
CAPSULE, EXTENDED RELEASE ORAL
Qty: 90 CAPSULE | Refills: 0 | Status: SHIPPED | OUTPATIENT
Start: 2021-11-04 | End: 2022-01-31

## 2021-11-30 ENCOUNTER — OFFICE VISIT (OUTPATIENT)
Dept: FAMILY MEDICINE | Facility: CLINIC | Age: 81
End: 2021-11-30
Payer: COMMERCIAL

## 2021-11-30 VITALS
SYSTOLIC BLOOD PRESSURE: 138 MMHG | BODY MASS INDEX: 28.07 KG/M2 | HEART RATE: 93 BPM | TEMPERATURE: 98.2 F | RESPIRATION RATE: 24 BRPM | DIASTOLIC BLOOD PRESSURE: 80 MMHG | OXYGEN SATURATION: 97 % | WEIGHT: 161 LBS

## 2021-11-30 DIAGNOSIS — G89.29 CHRONIC RIGHT SHOULDER PAIN: ICD-10-CM

## 2021-11-30 DIAGNOSIS — D64.9 NORMOCYTIC ANEMIA: ICD-10-CM

## 2021-11-30 DIAGNOSIS — M25.511 CHRONIC RIGHT SHOULDER PAIN: ICD-10-CM

## 2021-11-30 DIAGNOSIS — M17.11 PRIMARY OSTEOARTHRITIS OF RIGHT KNEE: ICD-10-CM

## 2021-11-30 DIAGNOSIS — N39.41 URGENCY INCONTINENCE: ICD-10-CM

## 2021-11-30 DIAGNOSIS — G89.29 CHRONIC PAIN OF RIGHT KNEE: Primary | ICD-10-CM

## 2021-11-30 DIAGNOSIS — M70.62 TROCHANTERIC BURSITIS OF LEFT HIP: ICD-10-CM

## 2021-11-30 DIAGNOSIS — M25.561 CHRONIC PAIN OF RIGHT KNEE: Primary | ICD-10-CM

## 2021-11-30 DIAGNOSIS — R74.8 ELEVATED ALKALINE PHOSPHATASE LEVEL: ICD-10-CM

## 2021-11-30 DIAGNOSIS — G56.01 CARPAL TUNNEL SYNDROME OF RIGHT WRIST: ICD-10-CM

## 2021-11-30 DIAGNOSIS — M25.512 ACUTE PAIN OF LEFT SHOULDER: ICD-10-CM

## 2021-11-30 LAB
ALBUMIN SERPL-MCNC: 3.7 G/DL (ref 3.4–5)
ALP SERPL-CCNC: 182 U/L (ref 40–150)
ALT SERPL W P-5'-P-CCNC: 18 U/L (ref 0–50)
ANION GAP SERPL CALCULATED.3IONS-SCNC: 7 MMOL/L (ref 3–14)
AST SERPL W P-5'-P-CCNC: 14 U/L (ref 0–45)
BASOPHILS # BLD AUTO: 0.1 10E3/UL (ref 0–0.2)
BASOPHILS NFR BLD AUTO: 1 %
BILIRUB SERPL-MCNC: 0.4 MG/DL (ref 0.2–1.3)
BUN SERPL-MCNC: 28 MG/DL (ref 7–30)
CALCIUM SERPL-MCNC: 9.7 MG/DL (ref 8.5–10.1)
CHLORIDE BLD-SCNC: 105 MMOL/L (ref 94–109)
CO2 SERPL-SCNC: 26 MMOL/L (ref 20–32)
CREAT SERPL-MCNC: 0.85 MG/DL (ref 0.52–1.04)
EOSINOPHIL # BLD AUTO: 0.3 10E3/UL (ref 0–0.7)
EOSINOPHIL NFR BLD AUTO: 4 %
ERYTHROCYTE [DISTWIDTH] IN BLOOD BY AUTOMATED COUNT: 13.8 % (ref 10–15)
GFR SERPL CREATININE-BSD FRML MDRD: 65 ML/MIN/1.73M2
GLUCOSE BLD-MCNC: 105 MG/DL (ref 70–99)
HCT VFR BLD AUTO: 34.7 % (ref 35–47)
HGB BLD-MCNC: 11.2 G/DL (ref 11.7–15.7)
LYMPHOCYTES # BLD AUTO: 2.3 10E3/UL (ref 0.8–5.3)
LYMPHOCYTES NFR BLD AUTO: 30 %
MCH RBC QN AUTO: 29.3 PG (ref 26.5–33)
MCHC RBC AUTO-ENTMCNC: 32.3 G/DL (ref 31.5–36.5)
MCV RBC AUTO: 91 FL (ref 78–100)
MONOCYTES # BLD AUTO: 0.8 10E3/UL (ref 0–1.3)
MONOCYTES NFR BLD AUTO: 10 %
NEUTROPHILS # BLD AUTO: 4.2 10E3/UL (ref 1.6–8.3)
NEUTROPHILS NFR BLD AUTO: 55 %
PLATELET # BLD AUTO: 398 10E3/UL (ref 150–450)
POTASSIUM BLD-SCNC: 4 MMOL/L (ref 3.4–5.3)
PROT SERPL-MCNC: 8.1 G/DL (ref 6.8–8.8)
RBC # BLD AUTO: 3.82 10E6/UL (ref 3.8–5.2)
RETICS # AUTO: 0.05 10E6/UL (ref 0.03–0.1)
RETICS/RBC NFR AUTO: 1.3 % (ref 0.5–2)
SODIUM SERPL-SCNC: 138 MMOL/L (ref 133–144)
WBC # BLD AUTO: 7.7 10E3/UL (ref 4–11)

## 2021-11-30 PROCEDURE — 85025 COMPLETE CBC W/AUTO DIFF WBC: CPT | Performed by: FAMILY MEDICINE

## 2021-11-30 PROCEDURE — 80053 COMPREHEN METABOLIC PANEL: CPT | Performed by: FAMILY MEDICINE

## 2021-11-30 PROCEDURE — 99215 OFFICE O/P EST HI 40 MIN: CPT | Performed by: FAMILY MEDICINE

## 2021-11-30 PROCEDURE — 36415 COLL VENOUS BLD VENIPUNCTURE: CPT | Performed by: FAMILY MEDICINE

## 2021-11-30 PROCEDURE — 85045 AUTOMATED RETICULOCYTE COUNT: CPT | Performed by: FAMILY MEDICINE

## 2021-11-30 PROCEDURE — 99417 PROLNG OP E/M EACH 15 MIN: CPT | Performed by: FAMILY MEDICINE

## 2021-11-30 RX ORDER — OXYCODONE HYDROCHLORIDE 5 MG/1
2.5-5 TABLET ORAL EVERY 6 HOURS PRN
Qty: 10 TABLET | Refills: 0 | Status: SHIPPED | OUTPATIENT
Start: 2021-11-30 | End: 2021-12-21

## 2021-11-30 ASSESSMENT — ASTHMA QUESTIONNAIRES
QUESTION_5 LAST FOUR WEEKS HOW WOULD YOU RATE YOUR ASTHMA CONTROL: WELL CONTROLLED
QUESTION_2 LAST FOUR WEEKS HOW OFTEN HAVE YOU HAD SHORTNESS OF BREATH: NOT AT ALL
QUESTION_4 LAST FOUR WEEKS HOW OFTEN HAVE YOU USED YOUR RESCUE INHALER OR NEBULIZER MEDICATION (SUCH AS ALBUTEROL): NOT AT ALL
QUESTION_3 LAST FOUR WEEKS HOW OFTEN DID YOUR ASTHMA SYMPTOMS (WHEEZING, COUGHING, SHORTNESS OF BREATH, CHEST TIGHTNESS OR PAIN) WAKE YOU UP AT NIGHT OR EARLIER THAN USUAL IN THE MORNING: NOT AT ALL
ACT_TOTALSCORE: 24
QUESTION_1 LAST FOUR WEEKS HOW MUCH OF THE TIME DID YOUR ASTHMA KEEP YOU FROM GETTING AS MUCH DONE AT WORK, SCHOOL OR AT HOME: NONE OF THE TIME

## 2021-11-30 NOTE — PROGRESS NOTES
Assessment & Plan     Chronic pain of right knee  In dire need of a knee replacement  Until she can get it, will help with some pain medication  - Walker Order for DME - ONLY FOR DME  - oxyCODONE (ROXICODONE) 5 MG tablet; Take 0.5-1 tablets (2.5-5 mg) by mouth every 6 hours as needed for pain    Primary osteoarthritis of right knee  As above  I do recommend a walker as she would be more stable and I would put less pressure on her shoulders  - Walker Order for DME - ONLY FOR DME  - oxyCODONE (ROXICODONE) 5 MG tablet; Take 0.5-1 tablets (2.5-5 mg) by mouth every 6 hours as needed for pain    Trochanteric bursitis of left hip  Intermittent  Okay to continue to monitor  She does not want to do physical therapy and is unwilling to retry prednisone injections  - Walker Order for DME - ONLY FOR DME    Chronic right shoulder pain  Rotator cuff tear likely  She does not want to do physical therapy right now  Have asked her to look on YouTube and look at the exercises there  - Walker Order for DME - ONLY FOR DME    Urgency incontinence  As this is fairly new we will get a urinalysis  Consider medication   - UA Macro with Reflex to Micro and Culture - lab collect; Future    Acute pain of left shoulder  Likely rotator cuff pathology  Home physical therapy as above    Carpal tunnel syndrome of right wrist  As she does not want to have surgery done right now, will have her wear a wrist splint at bedtime  - Wrist/Arm/Hand Supplies Order for DME - ONLY FOR DME    Normocytic anemia  Due for recheck  - Lab Blood Morphology Pathologist Review; Future  - Lab Blood Morphology Pathologist Review    Elevated alkaline phosphatase level  Likely bone in etiology  - Comprehensive metabolic panel (BMP + Alb, Alk Phos, ALT, AST, Total. Bili, TP); Future  - Comprehensive metabolic panel (BMP + Alb, Alk Phos, ALT, AST, Total. Bili, TP)    Patient Instructions   Get a wheeled walker with a seat    Go to YouTube and look up Kenna burt  "therapy for shoulders    Wear a wrist brace at bedtime           I spent a total of 82 minutes on the day of the visit.   Time spent doing chart review, history and exam, documentation and further activities per the note       BMI:   Estimated body mass index is 28.07 kg/m  as calculated from the following:    Height as of 10/11/21: 1.613 m (5' 3.5\").    Weight as of this encounter: 73 kg (161 lb).           Return in about 2 months (around 1/30/2022).    Azalea Meehan MD  [facility]    Bacilio Jha is a 81 year old who presents for the following health issues     HPI     Pain History:  When did you first notice your pain? - More than 6 weeks   Have you seen this provider for your pain in the past?   Yes   Where in your body do you have pain? Rt knee and shoulder  Are you seeing anyone else for your pain? Yes - Dr. Bear    She needs a right knee replacement but is being delayed because of COVID  Is taking 400 mg of ibuprofen 2-3 times a day, and some tylenol.   This really does not keep her pain controlled.  She has taken oxycodone in the past and done well with that.    She has had more pain in left bursa, got worse, then better. Now seems to come and go.     Is having more pain with right shoulder, and now with left shoulder. Hard to comb hair.   She reminds me she can't take steroids or have injections    Urinary incontinence  Started a year ago, much worse the last 2 months. Has a commode by the bedside, and wears pads.   If gets an urge, she just goes. Some frequency, starts 3 am, goes very often.     PHQ-9 SCORE 8/31/2021   PHQ-9 Total Score 2       NANCY-7 SCORE 8/31/2021   Total Score 0       TSH   Date Value Ref Range Status   04/05/2021 3.76 0.40 - 4.00 mU/L Final          PDMP Review     None        Last CSA Agreement:   CSA -- Patient Level:    CSA: None found at the patient level.       Last echocardiogram was 2018    Review of Systems   Urinary positive as " above  ROS: 5 point ROS negative except as noted above in HPI, including Gen., Resp., CV, GI &  system review.       Objective    /80 (BP Location: Right arm)   Pulse 93   Temp 98.2  F (36.8  C) (Tympanic)   Resp 24   Wt 73 kg (161 lb)   SpO2 97%   BMI 28.07 kg/m    Body mass index is 28.07 kg/m .  Physical Exam   General: appears well, no distress  Neck: supple, no adenopathy  Heart: regular rate and rhythm, normal S1S2, 1/6 systolic ejection murmur  Lungs: clear to ascultation   MS: right knee is not straight, severe genu valgum, very antalgic gait, assist of cane, knee is enlarged, sleeve brace in place  Shoulder: Symmetric without erythema, ecchymoses, warmth, or edema.There is tenderness in the supraspinatus with weakness bilaterally  Left wrist without erythema, ecchymosis, maybe some mild edema.  Tinel's is positive on the left wrist  Point tenderness over the left trochanteric bursa

## 2021-11-30 NOTE — NURSING NOTE
"Chief Complaint   Patient presents with     Knee Pain     Shoulder Pain       Initial /80 (BP Location: Right arm)   Pulse 93   Temp 98.2  F (36.8  C) (Tympanic)   Resp 24   Wt 73 kg (161 lb)   SpO2 97%   BMI 28.07 kg/m   Estimated body mass index is 28.07 kg/m  as calculated from the following:    Height as of 10/11/21: 1.613 m (5' 3.5\").    Weight as of this encounter: 73 kg (161 lb).    Patient presents to the clinic using Check Maintenance that is potentially due pending provider review:  NONE    n/a    Is there anyone who you would like to be able to receive your results? No  If yes have patient fill out LIGIA    "

## 2021-12-01 LAB
PATH REPORT.COMMENTS IMP SPEC: NORMAL
PATH REPORT.FINAL DX SPEC: NORMAL
PATH REPORT.MICROSCOPIC SPEC OTHER STN: NORMAL
PATH REPORT.MICROSCOPIC SPEC OTHER STN: NORMAL

## 2021-12-01 ASSESSMENT — ASTHMA QUESTIONNAIRES: ACT_TOTALSCORE: 24

## 2021-12-20 ENCOUNTER — MYC MEDICAL ADVICE (OUTPATIENT)
Dept: FAMILY MEDICINE | Facility: CLINIC | Age: 81
End: 2021-12-20
Payer: COMMERCIAL

## 2021-12-20 DIAGNOSIS — M17.11 PRIMARY OSTEOARTHRITIS OF RIGHT KNEE: ICD-10-CM

## 2021-12-20 DIAGNOSIS — M25.561 CHRONIC PAIN OF RIGHT KNEE: ICD-10-CM

## 2021-12-20 DIAGNOSIS — G89.29 CHRONIC PAIN OF RIGHT KNEE: ICD-10-CM

## 2021-12-21 RX ORDER — OXYCODONE HYDROCHLORIDE 5 MG/1
2.5-5 TABLET ORAL EVERY 6 HOURS PRN
Qty: 10 TABLET | Refills: 0 | Status: SHIPPED | OUTPATIENT
Start: 2021-12-21 | End: 2022-01-13

## 2022-01-01 ENCOUNTER — HEALTH MAINTENANCE LETTER (OUTPATIENT)
Age: 82
End: 2022-01-01

## 2022-01-13 ENCOUNTER — MYC REFILL (OUTPATIENT)
Dept: FAMILY MEDICINE | Facility: CLINIC | Age: 82
End: 2022-01-13
Payer: COMMERCIAL

## 2022-01-13 DIAGNOSIS — M17.11 PRIMARY OSTEOARTHRITIS OF RIGHT KNEE: ICD-10-CM

## 2022-01-13 DIAGNOSIS — G89.29 CHRONIC PAIN OF RIGHT KNEE: ICD-10-CM

## 2022-01-13 DIAGNOSIS — M25.561 CHRONIC PAIN OF RIGHT KNEE: ICD-10-CM

## 2022-01-13 RX ORDER — OXYCODONE HYDROCHLORIDE 5 MG/1
2.5-5 TABLET ORAL EVERY 6 HOURS PRN
Qty: 10 TABLET | Refills: 0 | Status: SHIPPED | OUTPATIENT
Start: 2022-01-13 | End: 2022-01-27

## 2022-01-13 NOTE — TELEPHONE ENCOUNTER
Requested Prescriptions   Pending Prescriptions Disp Refills     oxyCODONE (ROXICODONE) 5 MG tablet 10 tablet 0     Sig: Take 0.5-1 tablets (2.5-5 mg) by mouth every 6 hours as needed for pain       There is no refill protocol information for this order        Last Written Prescription Date:  12/21/21  Last Fill Quantity: 10,  # refills: 0   Last office visit: 11/30/2021 with prescribing provider:     Future Office Visit:   Next 5 appointments (look out 90 days)    Feb 10, 2022  2:20 PM  (Arrive by 2:00 PM)  Provider Visit with Azalea Ochoa MD  [facility] ([facility] ) [address] [place] [id number] [phone]

## 2022-01-27 ENCOUNTER — MYC REFILL (OUTPATIENT)
Dept: FAMILY MEDICINE | Facility: CLINIC | Age: 82
End: 2022-01-27
Payer: COMMERCIAL

## 2022-01-27 DIAGNOSIS — M17.11 PRIMARY OSTEOARTHRITIS OF RIGHT KNEE: ICD-10-CM

## 2022-01-27 DIAGNOSIS — G89.29 CHRONIC PAIN OF RIGHT KNEE: ICD-10-CM

## 2022-01-27 DIAGNOSIS — M25.561 CHRONIC PAIN OF RIGHT KNEE: ICD-10-CM

## 2022-01-27 RX ORDER — OXYCODONE HYDROCHLORIDE 5 MG/1
2.5-5 TABLET ORAL EVERY 6 HOURS PRN
Qty: 20 TABLET | Refills: 0 | Status: SHIPPED | OUTPATIENT
Start: 2022-01-27 | End: 2022-02-18

## 2022-01-27 NOTE — TELEPHONE ENCOUNTER
Routing refill request to provider for review/approval because:  Drug not on the [facility] refill protocol     Last seen 11/30/21  Last refill 1/13/22 for 10 tablets

## 2022-01-28 DIAGNOSIS — I10 ESSENTIAL HYPERTENSION WITH GOAL BLOOD PRESSURE LESS THAN 140/90: ICD-10-CM

## 2022-01-31 RX ORDER — DILTIAZEM HYDROCHLORIDE 240 MG/1
CAPSULE, EXTENDED RELEASE ORAL
Qty: 90 CAPSULE | Refills: 0 | Status: SHIPPED | OUTPATIENT
Start: 2022-01-31 | End: 2022-04-19

## 2022-02-10 ENCOUNTER — OFFICE VISIT (OUTPATIENT)
Dept: FAMILY MEDICINE | Facility: CLINIC | Age: 82
End: 2022-02-10
Payer: COMMERCIAL

## 2022-02-10 VITALS
OXYGEN SATURATION: 98 % | WEIGHT: 159 LBS | SYSTOLIC BLOOD PRESSURE: 130 MMHG | RESPIRATION RATE: 22 BRPM | HEIGHT: 63 IN | BODY MASS INDEX: 28.17 KG/M2 | TEMPERATURE: 98.7 F | DIASTOLIC BLOOD PRESSURE: 76 MMHG | HEART RATE: 85 BPM

## 2022-02-10 DIAGNOSIS — G89.29 CHRONIC PAIN OF RIGHT KNEE: ICD-10-CM

## 2022-02-10 DIAGNOSIS — E03.8 OTHER SPECIFIED HYPOTHYROIDISM: ICD-10-CM

## 2022-02-10 DIAGNOSIS — I10 ESSENTIAL HYPERTENSION WITH GOAL BLOOD PRESSURE LESS THAN 140/90: Primary | ICD-10-CM

## 2022-02-10 DIAGNOSIS — Z23 HIGH PRIORITY FOR 2019-NCOV VACCINE: ICD-10-CM

## 2022-02-10 DIAGNOSIS — M25.561 CHRONIC PAIN OF RIGHT KNEE: ICD-10-CM

## 2022-02-10 DIAGNOSIS — M25.512 CHRONIC PAIN OF BOTH SHOULDERS: ICD-10-CM

## 2022-02-10 DIAGNOSIS — R74.8 ELEVATED SERUM ALKALINE PHOSPHATASE LEVEL: ICD-10-CM

## 2022-02-10 DIAGNOSIS — M17.11 PRIMARY OSTEOARTHRITIS OF RIGHT KNEE: ICD-10-CM

## 2022-02-10 DIAGNOSIS — G25.0 ESSENTIAL TREMOR: ICD-10-CM

## 2022-02-10 DIAGNOSIS — G89.29 CHRONIC PAIN OF BOTH SHOULDERS: ICD-10-CM

## 2022-02-10 DIAGNOSIS — R73.9 ELEVATED BLOOD SUGAR: ICD-10-CM

## 2022-02-10 DIAGNOSIS — D64.9 NORMOCYTIC ANEMIA: ICD-10-CM

## 2022-02-10 DIAGNOSIS — M25.511 CHRONIC PAIN OF BOTH SHOULDERS: ICD-10-CM

## 2022-02-10 LAB
ALBUMIN SERPL-MCNC: 3.7 G/DL (ref 3.4–5)
ALP SERPL-CCNC: 167 U/L (ref 40–150)
ALT SERPL W P-5'-P-CCNC: 15 U/L (ref 0–50)
ANION GAP SERPL CALCULATED.3IONS-SCNC: 6 MMOL/L (ref 3–14)
AST SERPL W P-5'-P-CCNC: 14 U/L (ref 0–45)
BASOPHILS # BLD AUTO: 0.1 10E3/UL (ref 0–0.2)
BASOPHILS NFR BLD AUTO: 1 %
BILIRUB SERPL-MCNC: 0.5 MG/DL (ref 0.2–1.3)
BUN SERPL-MCNC: 26 MG/DL (ref 7–30)
CALCIUM SERPL-MCNC: 9.8 MG/DL (ref 8.5–10.1)
CHLORIDE BLD-SCNC: 106 MMOL/L (ref 94–109)
CO2 SERPL-SCNC: 26 MMOL/L (ref 20–32)
CREAT SERPL-MCNC: 0.79 MG/DL (ref 0.52–1.04)
CRP SERPL-MCNC: <2.9 MG/L (ref 0–8)
EOSINOPHIL # BLD AUTO: 0.4 10E3/UL (ref 0–0.7)
EOSINOPHIL NFR BLD AUTO: 4 %
ERYTHROCYTE [DISTWIDTH] IN BLOOD BY AUTOMATED COUNT: 13.2 % (ref 10–15)
ERYTHROCYTE [SEDIMENTATION RATE] IN BLOOD BY WESTERGREN METHOD: 32 MM/HR (ref 0–30)
FERRITIN SERPL-MCNC: 47 NG/ML (ref 8–252)
GFR SERPL CREATININE-BSD FRML MDRD: 75 ML/MIN/1.73M2
GLUCOSE BLD-MCNC: 113 MG/DL (ref 70–99)
HCT VFR BLD AUTO: 33.2 % (ref 35–47)
HGB BLD-MCNC: 11 G/DL (ref 11.7–15.7)
LYMPHOCYTES # BLD AUTO: 2.4 10E3/UL (ref 0.8–5.3)
LYMPHOCYTES NFR BLD AUTO: 23 %
MCH RBC QN AUTO: 30 PG (ref 26.5–33)
MCHC RBC AUTO-ENTMCNC: 33.1 G/DL (ref 31.5–36.5)
MCV RBC AUTO: 91 FL (ref 78–100)
MONOCYTES # BLD AUTO: 0.9 10E3/UL (ref 0–1.3)
MONOCYTES NFR BLD AUTO: 9 %
NEUTROPHILS # BLD AUTO: 6.5 10E3/UL (ref 1.6–8.3)
NEUTROPHILS NFR BLD AUTO: 63 %
PLATELET # BLD AUTO: 392 10E3/UL (ref 150–450)
POTASSIUM BLD-SCNC: 4 MMOL/L (ref 3.4–5.3)
PROT SERPL-MCNC: 7.9 G/DL (ref 6.8–8.8)
RBC # BLD AUTO: 3.67 10E6/UL (ref 3.8–5.2)
SODIUM SERPL-SCNC: 138 MMOL/L (ref 133–144)
TSH SERPL DL<=0.005 MIU/L-ACNC: 2.26 MU/L (ref 0.4–4)
WBC # BLD AUTO: 10.3 10E3/UL (ref 4–11)

## 2022-02-10 PROCEDURE — 83036 HEMOGLOBIN GLYCOSYLATED A1C: CPT | Performed by: FAMILY MEDICINE

## 2022-02-10 PROCEDURE — 85025 COMPLETE CBC W/AUTO DIFF WBC: CPT | Performed by: FAMILY MEDICINE

## 2022-02-10 PROCEDURE — 82728 ASSAY OF FERRITIN: CPT | Performed by: FAMILY MEDICINE

## 2022-02-10 PROCEDURE — 85652 RBC SED RATE AUTOMATED: CPT | Performed by: FAMILY MEDICINE

## 2022-02-10 PROCEDURE — 0064A COVID-19,PF,MODERNA (18+ YRS BOOSTER .25ML): CPT | Performed by: FAMILY MEDICINE

## 2022-02-10 PROCEDURE — 99215 OFFICE O/P EST HI 40 MIN: CPT | Performed by: FAMILY MEDICINE

## 2022-02-10 PROCEDURE — 36415 COLL VENOUS BLD VENIPUNCTURE: CPT | Performed by: FAMILY MEDICINE

## 2022-02-10 PROCEDURE — 99417 PROLNG OP E/M EACH 15 MIN: CPT | Performed by: FAMILY MEDICINE

## 2022-02-10 PROCEDURE — 91306 COVID-19,PF,MODERNA (18+ YRS BOOSTER .25ML): CPT | Performed by: FAMILY MEDICINE

## 2022-02-10 PROCEDURE — 83550 IRON BINDING TEST: CPT | Performed by: FAMILY MEDICINE

## 2022-02-10 PROCEDURE — 80053 COMPREHEN METABOLIC PANEL: CPT | Performed by: FAMILY MEDICINE

## 2022-02-10 PROCEDURE — 84443 ASSAY THYROID STIM HORMONE: CPT | Performed by: FAMILY MEDICINE

## 2022-02-10 PROCEDURE — 86140 C-REACTIVE PROTEIN: CPT | Performed by: FAMILY MEDICINE

## 2022-02-10 ASSESSMENT — PAIN SCALES - GENERAL: PAINLEVEL: MODERATE PAIN (4)

## 2022-02-10 ASSESSMENT — MIFFLIN-ST. JEOR: SCORE: 1166.47

## 2022-02-10 NOTE — PATIENT INSTRUCTIONS
Schedule your knee surgery    See ortho for the shoulders    Continue the  Ibuprofen as long as you take the Nexium    See you back for the preop  Take the iron every other day

## 2022-02-10 NOTE — PROGRESS NOTES
Assessment & Plan     Essential hypertension with goal blood pressure less than 140/90  Well controlled  Continue lisinopril    Chronic pain of right knee  Primary osteoarthritis of right knee  Recommend she contact Dr Peters's office and get set up for surgery  See me for preop prior    Other specified hypothyroidism  On levothyroxine   - TSH; Future  - TSH    Chronic pain of both shoulders  Consider PMR but patient won't take prednisone  Won't do physical therapy and won't do steroid injections  I have asked her to see ortho to see what else they can offer   - CRP, inflammation; Future  - ESR: Erythrocyte sedimentation rate; Future  - Orthopedic  Referral; Future  - ESR: Erythrocyte sedimentation rate  - CRP, inflammation  - CBC with platelets and differential    Normocytic anemia  Recheck iron studies  - Ferritin; Future  - Ferritin  - Iron and iron binding capacity; Future  - Iron and iron binding capacity    Elevated serum alkaline phosphatase level  Chronic since 2004  Declines bone scan  - Comprehensive metabolic panel (BMP + Alb, Alk Phos, ALT, AST, Total. Bili, TP); Future  - Comprehensive metabolic panel (BMP + Alb, Alk Phos, ALT, AST, Total. Bili, TP)    Essential tremor  Likely   Monitor for now    High priority for 2019-nCoV vaccine  Booster given  - COVID-19,PF,MODERNA (18+ Yrs BOOSTER .25mL)    Elevated blood sugar  - Hemoglobin A1c; Future    Patient Instructions   Schedule your knee surgery    See ortho for the shoulders    Continue the  Ibuprofen as long as you take the Nexium    See you back for the preop  Take the iron every other day               I spent a total of 86 minutes on the day of the visit.   Time spent doing chart review, history and exam, documentation and further activities per the note         Return in about 2 months (around 4/10/2022).    Azalea Meehan MD  [facility]    Bacilio Jha is a 81 year old who presents for the following  health issues     Knee Pain     needs replacement    Pain History:  When did you first notice your pain? - More than 6 weeks   Have you seen this provider for your pain in the past?   Yes   Where in your body do you have pain? Both shoulders, right knee  Are you seeing anyone else for your pain? Yes - for the knee [facility]- will be calling to set up surgery next month     PHQ-9 SCORE 8/31/2021   PHQ-9 Total Score 2       NANCY-7 SCORE 8/31/2021   Total Score 0           PEG Score 8/31/2021 2/10/2022 2/10/2022   PEG Total Score 1.33 8 8     Shoulders are much worse. Both shoulders, very painful. Can't lift her arms.   Has poor balance with her bad knee.   The oxycodone is really helping her pain, she isn't sure how she could be without it. Takes 1/2 tab daily first thing in the am. Sometimes if really hurting takes 1/2 at night. .   Last filled 1/27/22 #20.   [place] monitoring [place] accessed.   Has declined steroid shots and physical therapy for the shoulders.   She is waiting to do her TKA in a few months.     Chronic Pain Follow Up:    Location of pain: both shoulders and right knee  Analgesia/pain control:    - Recent changes:  no    - Overall control: Tolerable with discomfort    - Current treatments: pain medication    Adherence:     - Do you ever take more pain medicine than prescribed? No    - When did you take your last dose of pain medicine?  2/10/22 11am    Adverse effects: No   PDMP Review       Value Time User    State PDMP site checked  Yes 1/27/2022  4:34 PM Azalea Ochoa MD        Last CSA Agreement:   CSA -- Patient Level:    CSA: None found at the patient level.       Last UDS:     Wt Readings from Last 5 Encounters:   02/10/22 72.1 kg (159 lb)   11/30/21 73 kg (161 lb)   08/31/21 75.2 kg (165 lb 12.8 oz)   07/19/21 73.5 kg (162 lb)   06/24/21 73.5 kg (162 lb)      Tremor   Has a little bit of a tremor in her hands. Notices especially when she writes.  No FH Parkinsons or Essential  "tremor    hypertension   On lisinopril  BP Readings from Last 6 Encounters:   02/10/22 130/76   11/30/21 138/80   08/31/21 (!) 144/70   07/19/21 (!) 150/85   06/24/21 139/79   04/05/21 (!) 148/70      Normocytic anemia  First noted 10 months ago  In the low 11's   Blood smear done showed borderline normochromic normocytic anemia  B12 and folate normal, ferritin a little lower at 26, iron studies ok,   retic count ok.   She has not been interested in pursuing, doesn't want to do colonoscopy or endoscopy, doesn't want to see hematology.   Ok with monitoring.     Chronic elevated alk phos level  Likely form bone as GGT was normal  Has been elevated since 2004  Declined bone scan in past    hypothyroidism   On levothyroxine   TSH   Date Value Ref Range Status   04/05/2021 3.76 0.40 - 4.00 mU/L Final      Carpal tunnel  Didn't like wearing the brace  Not interested in release right now      Review of Systems   Some constipation  5 point ROS negative except as noted above in HPI, including Gen., Resp., CV, GI &  system review.       Objective    /76 (BP Location: Right arm, Patient Position: Sitting, Cuff Size: Adult Regular)   Pulse 85   Temp 98.7  F (37.1  C) (Tympanic)   Resp 22   Ht 1.61 m (5' 3.39\")   Wt 72.1 kg (159 lb)   SpO2 98%   BMI 27.82 kg/m    Body mass index is 27.82 kg/m .  Physical Exam   GENERAL: healthy, alert and no distress  NECK: no adenopathy, no asymmetry, masses, or scars and thyroid normal to palpation  RESP: lungs clear to auscultation - no rales, rhonchi or wheezes  CV: regular rates and rhythm, normal S1 S2, no S3 or S4 soft 1/6 systolic murmur, click or rub  ABDOMEN: soft, nontender, no hepatosplenomegaly, no masses and bowel sounds normal  MS: Shoulder: Symmetric without erythema, ecchymoses, warmth, or edema. There is tenderness to palpation around upper biceps.  Pain with abduction and can only bring them up around 70 degrees without pain. There is weakness in supraspinatus " rotator cuffs. EXT: pulses intact. Sensation to light touch intact.    Right knee enlarged with brace on it, walks with assist of walker, genu valgum  Neuro: fine mild tremor in outstretched hands that disappears with movement to touching nose

## 2022-02-11 LAB
IRON SATN MFR SERPL: 19 % (ref 15–46)
IRON SERPL-MCNC: 61 UG/DL (ref 35–180)
TIBC SERPL-MCNC: 326 UG/DL (ref 240–430)

## 2022-02-14 LAB — HBA1C MFR BLD: 6.4 % (ref 0–5.6)

## 2022-02-18 DIAGNOSIS — M25.561 CHRONIC PAIN OF RIGHT KNEE: ICD-10-CM

## 2022-02-18 DIAGNOSIS — M17.11 PRIMARY OSTEOARTHRITIS OF RIGHT KNEE: ICD-10-CM

## 2022-02-18 DIAGNOSIS — G89.29 CHRONIC PAIN OF RIGHT KNEE: ICD-10-CM

## 2022-02-18 RX ORDER — OXYCODONE HYDROCHLORIDE 5 MG/1
2.5 TABLET ORAL 2 TIMES DAILY PRN
Qty: 30 TABLET | Refills: 0 | Status: SHIPPED | OUTPATIENT
Start: 2022-02-18 | End: 2022-03-21

## 2022-03-07 ENCOUNTER — HOSPITAL ENCOUNTER (OUTPATIENT)
Facility: CLINIC | Age: 82
End: 2022-03-07
Attending: ORTHOPAEDIC SURGERY | Admitting: ORTHOPAEDIC SURGERY
Payer: COMMERCIAL

## 2022-03-21 DIAGNOSIS — G89.29 CHRONIC PAIN OF RIGHT KNEE: ICD-10-CM

## 2022-03-21 DIAGNOSIS — M17.11 PRIMARY OSTEOARTHRITIS OF RIGHT KNEE: ICD-10-CM

## 2022-03-21 DIAGNOSIS — M25.561 CHRONIC PAIN OF RIGHT KNEE: ICD-10-CM

## 2022-03-22 RX ORDER — OXYCODONE HYDROCHLORIDE 5 MG/1
2.5 TABLET ORAL 2 TIMES DAILY PRN
Qty: 30 TABLET | Refills: 0 | Status: SHIPPED | OUTPATIENT
Start: 2022-03-22 | End: 2022-04-19

## 2022-03-26 DIAGNOSIS — Z11.59 ENCOUNTER FOR SCREENING FOR OTHER VIRAL DISEASES: Primary | ICD-10-CM

## 2022-04-19 ENCOUNTER — OFFICE VISIT (OUTPATIENT)
Dept: FAMILY MEDICINE | Facility: CLINIC | Age: 82
End: 2022-04-19
Payer: COMMERCIAL

## 2022-04-19 VITALS
TEMPERATURE: 97.3 F | BODY MASS INDEX: 26.77 KG/M2 | WEIGHT: 153 LBS | HEART RATE: 85 BPM | RESPIRATION RATE: 14 BRPM | SYSTOLIC BLOOD PRESSURE: 132 MMHG | OXYGEN SATURATION: 99 % | DIASTOLIC BLOOD PRESSURE: 70 MMHG

## 2022-04-19 DIAGNOSIS — J45.20 MILD INTERMITTENT ASTHMA, UNCOMPLICATED: ICD-10-CM

## 2022-04-19 DIAGNOSIS — Z01.818 PREOP GENERAL PHYSICAL EXAM: Primary | ICD-10-CM

## 2022-04-19 DIAGNOSIS — G25.0 ESSENTIAL TREMOR: ICD-10-CM

## 2022-04-19 DIAGNOSIS — G89.29 CHRONIC PAIN OF RIGHT KNEE: ICD-10-CM

## 2022-04-19 DIAGNOSIS — R73.03 PREDIABETES: ICD-10-CM

## 2022-04-19 DIAGNOSIS — D64.9 NORMOCYTIC ANEMIA: ICD-10-CM

## 2022-04-19 DIAGNOSIS — R63.4 WEIGHT LOSS: ICD-10-CM

## 2022-04-19 DIAGNOSIS — M17.11 PRIMARY OSTEOARTHRITIS OF RIGHT KNEE: ICD-10-CM

## 2022-04-19 DIAGNOSIS — I10 ESSENTIAL HYPERTENSION WITH GOAL BLOOD PRESSURE LESS THAN 140/90: ICD-10-CM

## 2022-04-19 DIAGNOSIS — E03.8 OTHER SPECIFIED HYPOTHYROIDISM: ICD-10-CM

## 2022-04-19 DIAGNOSIS — M25.561 CHRONIC PAIN OF RIGHT KNEE: ICD-10-CM

## 2022-04-19 DIAGNOSIS — E78.5 HYPERLIPIDEMIA LDL GOAL <130: ICD-10-CM

## 2022-04-19 PROBLEM — R73.9 ELEVATED BLOOD SUGAR: Status: ACTIVE | Noted: 2022-04-19

## 2022-04-19 LAB
ALBUMIN SERPL-MCNC: 3.6 G/DL (ref 3.4–5)
ALP SERPL-CCNC: 155 U/L (ref 40–150)
ALT SERPL W P-5'-P-CCNC: 15 U/L (ref 0–50)
ANION GAP SERPL CALCULATED.3IONS-SCNC: 7 MMOL/L (ref 3–14)
AST SERPL W P-5'-P-CCNC: 12 U/L (ref 0–45)
BASOPHILS # BLD AUTO: 0.1 10E3/UL (ref 0–0.2)
BASOPHILS NFR BLD AUTO: 1 %
BILIRUB SERPL-MCNC: 0.4 MG/DL (ref 0.2–1.3)
BUN SERPL-MCNC: 26 MG/DL (ref 7–30)
CALCIUM SERPL-MCNC: 9.4 MG/DL (ref 8.5–10.1)
CHLORIDE BLD-SCNC: 105 MMOL/L (ref 94–109)
CHOLEST SERPL-MCNC: 183 MG/DL
CO2 SERPL-SCNC: 26 MMOL/L (ref 20–32)
CREAT SERPL-MCNC: 0.85 MG/DL (ref 0.52–1.04)
EOSINOPHIL # BLD AUTO: 0.5 10E3/UL (ref 0–0.7)
EOSINOPHIL NFR BLD AUTO: 6 %
ERYTHROCYTE [DISTWIDTH] IN BLOOD BY AUTOMATED COUNT: 13 % (ref 10–15)
FASTING STATUS PATIENT QL REPORTED: NO
GFR SERPL CREATININE-BSD FRML MDRD: 69 ML/MIN/1.73M2
GLUCOSE BLD-MCNC: 94 MG/DL (ref 70–99)
HCT VFR BLD AUTO: 34.4 % (ref 35–47)
HDLC SERPL-MCNC: 44 MG/DL
HGB BLD-MCNC: 10.5 G/DL (ref 11.7–15.7)
LDLC SERPL CALC-MCNC: 104 MG/DL
LYMPHOCYTES # BLD AUTO: 2.4 10E3/UL (ref 0.8–5.3)
LYMPHOCYTES NFR BLD AUTO: 30 %
MCH RBC QN AUTO: 29.2 PG (ref 26.5–33)
MCHC RBC AUTO-ENTMCNC: 30.5 G/DL (ref 31.5–36.5)
MCV RBC AUTO: 96 FL (ref 78–100)
MONOCYTES # BLD AUTO: 0.7 10E3/UL (ref 0–1.3)
MONOCYTES NFR BLD AUTO: 8 %
NEUTROPHILS # BLD AUTO: 4.7 10E3/UL (ref 1.6–8.3)
NEUTROPHILS NFR BLD AUTO: 56 %
NONHDLC SERPL-MCNC: 139 MG/DL
PLATELET # BLD AUTO: 384 10E3/UL (ref 150–450)
POTASSIUM BLD-SCNC: 4.6 MMOL/L (ref 3.4–5.3)
PROT SERPL-MCNC: 7.7 G/DL (ref 6.8–8.8)
RBC # BLD AUTO: 3.59 10E6/UL (ref 3.8–5.2)
SODIUM SERPL-SCNC: 138 MMOL/L (ref 133–144)
TRIGL SERPL-MCNC: 173 MG/DL
WBC # BLD AUTO: 8.3 10E3/UL (ref 4–11)

## 2022-04-19 PROCEDURE — 93000 ELECTROCARDIOGRAM COMPLETE: CPT | Performed by: FAMILY MEDICINE

## 2022-04-19 PROCEDURE — 99215 OFFICE O/P EST HI 40 MIN: CPT | Performed by: FAMILY MEDICINE

## 2022-04-19 PROCEDURE — 36415 COLL VENOUS BLD VENIPUNCTURE: CPT | Performed by: FAMILY MEDICINE

## 2022-04-19 PROCEDURE — 80061 LIPID PANEL: CPT | Performed by: FAMILY MEDICINE

## 2022-04-19 PROCEDURE — 85025 COMPLETE CBC W/AUTO DIFF WBC: CPT | Performed by: FAMILY MEDICINE

## 2022-04-19 PROCEDURE — 80053 COMPREHEN METABOLIC PANEL: CPT | Performed by: FAMILY MEDICINE

## 2022-04-19 RX ORDER — OXYCODONE HYDROCHLORIDE 5 MG/1
2.5 TABLET ORAL 2 TIMES DAILY PRN
Qty: 30 TABLET | Refills: 0 | Status: SHIPPED | OUTPATIENT
Start: 2022-04-19 | End: 2022-05-23

## 2022-04-19 RX ORDER — DILTIAZEM HYDROCHLORIDE 240 MG/1
CAPSULE, EXTENDED RELEASE ORAL
Qty: 90 CAPSULE | Refills: 3 | Status: SHIPPED | OUTPATIENT
Start: 2022-04-19 | End: 2023-04-26

## 2022-04-19 RX ORDER — ALBUTEROL SULFATE 90 UG/1
1-2 AEROSOL, METERED RESPIRATORY (INHALATION) EVERY 4 HOURS PRN
Qty: 9 G | Refills: 3 | Status: SHIPPED | OUTPATIENT
Start: 2022-04-19 | End: 2023-07-24

## 2022-04-19 ASSESSMENT — PAIN SCALES - GENERAL: PAINLEVEL: SEVERE PAIN (6)

## 2022-04-19 NOTE — PATIENT INSTRUCTIONS
Take a supplemental protein drink like Ensure or Boost or something similar, [place] instant breakfast     Take your medications like usual the night before    Take the oxycodone in the am of surgery with a sip of water    No ibuprofen a week to surgery, pain pill or tylenol is fine

## 2022-04-19 NOTE — PROGRESS NOTES
[facility]  [address] [place] [id number]  Phone: [phone]  Fax: [phone]  Primary Provider: Aga Ochoa  Pre-op Performing Provider: AGA OCHOA      PREOPERATIVE EVALUATION:  Today's date: 4/19/2022    Yudelka Ledesma is a 81 year old female who presents for a preoperative evaluation.    Surgical Information:  Surgery/Procedure: Total Knee Replacement   Surgery Location: [facility]   Surgeon: Fran  Surgery Date: 05/11/2022  Time of Surgery: TBD   Where patient plans to recover: At home with family  Fax number for surgical facility: Note does not need to be faxed, will be available electronically in Epic.    Type of Anesthesia Anticipated: Spinal    Assessment & Plan     The proposed surgical procedure is considered INTERMEDIATE risk.    Preop general physical exam  Medically optimized for surgery    Primary osteoarthritis of right knee  Appropriate for TKA  - Drug Confirmation Panel Urine with Creat - lab collect; Future  - oxyCODONE (ROXICODONE) 5 MG tablet; Take 0.5 tablets (2.5 mg) by mouth 2 times daily as needed for pain    Chronic pain of right knee  TKA scheduled  - Drug Confirmation Panel Urine with Creat - lab collect; Future  - oxyCODONE (ROXICODONE) 5 MG tablet; Take 0.5 tablets (2.5 mg) by mouth 2 times daily as needed for pain    Essential hypertension with goal blood pressure less than 140/90  Well controlled  Continue diltiazem and lisinopril  - diltiazem ER (DILT-XR) 240 MG 24 hr ER beaded capsule; Take 1 capsule by mouth once daily  - EKG 12-lead complete w/read - Clinics  - CBC with platelets and differential; Future  - Comprehensive metabolic panel (BMP + Alb, Alk Phos, ALT, AST, Total. Bili, TP); Future  - CBC with platelets and differential  - Comprehensive metabolic panel (BMP + Alb, Alk Phos, ALT, AST, Total. Bili, TP)    Mild intermittent asthma, uncomplicated  controlled  - albuterol (PROAIR HFA/PROVENTIL HFA/VENTOLIN  HFA) 108 (90 Base) MCG/ACT inhaler; Inhale 1-2 puffs into the lungs every 4 hours as needed for shortness of breath / dyspnea or wheezing    Prediabetes  Low carb diet  Continue to monitor    Hyperlipidemia LDL goal <130  On statin  - Lipid panel reflex to direct LDL Fasting; Future  - Lipid panel reflex to direct LDL Fasting    Essential tremor  Patient reassured    Other specified hypothyroidism  Controlled on levothyroxine     Normocytic anemia  Uncertain etiology  Suspect iron deficiency  Hemoglobin 10.5, from my standpoint is able to have surgery, ultimately up to surgeon  Restart iron every other day  - Adult Oncology/Hematology Referral; Future    Weight loss  New finding  Some anorexia  May be from pain and pain meds  Will have her add supplement and recheck in 2 months    Possible Sleep Apnea: no     Risks and Recommendations:  The patient has the following additional risks and recommendations for perioperative complications:      Medication Instructions:  Patient is to take all scheduled medications on the day of surgery  (patient takes her medications in pm)    RECOMMENDATION:  APPROVAL GIVEN to proceed with proposed procedure, without further diagnostic evaluation.        I spent a total of 50 minutes on the day of the visit.   Time spent doing chart review, history and exam, documentation and further activities per the note        Subjective     HPI related to upcoming procedure: long history of right knee pain, much worse this past year.       No flowsheet data found.  1. No - Have you ever had a heart attack or stroke?  2. No - Have you ever had surgery on your heart or blood vessels, such as a stent, coronary (heart) bypass, or surgery on an artery in the head, neck, heart, or legs?  3. No - Do you have chest pain when you are physically active?  4. No - Do you have a history of heart failure?  5. No - Do you currently have a cold, bronchitis, or symptoms of other respiratory (head and chest)  infections?  6. No - Do you have a cough, shortness of breath, or wheezing?  7. No - Do you or anyone in your family have a history of blood clots?  8. No - Do you or anyone in your family have a serious bleeding problem, such as long-lasting bleeding after surgeries or cuts?  9. No - Have you ever had anemia or been told to take iron pills?  9. Yes - Have you ever had anemia or been told to take iron pills? Mild  11. No - Have you ever had a blood transfusion?  12. Yes - Are you willing to have a blood transfusion if it is medically needed before, during, or after your surgery?   13. No - Have you or anyone in your family ever had problems with anesthesia (sedation for surgery)?  14. No - Do you have sleep apnea, excessive snoring, or daytime drowsiness?   15. No - Do you have any artifical heart valves or other implanted medical devices, such as a pacemaker, defibrillator, or continuous glucose monitor?  16. No - Do you have any artifical joints?  17. No - Are you allergic to latex?  18. No - Is there any chance that you may be pregnant?    Health Care Directive:  Patient does not have a Health Care Directive or Living Will: Discussed advance care planning with patient; information given to patient to review.    Preoperative Review of :   reviewed - controlled substances reflected in medication list.      Status of Chronic Conditions:  ANEMIA -   Mild anemia dating back to one year ago. 5/25/20 was normal.       Component Ref Range & Units 1 d ago   (4/19/22) 2 mo ago   (2/10/22) 4 mo ago   (11/30/21) 10 mo ago   (6/24/21) 12 mo ago   (4/24/21) 1 yr ago   (4/5/21) 1 yr ago   (6/25/20)    WBC Count 4.0 - 11.0 10e3/uL 8.3  10.3  7.7  7.7 R  7.8 R  8.0 R  8.1 R     RBC Count 3.80 - 5.20 10e6/uL 3.59 Low   3.67 Low   3.82  3.84 R  4.13 R  4.16 R  4.22 R     Hemoglobin 11.7 - 15.7 g/dL 10.5 Low   11.0 Low   11.2 Low   11.3 Low   11.6 Low   11.4 Low   12.1     Hematocrit 35.0 - 47.0 % 34.4 Low   33.2 Low    34.7 Low   34.6 Low   36.3  36.0  37.6     MCV 78 - 100 fL 96  91  91  90 R  88 R  87 R  89 R     MCH 26.5 - 33.0 pg 29.2  30.0  29.3  29.4  28.1  27.4  28.7     MCHC 31.5 - 36.5 g/dL 30.5 Low   33.1  32.3  32.7  32.0  31.7  32.2     RDW 10.0 - 15.0 % 13.0  13.2  13.8  14.2  13.7  13.9  13.5     Platelet Count 150 - 450 10e3/uL 384  392  398  366 R  389 R  340 R  362 R     % Neutrophils % 56  63  55  52.8  47.2  55.2  53.9     % Lymphocytes % 30  23  30  29.9  35.7  28.4  29.0     % Monocytes % 8  9  10  10.2  10.8  9.4  9.5     % Eosinophils % 6  4  4  6.3  5.7  6.4  6.7     % Basophils % 1  1  1  0.8  0.6  0.6  0.9         No other cytopenia  retic count normal, vit B12 and folate normal, ferritin on lower side at 24 a year ago, blood smear showed normochromic normocytic anemia.   I asked her to take iron every other day and update colonoscopy and get endoscopy. She declined both. Repeat blood smear last fall showed same. Ferritin in Feb was 47 with normal iron studies. Recommendation for hematology consideration, she declined. Kidney function normal.     ASTHMA - Patient has a longstanding history of mild persistent Asthma . Patient has been doing well overall noting NO SYMPTOMS and continues on medication regimen consisting of albuterol/atrovent prn without adverse reactions or side effects.     HYPERLIPIDEMIA - Patient has a long history of  Hyperlipidemia requiring medication for treatment with recent good control. Patient reports no problems or side effects with the medication.   On statin  Recent Labs   Lab Test 04/05/21  1417 06/25/20  1450 12/05/16  1057 10/27/15  1103 09/25/14  1102   CHOL 198 202*   < > 177 189   HDL 55 49*   < > 50* 48*   * 106*   < > 90 101   TRIG 180* 235*   < > 184* 201*   CHOLHDLRATIO  --   --   --  3.5 3.9    < > = values in this interval not displayed.      HYPERTENSION - Patient has longstanding history of HTN , currently denies any symptoms referable to elevated blood  pressure. Specifically denies chest pain, palpitations, dyspnea, orthopnea, PND or peripheral edema. Blood pressure readings have been in normal range. Current medication regimen is as listed below. Patient denies any side effects of medication.     HYPOTHYROIDISM - Patient has a longstanding history of chronic Hypothyroidism. Patient has been doing well, noting no tremor, insomnia, hair loss or changes in skin texture. Continues to take medications as directed, without adverse reactions or side effects. Last TSH   Lab Results   Component Value Date    TSH 2.26 02/10/2022   .    Prediabetes  blood sugars have been elevated in the upper prediabetes range  Lab Results   Component Value Date    A1C 6.4 02/10/2022    A1C 6.4 08/11/2011     tremor  Has noted a fine tremor last few months or longer, when eats or with holding a cup, when writing    Weight loss  I note some recent weight loss   Not hungry, not eating much.   Wt Readings from Last 5 Encounters:   04/19/22 69.4 kg (153 lb)   02/10/22 72.1 kg (159 lb)   11/30/21 73 kg (161 lb)   08/31/21 75.2 kg (165 lb 12.8 oz)   07/19/21 73.5 kg (162 lb)          Review of Systems  ROS: 5 point ROS negative except as noted above in HPI, including Gen., Resp., CV, GI &  system review.     Patient Active Problem List    Diagnosis Date Noted     Other idiopathic scoliosis 08/22/2016     Priority: Medium     Varicose veins of both lower extremities 07/07/2016     Priority: Medium     Undiagnosed cardiac murmurs 05/14/2015     Priority: Medium     Advanced directives, counseling/discussion 02/11/2013     Priority: Medium     Discussed advance care planning with patient; however, patient declined at this time. 2/11/2013          Scoliosis 02/11/2013     Priority: Medium     Carpal tunnel syndrome 08/25/2011     Priority: Medium     Positive EMG 10/2011, saw Dr Galindo who recommended surgical release. Patient did not go through with it.        Cervical radiculopathy 08/25/2011  "    Priority: Medium     Colon stricture (H) 02/15/2011     Priority: Medium     HYPERLIPIDEMIA LDL GOAL <130 10/31/2010     Priority: Medium     S/P hysterectomy 02/10/2010     Priority: Medium     Done for irregular bleeding in the 1970's.  No cancer.  Has had one ovary removed due to tubal pregnancy, she thinks it was the R.  She thinks she still has the L ovary.  \"they left one, I think it was the left.\"       Elevated serum alkaline phosphatase level 08/27/2009     Priority: Medium     Has had work-up with normal RUQ ultrasound and negative ELP, Hep A, Hep C ab, Hep B Ag, normal LFTs- 2004 by Internal medicine       Nephrolithiasis 06/11/2008     Priority: Medium     8/07- ureteroscopy with lasar lithotripsy for large 15 mm stone, stent placed at [facility]. Has 6 mm left lower kidney pole calculus- pt has elected to watch. Se[facility] Dr. Lutz at [facility].        Abnormal glucose 09/03/2007     Priority: Medium     Problem list name updated by automated process. Provider to review       Essential hypertension with goal blood pressure less than 140/90 05/18/2005     Priority: Medium     Problem list name updated by automated process. Provider to review       Esophageal reflux 05/18/2005     Priority: Medium     EGD 11/02 showing small hiatal hernia       Hypothyroidism 05/18/2005     Priority: Medium     Problem list name updated by automated process. Provider to review       Mild intermittent asthma 05/18/2005     Priority: Medium      Past Medical History:   Diagnosis Date     Basal cell carcinoma      Esophageal reflux      Other and unspecified hyperlipidemia      Unspecified essential hypertension      Unspecified hypothyroidism      Past Surgical History:   Procedure Laterality Date     COLONOSCOPY  2/7/2011    COLONOSCOPY performed by BHARTI DUNHAM at [facility]     Current Outpatient Medications   Medication Sig Dispense Refill     albuterol (PROAIR HFA/PROVENTIL HFA/VENTOLIN HFA) 108 (90 Base) MCG/ACT inhaler " "INHALE 2 PUFFS BY MOUTH EVERY 6 HOURS 9 g 0     atorvastatin (LIPITOR) 40 MG tablet Take 1 tablet by mouth once daily 180 tablet 3     DILT- MG 24 hr ER beaded capsule Take 1 capsule by mouth once daily 90 capsule 0     esomeprazole (NEXIUM) 20 MG DR capsule Take 1 capsule (20 mg) by mouth every morning (before breakfast) One hour before meals fill when needed       IBUPROFEN 3 times daily        ipratropium (ATROVENT HFA) 17 MCG/ACT inhaler INHALE 2 PUFFS INTO THE LUNGS FOUR TIMES A DAY AS NEEDED FOR WHEEZING 12.9 g 3     levothyroxine (SYNTHROID/LEVOTHROID) 88 MCG tablet Take 1 tablet (88 mcg) by mouth daily 90 tablet 3     lisinopril (ZESTRIL) 20 MG tablet Take 1 tablet by mouth once daily 90 tablet 3     oxyCODONE (ROXICODONE) 5 MG tablet Take 0.5 tablets (2.5 mg) by mouth 2 times daily as needed for pain 30 tablet 0       Allergies   Allergen Reactions     Epinephrine Palpitations     Gets \"cold, jittery, feels weird\"     Ancef [Cefazolin Sodium]      Augmentin [Aspartame]      Doxycycline      Gabapentin      Gave nightmares     Levaquin [Levofloxacin Hemihydrate]      Prednisolone      Seasonal Allergies      Sulfa Drugs         Social History     Tobacco Use     Smoking status: Former Smoker     Years: 15.00     Types: Cigarettes     Quit date: 1985     Years since quittin.3     Smokeless tobacco: Never Used   Substance Use Topics     Alcohol use: No     Family History   Problem Relation Age of Onset     Cerebrovascular Disease Mother      Heart Disease Father      Cancer Maternal Grandmother      Diabetes Paternal Grandmother      History   Drug Use No         Objective     /70   Pulse 85   Temp 97.3  F (36.3  C) (Tympanic)   Resp 14   Wt 69.4 kg (153 lb)   SpO2 99%   BMI 26.77 kg/m      Physical Exam    GENERAL APPEARANCE: healthy, alert and no distress     EYES: EOMI, PERRL     HENT: mask in place     NECK: no adenopathy, no asymmetry, masses, or scars and thyroid normal to " palpation     RESP: lungs clear to auscultation - no rales, rhonchi or wheezes     CV: regular rates and rhythm, normal S1 S2, no S3 or S4 and no murmur, click or rub     ABDOMEN:  soft, nontender, no HSM or masses and bowel sounds normal     MS: right knee with valgus deformity, antalgic gait, assisted by walker/cane, she has great difficulty walking     SKIN: no suspicious lesions or rashes     NEURO: Normal strength and tone, sensory exam grossly normal, mentation intact and speech normal     PSYCH: mentation appears normal. and affect normal/bright     LYMPHATICS: No cervical adenopathy    Recent Labs   Lab Test 02/10/22  1540 11/30/21  1613   HGB 11.0* 11.2*    398    138   POTASSIUM 4.0 4.0   CR 0.79 0.85   A1C 6.4*  --         Diagnostics:  Labs pending at this time.  Results will be reviewed when available.   EKG: appears normal, NSR, normal axis, normal intervals, no acute ST/T changes c/w ischemia, no LVH by voltage criteria, unchanged from previous tracings    Revised Cardiac Risk Index (RCRI):  The patient has the following serious cardiovascular risks for perioperative complications:   - No serious cardiac risks = 0 points     RCRI Interpretation: 0 points: Class I (very low risk - 0.4% complication rate)           Signed Electronically by: Azalea Meehan MD  Copy of this evaluation report is provided to requesting physician.

## 2022-04-20 ENCOUNTER — PATIENT OUTREACH (OUTPATIENT)
Dept: ONCOLOGY | Facility: CLINIC | Age: 82
End: 2022-04-20
Payer: COMMERCIAL

## 2022-04-20 NOTE — PROGRESS NOTES
Writer placed call to Yudelka to discuss referral to [facility] Hematology for anemia. Current Hgb level is 10.5 dated 4/19/22. Per lab review, patient has stable/mild anemia and ok for next available hematologist per location preference. Location preference is [place] per discussion. Advised if symptoms of anemia (exhaustion, winded, low energy) increase, to call our intake team or referring provider, Dr Ochoa, to discuss and potentially be seen sooner or have labs drawn by referring provider. Yudelka aware of referral, plan of care, and voiced understanding that the intake team will call to schedule.

## 2022-04-22 NOTE — PROGRESS NOTES
RECORDS STATUS - ALL OTHER DIAGNOSIS      RECORDS RECEIVED FROM: [facility]   DATE RECEIVED:6/1/2022   NOTES STATUS DETAILS   OFFICE NOTE from referring provider Complete Azalea Woodard MD   DISCHARGE SUMMARY from hospital     DISCHARGE REPORT from the ER     MEDICATION LIST Complete [facility]   CLINICAL TRIAL TREATMENTS TO DATE     LABS     PATHOLOGY REPORTS     ANYTHING RELATED TO DIAGNOSIS Complete Labs last updated on 4/19/2022   GENONOMIC TESTING     TYPE:     IMAGING (NEED IMAGES & REPORT)     CT SCANS     MRI     MAMMO     ULTRASOUND     PET

## 2022-05-02 RX ORDER — CEFAZOLIN SODIUM/WATER 2 G/20 ML
2 SYRINGE (ML) INTRAVENOUS
Status: CANCELLED | OUTPATIENT
Start: 2022-05-02

## 2022-05-02 RX ORDER — CEFAZOLIN SODIUM/WATER 2 G/20 ML
2 SYRINGE (ML) INTRAVENOUS SEE ADMIN INSTRUCTIONS
Status: CANCELLED | OUTPATIENT
Start: 2022-05-02

## 2022-05-02 RX ORDER — TRANEXAMIC ACID 650 MG/1
1950 TABLET ORAL ONCE
Status: CANCELLED | OUTPATIENT
Start: 2022-05-02 | End: 2022-05-02

## 2022-05-19 ENCOUNTER — APPOINTMENT (OUTPATIENT)
Dept: LAB | Facility: CLINIC | Age: 82
End: 2022-05-19
Payer: COMMERCIAL

## 2022-05-19 ENCOUNTER — ONCOLOGY VISIT (OUTPATIENT)
Dept: ONCOLOGY | Facility: CLINIC | Age: 82
End: 2022-05-19
Attending: FAMILY MEDICINE
Payer: COMMERCIAL

## 2022-05-19 VITALS
WEIGHT: 151.56 LBS | SYSTOLIC BLOOD PRESSURE: 155 MMHG | DIASTOLIC BLOOD PRESSURE: 69 MMHG | HEIGHT: 63 IN | TEMPERATURE: 98 F | OXYGEN SATURATION: 99 % | RESPIRATION RATE: 18 BRPM | BODY MASS INDEX: 26.86 KG/M2 | HEART RATE: 90 BPM

## 2022-05-19 DIAGNOSIS — D64.9 NORMOCYTIC ANEMIA: Primary | ICD-10-CM

## 2022-05-19 DIAGNOSIS — D50.8 OTHER IRON DEFICIENCY ANEMIA: ICD-10-CM

## 2022-05-19 DIAGNOSIS — K90.89 POOR IRON ABSORPTION: ICD-10-CM

## 2022-05-19 LAB
BASOPHILS # BLD AUTO: 0.1 10E3/UL (ref 0–0.2)
BASOPHILS NFR BLD AUTO: 1 %
EOSINOPHIL # BLD AUTO: 0.4 10E3/UL (ref 0–0.7)
EOSINOPHIL NFR BLD AUTO: 5 %
ERYTHROCYTE [DISTWIDTH] IN BLOOD BY AUTOMATED COUNT: 12.6 % (ref 10–15)
FERRITIN SERPL-MCNC: 43 NG/ML (ref 8–252)
HCT VFR BLD AUTO: 32.9 % (ref 35–47)
HGB BLD-MCNC: 10.5 G/DL (ref 11.7–15.7)
IMM GRANULOCYTES # BLD: 0 10E3/UL
IMM GRANULOCYTES NFR BLD: 0 %
IRON SATN MFR SERPL: 15 % (ref 15–46)
IRON SERPL-MCNC: 46 UG/DL (ref 35–180)
LYMPHOCYTES # BLD AUTO: 1.9 10E3/UL (ref 0.8–5.3)
LYMPHOCYTES NFR BLD AUTO: 23 %
MCH RBC QN AUTO: 29.2 PG (ref 26.5–33)
MCHC RBC AUTO-ENTMCNC: 31.9 G/DL (ref 31.5–36.5)
MCV RBC AUTO: 92 FL (ref 78–100)
MONOCYTES # BLD AUTO: 0.6 10E3/UL (ref 0–1.3)
MONOCYTES NFR BLD AUTO: 8 %
NEUTROPHILS # BLD AUTO: 5.3 10E3/UL (ref 1.6–8.3)
NEUTROPHILS NFR BLD AUTO: 63 %
NRBC # BLD AUTO: 0 10E3/UL
NRBC BLD AUTO-RTO: 0 /100
PLATELET # BLD AUTO: 369 10E3/UL (ref 150–450)
RBC # BLD AUTO: 3.59 10E6/UL (ref 3.8–5.2)
TIBC SERPL-MCNC: 317 UG/DL (ref 240–430)
WBC # BLD AUTO: 8.3 10E3/UL (ref 4–11)

## 2022-05-19 PROCEDURE — G0463 HOSPITAL OUTPT CLINIC VISIT: HCPCS

## 2022-05-19 PROCEDURE — 82728 ASSAY OF FERRITIN: CPT | Performed by: INTERNAL MEDICINE

## 2022-05-19 PROCEDURE — 83550 IRON BINDING TEST: CPT | Performed by: INTERNAL MEDICINE

## 2022-05-19 PROCEDURE — 85025 COMPLETE CBC W/AUTO DIFF WBC: CPT | Performed by: INTERNAL MEDICINE

## 2022-05-19 PROCEDURE — 36415 COLL VENOUS BLD VENIPUNCTURE: CPT | Performed by: INTERNAL MEDICINE

## 2022-05-19 PROCEDURE — 99203 OFFICE O/P NEW LOW 30 MIN: CPT | Performed by: INTERNAL MEDICINE

## 2022-05-19 RX ORDER — HEPARIN SODIUM (PORCINE) LOCK FLUSH IV SOLN 100 UNIT/ML 100 UNIT/ML
5 SOLUTION INTRAVENOUS
Status: CANCELLED | OUTPATIENT
Start: 2022-05-25

## 2022-05-19 RX ORDER — HEPARIN SODIUM,PORCINE 10 UNIT/ML
5 VIAL (ML) INTRAVENOUS
Status: CANCELLED | OUTPATIENT
Start: 2022-05-25

## 2022-05-19 RX ORDER — DIPHENHYDRAMINE HYDROCHLORIDE 50 MG/ML
50 INJECTION INTRAMUSCULAR; INTRAVENOUS
Status: CANCELLED
Start: 2022-05-25

## 2022-05-19 RX ORDER — MEPERIDINE HYDROCHLORIDE 25 MG/ML
25 INJECTION INTRAMUSCULAR; INTRAVENOUS; SUBCUTANEOUS EVERY 30 MIN PRN
Status: CANCELLED | OUTPATIENT
Start: 2022-05-25

## 2022-05-19 RX ORDER — NALOXONE HYDROCHLORIDE 0.4 MG/ML
0.2 INJECTION, SOLUTION INTRAMUSCULAR; INTRAVENOUS; SUBCUTANEOUS
Status: CANCELLED | OUTPATIENT
Start: 2022-05-25

## 2022-05-19 RX ORDER — CHLORHEXIDINE GLUCONATE ORAL RINSE 1.2 MG/ML
SOLUTION DENTAL
COMMUNITY
Start: 2022-03-21 | End: 2024-03-07

## 2022-05-19 ASSESSMENT — PAIN SCALES - GENERAL: PAINLEVEL: SEVERE PAIN (7)

## 2022-05-19 NOTE — PATIENT INSTRUCTIONS
Your mild anemia has been accompanied by mild iron deficiency. You have been taking iron supplements every other day for about a month and I would like to recheck your iron studies and hemoglobin today.    If your anemia has improved and your hemoglobin is higher today than a month ago we would have you continue your iron supplements and follow-up with your primary care provider to reschedule your knee surgery.    If your anemia has failed to improve with oral iron replacement we will schedule you to receive intravenous iron in the infusion clinic as an outpatient.    You will be scheduled for an office visit with me in about a month.

## 2022-05-19 NOTE — PROGRESS NOTES
[facility] Hematology and Oncology Outpatient Consult Note    Patient: Yudelka Ledesma  MRN: [id number]        Reason for Visit    I was consulted by  regarding anemia.    Assessment/Plan    It was a pleasure to evaluate you for the following issues today:    1. Normocytic anemia      Your mild anemia has been accompanied by mild iron deficiency. You have been taking iron supplements every other day for about a month and I would like to recheck your iron studies and hemoglobin today.    If your anemia has improved and your hemoglobin is higher today than a month ago we would have you continue your iron supplements and follow-up with your primary care provider to reschedule your knee surgery.    If your anemia has failed to improve with oral iron replacement we will schedule you to receive intravenous iron in the infusion clinic as an outpatient.    You will be scheduled for an office visit with me in about a month.    History  Ms. Yudelka Ledesma is a 81 year old with mild anemia.  Her most recent hemoglobin from a month ago was 10.5 which was down from 12.1 in June 2020 and 11.4 in April 2021.  The remainder of her CBC is normal.    Iron studies from 1 year ago and 3 months ago indicated iron deficiency with an iron saturation index of 15% and 19% respectively.  Her ferritin was also checked at those times and was found to be 26 and 47 respectively.  Her serum creatinine and GFR have been normal.  Her alkaline phosphatase level has been borderline elevated at 167 about 3 months ago and 182 about 5 months ago.      She has been on oral iron for about 1 month which she started taking every other day per recommendation of Dr. Herring.    She denies any gastrointestinal bleeding.  She denies any changes to her bowel habits. She cannot have colonoscopies as she had a very difficult colonoscopy years ago and they had to abort the procedure.    She is also concerned about having lost some  weight.  She is down about 4 kg in the last 6 months.  She just has no appetite.  She denies any abdominal pain or bloating. She denies any self palpated lumps or bumps.  She denies any pain other than arthritis related pain and was supposed to get surgery on her right knee which was canceled due to her mild anemia.    Wt Readings from Last 4 Encounters:   05/19/22 68.7 kg (151 lb 9 oz)   04/19/22 69.4 kg (153 lb)   02/10/22 72.1 kg (159 lb)   11/30/21 73 kg (161 lb)       Patient Active Problem List   Diagnosis     Essential hypertension with goal blood pressure less than 140/90     Esophageal reflux     Hypothyroidism     Mild intermittent asthma, uncomplicated     Abnormal glucose     Nephrolithiasis     Elevated serum alkaline phosphatase level     S/P hysterectomy     HYPERLIPIDEMIA LDL GOAL <130     Colon stricture (H)     Carpal tunnel syndrome     Cervical radiculopathy     Advanced directives, counseling/discussion     Scoliosis     Undiagnosed cardiac murmurs     Varicose veins of both lower extremities     Other idiopathic scoliosis     Essential tremor     Elevated blood sugar     Chronic pain of right knee     Primary osteoarthritis of right knee       Current Outpatient Medications   Medication     albuterol (PROAIR HFA/PROVENTIL HFA/VENTOLIN HFA) 108 (90 Base) MCG/ACT inhaler     atorvastatin (LIPITOR) 40 MG tablet     diltiazem ER (DILT-XR) 240 MG 24 hr ER beaded capsule     esomeprazole (NEXIUM) 20 MG DR capsule     IBUPROFEN     ipratropium (ATROVENT HFA) 17 MCG/ACT inhaler     levothyroxine (SYNTHROID/LEVOTHROID) 88 MCG tablet     lisinopril (ZESTRIL) 20 MG tablet     oxyCODONE (ROXICODONE) 5 MG tablet     chlorhexidine (PERIDEX) 0.12 % solution     No current facility-administered medications for this visit.       Past History  Past Medical History:   Diagnosis Date     Basal cell carcinoma      Esophageal reflux      Other and unspecified hyperlipidemia      Unspecified essential hypertension   "    Unspecified hypothyroidism      Past Surgical History:   Procedure Laterality Date     COLONOSCOPY  2011    COLONOSCOPY performed by BHARTI DUNHAM at [facility]     Family History   Problem Relation Age of Onset     Cerebrovascular Disease Mother      Heart Disease Father      Cancer Maternal Grandmother      Diabetes Paternal Grandmother      Social History     Socioeconomic History     Marital status:      Spouse name: None     Number of children: None     Years of education: None     Highest education level: None   Tobacco Use     Smoking status: Former Smoker     Years: 15.00     Types: Cigarettes     Quit date: 1985     Years since quittin.4     Smokeless tobacco: Never Used   Vaping Use     Vaping Use: Never used   Substance and Sexual Activity     Alcohol use: No     Drug use: No     Sexual activity: Not Currently   Other Topics Concern      Service No     Blood Transfusions No     Caffeine Concern No     Occupational Exposure No     Hobby Hazards No     Sleep Concern No     Stress Concern Yes     Comment: just todays visit     Weight Concern No     Special Diet No     Back Care No     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 No       Allergies    Allergies   Allergen Reactions     Epinephrine Palpitations     Gets \"cold, jittery, feels weird\"     Ancef [Cefazolin Sodium]      Augmentin [Aspartame]      Doxycycline      Gabapentin      Gave nightmares     Levaquin [Levofloxacin Hemihydrate]      Prednisolone      Seasonal Allergies      Sulfa Drugs        Current Outpatient Medications   Medication     albuterol (PROAIR HFA/PROVENTIL HFA/VENTOLIN HFA) 108 (90 Base) MCG/ACT inhaler     atorvastatin (LIPITOR) 40 MG tablet     diltiazem ER (DILT-XR) 240 MG 24 hr ER beaded capsule     esomeprazole (NEXIUM) 20 MG DR capsule " "    IBUPROFEN     ipratropium (ATROVENT HFA) 17 MCG/ACT inhaler     levothyroxine (SYNTHROID/LEVOTHROID) 88 MCG tablet     lisinopril (ZESTRIL) 20 MG tablet     oxyCODONE (ROXICODONE) 5 MG tablet     chlorhexidine (PERIDEX) 0.12 % solution     No current facility-administered medications for this visit.       Physical Exam  BP (!) 155/69 (BP Location: Right arm, Patient Position: Sitting, Cuff Size: Adult Regular)   Pulse 90   Temp 98  F (36.7  C) (Oral)   Resp 18   Ht 1.6 m (5' 3\")   Wt 68.7 kg (151 lb 9 oz)   SpO2 99%   Breastfeeding No   BMI 26.85 kg/m    ECOG Performance Status: 3: Confined to bed or a chair more than 50 percent of waking hours. Capable of limited self-care.    GENERAL: Alert and oriented to time place and person.  She gets around with the aid of a walker.  She is unable to get on the exam table.      EYES: No icterus.      Signed by: Enio Carmona MD    "

## 2022-05-19 NOTE — LETTER
5/19/2022         RE: Yudelka Ledesma  [address] [facility] [address]        Dear Colleague,    Thank you for referring your patient, Yudelka Ledesma, to the [facility]. Please see a copy of my visit note below.    [facility] Hematology [facility]cology Outpatient Consult Note    Patient: Yudelka Ledesma  MRN: [id number]        Reason for Visit    I was consulted by  regarding anemia.    Assessment/Plan    It was a pleasure to evaluate you for the following issues today:    1. Normocytic anemia      Your mild anemia has been accompanied by mild iron deficiency. You have been taking iron supplements every other day for about a month and I would like to recheck your iron studies and hemoglobin today.    If your anemia has improved and your hemoglobin is higher today than a month ago we would have you continue your iron supplements and follow-up with your primary care provider to reschedule your knee surgery.    If your anemia has failed to improve with oral iron replacement we will schedule you to receive intravenous iron in the infusion clinic as an outpatient.    You will be scheduled for an office visit with me in about a month.    History  Ms. Yudelka Ledesma is a 81 year old with mild anemia.  Her most recent hemoglobin from a month ago was 10.5 which was down from 12.1 in June 2020 and 11.4 in April 2021.  The remainder of her CBC is normal.    Iron studies from 1 year ago and 3 months ago indicated iron deficiency with an iron saturation index of 15% and 19% respectively.  Her ferritin was also checked at those times and was found to be 26 and 47 respectively.  Her serum creatinine and GFR have been normal.  Her alkaline phosphatase level has been borderline elevated at 167 about 3 months ago and 182 about 5 months ago.      She has been on oral iron for about 1 month which she started taking every other day per  recommendation of Dr. Herring.    She denies any gastrointestinal bleeding.  She denies any changes to her bowel habits. She cannot have colonoscopies as she had a very difficult colonoscopy years ago and they had to abort the procedure.    She is also concerned about having lost some weight.  She is down about 4 kg in the last 6 months.  She just has no appetite.  She denies any abdominal pain or bloating. She denies any self palpated lumps or bumps.  She denies any pain other than arthritis related pain and was supposed to get surgery on her right knee which was canceled due to her mild anemia.    Wt Readings from Last 4 Encounters:   05/19/22 68.7 kg (151 lb 9 oz)   04/19/22 69.4 kg (153 lb)   02/10/22 72.1 kg (159 lb)   11/30/21 73 kg (161 lb)       Patient Active Problem List   Diagnosis     Essential hypertension with goal blood pressure less than 140/90     Esophageal reflux     Hypothyroidism     Mild intermittent asthma, uncomplicated     Abnormal glucose     Nephrolithiasis     Elevated serum alkaline phosphatase level     S/P hysterectomy     HYPERLIPIDEMIA LDL GOAL <130     Colon stricture (H)     Carpal tunnel syndrome     Cervical radiculopathy     Advanced directives, counseling/discussion     Scoliosis     Undiagnosed cardiac murmurs     Varicose veins of both lower extremities     Other idiopathic scoliosis     Essential tremor     Elevated blood sugar     Chronic pain of right knee     Primary osteoarthritis of right knee       Current Outpatient Medications   Medication     albuterol (PROAIR HFA/PROVENTIL HFA/VENTOLIN HFA) 108 (90 Base) MCG/ACT inhaler     atorvastatin (LIPITOR) 40 MG tablet     diltiazem ER (DILT-XR) 240 MG 24 hr ER beaded capsule     esomeprazole (NEXIUM) 20 MG DR capsule     IBUPROFEN     ipratropium (ATROVENT HFA) 17 MCG/ACT inhaler     levothyroxine (SYNTHROID/LEVOTHROID) 88 MCG tablet     lisinopril (ZESTRIL) 20 MG tablet     oxyCODONE (ROXICODONE) 5 MG tablet      "chlorhexidine (PERIDEX) 0.12 % solution     No current facility-administered medications for this visit.       Past History  Past Medical History:   Diagnosis Date     Basal cell carcinoma      Esophageal reflux      Other and unspecified hyperlipidemia      Unspecified essential hypertension      Unspecified hypothyroidism      Past Surgical History:   Procedure Laterality Date     COLONOSCOPY  2011    COLONOSCOPY performed by BHARTI DUNHAM at [facility]     Family History   Problem Relation Age of Onset     Cerebrovascular Disease Mother      Heart Disease Father      Cancer Maternal Grandmother      Diabetes Paternal Grandmother      Social History     Socioeconomic History     Marital status:      Spouse name: None     Number of children: None     Years of education: None     Highest education level: None   Tobacco Use     Smoking status: Former Smoker     Years: 15.00     Types: Cigarettes     Quit date: 1985     Years since quittin.4     Smokeless tobacco: Never Used   Vaping Use     Vaping Use: Never used   Substance and Sexual Activity     Alcohol use: No     Drug use: No     Sexual activity: Not Currently   Other Topics Concern      Service No     Blood Transfusions No     Caffeine Concern No     Occupational Exposure No     Hobby Hazards No     Sleep Concern No     Stress Concern Yes     Comment: just todays visit     Weight Concern No     Special Diet No     Back Care No     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 No       Allergies    Allergies   Allergen Reactions     Epinephrine Palpitations     Gets \"cold, jittery, feels weird\"     Ancef [Cefazolin Sodium]      Augmentin [Aspartame]      Doxycycline      Gabapentin      Gave nightmares     Levaquin [Levofloxacin Hemihydrate]      Prednisolone      Seasonal " "Allergies      Sulfa Drugs        Current Outpatient Medications   Medication     albuterol (PROAIR HFA/PROVENTIL HFA/VENTOLIN HFA) 108 (90 Base) MCG/ACT inhaler     atorvastatin (LIPITOR) 40 MG tablet     diltiazem ER (DILT-XR) 240 MG 24 hr ER beaded capsule     esomeprazole (NEXIUM) 20 MG DR capsule     IBUPROFEN     ipratropium (ATROVENT HFA) 17 MCG/ACT inhaler     levothyroxine (SYNTHROID/LEVOTHROID) 88 MCG tablet     lisinopril (ZESTRIL) 20 MG tablet     oxyCODONE (ROXICODONE) 5 MG tablet     chlorhexidine (PERIDEX) 0.12 % solution     No current facility-administered medications for this visit.       Physical Exam  BP (!) 155/69 (BP Location: Right arm, Patient Position: Sitting, Cuff Size: Adult Regular)   Pulse 90   Temp 98  F (36.7  C) (Oral)   Resp 18   Ht 1.6 m (5' 3\")   Wt 68.7 kg (151 lb 9 oz)   SpO2 99%   Breastfeeding No   BMI 26.85 kg/m    ECOG Performance Status: 3: Confined to bed or a chair more than 50 percent of waking hours. Capable of limited self-care.    GENERAL: Alert and oriented to time place and person.  She gets around with the aid of a walker.  She is unable to get on the exam table.      EYES: No icterus.      Signed by: Enio Carmona MD        Again, thank you for allowing me to participate in the care of your patient.        Sincerely,        Enio Carmona MD    "

## 2022-05-21 DIAGNOSIS — K90.89 POOR IRON ABSORPTION: Primary | ICD-10-CM

## 2022-05-21 DIAGNOSIS — D50.8 OTHER IRON DEFICIENCY ANEMIA: ICD-10-CM

## 2022-05-21 RX ORDER — NALOXONE HYDROCHLORIDE 0.4 MG/ML
0.2 INJECTION, SOLUTION INTRAMUSCULAR; INTRAVENOUS; SUBCUTANEOUS
Status: CANCELLED | OUTPATIENT
Start: 2022-05-23

## 2022-05-21 RX ORDER — HEPARIN SODIUM (PORCINE) LOCK FLUSH IV SOLN 100 UNIT/ML 100 UNIT/ML
5 SOLUTION INTRAVENOUS
Status: CANCELLED | OUTPATIENT
Start: 2022-05-23

## 2022-05-21 RX ORDER — MEPERIDINE HYDROCHLORIDE 25 MG/ML
25 INJECTION INTRAMUSCULAR; INTRAVENOUS; SUBCUTANEOUS EVERY 30 MIN PRN
Status: CANCELLED | OUTPATIENT
Start: 2022-05-23

## 2022-05-21 RX ORDER — HEPARIN SODIUM,PORCINE 10 UNIT/ML
5 VIAL (ML) INTRAVENOUS
Status: CANCELLED | OUTPATIENT
Start: 2022-05-23

## 2022-05-21 RX ORDER — DIPHENHYDRAMINE HYDROCHLORIDE 50 MG/ML
50 INJECTION INTRAMUSCULAR; INTRAVENOUS
Status: CANCELLED
Start: 2022-05-23

## 2022-05-23 ENCOUNTER — MYC REFILL (OUTPATIENT)
Dept: FAMILY MEDICINE | Facility: CLINIC | Age: 82
End: 2022-05-23
Payer: COMMERCIAL

## 2022-05-23 DIAGNOSIS — M17.11 PRIMARY OSTEOARTHRITIS OF RIGHT KNEE: ICD-10-CM

## 2022-05-23 DIAGNOSIS — M25.561 CHRONIC PAIN OF RIGHT KNEE: ICD-10-CM

## 2022-05-23 DIAGNOSIS — G89.29 CHRONIC PAIN OF RIGHT KNEE: ICD-10-CM

## 2022-05-23 RX ORDER — OXYCODONE HYDROCHLORIDE 5 MG/1
2.5 TABLET ORAL 2 TIMES DAILY PRN
Qty: 30 TABLET | Status: CANCELLED | OUTPATIENT
Start: 2022-05-23

## 2022-05-24 RX ORDER — OXYCODONE HYDROCHLORIDE 5 MG/1
2.5 TABLET ORAL 2 TIMES DAILY PRN
Qty: 30 TABLET | Refills: 0 | Status: SHIPPED | OUTPATIENT
Start: 2022-05-24 | End: 2022-06-17

## 2022-05-24 NOTE — TELEPHONE ENCOUNTER
Requested Prescriptions   Pending Prescriptions Disp Refills     oxyCODONE (ROXICODONE) 5 MG tablet 30 tablet 0     Sig: Take 0.5 tablets (2.5 mg) by mouth 2 times daily as needed for pain       There is no refill protocol information for this order        Last Written Prescription Date:  4/19/22  Last Fill Quantity: 30,  # refills: 0   Last office visit: 4/19/2022 with prescribing provider:     Future Office Visit:   Next 5 appointments (look out 90 days)    Jun 23, 2022  2:30 PM  Return Visit with Enio Carmona MD  [facility] [place] ([facility] ) [address] [facility] [address]  [facility] [address]  [phone]   Jun 30, 2022  1:30 PM  (Arrive by 1:10 PM)  Provider Visit with Azalea Ochoa MD  [facility] ([facility] ) [address] [place] [id number]  [phone]

## 2022-05-27 ENCOUNTER — INFUSION THERAPY VISIT (OUTPATIENT)
Dept: INFUSION THERAPY | Facility: CLINIC | Age: 82
End: 2022-05-27
Attending: INTERNAL MEDICINE
Payer: COMMERCIAL

## 2022-05-27 VITALS
HEART RATE: 75 BPM | SYSTOLIC BLOOD PRESSURE: 141 MMHG | TEMPERATURE: 98 F | DIASTOLIC BLOOD PRESSURE: 62 MMHG | RESPIRATION RATE: 16 BRPM

## 2022-05-27 DIAGNOSIS — K90.89 POOR IRON ABSORPTION: Primary | ICD-10-CM

## 2022-05-27 DIAGNOSIS — D50.8 OTHER IRON DEFICIENCY ANEMIA: ICD-10-CM

## 2022-05-27 PROCEDURE — 250N000011 HC RX IP 250 OP 636: Performed by: INTERNAL MEDICINE

## 2022-05-27 PROCEDURE — 96365 THER/PROPH/DIAG IV INF INIT: CPT

## 2022-05-27 PROCEDURE — 258N000003 HC RX IP 258 OP 636: Performed by: INTERNAL MEDICINE

## 2022-05-27 RX ORDER — MEPERIDINE HYDROCHLORIDE 25 MG/ML
25 INJECTION INTRAMUSCULAR; INTRAVENOUS; SUBCUTANEOUS EVERY 30 MIN PRN
Status: CANCELLED | OUTPATIENT
Start: 2022-05-29

## 2022-05-27 RX ORDER — NALOXONE HYDROCHLORIDE 0.4 MG/ML
0.2 INJECTION, SOLUTION INTRAMUSCULAR; INTRAVENOUS; SUBCUTANEOUS
Status: CANCELLED | OUTPATIENT
Start: 2022-05-29

## 2022-05-27 RX ORDER — DIPHENHYDRAMINE HYDROCHLORIDE 50 MG/ML
50 INJECTION INTRAMUSCULAR; INTRAVENOUS
Status: CANCELLED
Start: 2022-05-29

## 2022-05-27 RX ADMIN — IRON SUCROSE 200 MG: 20 INJECTION, SOLUTION INTRAVENOUS at 13:48

## 2022-05-27 RX ADMIN — SODIUM CHLORIDE 250 ML: 9 INJECTION, SOLUTION INTRAVENOUS at 13:27

## 2022-05-27 NOTE — PROGRESS NOTES
Infusion Nursing Note:  Yudelka Ledesma presents today for 1/5 Venofer.    Patient seen by provider today: No   present during visit today: Not Applicable.    Note: Pt states she feels fatigues and needs to nap during the day.      Intravenous Access:  Peripheral IV placed.    Treatment Conditions:  Not Applicable.      Post Infusion Assessment:  Patient tolerated infusion without incident.  Patient observed for 30 minutes post venofer per protocol.  Site patent and intact, free from redness, edema or discomfort.  No evidence of extravasations.  Access discontinued per protocol.       Discharge Plan:   Discharge instructions reviewed with: Patient.  Patient and/or family verbalized understanding of discharge instructions and all questions answered.  Patient discharged in stable condition accompanied by: self.  Departure Mode: Ambulatory.      Eugenie Cisneros RN

## 2022-06-01 ENCOUNTER — PRE VISIT (OUTPATIENT)
Dept: ONCOLOGY | Facility: CLINIC | Age: 82
End: 2022-06-01

## 2022-06-06 ENCOUNTER — INFUSION THERAPY VISIT (OUTPATIENT)
Dept: INFUSION THERAPY | Facility: CLINIC | Age: 82
End: 2022-06-06
Attending: INTERNAL MEDICINE
Payer: COMMERCIAL

## 2022-06-06 VITALS — HEART RATE: 71 BPM | SYSTOLIC BLOOD PRESSURE: 131 MMHG | TEMPERATURE: 98.1 F | DIASTOLIC BLOOD PRESSURE: 73 MMHG

## 2022-06-06 DIAGNOSIS — D50.8 OTHER IRON DEFICIENCY ANEMIA: ICD-10-CM

## 2022-06-06 DIAGNOSIS — K90.89 POOR IRON ABSORPTION: Primary | ICD-10-CM

## 2022-06-06 PROCEDURE — 250N000011 HC RX IP 250 OP 636: Performed by: INTERNAL MEDICINE

## 2022-06-06 PROCEDURE — 258N000003 HC RX IP 258 OP 636: Performed by: INTERNAL MEDICINE

## 2022-06-06 PROCEDURE — 96365 THER/PROPH/DIAG IV INF INIT: CPT

## 2022-06-06 RX ORDER — NALOXONE HYDROCHLORIDE 0.4 MG/ML
0.2 INJECTION, SOLUTION INTRAMUSCULAR; INTRAVENOUS; SUBCUTANEOUS
Status: CANCELLED | OUTPATIENT
Start: 2022-06-08

## 2022-06-06 RX ORDER — MEPERIDINE HYDROCHLORIDE 25 MG/ML
25 INJECTION INTRAMUSCULAR; INTRAVENOUS; SUBCUTANEOUS EVERY 30 MIN PRN
Status: CANCELLED | OUTPATIENT
Start: 2022-06-08

## 2022-06-06 RX ORDER — DIPHENHYDRAMINE HYDROCHLORIDE 50 MG/ML
50 INJECTION INTRAMUSCULAR; INTRAVENOUS
Status: CANCELLED
Start: 2022-06-08

## 2022-06-06 RX ADMIN — IRON SUCROSE 200 MG: 20 INJECTION, SOLUTION INTRAVENOUS at 13:54

## 2022-06-06 RX ADMIN — SODIUM CHLORIDE 250 ML: 9 INJECTION, SOLUTION INTRAVENOUS at 13:39

## 2022-06-06 NOTE — PROGRESS NOTES
Infusion Nursing Note:  Yudelka Ledesma presents today for Venofer.    Patient seen by provider today: No   present during visit today: Not Applicable.    Note: N/A.      Intravenous Access:  Peripheral IV placed.    Treatment Conditions:  Not Applicable.      Post Infusion Assessment:  Patient tolerated infusion without incident.  Patient observed for 30 minutes post Venofer per protocol.  Blood return noted pre and post infusion.  Site patent and intact, free from redness, edema or discomfort.  No evidence of extravasations.  Access discontinued per protocol.       Discharge Plan:   Patient discharged in stable condition accompanied by: self.  Departure Mode: Ambulatory, with wheeled walker.      Trixie Gannon RN

## 2022-06-10 ENCOUNTER — INFUSION THERAPY VISIT (OUTPATIENT)
Dept: INFUSION THERAPY | Facility: CLINIC | Age: 82
End: 2022-06-10
Attending: INTERNAL MEDICINE
Payer: COMMERCIAL

## 2022-06-10 VITALS
RESPIRATION RATE: 18 BRPM | SYSTOLIC BLOOD PRESSURE: 128 MMHG | HEART RATE: 86 BPM | DIASTOLIC BLOOD PRESSURE: 74 MMHG | TEMPERATURE: 97.9 F

## 2022-06-10 DIAGNOSIS — D50.8 OTHER IRON DEFICIENCY ANEMIA: ICD-10-CM

## 2022-06-10 DIAGNOSIS — K90.89 POOR IRON ABSORPTION: Primary | ICD-10-CM

## 2022-06-10 PROCEDURE — 250N000011 HC RX IP 250 OP 636: Performed by: INTERNAL MEDICINE

## 2022-06-10 PROCEDURE — 258N000003 HC RX IP 258 OP 636: Performed by: INTERNAL MEDICINE

## 2022-06-10 PROCEDURE — 96365 THER/PROPH/DIAG IV INF INIT: CPT

## 2022-06-10 RX ORDER — DIPHENHYDRAMINE HYDROCHLORIDE 50 MG/ML
50 INJECTION INTRAMUSCULAR; INTRAVENOUS
Status: DISCONTINUED | OUTPATIENT
Start: 2022-06-10 | End: 2022-06-10 | Stop reason: HOSPADM

## 2022-06-10 RX ORDER — DIPHENHYDRAMINE HYDROCHLORIDE 50 MG/ML
50 INJECTION INTRAMUSCULAR; INTRAVENOUS
Status: CANCELLED
Start: 2022-06-12

## 2022-06-10 RX ORDER — NALOXONE HYDROCHLORIDE 0.4 MG/ML
0.2 INJECTION, SOLUTION INTRAMUSCULAR; INTRAVENOUS; SUBCUTANEOUS
Status: DISCONTINUED | OUTPATIENT
Start: 2022-06-10 | End: 2022-06-10 | Stop reason: HOSPADM

## 2022-06-10 RX ORDER — MEPERIDINE HYDROCHLORIDE 25 MG/ML
25 INJECTION INTRAMUSCULAR; INTRAVENOUS; SUBCUTANEOUS EVERY 30 MIN PRN
Status: DISCONTINUED | OUTPATIENT
Start: 2022-06-10 | End: 2022-06-10 | Stop reason: HOSPADM

## 2022-06-10 RX ORDER — NALOXONE HYDROCHLORIDE 0.4 MG/ML
0.2 INJECTION, SOLUTION INTRAMUSCULAR; INTRAVENOUS; SUBCUTANEOUS
Status: CANCELLED | OUTPATIENT
Start: 2022-06-12

## 2022-06-10 RX ORDER — MEPERIDINE HYDROCHLORIDE 25 MG/ML
25 INJECTION INTRAMUSCULAR; INTRAVENOUS; SUBCUTANEOUS EVERY 30 MIN PRN
Status: CANCELLED | OUTPATIENT
Start: 2022-06-12

## 2022-06-10 RX ADMIN — SODIUM CHLORIDE 250 ML: 9 INJECTION, SOLUTION INTRAVENOUS at 13:49

## 2022-06-10 RX ADMIN — IRON SUCROSE 200 MG: 20 INJECTION, SOLUTION INTRAVENOUS at 13:52

## 2022-06-10 NOTE — PROGRESS NOTES
Infusion Nursing Note:  Yudelka Ledesma presents today for Venofer.    Patient seen by provider today: No   present during visit today: Not Applicable.    Note: N/A.    Intravenous Access:  Peripheral IV placed.    Treatment Conditions:  Pt observed for 30 min post for Venofer protocol.    Post Infusion Assessment:  Patient observed for 30 minutes post Venofer per protocol.   Blood return noted pre and post infusion,  Site patient and intact, free from redness, edema or discomfort. No evidence of extravasations. Access discontinued per protocol.    Discharge Plan:   Discharge instructions reviewed with: Patient.  Patient discharged in stable condition accompanied by: self.  Departure Mode: Ambulatory.      Ana Alba RN

## 2022-06-15 ENCOUNTER — INFUSION THERAPY VISIT (OUTPATIENT)
Dept: INFUSION THERAPY | Facility: CLINIC | Age: 82
End: 2022-06-15
Attending: INTERNAL MEDICINE
Payer: COMMERCIAL

## 2022-06-15 VITALS — SYSTOLIC BLOOD PRESSURE: 113 MMHG | DIASTOLIC BLOOD PRESSURE: 61 MMHG | HEART RATE: 75 BPM | RESPIRATION RATE: 16 BRPM

## 2022-06-15 DIAGNOSIS — K90.89 POOR IRON ABSORPTION: Primary | ICD-10-CM

## 2022-06-15 DIAGNOSIS — D50.8 OTHER IRON DEFICIENCY ANEMIA: ICD-10-CM

## 2022-06-15 PROCEDURE — 250N000011 HC RX IP 250 OP 636: Performed by: INTERNAL MEDICINE

## 2022-06-15 PROCEDURE — 96365 THER/PROPH/DIAG IV INF INIT: CPT

## 2022-06-15 PROCEDURE — 258N000003 HC RX IP 258 OP 636: Performed by: INTERNAL MEDICINE

## 2022-06-15 RX ORDER — NALOXONE HYDROCHLORIDE 0.4 MG/ML
0.2 INJECTION, SOLUTION INTRAMUSCULAR; INTRAVENOUS; SUBCUTANEOUS
Status: CANCELLED | OUTPATIENT
Start: 2022-06-16

## 2022-06-15 RX ORDER — MEPERIDINE HYDROCHLORIDE 25 MG/ML
25 INJECTION INTRAMUSCULAR; INTRAVENOUS; SUBCUTANEOUS EVERY 30 MIN PRN
Status: CANCELLED | OUTPATIENT
Start: 2022-06-16

## 2022-06-15 RX ORDER — DIPHENHYDRAMINE HYDROCHLORIDE 50 MG/ML
50 INJECTION INTRAMUSCULAR; INTRAVENOUS
Status: CANCELLED
Start: 2022-06-16

## 2022-06-15 RX ADMIN — IRON SUCROSE 200 MG: 20 INJECTION, SOLUTION INTRAVENOUS at 13:27

## 2022-06-15 RX ADMIN — SODIUM CHLORIDE 250 ML: 9 INJECTION, SOLUTION INTRAVENOUS at 13:07

## 2022-06-15 NOTE — PROGRESS NOTES
Infusion Nursing Note:  Yudelka HANNAH SuGarlandchantelle Jerezling presents today for 4/5 Venofer.    Patient seen by provider today: No   present during visit today: Not Applicable.    Note: N/A.    Intravenous Access:  Peripheral IV placed.    Treatment Conditions:  Not Applicable.    Post Infusion Assessment:  Patient tolerated infusion without incident.  Patient observed for 30 minutes post venofer per protocol.  Blood return noted pre and post infusion.  Site patent and intact, free from redness, edema or discomfort.  No evidence of extravasations.  Access discontinued per protocol.     Discharge Plan:   Discharge instructions reviewed with: Patient.  Patient and/or family verbalized understanding of discharge instructions and all questions answered.  Patient discharged in stable condition accompanied by: self.  Departure Mode: Ambulatory.      Eugenie Cisneros RN

## 2022-06-17 ENCOUNTER — INFUSION THERAPY VISIT (OUTPATIENT)
Dept: INFUSION THERAPY | Facility: CLINIC | Age: 82
End: 2022-06-17
Attending: INTERNAL MEDICINE
Payer: COMMERCIAL

## 2022-06-17 VITALS — HEART RATE: 70 BPM | SYSTOLIC BLOOD PRESSURE: 141 MMHG | DIASTOLIC BLOOD PRESSURE: 58 MMHG | RESPIRATION RATE: 16 BRPM

## 2022-06-17 DIAGNOSIS — D50.8 OTHER IRON DEFICIENCY ANEMIA: ICD-10-CM

## 2022-06-17 DIAGNOSIS — K90.89 POOR IRON ABSORPTION: Primary | ICD-10-CM

## 2022-06-17 PROCEDURE — 258N000003 HC RX IP 258 OP 636: Performed by: INTERNAL MEDICINE

## 2022-06-17 PROCEDURE — 250N000011 HC RX IP 250 OP 636: Performed by: INTERNAL MEDICINE

## 2022-06-17 PROCEDURE — 96365 THER/PROPH/DIAG IV INF INIT: CPT

## 2022-06-17 RX ORDER — MEPERIDINE HYDROCHLORIDE 25 MG/ML
25 INJECTION INTRAMUSCULAR; INTRAVENOUS; SUBCUTANEOUS EVERY 30 MIN PRN
Status: CANCELLED | OUTPATIENT
Start: 2022-06-17

## 2022-06-17 RX ORDER — DIPHENHYDRAMINE HYDROCHLORIDE 50 MG/ML
50 INJECTION INTRAMUSCULAR; INTRAVENOUS
Status: CANCELLED
Start: 2022-06-17

## 2022-06-17 RX ORDER — NALOXONE HYDROCHLORIDE 0.4 MG/ML
0.2 INJECTION, SOLUTION INTRAMUSCULAR; INTRAVENOUS; SUBCUTANEOUS
Status: CANCELLED | OUTPATIENT
Start: 2022-06-17

## 2022-06-17 RX ADMIN — SODIUM CHLORIDE 250 ML: 9 INJECTION, SOLUTION INTRAVENOUS at 13:08

## 2022-06-17 RX ADMIN — IRON SUCROSE 200 MG: 20 INJECTION, SOLUTION INTRAVENOUS at 13:20

## 2022-06-17 NOTE — PROGRESS NOTES
Infusion Nursing Note:  Yudelka Ledesma presents today for 5/5 venofer.    Patient seen by provider today: No   present during visit today: Not Applicable.    Note: Pt denies any new medical concerns at this time.    Intravenous Access:  Peripheral IV placed.    Treatment Conditions:  Not Applicable.    Post Infusion Assessment:  Patient tolerated infusion without incident.  Patient observed for 30 minutes post venofer per protocol.  Blood return noted pre and post infusion.  Site patent and intact, free from redness, edema or discomfort.  No evidence of extravasations.  Access discontinued per protocol.     Discharge Plan:   Discharge instructions reviewed with: Patient.  Patient and/or family verbalized understanding of discharge instructions and all questions answered.  Patient discharged in stable condition accompanied by: self.  Departure Mode: Ambulatory.      Eugenie Cisneros RN

## 2022-06-30 ENCOUNTER — ANCILLARY PROCEDURE (OUTPATIENT)
Dept: GENERAL RADIOLOGY | Facility: CLINIC | Age: 82
End: 2022-06-30
Attending: FAMILY MEDICINE
Payer: COMMERCIAL

## 2022-06-30 ENCOUNTER — OFFICE VISIT (OUTPATIENT)
Dept: FAMILY MEDICINE | Facility: CLINIC | Age: 82
End: 2022-06-30

## 2022-06-30 VITALS
SYSTOLIC BLOOD PRESSURE: 120 MMHG | HEART RATE: 82 BPM | RESPIRATION RATE: 20 BRPM | DIASTOLIC BLOOD PRESSURE: 80 MMHG | BODY MASS INDEX: 26.22 KG/M2 | TEMPERATURE: 98.2 F | OXYGEN SATURATION: 97 % | WEIGHT: 148 LBS

## 2022-06-30 DIAGNOSIS — M25.561 CHRONIC PAIN OF RIGHT KNEE: ICD-10-CM

## 2022-06-30 DIAGNOSIS — Z79.899 ENCOUNTER FOR LONG-TERM (CURRENT) USE OF MEDICATIONS: ICD-10-CM

## 2022-06-30 DIAGNOSIS — M17.11 PRIMARY OSTEOARTHRITIS OF RIGHT KNEE: Primary | ICD-10-CM

## 2022-06-30 DIAGNOSIS — D64.9 NORMOCYTIC ANEMIA: ICD-10-CM

## 2022-06-30 DIAGNOSIS — R73.03 PREDIABETES: ICD-10-CM

## 2022-06-30 DIAGNOSIS — G56.01 CARPAL TUNNEL SYNDROME OF RIGHT WRIST: ICD-10-CM

## 2022-06-30 DIAGNOSIS — R63.4 WEIGHT LOSS: ICD-10-CM

## 2022-06-30 DIAGNOSIS — G89.29 CHRONIC PAIN OF RIGHT KNEE: ICD-10-CM

## 2022-06-30 DIAGNOSIS — E03.8 OTHER SPECIFIED HYPOTHYROIDISM: ICD-10-CM

## 2022-06-30 DIAGNOSIS — I10 ESSENTIAL HYPERTENSION WITH GOAL BLOOD PRESSURE LESS THAN 140/90: ICD-10-CM

## 2022-06-30 LAB — CREAT UR-MCNC: 229 MG/DL

## 2022-06-30 PROCEDURE — 71046 X-RAY EXAM CHEST 2 VIEWS: CPT | Mod: TC | Performed by: RADIOLOGY

## 2022-06-30 PROCEDURE — 80307 DRUG TEST PRSMV CHEM ANLYZR: CPT | Performed by: FAMILY MEDICINE

## 2022-06-30 PROCEDURE — 99215 OFFICE O/P EST HI 40 MIN: CPT | Performed by: FAMILY MEDICINE

## 2022-06-30 ASSESSMENT — ASTHMA QUESTIONNAIRES
ACT_TOTALSCORE: 25
QUESTION_4 LAST FOUR WEEKS HOW OFTEN HAVE YOU USED YOUR RESCUE INHALER OR NEBULIZER MEDICATION (SUCH AS ALBUTEROL): NOT AT ALL
QUESTION_3 LAST FOUR WEEKS HOW OFTEN DID YOUR ASTHMA SYMPTOMS (WHEEZING, COUGHING, SHORTNESS OF BREATH, CHEST TIGHTNESS OR PAIN) WAKE YOU UP AT NIGHT OR EARLIER THAN USUAL IN THE MORNING: NOT AT ALL
QUESTION_5 LAST FOUR WEEKS HOW WOULD YOU RATE YOUR ASTHMA CONTROL: COMPLETELY CONTROLLED
QUESTION_1 LAST FOUR WEEKS HOW MUCH OF THE TIME DID YOUR ASTHMA KEEP YOU FROM GETTING AS MUCH DONE AT WORK, SCHOOL OR AT HOME: NONE OF THE TIME
QUESTION_2 LAST FOUR WEEKS HOW OFTEN HAVE YOU HAD SHORTNESS OF BREATH: NOT AT ALL
ACT_TOTALSCORE: 25

## 2022-06-30 ASSESSMENT — ANXIETY QUESTIONNAIRES
2. NOT BEING ABLE TO STOP OR CONTROL WORRYING: SEVERAL DAYS
GAD7 TOTAL SCORE: 3
5. BEING SO RESTLESS THAT IT IS HARD TO SIT STILL: NOT AT ALL
7. FEELING AFRAID AS IF SOMETHING AWFUL MIGHT HAPPEN: SEVERAL DAYS
6. BECOMING EASILY ANNOYED OR IRRITABLE: NOT AT ALL
IF YOU CHECKED OFF ANY PROBLEMS ON THIS QUESTIONNAIRE, HOW DIFFICULT HAVE THESE PROBLEMS MADE IT FOR YOU TO DO YOUR WORK, TAKE CARE OF THINGS AT HOME, OR GET ALONG WITH OTHER PEOPLE: SOMEWHAT DIFFICULT
3. WORRYING TOO MUCH ABOUT DIFFERENT THINGS: SEVERAL DAYS
GAD7 TOTAL SCORE: 3
1. FEELING NERVOUS, ANXIOUS, OR ON EDGE: NOT AT ALL

## 2022-06-30 ASSESSMENT — PAIN SCALES - GENERAL: PAINLEVEL: MODERATE PAIN (4)

## 2022-06-30 ASSESSMENT — PATIENT HEALTH QUESTIONNAIRE - PHQ9
5. POOR APPETITE OR OVEREATING: NOT AT ALL
SUM OF ALL RESPONSES TO PHQ QUESTIONS 1-9: 0

## 2022-06-30 NOTE — LETTER
Opioid / Opioid Plus Controlled Substance Agreement    This is an agreement between you and your provider about the safe and appropriate use of controlled substance/opioids prescribed by your care team. Controlled substances are medicines that can cause physical and mental dependence (abuse).    There are strict laws about having and using these medicines. We here at [facility] are committing to working with you in your efforts to get better. To support you in this work, we ll help you schedule regular office appointments for medicine refills. If we must cancel or change your appointment for any reason, we ll make sure you have enough medicine to last until your next appointment.     As a Provider, I will:    Listen carefully to your concerns and treat you with respect.     Recommend a treatment plan that I believe is in your best interest. This plan may involve therapies other than opioid pain medication.     Talk with you often about the possible benefits, and the risk of harm of any medicine that we prescribe for you.     Provide a plan on how to taper (discontinue or go off) using this medicine if the decision is made to stop its use.    As a Patient, I understand that opioid(s):     Are a controlled substance prescribed by my care team to help me function or work and manage my condition(s).     Are strong medicines and can cause serious side effects such as:    Drowsiness, which can seriously affect my driving ability    A lower breathing rate, enough to cause death    Harm to my thinking ability     Depression     Abuse of and addiction to this medicine    Need to be taken exactly as prescribed. Combining opioids with certain medicines or chemicals (such as illegal drugs, sedatives, sleeping pills, and benzodiazepines) can be dangerous or even fatal. If I stop opioids suddenly, I may have severe withdrawal symptoms.    Do not work for all types of pain nor for all patients. If they re not helpful, I may  be asked to stop them.        The risks, benefits and side effects of these medicine(s) were explained to me. I agree that:  1. I will take part in other treatments as advised by my care team. This may be psychiatry or counseling, physical therapy, behavioral therapy, group treatment or a referral to a specialist.     2. I will keep all my appointments. I understand that this is part of the monitoring of opioids. My care team may require an office visit for EVERY opioid/controlled substance refill. If I miss appointments or don t follow instructions, my care team may stop my medicine.    3. I will take my medicines as prescribed. I will not change the dose or schedule unless my care team tells me to. There will be no refills if I run out early.     4. I may be asked to come to the clinic and complete a urine drug test or complete a pill count at any time. If I don t give a urine sample or participate in a pill count, the care team may stop my medicine.    5. I will only receive prescriptions from this clinic for chronic pain. If I am treated by another provider for acute pain issues, I will tell them that I am taking opioid pain medication for chronic pain and that I have a treatment agreement with this provider. I will inform my [facility] care team within one business day if I am given a prescription for any pain medication by another healthcare provider. My [facility] care team can contact other providers and pharmacists about my use of any medicines.    6. It is up to me to make sure that I don t run out of my medicines on weekends or holidays. If my care team is willing to refill my opioid prescription without a visit, I must request refills only during office hours. Refills may take up to 3 business days to process. I will use one pharmacy to fill all my opioid and other controlled substance prescriptions. I will notify the clinic about any changes to my insurance or medication  availability.    7. I am responsible for my prescriptions. If the medicine/prescription is lost, stolen or destroyed, it will not be replaced. I also agree not to share controlled substance medicines with anyone.    8. I am aware I should not use any illegal or recreational drugs. I agree not to drink alcohol unless my care team says I can.       9. If I enroll in the [place] [facility] Cannabis program, I will tell my care team prior to my next refill.     10. I will tell my care team right away if I become pregnant, have a new medical problem treated outside of my regular clinic, or have a change in my medications.    11. I understand that this medicine can affect my thinking, judgment and reaction time. Alcohol and drugs affect the brain and body, which can affect the safety of my driving. Being under the influence of alcohol or drugs can affect my decision-making, behaviors, personal safety, and the safety of others. Driving while impaired (DWI) can occur if a person is driving, operating, or in physical control of a car, motorcycle, boat, snowmobile, ATV, motorbike, off-road vehicle, or any other motor vehicle ([place] Statute 169A.20). I understand the risk if I choose to drive or operate any vehicle or machinery.    I understand that if I do not follow any of the conditions above, my prescriptions or treatment may be stopped or changed.          Opioids  What You Need to Know    What are opioids?   Opioids are pain medicines that must be prescribed by a doctor. They are also known as narcotics.     Examples are:   1. morphine (MS Contin, Merna)  2. oxycodone (Oxycontin)  3. oxycodone and acetaminophen (Percocet)  4. hydrocodone and acetaminophen (Vicodin, Norco)   5. fentanyl patch (Duragesic)   6. hydromorphone (Dilaudid)   7. methadone  8. codeine (Tylenol #3)     What do opioids do well?   Opioids are best for severe short-term pain such as after a surgery or injury. They may work well for cancer pain. They may  help some people with long-lasting (chronic) pain.     What do opioids NOT do well?   Opioids never get rid of pain entirely, and they don t work well for most patients with chronic pain. Opioids don t reduce swelling, one of the causes of pain.                                    Other ways to manage chronic pain and improve function include:       Treat the health problem that may be causing pain    Anti-inflammation medicines, which reduce swelling and tenderness, such as ibuprofen (Advil, Motrin) or naproxen (Aleve)    Acetaminophen (Tylenol)    Antidepressants and anti-seizure medicines, especially for nerve pain    Topical treatments such as patches or creams    Injections or nerve blocks    Chiropractic or osteopathic treatment    Acupuncture, massage, deep breathing, meditation, visual imagery, aromatherapy    Use heat or ice at the pain site    Physical therapy     Exercise    Stop smoking    Take part in therapy       Risks and side effects     Talk to your doctor before you start or decide to keep taking opioids. Possible side effects include:      Lowering your breathing rate enough to cause death    Overdose, including death, especially if taking higher than prescribed doses    Worse depression symptoms; less pleasure in things you usually enjoy    Feeling tired or sluggish    Slower thoughts or cloudy thinking    Being more sensitive to pain over time; pain is harder to control    Trouble sleeping or restless sleep    Changes in hormone levels (for example, less testosterone)    Changes in sex drive or ability to have sex    Constipation    Unsafe driving    Itching and sweating    Dizziness    Nausea, throwing up and dry mouth    What else should I know about opioids?    Opioids may lead to dependence, tolerance, or addiction.      Dependence means that if you stop or reduce the medicine too quickly, you will have withdrawal symptoms. These include loose poop (diarrhea), jitters, flu-like symptoms,  nervousness and tremors. Dependence is not the same as addiction.                       Tolerance means needing higher doses over time to get the same effect. This may increase the chance of serious side effects.      Addiction is when people improperly use a substance that harms their body, their mind or their relations with others. Use of opiates can cause a relapse of addiction if you have a history of drug or alcohol abuse.      People who have used opioids for a long time may have a lower quality of life, worse depression, higher levels of pain and more visits to doctors.    You can overdose on opioids. Take these steps to lower your risk of overdose:    1. Recognize the signs:  Signs of overdose include decrease or loss of consciousness (blackout), slowed breathing, trouble waking up and blue lips. If someone is worried about overdose, they should call 911.    2. Talk to your doctor about Narcan (naloxone).   If you are at risk for overdose, you may be given a prescription for Narcan. This medicine very quickly reverses the effects of opioids.   If you overdose, a friend or family member can give you Narcan while waiting for the ambulance. They need to know the signs of overdose and how to give Narcan.     3. Don't use alcohol or street drugs.   Taking them with opioids can cause death.    4. Do not take any of these medicines unless your doctor says it s OK. Taking these with opioids can cause death:    Benzodiazepines, such as lorazepam (Ativan), alprazolam (Xanax) or diazepam (Valium)    Muscle relaxers, such as cyclobenzaprine (Flexeril)    Sleeping pills like zolpidem (Ambien)     Other opioids      How to keep you and other people safe while taking opioids:    1. Never share your opioids with others.  Opioid medicines are regulated by the Drug Enforcement Agency ([place]). Selling or sharing medications is a criminal act.    2. Be sure to store opioids in a secure place, locked up if possible. Young children  can easily swallow them and overdose.    3. When you are traveling with your medicines, keep them in the original bottles. If you use a pill box, be sure you also carry a copy of your medicine list from your clinic or pharmacy.    4. Safe disposal of opioids    Most pharmacies have places to get rid of medicine, called disposal kiosks. Medicine disposal options are also available in every [facility]. Search your county and  medication disposal  to find more options. You can find more details at:  [email or web address]     I agree that my provider, clinic care team, and pharmacy may work with any city, state or federal law enforcement agency that investigates the misuse, sale, or other diversion of my controlled medicine. I will allow my provider to discuss my care with, or share a copy of, this agreement with any other treating provider, pharmacy or emergency room where I receive care.    I have read this agreement and have asked questions about anything I did not understand.    _______________________________________________________  Patient Signature - Yudelka Ledesma _____________________                   Date     _______________________________________________________  Provider Signature - Azalea Meehan MD   _____________________                   Date     _______________________________________________________  Witness Signature (required if provider not present while patient signing)   _____________________                   Date

## 2022-06-30 NOTE — PROGRESS NOTES
"  Assessment & Plan     Primary osteoarthritis of right knee  Chronic pain of right knee  Hopefully after iron infusions, her hemoglobin will be sufficient for her TKA    Essential hypertension with goal blood pressure less than 140/90  Well controlled  Continue diltiazem and lisinopril    Encounter for long-term (current) use of medications  Urine updated  - [id number] - Urine Drug Confirmation Panel (Comprehensive); Future  - [id number] - Urine Drug Confirmation Panel (Comprehensive)    Weight loss  Will add chest x-ray and FIT test. Patient won't get colonoscopy  - Fecal colorectal cancer screen (FIT); Future  - XR Chest 2 Views; Future    Carpal tunnel syndrome of right wrist  See ortho  - Orthopedic  Referral; Future    Prediabetes  Recheck A1C next blood draw  Hypothyroidism  On replacement   Next TSH due in Feb    Normocytic anemia  Thought to be iron deficient  Iron infusions   Recheck in a few weeks  Followed by hematology    Patient Instructions   Test for blood in the stool    Chest xray    See ortho for the carpal tunnel           I spent a total of 53 minutes on the day of the visit.   Time spent doing chart review, history and exam, documentation and further activities per the note       BMI:   Estimated body mass index is 26.22 kg/m  as calculated from the following:    Height as of 5/19/22: 1.6 m (5' 3\").    Weight as of this encounter: 67.1 kg (148 lb).           Return in about 2 months (around 8/30/2022).    Azalea Meehan MD  [facility]    Bacilio casanova is a 81 year old, presenting for the following health issues:  Pain      HPI     Pain History:  When did you first notice your pain? - Chronic Pain   Have you seen this provider for your pain in the past?   Yes   Where in your body do you have pain? Shoulder, knees and hands  Are you seeing anyone else for your pain? No    PHQ-9 SCORE 8/31/2021 6/30/2022   PHQ-9 Total Score 2 0       NANCY-7 SCORE 8/31/2021 " 6/30/2022   Total Score 0 3           PHQ-9 SCORE 8/31/2021 6/30/2022   PHQ-9 Total Score 2 0     NANCY-7 SCORE 8/31/2021 6/30/2022   Total Score 0 3       Chronic Pain Follow Up:    Location of pain: right knee  Analgesia/pain control:    - Recent changes:  no    - Overall control: Tolerable with discomfort    - Current treatments: oxycodone   Adherence:     - Do you ever take more pain medicine than prescribed? No    - When did you take your last dose of pain medicine?  am   Adverse effects: No   PDMP Review       Value Time User    State PDMP site checked  Yes 6/30/2022  1:47 PM Azalea Ochoa MD        Was unable to mylene her right TKA done due to her anemia.   Is getting by with the pain by taking oxycodone,   Takes 1/2 pill two to three times a day  [place] prescription monitoring system accessed, last filled oxycodone 5 mg #45 6/20/22.     Last CSA Agreement:   CSA -- Patient Level:    Controlled Substance Agreement - Opioid - Scan on 6/30/2022  2:35 PM       Last UDS:       Wt Readings from Last 5 Encounters:   06/30/22 67.1 kg (148 lb)   05/19/22 68.7 kg (151 lb 9 oz)   04/19/22 69.4 kg (153 lb)   02/10/22 72.1 kg (159 lb)   11/30/21 73 kg (161 lb)      Her weights at home are a little more steady.   Is now taking Ensure a week ago, the higher calorie one.   Feels like her appetite is back    BP Readings from Last 6 Encounters:   06/30/22 120/80   06/17/22 (!) 141/58   06/15/22 113/61   06/10/22 128/74   06/06/22 131/73   05/27/22 (!) 141/62        Hand numbness right   EMG in 2011 showed carpal tunnel, Dr Reaves had recommend release, she didn't have it done but now is having more trouble with numbness, dropping things, is more interested.   Is right handed    Left shoulder is still painful, hard to move it, has lost range of motion   Neck cracks and is sore    Anemia  Seeing hematology  Is iron deficiecy  Is getting iron infusions  Will have her labs rechecked 7/12 and see oncology 7/14    Review of Systems    No constipation      Objective    /80 (BP Location: Right arm)   Pulse 82   Temp 98.2  F (36.8  C) (Tympanic)   Resp 20   Wt 67.1 kg (148 lb)   SpO2 97%   BMI 26.22 kg/m    Body mass index is 26.22 kg/m .  Physical Exam   GENERAL: healthy, alert and no distress  NECK: no adenopathy, no asymmetry, masses, or scars and thyroid normal to palpation, fair range of motion   RESP: lungs clear to auscultation - no rales, rhonchi or wheezes  CV: regular rates and rhythm, normal S1 S2, no S3 or S4, no murmur, click or rub, grade 2/6 systolic murmur heard best over the LLSB, peripheral pulses strong   MS: right knee is deformed and not straight, in brace.   Left hand with numbness in right middle three fingers and mild weakness                 .  ..

## 2022-07-01 RX ORDER — OXYCODONE HYDROCHLORIDE 5 MG/1
2.5 TABLET ORAL 3 TIMES DAILY PRN
Qty: 45 TABLET | Refills: 0 | Status: ON HOLD | OUTPATIENT
Start: 2022-07-20 | End: 2022-08-05

## 2022-07-05 LAB
OXYCODONE UR CFM-MCNC: 1980 NG/ML
OXYCODONE/CREAT UR: 865 NG/MG {CREAT}
OXYMORPHONE UR CFM-MCNC: 3880 NG/ML
OXYMORPHONE/CREAT UR: 1694 NG/MG {CREAT}

## 2022-07-12 ENCOUNTER — LAB (OUTPATIENT)
Dept: LAB | Facility: CLINIC | Age: 82
End: 2022-07-12
Payer: COMMERCIAL

## 2022-07-12 DIAGNOSIS — D64.9 NORMOCYTIC ANEMIA: ICD-10-CM

## 2022-07-12 DIAGNOSIS — R73.03 PREDIABETES: ICD-10-CM

## 2022-07-12 LAB
BASOPHILS # BLD AUTO: 0.1 10E3/UL (ref 0–0.2)
BASOPHILS NFR BLD AUTO: 1 %
EOSINOPHIL # BLD AUTO: 0.5 10E3/UL (ref 0–0.7)
EOSINOPHIL NFR BLD AUTO: 7 %
ERYTHROCYTE [DISTWIDTH] IN BLOOD BY AUTOMATED COUNT: 13.4 % (ref 10–15)
FERRITIN SERPL-MCNC: 430 NG/ML (ref 8–252)
HBA1C MFR BLD: 6 % (ref 0–5.6)
HCT VFR BLD AUTO: 35.3 % (ref 35–47)
HGB BLD-MCNC: 11.1 G/DL (ref 11.7–15.7)
IMM GRANULOCYTES # BLD: 0 10E3/UL
IMM GRANULOCYTES NFR BLD: 0 %
IRON SATN MFR SERPL: 29 % (ref 15–46)
IRON SERPL-MCNC: 76 UG/DL (ref 35–180)
LYMPHOCYTES # BLD AUTO: 2.2 10E3/UL (ref 0.8–5.3)
LYMPHOCYTES NFR BLD AUTO: 28 %
MCH RBC QN AUTO: 29 PG (ref 26.5–33)
MCHC RBC AUTO-ENTMCNC: 31.4 G/DL (ref 31.5–36.5)
MCV RBC AUTO: 92 FL (ref 78–100)
MONOCYTES # BLD AUTO: 0.7 10E3/UL (ref 0–1.3)
MONOCYTES NFR BLD AUTO: 9 %
NEUTROPHILS # BLD AUTO: 4.2 10E3/UL (ref 1.6–8.3)
NEUTROPHILS NFR BLD AUTO: 55 %
NRBC # BLD AUTO: 0 10E3/UL
NRBC BLD AUTO-RTO: 0 /100
PLATELET # BLD AUTO: 362 10E3/UL (ref 150–450)
RBC # BLD AUTO: 3.83 10E6/UL (ref 3.8–5.2)
TIBC SERPL-MCNC: 263 UG/DL (ref 240–430)
WBC # BLD AUTO: 7.8 10E3/UL (ref 4–11)

## 2022-07-12 PROCEDURE — 36415 COLL VENOUS BLD VENIPUNCTURE: CPT

## 2022-07-12 PROCEDURE — 85025 COMPLETE CBC W/AUTO DIFF WBC: CPT

## 2022-07-12 PROCEDURE — 82728 ASSAY OF FERRITIN: CPT

## 2022-07-12 PROCEDURE — 83036 HEMOGLOBIN GLYCOSYLATED A1C: CPT

## 2022-07-12 PROCEDURE — 83550 IRON BINDING TEST: CPT

## 2022-07-14 ENCOUNTER — ONCOLOGY VISIT (OUTPATIENT)
Dept: ONCOLOGY | Facility: CLINIC | Age: 82
End: 2022-07-14
Attending: INTERNAL MEDICINE
Payer: COMMERCIAL

## 2022-07-14 VITALS
OXYGEN SATURATION: 97 % | SYSTOLIC BLOOD PRESSURE: 141 MMHG | TEMPERATURE: 98.4 F | HEART RATE: 82 BPM | WEIGHT: 150 LBS | DIASTOLIC BLOOD PRESSURE: 60 MMHG | RESPIRATION RATE: 12 BRPM | BODY MASS INDEX: 26.57 KG/M2

## 2022-07-14 DIAGNOSIS — D50.8 OTHER IRON DEFICIENCY ANEMIA: Primary | ICD-10-CM

## 2022-07-14 PROCEDURE — G0463 HOSPITAL OUTPT CLINIC VISIT: HCPCS

## 2022-07-14 PROCEDURE — 99214 OFFICE O/P EST MOD 30 MIN: CPT | Performed by: INTERNAL MEDICINE

## 2022-07-14 ASSESSMENT — PAIN SCALES - GENERAL: PAINLEVEL: MILD PAIN (3)

## 2022-07-14 NOTE — PATIENT INSTRUCTIONS
It was a pleasure to evaluate you for the following issues today:    1. Other iron deficiency anemia      Your blood work from 2 days ago was reviewed by me today.    You remained iron deficient despite oral iron supplementation for 1 month so we proceeded with intravenous iron replacement with Venofer 200 mg x 5 doses.    Your hemoglobin has improved from 10.5 g/DL to 11.1 g/DL.      Your ferritin level has increased from 43 to 430 over the course of the past month and your iron saturation has normalized as measured by the iron saturation index. Your iron saturation index was 15% as result of iron deficiency 1 month ago and has improved now to 29% which is normal.    Given that your iron deficiency has been corrected and you only have mild anemia and your anemia would be expected to continue to improve in the next few months with adequate red blood cell production after correction of iron deficiency I do not see anemia as being an issue that should prevent you from having knee surgery at this time.  My clinic nurse will contact the RN in the orthopedic clinic to let them know of my assessment. They may request that you follow-up with your primary care provider again before proceeding with knee surgery.    I recommend that you proceed to thorough work-up for iron deficiency anemia through your primary care provider. This may include stool evaluation with Cologuard which looks for cancer DNA in the stool or a fecal immunochemical test also known as FIT test. The fecal immunochemical tests examine stool samples for the presence of microscopic amounts of blood that cannot be seen with the naked eye. FIT is considered a newer type of fecal occult blood test and is sometimes referred to as iFOBT or immunochemical fecal occult blood test. Evaluation with an upper endoscopy also known as EGD may be necessary as well.    My clinic staff will schedule you for a follow-up office visit with me in 4 months.  We will have you  stop by to get some blood drawn 1 day before your next office visit with me.

## 2022-07-14 NOTE — PROGRESS NOTES
[facility]ematology and Oncology Outpatient Clinic Visit Note    Patient: Yudelka Ledesma  MRN: [id number]        Reason for Visit    anemia.    Assessment/Plan    It was a pleasure to evaluate you for the following issues today:    1. Other iron deficiency anemia        Your blood work from 2 days ago was reviewed by me today.    You remained iron deficient despite oral iron supplementation for 1 month so we proceeded with intravenous iron replacement with Venofer 200 mg x 5 doses.    Your hemoglobin has improved from 10.5 g/DL to 11.1 g/DL.      Your ferritin level has increased from 43 to 430 over the course of the past month and your iron saturation has normalized as measured by the iron saturation index. Your iron saturation index was 15% as result of iron deficiency 1 month ago and has improved now to 29% which is normal.    Given that your iron deficiency has been corrected and you only have mild anemia and your anemia would be expected to continue to improve in the next few months with adequate red blood cell production after correction of iron deficiency I do not see anemia as being an issue that should prevent you from having knee surgery at this time.  My clinic nurse will contact the RN in the orthopedic clinic to let them know of my assessment. They may request that you follow-up with your primary care provider again before proceeding with knee surgery.    I recommend that you proceed to thorough work-up for iron deficiency anemia through your primary care provider. This may include stool evaluation with Cologuard which looks for cancer DNA in the stool or a fecal immunochemical test also known as FIT test. The fecal immunochemical tests examine stool samples for the presence of microscopic amounts of blood that cannot be seen with the naked eye. FIT is considered a newer type of fecal occult blood test and is sometimes referred to as iFOBT or immunochemical fecal occult blood test.  Evaluation with an upper endoscopy also known as EGD may be necessary as well.    My clinic staff will schedule you for a follow-up office visit with me in 4 months.  We will have you stop by to get some blood drawn 1 day before your next office visit with me.    I spent a total of 30 minutes on the care of this patient on the day of service including face to face time and the remainder in chart review, care coordination, and documentation on the day of service.    History:    Ms. Yudelka Ledesma is a 81 year old with mild anemia in the setting of iron deficiency which had persisted for 1 year with an iron saturation index between 15 to 19%.  She was started on oral iron supplementation about 2 months ago but as of 1 month ago remained iron deficient as evidenced by an iron saturation index 19%.      Therefore, I recommended intravenous iron replacement and she has completed 5 doses of Venofer 200 mg without incident.      She has not had any gastrointestinal bleeding or tarry colored stools.      She cannot have colonoscopies as she had a very difficult colonoscopy years ago and they had to abort the procedure.    She has had some weight loss.        Wt Readings from Last 4 Encounters:   07/14/22 68 kg (150 lb)   06/30/22 67.1 kg (148 lb)   05/19/22 68.7 kg (151 lb 9 oz)   04/19/22 69.4 kg (153 lb)       Patient Active Problem List   Diagnosis     Essential hypertension with goal blood pressure less than 140/90     Esophageal reflux     Hypothyroidism     Mild intermittent asthma, uncomplicated     Abnormal glucose     Nephrolithiasis     Elevated serum alkaline phosphatase level     S/P hysterectomy     HYPERLIPIDEMIA LDL GOAL <130     Colon stricture (H)     Carpal tunnel syndrome     Cervical radiculopathy     Advanced directives, counseling/discussion     Scoliosis     Undiagnosed cardiac murmurs     Varicose veins of both lower extremities     Other idiopathic scoliosis     Essential tremor      Elevated blood sugar     Chronic pain of right knee     Primary osteoarthritis of right knee     Other iron deficiency anemia     Poor iron absorption       Current Outpatient Medications   Medication     albuterol (PROAIR HFA/PROVENTIL HFA/VENTOLIN HFA) 108 (90 Base) MCG/ACT inhaler     atorvastatin (LIPITOR) 40 MG tablet     chlorhexidine (PERIDEX) 0.12 % solution     diltiazem ER (DILT-XR) 240 MG 24 hr ER beaded capsule     esomeprazole (NEXIUM) 20 MG DR capsule     IBUPROFEN     ipratropium (ATROVENT HFA) 17 MCG/ACT inhaler     levothyroxine (SYNTHROID/LEVOTHROID) 88 MCG tablet     lisinopril (ZESTRIL) 20 MG tablet     [START ON 2022] oxyCODONE (ROXICODONE) 5 MG tablet     No current facility-administered medications for this visit.       Past History  Past Medical History:   Diagnosis Date     Basal cell carcinoma      Esophageal reflux      Other and unspecified hyperlipidemia      Unspecified essential hypertension      Unspecified hypothyroidism      Past Surgical History:   Procedure Laterality Date     COLONOSCOPY  2011    COLONOSCOPY performed by BHARTI DUNHAM at [facility]     Family History   Problem Relation Age of Onset     Cerebrovascular Disease Mother      Heart Disease Father      Cancer Maternal Grandmother      Diabetes Paternal Grandmother      Social History     Socioeconomic History     Marital status:      Spouse name: None     Number of children: None     Years of education: None     Highest education level: None   Tobacco Use     Smoking status: Former Smoker     Years: 15.00     Types: Cigarettes     Quit date: 1985     Years since quittin.4     Smokeless tobacco: Never Used   Vaping Use     Vaping Use: Never used   Substance and Sexual Activity     Alcohol use: No     Drug use: No     Sexual activity: Not Currently   Other Topics Concern      Service No     Blood Transfusions No     Caffeine Concern No     Occupational Exposure No     Hobby Hazards No  "    Sleep Concern No     Stress Concern Yes     Comment: just todays visit     Weight Concern No     Special Diet No     Back Care No     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 No       Allergies    Allergies   Allergen Reactions     Epinephrine Palpitations     Gets \"cold, jittery, feels weird\"     Ancef [Cefazolin Sodium]      Augmentin [Aspartame]      Doxycycline      Gabapentin      Gave nightmares     Levaquin [Levofloxacin Hemihydrate]      Prednisolone      Seasonal Allergies      Sulfa Drugs        Current Outpatient Medications   Medication     albuterol (PROAIR HFA/PROVENTIL HFA/VENTOLIN HFA) 108 (90 Base) MCG/ACT inhaler     atorvastatin (LIPITOR) 40 MG tablet     chlorhexidine (PERIDEX) 0.12 % solution     diltiazem ER (DILT-XR) 240 MG 24 hr ER beaded capsule     esomeprazole (NEXIUM) 20 MG DR capsule     IBUPROFEN     ipratropium (ATROVENT HFA) 17 MCG/ACT inhaler     levothyroxine (SYNTHROID/LEVOTHROID) 88 MCG tablet     lisinopril (ZESTRIL) 20 MG tablet     [START ON 7/20/2022] oxyCODONE (ROXICODONE) 5 MG tablet     No current facility-administered medications for this visit.       Physical Exam  BP (!) 141/60 (BP Location: Right arm, Patient Position: Sitting, Cuff Size: Adult Regular)   Pulse 82   Temp 98.4  F (36.9  C) (Tympanic)   Resp 12   Wt 68 kg (150 lb)   SpO2 97%   BMI 26.57 kg/m    ECOG Performance Status: 2.  General: Todays' vital signs reviewed in the EMR.    HEENT: No scleral icterus  Cardiovascular: Cor RRR  Respiratory: Lungs clear to auscultation bilaterally      Signed by: Enio Carmona MD    "

## 2022-07-14 NOTE — PROGRESS NOTES
"Oncology Rooming Note    July 14, 2022 2:38 PM   Yudelka Ledesma is a 81 year old female who presents for:    Chief Complaint   Patient presents with     Hematology     Normocytic anemia- Provider visit only     Initial Vitals: Wt 68 kg (150 lb)   BMI 26.57 kg/m   Estimated body mass index is 26.57 kg/m  as calculated from the following:    Height as of 5/19/22: 1.6 m (5' 3\").    Weight as of this encounter: 68 kg (150 lb). Body surface area is 1.74 meters squared.  Data Unavailable Comment: Data Unavailable   No LMP recorded. Patient has had a hysterectomy.  Allergies reviewed: Yes  Medications reviewed: Yes    Medications: Medication refills not needed today.  Pharmacy name entered into [employer]:    [facility] [address] [facility] [address] [place] [address]    Clinical concerns:  None  Oncology Rooming Note    July 14, 2022 2:40 PM   Yudelka Ledesma is a 81 year old female who presents for:    Chief Complaint   Patient presents with     Hematology     Normocytic anemia- Provider visit only     Initial Vitals: BP (!) 141/60 (BP Location: Right arm, Patient Position: Sitting, Cuff Size: Adult Regular)   Pulse 82   Temp 98.4  F (36.9  C) (Tympanic)   Resp 12   Wt 68 kg (150 lb)   SpO2 97%   BMI 26.57 kg/m   Estimated body mass index is 26.57 kg/m  as calculated from the following:    Height as of 5/19/22: 1.6 m (5' 3\").    Weight as of this encounter: 68 kg (150 lb). Body surface area is 1.74 meters squared.  Mild Pain (3) Comment: Data Unavailable   No LMP recorded. Patient has had a hysterectomy.  Allergies reviewed: Yes  Medications reviewed: Yes    Medications: Medication refills not needed today.  Pharmacy name entered into [employer]:    [facility] [address] [facility] [address] [place] [address]    Clinical concerns:  None      CAROL ANN Pearson" Rosalind, [facility]

## 2022-07-14 NOTE — LETTER
"    7/14/2022         RE: Yudelka Ledesma  [address] [facility] [id number]        Dear Colleague,    Thank you for referring your patient, Yudelka Ledesma, to the [facility] [place]. Please see a copy of my visit note below.    Oncology Rooming Note    July 14, 2022 2:38 PM   Yudelka Ledesma is a 81 year old female who presents for:    Chief Complaint   Patient presents with     Hematology     Normocytic anemia- Provider visit only     Initial Vitals: Wt 68 kg (150 lb)   BMI 26.57 kg/m   Estimated body mass index is 26.57 kg/m  as calculated from the following:    Height as of 5/19/22: 1.6 m (5' 3\").    Weight as of this encounter: 68 kg (150 lb). Body surface area is 1.74 meters squared.  Data Unavailable Comment: Data Unavailable   No LMP recorded. Patient has had a hysterectomy.  Allergies reviewed: Yes  Medications reviewed: Yes    Medications: Medication refills not needed today.  Pharmacy name entered into [facility]:    [facility] [place] - [place], [place] - [address] [facility] [address] [place] [address] - [address] [place] [address]    Clinical concerns:  None  Oncology Rooming Note    July 14, 2022 2:40 PM   Yudelka Ledesma is a 81 year old female who presents for:    Chief Complaint   Patient presents with     Hematology     Normocytic anemia- Provider visit only     Initial Vitals: BP (!) 141/60 (BP Location: Right arm, Patient Position: Sitting, Cuff Size: Adult Regular)   Pulse 82   Temp 98.4  F (36.9  C) (Tympanic)   Resp 12   Wt 68 kg (150 lb)   SpO2 97%   BMI 26.57 kg/m   Estimated body mass index is 26.57 kg/m  as calculated from the following:    Height as of 5/19/22: 1.6 m (5' 3\").    Weight as of this encounter: 68 kg (150 lb). Body surface area is 1.74 meters squared.  Mild Pain (3) Comment: Data Unavailable   No LMP recorded. Patient has had a hysterectomy.  Allergies reviewed: Yes  Medications reviewed: Yes    Medications: " Medication refills not needed today.  Pharmacy name entered into [facility]:    [facility] [place] - [address] [facility] - [place], [place] - [address] [place] [address]    Clinical concerns:  None      Elizabeth Boyer, CAROL ANN Boyer, [facility] Hematology and Oncology Outpatient Consult Note    Patient: Yudelka Ledesma  MRN: [id number]        Reason for Visit    Referral from  regarding anemia.    Assessment/Plan    It was a pleasure to evaluate you for the following issues today:    1. Other iron deficiency anemia        Your blood work from 2 days ago was reviewed by me today.    You remained iron deficient despite oral iron supplementation for 1 month so we proceeded with intravenous iron replacement with Venofer 200 mg x 5 doses.    Your hemoglobin has improved from 10.5 g/DL to 11.1 g/DL.      Your ferritin level has increased from 43 to 430 over the course of the past month and your iron saturation has normalized as measured by the iron saturation index. Your iron saturation index was 15% as result of iron deficiency 1 month ago and has improved now to 29% which is normal.    Given that your iron deficiency has been corrected and you only have mild anemia and your anemia would be expected to continue to improve in the next few months with adequate red blood cell production after correction of iron deficiency I do not see anemia as being an issue that should prevent you from having knee surgery at this time.  My clinic nurse will contact the RN in the orthopedic clinic to let them know of my assessment. They may request that you follow-up with your primary care provider again before proceeding with knee surgery.    I recommend that you proceed to thorough work-up for iron deficiency anemia through your primary care provider. This may include stool evaluation with Cologuard which looks for cancer DNA in the stool or a fecal  immunochemical test also known as FIT test. The fecal immunochemical tests examine stool samples for the presence of microscopic amounts of blood that cannot be seen with the naked eye. FIT is considered a newer type of fecal occult blood test and is sometimes referred to as iFOBT or immunochemical fecal occult blood test. Evaluation with an upper endoscopy also known as EGD may be necessary as well.    My clinic staff will schedule you for a follow-up office visit with me in 4 months.  We will have you stop by to get some blood drawn 1 day before your next office visit with me.    I spent a total of 30 minutes on the care of this patient on the day of service including face to face time and the remainder in chart review, care coordination, and documentation on the day of service.    History:    Ms. Yudelka Ledesma is a 81 year old with mild anemia in the setting of iron deficiency which had persisted for 1 year with an iron saturation index between 15 to 19%.  She was started on oral iron supplementation about 2 months ago but as of 1 month ago remained iron deficient as evidenced by an iron saturation index 19%.      Therefore, I recommended intravenous iron replacement and she has completed 5 doses of Venofer 200 mg without incident.      She has not had any gastrointestinal bleeding or tarry colored stools.      She cannot have colonoscopies as she had a very difficult colonoscopy years ago and they had to abort the procedure.    She has had some weight loss.        Wt Readings from Last 4 Encounters:   07/14/22 68 kg (150 lb)   06/30/22 67.1 kg (148 lb)   05/19/22 68.7 kg (151 lb 9 oz)   04/19/22 69.4 kg (153 lb)       Patient Active Problem List   Diagnosis     Essential hypertension with goal blood pressure less than 140/90     Esophageal reflux     Hypothyroidism     Mild intermittent asthma, uncomplicated     Abnormal glucose     Nephrolithiasis     Elevated serum alkaline phosphatase level     S/P  hysterectomy     HYPERLIPIDEMIA LDL GOAL <130     Colon stricture (H)     Carpal tunnel syndrome     Cervical radiculopathy     Advanced directives, counseling/discussion     Scoliosis     Undiagnosed cardiac murmurs     Varicose veins of both lower extremities     Other idiopathic scoliosis     Essential tremor     Elevated blood sugar     Chronic pain of right knee     Primary osteoarthritis of right knee     Other iron deficiency anemia     Poor iron absorption       Current Outpatient Medications   Medication     albuterol (PROAIR HFA/PROVENTIL HFA/VENTOLIN HFA) 108 (90 Base) MCG/ACT inhaler     atorvastatin (LIPITOR) 40 MG tablet     chlorhexidine (PERIDEX) 0.12 % solution     diltiazem ER (DILT-XR) 240 MG 24 hr ER beaded capsule     esomeprazole (NEXIUM) 20 MG DR capsule     IBUPROFEN     ipratropium (ATROVENT HFA) 17 MCG/ACT inhaler     levothyroxine (SYNTHROID/LEVOTHROID) 88 MCG tablet     lisinopril (ZESTRIL) 20 MG tablet     [START ON 2022] oxyCODONE (ROXICODONE) 5 MG tablet     No current facility-administered medications for this visit.       Past History  Past Medical History:   Diagnosis Date     Basal cell carcinoma      Esophageal reflux      Other and unspecified hyperlipidemia      Unspecified essential hypertension      Unspecified hypothyroidism      Past Surgical History:   Procedure Laterality Date     COLONOSCOPY  2011    COLONOSCOPY performed by BHARTI DUNHAM at [facility]     Family History   Problem Relation Age of Onset     Cerebrovascular Disease Mother      Heart Disease Father      Cancer Maternal Grandmother      Diabetes Paternal Grandmother      Social History     Socioeconomic History     Marital status:      Spouse name: None     Number of children: None     Years of education: None     Highest education level: None   Tobacco Use     Smoking status: Former Smoker     Years: 15.00     Types: Cigarettes     Quit date: 1985     Years since quittin.4      "Smokeless tobacco: Never Used   Vaping Use     Vaping Use: Never used   Substance and Sexual Activity     Alcohol use: No     Drug use: No     Sexual activity: Not Currently   Other Topics Concern      Service No     Blood Transfusions No     Caffeine Concern No     Occupational Exposure No     Hobby Hazards No     Sleep Concern No     Stress Concern Yes     Comment: just todays visit     Weight Concern No     Special Diet No     Back Care No     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 No       Allergies    Allergies   Allergen Reactions     Epinephrine Palpitations     Gets \"cold, jittery, feels weird\"     Ancef [Cefazolin Sodium]      Augmentin [Aspartame]      Doxycycline      Gabapentin      Gave nightmares     Levaquin [Levofloxacin Hemihydrate]      Prednisolone      Seasonal Allergies      Sulfa Drugs        Current Outpatient Medications   Medication     albuterol (PROAIR HFA/PROVENTIL HFA/VENTOLIN HFA) 108 (90 Base) MCG/ACT inhaler     atorvastatin (LIPITOR) 40 MG tablet     chlorhexidine (PERIDEX) 0.12 % solution     diltiazem ER (DILT-XR) 240 MG 24 hr ER beaded capsule     esomeprazole (NEXIUM) 20 MG DR capsule     IBUPROFEN     ipratropium (ATROVENT HFA) 17 MCG/ACT inhaler     levothyroxine (SYNTHROID/LEVOTHROID) 88 MCG tablet     lisinopril (ZESTRIL) 20 MG tablet     [START ON 7/20/2022] oxyCODONE (ROXICODONE) 5 MG tablet     No current facility-administered medications for this visit.       Physical Exam  BP (!) 141/60 (BP Location: Right arm, Patient Position: Sitting, Cuff Size: Adult Regular)   Pulse 82   Temp 98.4  F (36.9  C) (Tympanic)   Resp 12   Wt 68 kg (150 lb)   SpO2 97%   BMI 26.57 kg/m    ECOG Performance Status: 2.  General: Todays' vital signs reviewed in the EMR.    HEENT: No scleral icterus  Cardiovascular: Cor " RRR  Respiratory: Lungs clear to auscultation bilaterally      Signed by: Enio Carmona MD        Again, thank you for allowing me to participate in the care of your patient.        Sincerely,        Eino Carmona MD

## 2022-07-22 ENCOUNTER — HOSPITAL ENCOUNTER (OUTPATIENT)
Facility: CLINIC | Age: 82
Discharge: HOME | End: 2022-07-22
Admitting: FAMILY MEDICINE
Payer: COMMERCIAL

## 2022-07-22 ENCOUNTER — LAB (OUTPATIENT)
Dept: LAB | Facility: CLINIC | Age: 82
End: 2022-07-22
Payer: COMMERCIAL

## 2022-07-22 DIAGNOSIS — R63.4 WEIGHT LOSS: ICD-10-CM

## 2022-07-22 PROCEDURE — 82274 ASSAY TEST FOR BLOOD FECAL: CPT

## 2022-07-24 LAB — HEMOCCULT STL QL IA: NEGATIVE

## 2022-07-26 DIAGNOSIS — I10 ESSENTIAL HYPERTENSION WITH GOAL BLOOD PRESSURE LESS THAN 140/90: ICD-10-CM

## 2022-07-28 ENCOUNTER — OFFICE VISIT (OUTPATIENT)
Dept: FAMILY MEDICINE | Facility: CLINIC | Age: 82
End: 2022-07-28
Payer: COMMERCIAL

## 2022-07-28 VITALS
OXYGEN SATURATION: 98 % | WEIGHT: 150.4 LBS | BODY MASS INDEX: 26.65 KG/M2 | HEIGHT: 63 IN | DIASTOLIC BLOOD PRESSURE: 72 MMHG | TEMPERATURE: 97.7 F | SYSTOLIC BLOOD PRESSURE: 142 MMHG | RESPIRATION RATE: 18 BRPM | HEART RATE: 73 BPM

## 2022-07-28 DIAGNOSIS — E78.5 HYPERLIPIDEMIA LDL GOAL <130: ICD-10-CM

## 2022-07-28 DIAGNOSIS — R01.1 UNDIAGNOSED CARDIAC MURMURS: ICD-10-CM

## 2022-07-28 DIAGNOSIS — I10 ESSENTIAL HYPERTENSION WITH GOAL BLOOD PRESSURE LESS THAN 140/90: ICD-10-CM

## 2022-07-28 DIAGNOSIS — M17.11 PRIMARY OSTEOARTHRITIS OF RIGHT KNEE: ICD-10-CM

## 2022-07-28 DIAGNOSIS — Z01.818 PREOP GENERAL PHYSICAL EXAM: Primary | ICD-10-CM

## 2022-07-28 DIAGNOSIS — D50.9 IRON DEFICIENCY ANEMIA, UNSPECIFIED IRON DEFICIENCY ANEMIA TYPE: ICD-10-CM

## 2022-07-28 DIAGNOSIS — E03.9 HYPOTHYROIDISM, UNSPECIFIED TYPE: ICD-10-CM

## 2022-07-28 PROCEDURE — 93000 ELECTROCARDIOGRAM COMPLETE: CPT | Performed by: FAMILY MEDICINE

## 2022-07-28 PROCEDURE — 99214 OFFICE O/P EST MOD 30 MIN: CPT | Performed by: FAMILY MEDICINE

## 2022-07-28 ASSESSMENT — PAIN SCALES - GENERAL: PAINLEVEL: MODERATE PAIN (4)

## 2022-07-28 NOTE — PROGRESS NOTES
[facility]  [address] [facility] [address] [address]  Phone: [phone]  Primary Provider: Azalea Ochoa  Pre-op Performing Provider: ISMA CORRALES      PREOPERATIVE EVALUATION:  Today's date: 7/28/2022    Yudelka Ledesma is a 81 year old female who presents for a preoperative evaluation.    Surgical Information:  Surgery/Procedure: Total knee arthroplasty, right  Surgery Location: [facility]  Surgeon: Dr Peters  Surgery Date: 8/4/22  Time of Surgery: 12:30  Where patient plans to recover: At home with family  Fax number for surgical facility: Note does not need to be faxed, will be available electronically in Epic.    Type of Anesthesia Anticipated: Spinal    Assessment & Plan     The proposed surgical procedure is considered INTERMEDIATE risk.    Preop general physical exam  Compared to previous EKG, the LVH is not evident on today's tracing.  Patient's anemia has been managed byhematology.  - EKG 12-lead complete w/read - Clinics    Primary osteoarthritis of right knee    Iron deficiency anemia, unspecified iron deficiency anemia type  See hematology notes. No additional treatment per hematologist.  Patient's latest hgb 2 weeks ago was >11.  Monitor hgb perioperatively and refer to hospitalist as appropriate.    Essential hypertension with goal blood pressure less than 140/90  slightly elevated only. Monitor BP closely perioperatively and refer to hospitalist as appropriate.  Low salt, low fat diet.   Exercise as tolerated.  Take meds as prescribed; call if with side effects.   - EKG 12-lead complete w/read - Clinics    Undiagnosed cardiac murmurs  Stable. asymptomatic  - EKG 12-lead complete w/read - Clinics    Hyperlipidemia LDL goal <130  Reinforced heart healthy lifestyle.. continue statin.    Hypothyroidism, unspecified type  Stable and asymptomatic.           Risks and Recommendations:  The patient has the following additional risks and  recommendations for perioperative complications:  Anemia/Bleeding/Clotting:    - Anemia and does not require treatment prior to surgery. Monitor hemoglobin postoperatively    Medication Instructions:  Patient is to take all scheduled medications on the day of surgery EXCEPT for modifications listed below:   - ACE/ARB: May be continued on the day of surgery.    - Calcium Channel Blockers: May be continued on the day of surgery.   - Statins: Continue taking on the day of surgery.    - ibuprofen (Advil, Motrin): HOLD 1 day before surgery.     RECOMMENDATION:  APPROVAL GIVEN to proceed with proposed procedure, without further diagnostic evaluation.    Subjective     HPI related to upcoming procedure: Patient has right knee osteoarthritis causing chronic and progressive pain. Hence, planned surgery. Reviewed above with patient.    Preop Questions 7/28/2022   1. Have you ever had a heart attack or stroke? No   2. Have you ever had surgery on your heart or blood vessels, such as a stent placement, a coronary artery bypass, or surgery on an artery in your head, neck, heart, or legs? No   3. Do you have chest pain with activity? No   4. Do you have a history of  heart failure? No   5. Do you currently have a cold, bronchitis or symptoms of other infection? No   6. Do you have a cough, shortness of breath, or wheezing? No   7. Do you or anyone in your family have previous history of blood clots? No   8. Do you or does anyone in your family have a serious bleeding problem such as prolonged bleeding following surgeries or cuts? No   9. Have you ever had problems with anemia or been told to take iron pills? YES - Pt has iron deficiency anemia   10. Have you had any abnormal blood loss such as black, tarry or bloody stools, or abnormal vaginal bleeding? No   11. Have you ever had a blood transfusion? No   12. Are you willing to have a blood transfusion if it is medically needed before, during, or after your surgery? Yes   13.  "Have you or any of your relatives ever had problems with anesthesia? No   14. Do you have sleep apnea, excessive snoring or daytime drowsiness? No   15. Do you have any artifical heart valves or other implanted medical devices like a pacemaker, defibrillator, or continuous glucose monitor? No   16. Do you have artificial joints? No   17. Are you allergic to latex? No       Health Care Directive:  Patient does not have a Health Care Directive or Living Will: Discussed advance care planning with patient; information given to patient to review.    Preoperative Review of :   reviewed - controlled substances reflected in medication list.      Status of Chronic Conditions:  See problem list for active medical problems.  Problems all longstanding and stable, except as noted/documented.  See ROS for pertinent symptoms related to these conditions.    ANEMIA - Patient has a recent history of moderate-severe anemia, which has not been symptomatic. Work up to date has revealed improved hgb but still mildly anemic per hematology. Treatment has been iron infusions. Per last hematology note on 7/14/2022:    \"Given that your iron deficiency has been corrected and you only have mild anemia and your anemia would be expected to continue to improve in the next few months with adequate red blood cell production after correction of iron deficiency I do not see anemia as being an issue that should prevent you from having knee surgery at this time.  My clinic nurse will contact the RN in the orthopedic clinic to let them know of my assessment. They may request that you follow-up with your primary care provider again before proceeding with knee surgery\".     HYPERLIPIDEMIA - Patient has a long history of significant Hyperlipidemia requiring medication for treatment with recent fair control. Patient reports no problems or side effects with the medication.     HYPERTENSION - Patient has longstanding history of HTN , currently denies any " symptoms referable to elevated blood pressure. Specifically denies chest pain, palpitations, dyspnea, orthopnea, PND or peripheral edema. Blood pressure readings have been slighlty elevated Current medication regimen is as listed below. Patient denies any side effects of medication.     HYPOTHYROIDISM - Patient has a longstanding history of chronic Hypothyroidism. Patient has been doing well, noting no tremor, insomnia, hair loss or changes in skin texture. Continues to take medications as directed, without adverse reactions or side effects. Last TSH   Lab Results   Component Value Date    TSH 2.26 02/10/2022   .        Review of Systems  CONSTITUTIONAL: NEGATIVE for fever, chills, change in weight  INTEGUMENTARY/SKIN: NEGATIVE for worrisome rashes, moles or lesions  EYES: NEGATIVE for vision changes or irritation  ENT/MOUTH: NEGATIVE for ear, mouth and throat problems  RESP: NEGATIVE for significant cough or SOB  CV: NEGATIVE for chest pain, palpitations or peripheral edema  GI: NEGATIVE for nausea, abdominal pain, heartburn, or change in bowel habits  : NEGATIVE for frequency, dysuria, or hematuria  MUSCULOSKELETAL:chronic knee pain on right; left shoulder pain chronic; recurrent finger joint pains  NEURO: NEGATIVE for weakness, dizziness or paresthesias  ENDOCRINE: NEGATIVE for temperature intolerance, skin/hair changes  HEME: NEGATIVE for bleeding problems  PSYCHIATRIC: NEGATIVE for changes in mood or affect    Patient Active Problem List    Diagnosis Date Noted     Other iron deficiency anemia 05/19/2022     Priority: Medium     Poor iron absorption 05/19/2022     Priority: Medium     Essential tremor 04/19/2022     Priority: Medium     Elevated blood sugar 04/19/2022     Priority: Medium     Chronic pain of right knee 04/19/2022     Priority: Medium     Primary osteoarthritis of right knee 04/19/2022     Priority: Medium     Other idiopathic scoliosis 08/22/2016     Priority: Medium     Varicose veins of  "both lower extremities 07/07/2016     Priority: Medium     Undiagnosed cardiac murmurs 05/14/2015     Priority: Medium     Advanced directives, counseling/discussion 02/11/2013     Priority: Medium     Discussed advance care planning with patient; however, patient declined at this time. 2/11/2013          Scoliosis 02/11/2013     Priority: Medium     Carpal tunnel syndrome 08/25/2011     Priority: Medium     Positive EMG 10/2011, saw Dr Galindo who recommended surgical release. Patient did not go through with it.        Cervical radiculopathy 08/25/2011     Priority: Medium     Colon stricture (H) 02/15/2011     Priority: Medium     HYPERLIPIDEMIA LDL GOAL <130 10/31/2010     Priority: Medium     S/P hysterectomy 02/10/2010     Priority: Medium     Done for irregular bleeding in the 1970's.  No cancer.  Has had one ovary removed due to tubal pregnancy, she thinks it was the R.  She thinks she still has the L ovary.  \"they left one, I think it was the left.\"       Elevated serum alkaline phosphatase level 08/27/2009     Priority: Medium     Has had work-up with normal RUQ ultrasound and negative ELP, Hep A, Hep C ab, Hep B Ag, normal LFTs- 2004 by Internal medicine       Nephrolithiasis 06/11/2008     Priority: Medium     8/07- ureteroscopy with lasar lithotripsy for large 15 mm stone, stent placed at [facility]. Has 6 mm left lower kidney pole calculus- pt has elected to watch. Sees Dr. Lutz at [facility].        Abnormal glucose 09/03/2007     Priority: Medium     Problem list name updated by automated process. Provider to review       Essential hypertension with goal blood pressure less than 140/90 05/18/2005     Priority: Medium     Problem list name updated by automated process. Provider to review       Esophageal reflux 05/18/2005     Priority: Medium     EGD 11/02 showing small hiatal hernia       Hypothyroidism 05/18/2005     Priority: Medium     Problem list name updated by automated process. Provider to review   " "    Mild intermittent asthma, uncomplicated 05/18/2005     Priority: Medium      Past Medical History:   Diagnosis Date     Basal cell carcinoma      Esophageal reflux      Other and unspecified hyperlipidemia      Unspecified essential hypertension      Unspecified hypothyroidism      Past Surgical History:   Procedure Laterality Date     COLONOSCOPY  2/7/2011    COLONOSCOPY performed by BHARTI DUNHAM at [facility]     Current Outpatient Medications   Medication Sig Dispense Refill     atorvastatin (LIPITOR) 40 MG tablet Take 1 tablet by mouth once daily 180 tablet 3     diltiazem ER (DILT-XR) 240 MG 24 hr ER beaded capsule Take 1 capsule by mouth once daily 90 capsule 3     esomeprazole (NEXIUM) 20 MG DR capsule Take 1 capsule (20 mg) by mouth every morning (before breakfast) One hour before meals fill when needed       IBUPROFEN 3 times daily        levothyroxine (SYNTHROID/LEVOTHROID) 88 MCG tablet Take 1 tablet (88 mcg) by mouth daily 90 tablet 3     lisinopril (ZESTRIL) 20 MG tablet Take 1 tablet by mouth once daily 90 tablet 3     oxyCODONE (ROXICODONE) 5 MG tablet Take 0.5 tablets (2.5 mg) by mouth 3 times daily as needed for pain 45 tablet 0     albuterol (PROAIR HFA/PROVENTIL HFA/VENTOLIN HFA) 108 (90 Base) MCG/ACT inhaler Inhale 1-2 puffs into the lungs every 4 hours as needed for shortness of breath / dyspnea or wheezing (Patient not taking: Reported on 7/28/2022) 9 g 3     chlorhexidine (PERIDEX) 0.12 % solution FILL CAP TO FILL LINE (15 ML). SWISH IN MOUTH UNDILUTED FOR 30 SECONDS AND THEN SPIT OUT, TWO TIMES A DAY       ipratropium (ATROVENT HFA) 17 MCG/ACT inhaler INHALE 2 PUFFS INTO THE LUNGS FOUR TIMES A DAY AS NEEDED FOR WHEEZING (Patient not taking: Reported on 7/28/2022) 12.9 g 3       Allergies   Allergen Reactions     Epinephrine Palpitations     Gets \"cold, jittery, feels weird\"     Ancef [Cefazolin Sodium]      Augmentin [Aspartame]      Doxycycline      Gabapentin      Gave nightmares " "    Levaquin [Levofloxacin Hemihydrate]      Prednisolone      Seasonal Allergies      Sulfa Drugs         Social History     Tobacco Use     Smoking status: Former Smoker     Years: 15.00     Types: Cigarettes     Quit date: 1985     Years since quittin.5     Smokeless tobacco: Never Used   Substance Use Topics     Alcohol use: No     Family History   Problem Relation Age of Onset     Cerebrovascular Disease Mother      Heart Disease Father      Cancer Maternal Grandmother      Diabetes Paternal Grandmother      History   Drug Use No         Objective     BP (!) 158/74   Pulse 73   Temp 97.7  F (36.5  C) (Tympanic)   Resp 18   Ht 1.6 m (5' 3\")   Wt 68.2 kg (150 lb 6.4 oz)   SpO2 98%   BMI 26.64 kg/m      Physical Exam  GENERAL APPEARANCE: overweight, alert and no distress; ambulatory w/ a walker  EYES: pink conj, no icterus, PERRL, EOMI  HENT: ear canals and TM's normal, nose and mouth without ulcers or lesions, oropharynx clear and oral mucous membranes moist  NECK: no adenopathy, no asymmetry, masses, or scars and thyroid normal to palpation  RESP: lungs clear to auscultation - no rales, rhonchi or wheezes  CV: regular rates and rhythm, normal S1 S2, no S3 or S4, no murmur, click or rub, no peripheral edema and peripheral pulses strong  ABDOMEN: soft, nontender, no hepatosplenomegaly, no masses and bowel sounds normal  MS: no musculoskeletal defects are noted and gait is age appropriate without ataxia  SKIN: good turgor, no rash/jaundice/ecchymosis  NEURO: Normal strength and tone, sensory exam grossly normal, mentation intact and speech normal    Recent Labs   Lab Test 22  0954 22  1406 22  1515 02/10/22  1540   HGB 11.1* 10.5* 10.5* 11.0*    369 384 392   NA  --   --  138 138   POTASSIUM  --   --  4.6 4.0   CR  --   --  0.85 0.79   A1C 6.0*  --   --  6.4*        Diagnostics:  No labs were ordered during this visit.   EKG required for last EKG showing LVH and not " completed in the last 90 days.   EKG: sinus rhythm,, normal axis, normal intervals, no acute ST/T changes c/w ischemia, no LVH by voltage criteria, compared to last EKG 3 months ago the voltage criteria for LVH is not met today    Revised Cardiac Risk Index (RCRI):  The patient has the following serious cardiovascular risks for perioperative complications:   - No serious cardiac risks = 0 points     RCRI Interpretation: 0 points: Class I (very low risk - 0.4% complication rate)           Signed Electronically by: Tobin Coronado MD  Copy of this evaluation report is provided to requesting physician.

## 2022-07-28 NOTE — PATIENT INSTRUCTIONS
You may take diltiazem ER and lisinopril on morning of surgery if they are scheduled to be taken then. Take only with a small sip of water.  All other scheduled medication may be held on morning of surgery, and resumed when you are allowed to eat.      Do not take Ibuprofen, Aspirin or Naproxen from now until after procedure.  If you need to take something for pain, take Acetaminophen 325 mg orally 1-2 tabs every 4-6 hrs as needed for pain   You may also take previously prescribed oxycodone as needed for severe pain.    If you develop heavy bleeding, seek medical attention promptly.    Preparing for Your Surgery  Getting started  A nurse will call you to review your health history and instructions. They will give you an arrival time based on your scheduled surgery time. Please be ready to share:  Your doctor's clinic name and phone number  Your medical, surgical and anesthesia history  A list of allergies and sensitivities  A list of medicines, including herbal treatments and over-the-counter drugs  Whether the patient has a legal guardian (ask how to send us the papers in advance)  Please tell us if you're pregnant--or if there's any chance you might be pregnant. Some surgeries may injure a fetus (unborn baby), so they require a pregnancy test. Surgeries that are safe for a fetus don't always need a test, and you can choose whether to have one.   If you have a child who's having surgery, please ask for a copy of Preparing for Your Child's Surgery.    Preparing for surgery  Within 30 days of surgery: Have a pre-op exam (sometimes called an H&P, or History and Physical). This can be done at a clinic or pre-operative center.  If you're having a , you may not need this exam. Talk to your care team.  At your pre-op exam, talk to your care team about all medicines you take. If you need to stop any medicines before surgery, ask when to start taking them again.  We do this for your safety. Many medicines can make  you bleed too much during surgery. Some change how well surgery (anesthesia) drugs work.  Call your insurance company to let them know you're having surgery. (If you don't have insurance, call [phone].)  Call your clinic if there's any change in your health. This includes signs of a cold or flu (sore throat, runny nose, cough, rash, fever). It also includes a scrape or scratch near the surgery site.  If you have questions on the day of surgery, call your hospital or surgery center.  COVID testing  You may need to be tested for COVID-19 before having surgery. If so, we will give you instructions.  Eating and drinking guidelines  For your safety: Unless your surgeon tells you otherwise, follow the guidelines below.  Eat and drink as usual until 8 hours before surgery. After that, no food or milk.  Drink clear liquids until 2 hours before surgery. These are liquids you can see through, like water, Gatorade and Propel Water. You may also have black coffee and tea (no cream or milk).  Nothing by mouth within 2 hours of surgery. This includes gum, candy and breath mints.  If you drink alcohol: Stop drinking it the night before surgery.  If your care team tells you to take medicine on the morning of surgery, it's okay to take it with a sip of water.  Preventing infection  Shower or bathe the night before and morning of your surgery. Follow the instructions your clinic gave you. (If no instructions, use regular soap.)  Don't shave or clip hair near your surgery site. We'll remove the hair if needed.  Don't smoke or vape the morning of surgery. You may chew nicotine gum up to 2 hours before surgery. A nicotine patch is okay.  Note: Some surgeries require you to completely quit smoking and nicotine. Check with your surgeon.  Your care team will make every effort to keep you safe from infection. We will:  Clean our hands often with soap and water (or an alcohol-based hand rub).  Clean the skin at your surgery site with a  special soap that kills germs.  Give you a special gown to keep you warm. (Cold raises the risk of infection.)  Wear special hair covers, masks, gowns and gloves during surgery.  Give antibiotic medicine, if prescribed. Not all surgeries need antibiotics.  What to bring on the day of surgery  Photo ID and insurance card  Copy of your health care directive, if you have one  Glasses and hearing aides (bring cases)  You can't wear contacts during surgery  Inhaler and eye drops, if you use them (tell us about these when you arrive)  CPAP machine or breathing device, if you use them  A few personal items, if spending the night  If you have . . .  A pacemaker, ICD (cardiac defibrillator) or other implant: Bring the ID card.  An implanted stimulator: Bring the remote control.  A legal guardian: Bring a copy of the certified (court-stamped) guardianship papers.  Please remove any jewelry, including body piercings. Leave jewelry and other valuables at home.  If you're going home the day of surgery  You must have a responsible adult drive you home. They should stay with you overnight as well.  If you don't have someone to stay with you, and you aren't safe to go home alone, we may keep you overnight. Insurance often won't pay for this.  After surgery  If it's hard to control your pain or you need more pain medicine, please call your surgeon's office.  Questions?   If you have any questions for your care team, list them here: _________________________________________________________________________________________________________________________________________________________________________ ____________________________________ ____________________________________ ____________________________________  For informational purposes only. Not to replace the advice of your health care provider. Copyright   2003, 2019 [employer]. All rights reserved. Clinically reviewed by Spring Chiu MD. [employer] 781716 - REV  "07/21.    Before Your Procedure or Hospital Admission  Testing for COVID-19  Thank you for choosing [facility] for your health care needs.The COVID-19 pandemic is a very challenging time for everyone.   Our goal is to keep you and our team here at [facility] safe and healthy. We've taken many steps to make this happen. For example:  We test and screen our staff, care teams and patients for COVID-19.  Everyone at [facility] must wear a mask and stay 6 feet apart.  We are limiting hospital and clinic visitors.  Before you come in  You must get tested for COVID-19, even if you've been vaccinated.  If you're going home on the same day as your procedure (surgery):  1 to 2 days before your procedure, take an at-home, rapid antigen test. You can buy these at many pharmacy stores. Or you can order free, at-home tests at covid.gov/tests. If you can't find an at-home, rapid antigen test, please schedule a PCR test with [facility] by calling [facility], or visiting [email or web address]. We can't accept tests that are more than 4 days old.  If your test is negative, please take a photo of the test. Show the photo to the nurse when you come in for your procedure.  If your test is positive, please see the \"If your test shows you have COVID-19\" section on this page.  If you're staying overnight in the hospital:  4 days before your procedure, please get a   PCR test from a lab.  To schedule a PCR test with [facility], call [phone]. Or, visit   [email or web address].  If your test is negative, please ask the testing location to fax your results to us at [phone].  If your test is positive, please see the \"If your test shows you have COVID-19\" section below.  After your test and before your procedure, please follow these safetyguidelines:  Limit trips outside your home.  Limit the number of people you see.  Always wear a face covering outside your " home.  Use social distancing. Stay 6 feet away from others whenever you can.  Wash your hands often.  If your test shows you have COVID-19  If your test is positive, please tell your surgeon's office right away. A positive test means that you have COVID-19.  We'll probably have to postpone your admission, surgery or procedure. Your care team will discuss this with you. After that, we'll let you know what to do and when you can re-schedule.  If your test shows you DON'T have COVID-19  Even if your test is negative, you can still get COVID-19. It's rare, but sometimes the test result is wrong. You could also catch the virus after taking the test.   There's a very small chance that you could catch COVID-19 in the hospital or surgery center. [facility] has taken many steps to prevent this from happening.   Possible surgery delay   Like you, we want your surgery to happen when it's scheduled. But sometimes the hospital is so full that it's not safe for you to have your surgery. This is especially true during the pandemic. Your surgery may need to be re-scheduled at a later date. If thishappens, we will call and tell you.   Day of your surgery or procedure  Please come wearing a face covering that covers both your nose and mouth.  When you arrive, we'll ask you some questions to find out if you've had any exposures to COVID-19, or have any signs of COVID-19.  No matter where you took a test, we'll need to have the results. Please ask your testing location to fax the results to us at [phone]. If you took a rapid antigen at-home test, you can bring a photo of the results with you to your procedure.  Ask your care team if you can have visitors. All visitors must wear face coverings and will be screened for exposure to, or signs of, COVID-19.  The rules for visitors change often, depending on how much the virus is spreading. To learn more, see Visiting a Loved One in the Hospital during the COVID-19  Outbreak.  Please call your care team, hospital or surgery center if you have any questions. We thank you for your understanding and for choosing [facility] for your care.   Questions and answers  Does it matter how I get tested for COVID-19?  Yes. If the plan is for you to go home on the same day as your procedure, you can take a rapid antigen, at-home test 1 to 2 days before you come in. If your test is negative, please take a photo of your test. Show it to the nurse when you come to the hospital. If you can't find a rapid antigen, at-home test, you can take a PCR test at a lab instead.  If the plan is for you to stay in the hospital after your surgery, you'll need to get a PCR test from a lab 4 days before your procedure. Ask the testing location to fax your results to [phone].  If we don't get your results, we may have to delay or cancel your treatment.  Do I need to get tested at [facility]?  No. However, if you need a PCR test from a lab, we recommend using an [facility] lab. We'll get the results more quickly and easily that way. To schedule a test, please call [phone]. Or, visit [email or web address].  For informational purposes only. Not to replace the advice of your health care provider. Copyright   2020 [facility]. All rights reserved. Clinically reviewed by Infection Prevention and the [facility] COVID-19 Clinical Team. [employer] 020637 - Rev 05/26/22.

## 2022-07-28 NOTE — RESULT ENCOUNTER NOTE
Sinus rhythm.  Compared to previous tracing, the LVH by voltage criteria is not present.  Discussed with patient.

## 2022-07-28 NOTE — H&P (VIEW-ONLY)
[facility]  [address] [address]  Phone: [phone]  Primary Provider: Azalea Ochoa  Pre-op Performing Provider: ISMA CORRALES      PREOPERATIVE EVALUATION:  Today's date: 7/28/2022    Yudelka Ledesma is a 81 year old female who presents for a preoperative evaluation.    Surgical Information:  Surgery/Procedure: Total knee arthroplasty, right  Surgery Location: [facility]  Surgeon: Dr Peters  Surgery Date: 8/4/22  Time of Surgery: 12:30  Where patient plans to recover: At home with family  Fax number for surgical facility: Note does not need to be faxed, will be available electronically in Epic.    Type of Anesthesia Anticipated: Spinal    Assessment & Plan     The proposed surgical procedure is considered INTERMEDIATE risk.    Preop general physical exam  Compared to previous EKG, the LVH is not evident on today's tracing.  Patient's anemia has been managed byhematology.  - EKG 12-lead complete w/read - Clinics    Primary osteoarthritis of right knee    Iron deficiency anemia, unspecified iron deficiency anemia type  See hematology notes. No additional treatment per hematologist.  Patient's latest hgb 2 weeks ago was >11.  Monitor hgb perioperatively and refer to hospitalist as appropriate.    Essential hypertension with goal blood pressure less than 140/90  slightly elevated only. Monitor BP closely perioperatively and refer to hospitalist as appropriate.  Low salt, low fat diet.   Exercise as tolerated.  Take meds as prescribed; call if with side effects.   - EKG 12-lead complete w/read - Clinics    Undiagnosed cardiac murmurs  Stable. asymptomatic  - EKG 12-lead complete w/read - Clinics    Hyperlipidemia LDL goal <130  Reinforced heart healthy lifestyle.. continue statin.    Hypothyroidism, unspecified type  Stable and asymptomatic.           Risks and Recommendations:  The patient has the following additional risks and  recommendations for perioperative complications:  Anemia/Bleeding/Clotting:    - Anemia and does not require treatment prior to surgery. Monitor hemoglobin postoperatively    Medication Instructions:  Patient is to take all scheduled medications on the day of surgery EXCEPT for modifications listed below:   - ACE/ARB: May be continued on the day of surgery.    - Calcium Channel Blockers: May be continued on the day of surgery.   - Statins: Continue taking on the day of surgery.    - ibuprofen (Advil, Motrin): HOLD 1 day before surgery.     RECOMMENDATION:  APPROVAL GIVEN to proceed with proposed procedure, without further diagnostic evaluation.    Subjective     HPI related to upcoming procedure: Patient has right knee osteoarthritis causing chronic and progressive pain. Hence, planned surgery. Reviewed above with patient.    Preop Questions 7/28/2022   1. Have you ever had a heart attack or stroke? No   2. Have you ever had surgery on your heart or blood vessels, such as a stent placement, a coronary artery bypass, or surgery on an artery in your head, neck, heart, or legs? No   3. Do you have chest pain with activity? No   4. Do you have a history of  heart failure? No   5. Do you currently have a cold, bronchitis or symptoms of other infection? No   6. Do you have a cough, shortness of breath, or wheezing? No   7. Do you or anyone in your family have previous history of blood clots? No   8. Do you or does anyone in your family have a serious bleeding problem such as prolonged bleeding following surgeries or cuts? No   9. Have you ever had problems with anemia or been told to take iron pills? YES - Pt has iron deficiency anemia   10. Have you had any abnormal blood loss such as black, tarry or bloody stools, or abnormal vaginal bleeding? No   11. Have you ever had a blood transfusion? No   12. Are you willing to have a blood transfusion if it is medically needed before, during, or after your surgery? Yes   13.  "Have you or any of your relatives ever had problems with anesthesia? No   14. Do you have sleep apnea, excessive snoring or daytime drowsiness? No   15. Do you have any artifical heart valves or other implanted medical devices like a pacemaker, defibrillator, or continuous glucose monitor? No   16. Do you have artificial joints? No   17. Are you allergic to latex? No       Health Care Directive:  Patient does not have a Health Care Directive or Living Will: Discussed advance care planning with patient; information given to patient to review.    Preoperative Review of :   reviewed - controlled substances reflected in medication list.      Status of Chronic Conditions:  See problem list for active medical problems.  Problems all longstanding and stable, except as noted/documented.  See ROS for pertinent symptoms related to these conditions.    ANEMIA - Patient has a recent history of moderate-severe anemia, which has not been symptomatic. Work up to date has revealed improved hgb but still mildly anemic per hematology. Treatment has been iron infusions. Per last hematology note on 7/14/2022:    \"Given that your iron deficiency has been corrected and you only have mild anemia and your anemia would be expected to continue to improve in the next few months with adequate red blood cell production after correction of iron deficiency I do not see anemia as being an issue that should prevent you from having knee surgery at this time.  My clinic nurse will contact the RN in the orthopedic clinic to let them know of my assessment. They may request that you follow-up with your primary care provider again before proceeding with knee surgery\".     HYPERLIPIDEMIA - Patient has a long history of significant Hyperlipidemia requiring medication for treatment with recent fair control. Patient reports no problems or side effects with the medication.     HYPERTENSION - Patient has longstanding history of HTN , currently denies any " symptoms referable to elevated blood pressure. Specifically denies chest pain, palpitations, dyspnea, orthopnea, PND or peripheral edema. Blood pressure readings have been slighlty elevated Current medication regimen is as listed below. Patient denies any side effects of medication.     HYPOTHYROIDISM - Patient has a longstanding history of chronic Hypothyroidism. Patient has been doing well, noting no tremor, insomnia, hair loss or changes in skin texture. Continues to take medications as directed, without adverse reactions or side effects. Last TSH   Lab Results   Component Value Date    TSH 2.26 02/10/2022   .        Review of Systems  CONSTITUTIONAL: NEGATIVE for fever, chills, change in weight  INTEGUMENTARY/SKIN: NEGATIVE for worrisome rashes, moles or lesions  EYES: NEGATIVE for vision changes or irritation  ENT/MOUTH: NEGATIVE for ear, mouth and throat problems  RESP: NEGATIVE for significant cough or SOB  CV: NEGATIVE for chest pain, palpitations or peripheral edema  GI: NEGATIVE for nausea, abdominal pain, heartburn, or change in bowel habits  : NEGATIVE for frequency, dysuria, or hematuria  MUSCULOSKELETAL:chronic knee pain on right; left shoulder pain chronic; recurrent finger joint pains  NEURO: NEGATIVE for weakness, dizziness or paresthesias  ENDOCRINE: NEGATIVE for temperature intolerance, skin/hair changes  HEME: NEGATIVE for bleeding problems  PSYCHIATRIC: NEGATIVE for changes in mood or affect    Patient Active Problem List    Diagnosis Date Noted     Other iron deficiency anemia 05/19/2022     Priority: Medium     Poor iron absorption 05/19/2022     Priority: Medium     Essential tremor 04/19/2022     Priority: Medium     Elevated blood sugar 04/19/2022     Priority: Medium     Chronic pain of right knee 04/19/2022     Priority: Medium     Primary osteoarthritis of right knee 04/19/2022     Priority: Medium     Other idiopathic scoliosis 08/22/2016     Priority: Medium     Varicose veins of  "both lower extremities 07/07/2016     Priority: Medium     Undiagnosed cardiac murmurs 05/14/2015     Priority: Medium     Advanced directives, counseling/discussion 02/11/2013     Priority: Medium     Discussed advance care planning with patient; however, patient declined at this time. 2/11/2013          Scoliosis 02/11/2013     Priority: Medium     Carpal tunnel syndrome 08/25/2011     Priority: Medium     Positive EMG 10/2011, saw Dr Galindo who recommended surgical release. Patient did not go through with it.        Cervical radiculopathy 08/25/2011     Priority: Medium     Colon stricture (H) 02/15/2011     Priority: Medium     HYPERLIPIDEMIA LDL GOAL <130 10/31/2010     Priority: Medium     S/P hysterectomy 02/10/2010     Priority: Medium     Done for irregular bleeding in the 1970's.  No cancer.  Has had one ovary removed due to tubal pregnancy, she thinks it was the R.  She thinks she still has the L ovary.  \"they left one, I think it was the left.\"       Elevated serum alkaline phosphatase level 08/27/2009     Priority: Medium     Has had work-up with normal RUQ ultrasound and negative ELP, Hep A, Hep C ab, Hep B Ag, normal LFTs- 2004 by Internal medicine       Nephrolithiasis 06/11/2008     Priority: Medium     8/07- ureteroscopy with lasar lithotripsy for large 15 mm stone, stent placed at [facility]. Has 6 mm left lower kidney pole calculus- pt has elected to watch. Sees Dr. Lutz at [facility].        Abnormal glucose 09/03/2007     Priority: Medium     Problem list name updated by automated process. Provider to review       Essential hypertension with goal blood pressure less than 140/90 05/18/2005     Priority: Medium     Problem list name updated by automated process. Provider to review       Esophageal reflux 05/18/2005     Priority: Medium     EGD 11/02 showing small hiatal hernia       Hypothyroidism 05/18/2005     Priority: Medium     Problem list name updated by automated process. Provider to review   " "    Mild intermittent asthma, uncomplicated 05/18/2005     Priority: Medium      Past Medical History:   Diagnosis Date     Basal cell carcinoma      Esophageal reflux      Other and unspecified hyperlipidemia      Unspecified essential hypertension      Unspecified hypothyroidism      Past Surgical History:   Procedure Laterality Date     COLONOSCOPY  2/7/2011    COLONOSCOPY performed by BHARTI DUNHAM at [facility]     Current Outpatient Medications   Medication Sig Dispense Refill     atorvastatin (LIPITOR) 40 MG tablet Take 1 tablet by mouth once daily 180 tablet 3     diltiazem ER (DILT-XR) 240 MG 24 hr ER beaded capsule Take 1 capsule by mouth once daily 90 capsule 3     esomeprazole (NEXIUM) 20 MG DR capsule Take 1 capsule (20 mg) by mouth every morning (before breakfast) One hour before meals fill when needed       IBUPROFEN 3 times daily        levothyroxine (SYNTHROID/LEVOTHROID) 88 MCG tablet Take 1 tablet (88 mcg) by mouth daily 90 tablet 3     lisinopril (ZESTRIL) 20 MG tablet Take 1 tablet by mouth once daily 90 tablet 3     oxyCODONE (ROXICODONE) 5 MG tablet Take 0.5 tablets (2.5 mg) by mouth 3 times daily as needed for pain 45 tablet 0     albuterol (PROAIR HFA/PROVENTIL HFA/VENTOLIN HFA) 108 (90 Base) MCG/ACT inhaler Inhale 1-2 puffs into the lungs every 4 hours as needed for shortness of breath / dyspnea or wheezing (Patient not taking: Reported on 7/28/2022) 9 g 3     chlorhexidine (PERIDEX) 0.12 % solution FILL CAP TO FILL LINE (15 ML). SWISH IN MOUTH UNDILUTED FOR 30 SECONDS AND THEN SPIT OUT, TWO TIMES A DAY       ipratropium (ATROVENT HFA) 17 MCG/ACT inhaler INHALE 2 PUFFS INTO THE LUNGS FOUR TIMES A DAY AS NEEDED FOR WHEEZING (Patient not taking: Reported on 7/28/2022) 12.9 g 3       Allergies   Allergen Reactions     Epinephrine Palpitations     Gets \"cold, jittery, feels weird\"     Ancef [Cefazolin Sodium]      Augmentin [Aspartame]      Doxycycline      Gabapentin      Gave nightmares " "    Levaquin [Levofloxacin Hemihydrate]      Prednisolone      Seasonal Allergies      Sulfa Drugs         Social History     Tobacco Use     Smoking status: Former Smoker     Years: 15.00     Types: Cigarettes     Quit date: 1985     Years since quittin.5     Smokeless tobacco: Never Used   Substance Use Topics     Alcohol use: No     Family History   Problem Relation Age of Onset     Cerebrovascular Disease Mother      Heart Disease Father      Cancer Maternal Grandmother      Diabetes Paternal Grandmother      History   Drug Use No         Objective     BP (!) 158/74   Pulse 73   Temp 97.7  F (36.5  C) (Tympanic)   Resp 18   Ht 1.6 m (5' 3\")   Wt 68.2 kg (150 lb 6.4 oz)   SpO2 98%   BMI 26.64 kg/m      Physical Exam  GENERAL APPEARANCE: overweight, alert and no distress; ambulatory w/ a walker  EYES: pink conj, no icterus, PERRL, EOMI  HENT: ear canals and TM's normal, nose and mouth without ulcers or lesions, oropharynx clear and oral mucous membranes moist  NECK: no adenopathy, no asymmetry, masses, or scars and thyroid normal to palpation  RESP: lungs clear to auscultation - no rales, rhonchi or wheezes  CV: regular rates and rhythm, normal S1 S2, no S3 or S4, no murmur, click or rub, no peripheral edema and peripheral pulses strong  ABDOMEN: soft, nontender, no hepatosplenomegaly, no masses and bowel sounds normal  MS: no musculoskeletal defects are noted and gait is age appropriate without ataxia  SKIN: good turgor, no rash/jaundice/ecchymosis  NEURO: Normal strength and tone, sensory exam grossly normal, mentation intact and speech normal    Recent Labs   Lab Test 22  0954 22  1406 22  1515 02/10/22  1540   HGB 11.1* 10.5* 10.5* 11.0*    369 384 392   NA  --   --  138 138   POTASSIUM  --   --  4.6 4.0   CR  --   --  0.85 0.79   A1C 6.0*  --   --  6.4*        Diagnostics:  No labs were ordered during this visit.   EKG required for last EKG showing LVH and not " completed in the last 90 days.   EKG: sinus rhythm,, normal axis, normal intervals, no acute ST/T changes c/w ischemia, no LVH by voltage criteria, compared to last EKG 3 months ago the voltage criteria for LVH is not met today    Revised Cardiac Risk Index (RCRI):  The patient has the following serious cardiovascular risks for perioperative complications:   - No serious cardiac risks = 0 points     RCRI Interpretation: 0 points: Class I (very low risk - 0.4% complication rate)           Signed Electronically by: Tobin Coronado MD  Copy of this evaluation report is provided to requesting physician.

## 2022-07-29 RX ORDER — LISINOPRIL 20 MG/1
TABLET ORAL
Qty: 90 TABLET | Refills: 1 | Status: SHIPPED | OUTPATIENT
Start: 2022-07-29 | End: 2023-01-30

## 2022-07-29 NOTE — TELEPHONE ENCOUNTER
Refill approved, see office visit notes on 6-30-22 for patient to continue on Lisinopril.      RYAN Acuña

## 2022-08-01 ENCOUNTER — LAB (OUTPATIENT)
Dept: LAB | Facility: CLINIC | Age: 82
End: 2022-08-01
Payer: COMMERCIAL

## 2022-08-01 DIAGNOSIS — Z11.59 ENCOUNTER FOR SCREENING FOR OTHER VIRAL DISEASES: ICD-10-CM

## 2022-08-01 PROCEDURE — U0003 INFECTIOUS AGENT DETECTION BY NUCLEIC ACID (DNA OR RNA); SEVERE ACUTE RESPIRATORY SYNDROME CORONAVIRUS 2 (SARS-COV-2) (CORONAVIRUS DISEASE [COVID-19]), AMPLIFIED PROBE TECHNIQUE, MAKING USE OF HIGH THROUGHPUT TECHNOLOGIES AS DESCRIBED BY CMS-2020-01-R: HCPCS

## 2022-08-01 PROCEDURE — U0005 INFEC AGEN DETEC AMPLI PROBE: HCPCS

## 2022-08-02 LAB — SARS-COV-2 RNA RESP QL NAA+PROBE: NEGATIVE

## 2022-08-03 ENCOUNTER — ANESTHESIA EVENT (OUTPATIENT)
Dept: SURGERY | Facility: CLINIC | Age: 82
End: 2022-08-03
Payer: COMMERCIAL

## 2022-08-03 ASSESSMENT — LIFESTYLE VARIABLES: TOBACCO_USE: 1

## 2022-08-03 NOTE — ANESTHESIA PREPROCEDURE EVALUATION
"Anesthesia Pre-Procedure Evaluation    Patient: Yudelka Ledesma   MRN: [id number] : 1940        Procedure : Procedure(s):  TOTAL Knee Arthroplasty          Past Medical History:   Diagnosis Date     Basal cell carcinoma      Esophageal reflux      Other and unspecified hyperlipidemia      Unspecified essential hypertension      Unspecified hypothyroidism       Past Surgical History:   Procedure Laterality Date     COLONOSCOPY  2011    COLONOSCOPY performed by BHARTI DUNHAM at [facility]      Allergies   Allergen Reactions     Epinephrine Palpitations     Gets \"cold, jittery, feels weird\"     Ancef [Cefazolin Sodium]      Augmentin [Aspartame]      Doxycycline      Gabapentin      Gave nightmares     Levaquin [Levofloxacin Hemihydrate]      Prednisolone      Seasonal Allergies      Sulfa Drugs       Social History     Tobacco Use     Smoking status: Former Smoker     Years: 15.00     Types: Cigarettes     Quit date: 1985     Years since quittin.6     Smokeless tobacco: Never Used   Substance Use Topics     Alcohol use: No      Wt Readings from Last 1 Encounters:   22 68.2 kg (150 lb 6.4 oz)        Anesthesia Evaluation   Pt has had prior anesthetic. Type: General.    No history of anesthetic complications       ROS/MED HX  ENT/Pulmonary:     (+) tobacco use, Past use, Intermittent, asthma     Neurologic: Comment: Carpal tunnel syndrome  Cervical radiculopathy          Cardiovascular:  - neg cardiovascular ROS   (+) Dyslipidemia hypertension-----Previous cardiac testing   Echo: Date:  Results:  Interpretation Summary     The visual ejection fraction is estimated at 55-60%.  No hemodynamically significant valvular aortic stenosis.  The aortic valve is trileaflet with aortic valve sclerosis.  Stress Test: Date: Results:    ECG Reviewed: Date: 22 Results:  Sinus Rhythm   WITHIN NORMAL LIMITS  Cath: Date: Results:      METS/Exercise Tolerance: >4 METS    Hematologic:     (+) " anemia,     Musculoskeletal: Comment: Cervical radiculopathy  Scoliosis  Tremor    (+) arthritis,     GI/Hepatic:     (+) GERD,     Renal/Genitourinary:     (+) Nephrolithiasis ,     Endo:     (+) thyroid problem, hypothyroidism,     Psychiatric/Substance Use:  - neg psychiatric ROS     Infectious Disease:  - neg infectious disease ROS     Malignancy:   (+) Malignancy, History of Skin.Skin CA Remission status post Surgery.        Other:  - neg other ROS    (+) , H/O Chronic Pain,        Physical Exam    Airway  airway exam normal      Mallampati: II   TM distance: > 3 FB   Neck ROM: full   Mouth opening: > 3 cm    Respiratory Devices and Support         Dental  no notable dental history         Cardiovascular   cardiovascular exam normal       Rhythm and rate: regular and normal     Pulmonary   pulmonary exam normal        breath sounds clear to auscultation           OUTSIDE LABS:  CBC:   Lab Results   Component Value Date    WBC 7.8 07/12/2022    WBC 8.3 05/19/2022    HGB 11.1 (L) 07/12/2022    HGB 10.5 (L) 05/19/2022    HCT 35.3 07/12/2022    HCT 32.9 (L) 05/19/2022     07/12/2022     05/19/2022     BMP:   Lab Results   Component Value Date     04/19/2022     02/10/2022    POTASSIUM 4.6 04/19/2022    POTASSIUM 4.0 02/10/2022    CHLORIDE 105 04/19/2022    CHLORIDE 106 02/10/2022    CO2 26 04/19/2022    CO2 26 02/10/2022    BUN 26 04/19/2022    BUN 26 02/10/2022    CR 0.85 04/19/2022    CR 0.79 02/10/2022    GLC 94 04/19/2022     (H) 02/10/2022     COAGS: No results found for: PTT, INR, FIBR  POC:   Lab Results   Component Value Date    HCG Negative 07/19/2007     HEPATIC:   Lab Results   Component Value Date    ALBUMIN 3.6 04/19/2022    PROTTOTAL 7.7 04/19/2022    ALT 15 04/19/2022    AST 12 04/19/2022    GGT 9 04/24/2021    ALKPHOS 155 (H) 04/19/2022    BILITOTAL 0.4 04/19/2022     OTHER:   Lab Results   Component Value Date    A1C 6.0 (H) 07/12/2022    VARHSA 9.4 04/19/2022    MAG  1.9 05/14/2015    LIPASE 135 06/10/2006    TSH 2.26 02/10/2022    CRP <2.9 02/10/2022    SED 32 (H) 02/10/2022       Anesthesia Plan    ASA Status:  3   NPO Status:  NPO Appropriate    Anesthesia Type: Spinal.              Consents    Anesthesia Plan(s) and associated risks, benefits, and realistic alternatives discussed. Questions answered and patient/representative(s) expressed understanding.     - Discussed: Risks, Benefits and Alternatives for BOTH SEDATION and the PROCEDURE were discussed     - Discussed with:  Patient      - Extended Intubation/Ventilatory Support Discussed: No.      - Patient is DNR/DNI Status: No    Use of blood products discussed: No .     Postoperative Care    Pain management: Oral pain medications, IV analgesics, Peripheral nerve block (Single Shot).   PONV prophylaxis: Dexamethasone or Solumedrol, Background Propofol Infusion, Ondansetron (or other 5HT-3)     Comments:                DARIUSZ Middleton CRNA

## 2022-08-04 ENCOUNTER — ANESTHESIA (OUTPATIENT)
Dept: SURGERY | Facility: CLINIC | Age: 82
End: 2022-08-04
Payer: COMMERCIAL

## 2022-08-04 ENCOUNTER — HOSPITAL ENCOUNTER (OUTPATIENT)
Facility: CLINIC | Age: 82
Discharge: HOME HEALTH | End: 2022-08-05
Attending: ORTHOPAEDIC SURGERY | Admitting: ORTHOPAEDIC SURGERY
Payer: COMMERCIAL

## 2022-08-04 ENCOUNTER — APPOINTMENT (OUTPATIENT)
Dept: GENERAL RADIOLOGY | Facility: CLINIC | Age: 82
End: 2022-08-04
Attending: ORTHOPAEDIC SURGERY
Payer: COMMERCIAL

## 2022-08-04 DIAGNOSIS — Z96.651 STATUS POST RIGHT KNEE REPLACEMENT: Primary | ICD-10-CM

## 2022-08-04 PROBLEM — Z96.659 STATUS POST TOTAL KNEE REPLACEMENT: Status: ACTIVE | Noted: 2022-08-04

## 2022-08-04 LAB — GLUCOSE BLDC GLUCOMTR-MCNC: 169 MG/DL (ref 70–99)

## 2022-08-04 PROCEDURE — 710N000012 HC RECOVERY PHASE 2, PER MINUTE: Performed by: ORTHOPAEDIC SURGERY

## 2022-08-04 PROCEDURE — C1776 JOINT DEVICE (IMPLANTABLE): HCPCS | Performed by: ORTHOPAEDIC SURGERY

## 2022-08-04 PROCEDURE — 258N000003 HC RX IP 258 OP 636: Performed by: NURSE ANESTHETIST, CERTIFIED REGISTERED

## 2022-08-04 PROCEDURE — 360N000077 HC SURGERY LEVEL 4, PER MIN: Performed by: ORTHOPAEDIC SURGERY

## 2022-08-04 PROCEDURE — 250N000011 HC RX IP 250 OP 636: Performed by: PHYSICIAN ASSISTANT

## 2022-08-04 PROCEDURE — 250N000011 HC RX IP 250 OP 636: Performed by: NURSE ANESTHETIST, CERTIFIED REGISTERED

## 2022-08-04 PROCEDURE — 250N000013 HC RX MED GY IP 250 OP 250 PS 637: Performed by: PHYSICIAN ASSISTANT

## 2022-08-04 PROCEDURE — 250N000009 HC RX 250: Performed by: NURSE ANESTHETIST, CERTIFIED REGISTERED

## 2022-08-04 PROCEDURE — 250N000013 HC RX MED GY IP 250 OP 250 PS 637: Performed by: NURSE ANESTHETIST, CERTIFIED REGISTERED

## 2022-08-04 PROCEDURE — C1713 ANCHOR/SCREW BN/BN,TIS/BN: HCPCS | Performed by: ORTHOPAEDIC SURGERY

## 2022-08-04 PROCEDURE — 999N000065 XR KNEE PORT RIGHT 1/2 VIEWS: Mod: RT

## 2022-08-04 PROCEDURE — 250N000011 HC RX IP 250 OP 636: Performed by: ORTHOPAEDIC SURGERY

## 2022-08-04 PROCEDURE — 82962 GLUCOSE BLOOD TEST: CPT

## 2022-08-04 PROCEDURE — 999N000141 HC STATISTIC PRE-PROCEDURE NURSING ASSESSMENT: Performed by: ORTHOPAEDIC SURGERY

## 2022-08-04 PROCEDURE — 272N000001 HC OR GENERAL SUPPLY STERILE: Performed by: ORTHOPAEDIC SURGERY

## 2022-08-04 PROCEDURE — 258N000003 HC RX IP 258 OP 636: Performed by: ORTHOPAEDIC SURGERY

## 2022-08-04 PROCEDURE — 710N000009 HC RECOVERY PHASE 1, LEVEL 1, PER MIN: Performed by: ORTHOPAEDIC SURGERY

## 2022-08-04 PROCEDURE — 370N000017 HC ANESTHESIA TECHNICAL FEE, PER MIN: Performed by: ORTHOPAEDIC SURGERY

## 2022-08-04 PROCEDURE — 250N000013 HC RX MED GY IP 250 OP 250 PS 637: Performed by: ORTHOPAEDIC SURGERY

## 2022-08-04 PROCEDURE — 250N000009 HC RX 250: Performed by: ORTHOPAEDIC SURGERY

## 2022-08-04 DEVICE — IMP COMP PATELLA SNR GENESIS II 9X32MM 71420576: Type: IMPLANTABLE DEVICE | Site: KNEE | Status: FUNCTIONAL

## 2022-08-04 DEVICE — IMP COMP FEM S&N LEGION OXIN NARROW CR SZ5N RT 71421255: Type: IMPLANTABLE DEVICE | Site: KNEE | Status: FUNCTIONAL

## 2022-08-04 DEVICE — IMP BASEPLATE TIBIAL GENESIS II SZ 3 RT TI 71420182: Type: IMPLANTABLE DEVICE | Site: KNEE | Status: FUNCTIONAL

## 2022-08-04 DEVICE — BONE CEMENT RADIOPAQUE SIMPLEX HV FULL DOSE 6194-1-001: Type: IMPLANTABLE DEVICE | Site: KNEE | Status: FUNCTIONAL

## 2022-08-04 DEVICE — IMPLANTABLE DEVICE: Type: IMPLANTABLE DEVICE | Site: KNEE | Status: FUNCTIONAL

## 2022-08-04 RX ORDER — LIDOCAINE 40 MG/G
CREAM TOPICAL
Status: DISCONTINUED | OUTPATIENT
Start: 2022-08-04 | End: 2022-08-05 | Stop reason: HOSPADM

## 2022-08-04 RX ORDER — POLYETHYLENE GLYCOL 3350 17 G/17G
17 POWDER, FOR SOLUTION ORAL DAILY
Status: DISCONTINUED | OUTPATIENT
Start: 2022-08-05 | End: 2022-08-05 | Stop reason: HOSPADM

## 2022-08-04 RX ORDER — ACETAMINOPHEN 325 MG/1
975 TABLET ORAL ONCE
Status: DISCONTINUED | OUTPATIENT
Start: 2022-08-04 | End: 2022-08-04 | Stop reason: HOSPADM

## 2022-08-04 RX ORDER — HYDROMORPHONE HCL IN WATER/PF 6 MG/30 ML
0.2 PATIENT CONTROLLED ANALGESIA SYRINGE INTRAVENOUS EVERY 5 MIN PRN
Status: DISCONTINUED | OUTPATIENT
Start: 2022-08-04 | End: 2022-08-04 | Stop reason: HOSPADM

## 2022-08-04 RX ORDER — OXYCODONE HYDROCHLORIDE 5 MG/1
10 TABLET ORAL EVERY 4 HOURS PRN
Status: DISCONTINUED | OUTPATIENT
Start: 2022-08-04 | End: 2022-08-05 | Stop reason: HOSPADM

## 2022-08-04 RX ORDER — CEFAZOLIN SODIUM/WATER 2 G/20 ML
2 SYRINGE (ML) INTRAVENOUS SEE ADMIN INSTRUCTIONS
Status: DISCONTINUED | OUTPATIENT
Start: 2022-08-04 | End: 2022-08-04 | Stop reason: HOSPADM

## 2022-08-04 RX ORDER — AMOXICILLIN 250 MG
1-2 CAPSULE ORAL DAILY PRN
Qty: 15 TABLET | Refills: 0 | Status: SHIPPED | OUTPATIENT
Start: 2022-08-04 | End: 2022-08-05

## 2022-08-04 RX ORDER — LIDOCAINE HCL/EPINEPHRINE/PF 2%-1:200K
VIAL (ML) INJECTION PRN
Status: DISCONTINUED | OUTPATIENT
Start: 2022-08-04 | End: 2022-08-04

## 2022-08-04 RX ORDER — HYDROMORPHONE HCL IN WATER/PF 6 MG/30 ML
0.4 PATIENT CONTROLLED ANALGESIA SYRINGE INTRAVENOUS
Status: DISCONTINUED | OUTPATIENT
Start: 2022-08-04 | End: 2022-08-05 | Stop reason: HOSPADM

## 2022-08-04 RX ORDER — KETAMINE HYDROCHLORIDE 10 MG/ML
INJECTION INTRAMUSCULAR; INTRAVENOUS PRN
Status: DISCONTINUED | OUTPATIENT
Start: 2022-08-04 | End: 2022-08-04

## 2022-08-04 RX ORDER — TRANEXAMIC ACID 650 MG/1
1950 TABLET ORAL ONCE
Status: DISCONTINUED | OUTPATIENT
Start: 2022-08-04 | End: 2022-08-04 | Stop reason: HOSPADM

## 2022-08-04 RX ORDER — ACETAMINOPHEN 325 MG/1
975 TABLET ORAL EVERY 8 HOURS
Status: DISCONTINUED | OUTPATIENT
Start: 2022-08-04 | End: 2022-08-05 | Stop reason: HOSPADM

## 2022-08-04 RX ORDER — MAGNESIUM SULFATE HEPTAHYDRATE 40 MG/ML
2 INJECTION, SOLUTION INTRAVENOUS ONCE
Status: COMPLETED | OUTPATIENT
Start: 2022-08-04 | End: 2022-08-04

## 2022-08-04 RX ORDER — HYDROMORPHONE HCL IN WATER/PF 6 MG/30 ML
0.2 PATIENT CONTROLLED ANALGESIA SYRINGE INTRAVENOUS
Status: DISCONTINUED | OUTPATIENT
Start: 2022-08-04 | End: 2022-08-05 | Stop reason: HOSPADM

## 2022-08-04 RX ORDER — OXYCODONE HYDROCHLORIDE 5 MG/1
5-10 TABLET ORAL EVERY 4 HOURS PRN
Qty: 33 TABLET | Refills: 0 | Status: SHIPPED | OUTPATIENT
Start: 2022-08-04 | End: 2022-11-15

## 2022-08-04 RX ORDER — NALOXONE HYDROCHLORIDE 0.4 MG/ML
0.2 INJECTION, SOLUTION INTRAMUSCULAR; INTRAVENOUS; SUBCUTANEOUS
Status: DISCONTINUED | OUTPATIENT
Start: 2022-08-04 | End: 2022-08-05 | Stop reason: HOSPADM

## 2022-08-04 RX ORDER — ONDANSETRON 2 MG/ML
INJECTION INTRAMUSCULAR; INTRAVENOUS PRN
Status: DISCONTINUED | OUTPATIENT
Start: 2022-08-04 | End: 2022-08-04

## 2022-08-04 RX ORDER — SODIUM CHLORIDE, SODIUM LACTATE, POTASSIUM CHLORIDE, CALCIUM CHLORIDE 600; 310; 30; 20 MG/100ML; MG/100ML; MG/100ML; MG/100ML
INJECTION, SOLUTION INTRAVENOUS CONTINUOUS
Status: DISCONTINUED | OUTPATIENT
Start: 2022-08-04 | End: 2022-08-04 | Stop reason: HOSPADM

## 2022-08-04 RX ORDER — LISINOPRIL 20 MG/1
20 TABLET ORAL
Status: DISCONTINUED | OUTPATIENT
Start: 2022-08-05 | End: 2022-08-05 | Stop reason: HOSPADM

## 2022-08-04 RX ORDER — ACETAMINOPHEN 325 MG/1
650 TABLET ORAL EVERY 4 HOURS PRN
Status: DISCONTINUED | OUTPATIENT
Start: 2022-08-07 | End: 2022-08-05 | Stop reason: HOSPADM

## 2022-08-04 RX ORDER — ONDANSETRON 4 MG/1
4 TABLET, ORALLY DISINTEGRATING ORAL EVERY 30 MIN PRN
Status: DISCONTINUED | OUTPATIENT
Start: 2022-08-04 | End: 2022-08-04 | Stop reason: HOSPADM

## 2022-08-04 RX ORDER — CEFAZOLIN SODIUM/WATER 2 G/20 ML
2 SYRINGE (ML) INTRAVENOUS
Status: COMPLETED | OUTPATIENT
Start: 2022-08-04 | End: 2022-08-04

## 2022-08-04 RX ORDER — ONDANSETRON 2 MG/ML
4 INJECTION INTRAMUSCULAR; INTRAVENOUS EVERY 6 HOURS PRN
Status: DISCONTINUED | OUTPATIENT
Start: 2022-08-04 | End: 2022-08-05 | Stop reason: HOSPADM

## 2022-08-04 RX ORDER — NALOXONE HYDROCHLORIDE 0.4 MG/ML
0.4 INJECTION, SOLUTION INTRAMUSCULAR; INTRAVENOUS; SUBCUTANEOUS
Status: DISCONTINUED | OUTPATIENT
Start: 2022-08-04 | End: 2022-08-05 | Stop reason: HOSPADM

## 2022-08-04 RX ORDER — LIDOCAINE 40 MG/G
CREAM TOPICAL
Status: DISCONTINUED | OUTPATIENT
Start: 2022-08-04 | End: 2022-08-04 | Stop reason: HOSPADM

## 2022-08-04 RX ORDER — ALBUTEROL SULFATE 5 MG/ML
2.5 SOLUTION RESPIRATORY (INHALATION)
Status: DISCONTINUED | OUTPATIENT
Start: 2022-08-04 | End: 2022-08-05 | Stop reason: HOSPADM

## 2022-08-04 RX ORDER — EPINEPHRINE 1 MG/ML
INJECTION, SOLUTION, CONCENTRATE INTRAVENOUS
Status: COMPLETED | OUTPATIENT
Start: 2022-08-04 | End: 2022-08-04

## 2022-08-04 RX ORDER — TRANEXAMIC ACID 10 MG/ML
1 INJECTION, SOLUTION INTRAVENOUS ONCE
Status: DISCONTINUED | OUTPATIENT
Start: 2022-08-04 | End: 2022-08-04 | Stop reason: HOSPADM

## 2022-08-04 RX ORDER — DILTIAZEM HYDROCHLORIDE 240 MG/1
240 CAPSULE, COATED, EXTENDED RELEASE ORAL
Status: DISCONTINUED | OUTPATIENT
Start: 2022-08-04 | End: 2022-08-05 | Stop reason: HOSPADM

## 2022-08-04 RX ORDER — SODIUM CHLORIDE, SODIUM LACTATE, POTASSIUM CHLORIDE, CALCIUM CHLORIDE 600; 310; 30; 20 MG/100ML; MG/100ML; MG/100ML; MG/100ML
INJECTION, SOLUTION INTRAVENOUS CONTINUOUS
Status: DISCONTINUED | OUTPATIENT
Start: 2022-08-04 | End: 2022-08-05 | Stop reason: HOSPADM

## 2022-08-04 RX ORDER — HYDROXYZINE HYDROCHLORIDE 25 MG/1
25 TABLET, FILM COATED ORAL EVERY 6 HOURS PRN
Status: DISCONTINUED | OUTPATIENT
Start: 2022-08-04 | End: 2022-08-05 | Stop reason: HOSPADM

## 2022-08-04 RX ORDER — LEVOTHYROXINE SODIUM 88 UG/1
88 TABLET ORAL
Status: DISCONTINUED | OUTPATIENT
Start: 2022-08-04 | End: 2022-08-05 | Stop reason: HOSPADM

## 2022-08-04 RX ORDER — AMOXICILLIN 250 MG
1 CAPSULE ORAL 2 TIMES DAILY
Status: DISCONTINUED | OUTPATIENT
Start: 2022-08-04 | End: 2022-08-05 | Stop reason: HOSPADM

## 2022-08-04 RX ORDER — ROPIVACAINE HYDROCHLORIDE 5 MG/ML
INJECTION, SOLUTION EPIDURAL; INFILTRATION; PERINEURAL PRN
Status: DISCONTINUED | OUTPATIENT
Start: 2022-08-04 | End: 2022-08-04

## 2022-08-04 RX ORDER — FENTANYL CITRATE 50 UG/ML
25 INJECTION, SOLUTION INTRAMUSCULAR; INTRAVENOUS EVERY 5 MIN PRN
Status: DISCONTINUED | OUTPATIENT
Start: 2022-08-04 | End: 2022-08-04 | Stop reason: HOSPADM

## 2022-08-04 RX ORDER — BUPIVACAINE HYDROCHLORIDE 5 MG/ML
INJECTION, SOLUTION PERINEURAL PRN
Status: DISCONTINUED | OUTPATIENT
Start: 2022-08-04 | End: 2022-08-04 | Stop reason: HOSPADM

## 2022-08-04 RX ORDER — ATORVASTATIN CALCIUM 20 MG/1
40 TABLET, FILM COATED ORAL
Status: DISCONTINUED | OUTPATIENT
Start: 2022-08-04 | End: 2022-08-05 | Stop reason: HOSPADM

## 2022-08-04 RX ORDER — DEXAMETHASONE SODIUM PHOSPHATE 4 MG/ML
INJECTION, SOLUTION INTRA-ARTICULAR; INTRALESIONAL; INTRAMUSCULAR; INTRAVENOUS; SOFT TISSUE PRN
Status: DISCONTINUED | OUTPATIENT
Start: 2022-08-04 | End: 2022-08-04

## 2022-08-04 RX ORDER — ACETAMINOPHEN 325 MG/1
650 TABLET ORAL EVERY 4 HOURS PRN
Qty: 100 TABLET | Refills: 0
Start: 2022-08-04 | End: 2024-01-18

## 2022-08-04 RX ORDER — BUPIVACAINE HYDROCHLORIDE 7.5 MG/ML
INJECTION, SOLUTION INTRASPINAL
Status: COMPLETED | OUTPATIENT
Start: 2022-08-04 | End: 2022-08-04

## 2022-08-04 RX ORDER — CEFAZOLIN SODIUM 2 G/100ML
2 INJECTION, SOLUTION INTRAVENOUS EVERY 8 HOURS
Status: COMPLETED | OUTPATIENT
Start: 2022-08-04 | End: 2022-08-05

## 2022-08-04 RX ORDER — HYDROXYZINE HYDROCHLORIDE 25 MG/1
25-50 TABLET, FILM COATED ORAL EVERY 6 HOURS PRN
Qty: 30 TABLET | Refills: 0 | Status: SHIPPED | OUTPATIENT
Start: 2022-08-04 | End: 2022-11-15

## 2022-08-04 RX ORDER — IBUPROFEN 200 MG
400 TABLET ORAL EVERY 4 HOURS PRN
Status: ON HOLD | COMMUNITY
End: 2022-08-05

## 2022-08-04 RX ORDER — KETOROLAC TROMETHAMINE 15 MG/ML
15 INJECTION, SOLUTION INTRAMUSCULAR; INTRAVENOUS EVERY 6 HOURS
Status: COMPLETED | OUTPATIENT
Start: 2022-08-04 | End: 2022-08-05

## 2022-08-04 RX ORDER — OXYCODONE HYDROCHLORIDE 5 MG/1
5 TABLET ORAL EVERY 4 HOURS PRN
Status: DISCONTINUED | OUTPATIENT
Start: 2022-08-04 | End: 2022-08-05 | Stop reason: HOSPADM

## 2022-08-04 RX ORDER — ACETAMINOPHEN 500 MG
1000 TABLET ORAL EVERY 6 HOURS PRN
Status: ON HOLD | COMMUNITY
End: 2022-08-05

## 2022-08-04 RX ORDER — BISACODYL 10 MG
10 SUPPOSITORY, RECTAL RECTAL DAILY PRN
Status: DISCONTINUED | OUTPATIENT
Start: 2022-08-04 | End: 2022-08-05 | Stop reason: HOSPADM

## 2022-08-04 RX ORDER — PROPOFOL 10 MG/ML
INJECTION, EMULSION INTRAVENOUS CONTINUOUS PRN
Status: DISCONTINUED | OUTPATIENT
Start: 2022-08-04 | End: 2022-08-04

## 2022-08-04 RX ORDER — ONDANSETRON 2 MG/ML
4 INJECTION INTRAMUSCULAR; INTRAVENOUS EVERY 30 MIN PRN
Status: DISCONTINUED | OUTPATIENT
Start: 2022-08-04 | End: 2022-08-04 | Stop reason: HOSPADM

## 2022-08-04 RX ORDER — OXYCODONE HYDROCHLORIDE 5 MG/1
5 TABLET ORAL EVERY 4 HOURS PRN
Status: DISCONTINUED | OUTPATIENT
Start: 2022-08-04 | End: 2022-08-04 | Stop reason: HOSPADM

## 2022-08-04 RX ORDER — PANTOPRAZOLE SODIUM 40 MG/1
40 TABLET, DELAYED RELEASE ORAL
Status: DISCONTINUED | OUTPATIENT
Start: 2022-08-05 | End: 2022-08-05 | Stop reason: HOSPADM

## 2022-08-04 RX ORDER — PROCHLORPERAZINE MALEATE 5 MG
5 TABLET ORAL EVERY 6 HOURS PRN
Status: DISCONTINUED | OUTPATIENT
Start: 2022-08-04 | End: 2022-08-05 | Stop reason: HOSPADM

## 2022-08-04 RX ORDER — ONDANSETRON 4 MG/1
4 TABLET, ORALLY DISINTEGRATING ORAL EVERY 6 HOURS PRN
Status: DISCONTINUED | OUTPATIENT
Start: 2022-08-04 | End: 2022-08-05 | Stop reason: HOSPADM

## 2022-08-04 RX ADMIN — MIDAZOLAM 1 MG: 1 INJECTION INTRAMUSCULAR; INTRAVENOUS at 12:55

## 2022-08-04 RX ADMIN — PROPOFOL 25 MCG/KG/MIN: 10 INJECTION, EMULSION INTRAVENOUS at 13:10

## 2022-08-04 RX ADMIN — OXYCODONE HYDROCHLORIDE 5 MG: 5 TABLET ORAL at 15:43

## 2022-08-04 RX ADMIN — ASPIRIN 325 MG: 325 TABLET, COATED ORAL at 22:59

## 2022-08-04 RX ADMIN — DEXAMETHASONE SODIUM PHOSPHATE 4 MG: 4 INJECTION, SOLUTION INTRA-ARTICULAR; INTRALESIONAL; INTRAMUSCULAR; INTRAVENOUS; SOFT TISSUE at 14:44

## 2022-08-04 RX ADMIN — MIDAZOLAM 1 MG: 1 INJECTION INTRAMUSCULAR; INTRAVENOUS at 12:50

## 2022-08-04 RX ADMIN — Medication 2 G: at 12:30

## 2022-08-04 RX ADMIN — KETAMINE HYDROCHLORIDE 25 MG: 10 INJECTION, SOLUTION INTRAMUSCULAR; INTRAVENOUS at 13:00

## 2022-08-04 RX ADMIN — KETOROLAC TROMETHAMINE 15 MG: 15 INJECTION, SOLUTION INTRAMUSCULAR; INTRAVENOUS at 22:59

## 2022-08-04 RX ADMIN — ROPIVACAINE HYDROCHLORIDE 20 ML: 5 INJECTION, SOLUTION EPIDURAL; INFILTRATION; PERINEURAL at 14:44

## 2022-08-04 RX ADMIN — ATORVASTATIN CALCIUM 40 MG: 20 TABLET, FILM COATED ORAL at 20:14

## 2022-08-04 RX ADMIN — ACETAMINOPHEN 975 MG: 325 TABLET, FILM COATED ORAL at 17:24

## 2022-08-04 RX ADMIN — SENNOSIDES AND DOCUSATE SODIUM 1 TABLET: 50; 8.6 TABLET ORAL at 20:14

## 2022-08-04 RX ADMIN — HYDROXYZINE HYDROCHLORIDE 25 MG: 25 TABLET ORAL at 15:43

## 2022-08-04 RX ADMIN — SODIUM CHLORIDE, POTASSIUM CHLORIDE, SODIUM LACTATE AND CALCIUM CHLORIDE: 600; 310; 30; 20 INJECTION, SOLUTION INTRAVENOUS at 17:21

## 2022-08-04 RX ADMIN — MAGNESIUM SULFATE HEPTAHYDRATE 2 G: 40 INJECTION, SOLUTION INTRAVENOUS at 13:00

## 2022-08-04 RX ADMIN — OXYCODONE HYDROCHLORIDE 5 MG: 5 TABLET ORAL at 17:17

## 2022-08-04 RX ADMIN — KETAMINE HYDROCHLORIDE 25 MG: 10 INJECTION, SOLUTION INTRAMUSCULAR; INTRAVENOUS at 12:50

## 2022-08-04 RX ADMIN — HYDROMORPHONE HYDROCHLORIDE 0.2 MG: 0.2 INJECTION, SOLUTION INTRAMUSCULAR; INTRAVENOUS; SUBCUTANEOUS at 16:20

## 2022-08-04 RX ADMIN — ONDANSETRON 4 MG: 2 INJECTION INTRAMUSCULAR; INTRAVENOUS at 13:30

## 2022-08-04 RX ADMIN — FENTANYL CITRATE 25 MCG: 50 INJECTION, SOLUTION INTRAMUSCULAR; INTRAVENOUS at 15:14

## 2022-08-04 RX ADMIN — SODIUM CHLORIDE, POTASSIUM CHLORIDE, SODIUM LACTATE AND CALCIUM CHLORIDE: 600; 310; 30; 20 INJECTION, SOLUTION INTRAVENOUS at 20:10

## 2022-08-04 RX ADMIN — EPINEPHRINE 0.2 MG: 1 INJECTION, SOLUTION INTRAMUSCULAR; SUBCUTANEOUS at 12:56

## 2022-08-04 RX ADMIN — HYDROMORPHONE HYDROCHLORIDE 0.2 MG: 0.2 INJECTION, SOLUTION INTRAMUSCULAR; INTRAVENOUS; SUBCUTANEOUS at 16:12

## 2022-08-04 RX ADMIN — LIDOCAINE HYDROCHLORIDE,EPINEPHRINE BITARTRATE 5 ML: 20; .005 INJECTION, SOLUTION EPIDURAL; INFILTRATION; INTRACAUDAL; PERINEURAL at 14:41

## 2022-08-04 RX ADMIN — LEVOTHYROXINE SODIUM 88 MCG: 0.09 TABLET ORAL at 20:14

## 2022-08-04 RX ADMIN — CEFAZOLIN SODIUM 2 G: 2 INJECTION, SOLUTION INTRAVENOUS at 20:10

## 2022-08-04 RX ADMIN — KETOROLAC TROMETHAMINE 15 MG: 15 INJECTION, SOLUTION INTRAMUSCULAR; INTRAVENOUS at 17:20

## 2022-08-04 RX ADMIN — BUPIVACAINE HYDROCHLORIDE IN DEXTROSE 1.8 ML: 7.5 INJECTION, SOLUTION SUBARACHNOID at 12:55

## 2022-08-04 RX ADMIN — LIDOCAINE HYDROCHLORIDE 0.1 ML: 10 INJECTION, SOLUTION EPIDURAL; INFILTRATION; INTRACAUDAL; PERINEURAL at 11:46

## 2022-08-04 RX ADMIN — SODIUM CHLORIDE, POTASSIUM CHLORIDE, SODIUM LACTATE AND CALCIUM CHLORIDE: 600; 310; 30; 20 INJECTION, SOLUTION INTRAVENOUS at 11:46

## 2022-08-04 NOTE — PROCEDURES
08/04/22    PREOPERATIVE DIAGNOSES:    1. Right knee arthritis   2. Metal allergy  POSTOPERATIVE DIAGNOSIS:    1. Right knee arthritis   2. Metal allergy  PROCEDURE: Right total knee arthroplasty.   SURGEON: Sheldon Peters MD   ASSISTANT: Latrice Mathis PA-C The presence of the PA was necessary for safe progression of the case.   ANESTHESIA: Spinal with MAC.   ESTIMATED BLOOD LOSS: 50 mL.   TOURNIQUET TIME: 42 minutes at 250 mmHg.   COMPLICATIONS: None apparent.   DISPOSITION: Stable to PACU.    IMPLANTS USED: [employer] oxinium Legion size 5N CR femoral component with a size 3 tibial component, size 3 by 13mm dished polyethylene and 32 mm patella.     INDICATIONS: Yudelka is an 81 year old female with a long history of progressive right knee pain due to end stage arthritis. Unfortunately, she failed conservative management including therapy and injections. After discussing risks, benefits and surgery, she elected to proceed with a right total knee replacement understanding the risks of infection, damage to vessels and nerves, blood clots, stiffness, ongoing pain, need for revision surgery, need for transfusion.     PROCEDURE: Yudelka was brought to the preoperative holding area where the right knee was marked. Consent was reviewed. She then was transferred to the operating theater. After induction of successful spinal anesthesia, she was placed supine with a bump under the right hip.  A timeout was performed, verifying correct patient, surgery, location. She received preoperative antibiotics as well as transexemic acid. The right lower extremity was then prepped and draped in standard sterile fashion.     We exsanguinated the leg and inflated the tourniquet. We then made a longitudinal incision over the anterior aspect of the knee. Dissection was carried down through skin and subcutaneous tissue. A medial parapatellar arthrotomy was made, exposing end stage lateral and patellofemoral arthritis.  Synovial tissue  from the suprapatellar pouch excised. The anterior horn of the medial meniscus was released and a gentle medial release was performed. Then, the remainder of the fat pad was excised. We turned our attention to the patella. Using a free hand technique, this was resected down to 12 mm and sized to a 32mm, then punched and a metal protector plate was placed. We then turned our attention to the femur. Preoperatively, there was a 7 degree flexion contracture.  Therefore, using an intramedullary alignment guide, 11.5 mm of distal femur was resected in 6 degrees of valgus.     We then turned our attention to the tibia.  An extramedullary alignment cut was used to resect 2mm off the low medial side, perpendicular to the mechanical axis.  We then turned our attention back to the distal femur and sized it to a size 5.  The epicondylar axis was established and we placed our 4-in-1 cutting block perpendicular to it, in 1 degrees of external rotation. With this in place, our anterior, posterior and chamfer distal femur cuts were made.  We then used a laminar  to open the joint in order to remove medial and lateral meniscus remnants as well as posterior osteophytes.  The PCL was intact but appeared lax.  A variety of polyethylene were trialed, and we felt a 13mm was best, with range of motion from full extension to 135 of flexion, stability to varus and valgus stress, normal POLO test, and the patella tracked well.  After this, sized our tibial component to a 3.  We then drilled and punched our tibial component, lining it with the medial 1/3 of the tibial tubercle. The knee was thoroughly irrigated using pulse lavage. The final components were then cemented in place.  Given her metal allergy, we elected to use an oxinium component to minimize risk of allergic reaction.  All excess cement was removed and the knee was placed into full extension while the cement was curing. Also, the wound was instilled with dilute Betadine  solution. Once the cement had cured, we retrialed our components with a 13 dished poly with findings as above. We then placed the final poly.  The tourniquet was deflated with hemostasis achieved.  The capsular layer was closed with #1 Stratafix, at which point there was 130 degrees of flexion with gravity.  Subcutaneous tissues with 2-0 Vicryl, skin with a 3-0 Monocryl. A sterile dressing was applied and the patient was awoken from anesthesia and transferred to PACU in stable condition.     POSTOPERATIVE PLAN:   1. Weightbearing as tolerated right lower extremity with PT for mobilization.   2. Deep venous thrombosis prophylaxis: Aspirin 325mg daily x 30 days  3. Perioperative antibiotics.   4. Follow up in 2 weeks for wound check.     SYDNEY YING MD

## 2022-08-04 NOTE — ANESTHESIA CARE TRANSFER NOTE
Patient: Yudelka Ledesma    Procedure: Procedure(s):  TOTAL Knee Arthroplasty, right       Diagnosis: Arthritis of right knee [M17.11]  Diagnosis Additional Information: No value filed.    Anesthesia Type:   Spinal     Note:    Oropharynx: oropharynx clear of all foreign objects and spontaneously breathing  Level of Consciousness: awake and drowsy  Oxygen Supplementation: face mask  Level of Supplemental Oxygen (L/min / FiO2): 8  Independent Airway: airway patency satisfactory and stable  Dentition: dentition unchanged  Vital Signs Stable: post-procedure vital signs reviewed and stable  Report to RN Given: handoff report given  Patient transferred to: Phase II    Handoff Report: Identifed the Patient, Identified the Reponsible Provider, Reviewed the pertinent medical history, Discussed the surgical course, Reviewed Intra-OP anesthesia mangement and issues during anesthesia, Set expectations for post-procedure period and Allowed opportunity for questions and acknowledgement of understanding      Vitals:  Vitals Value Taken Time   BP     Temp     Pulse     Resp     SpO2         Electronically Signed By: Ashvin Tinsley CRNA, DARIUSZ ENGLAND  August 4, 2022  2:30 PM

## 2022-08-04 NOTE — INTERVAL H&P NOTE
"I have reviewed the surgical (or preoperative) H&P that is linked to this encounter, and examined the patient. There are no significant changes    Clinical Conditions Present on Arrival:  Clinically Significant Risk Factors Present on Admission                   # Overweight: Estimated body mass index is 26.57 kg/m  as calculated from the following:    Height as of this encounter: 1.6 m (5' 3\").    Weight as of this encounter: 68 kg (150 lb).       "

## 2022-08-04 NOTE — ANESTHESIA PROCEDURE NOTES
Intrathecal injection Procedure Note    Pre-Procedure   Staff -        CRNA: Aris Rios APRN CRNA       Performed By: CRNA       Location: OR       Pre-Anesthestic Checklist: patient identified, IV checked, risks and benefits discussed, informed consent, monitors and equipment checked, pre-op evaluation, at physician/surgeon's request and post-op pain management  Timeout:       Correct Patient: Yes        Correct Procedure: Yes        Correct Site: Yes        Correct Position: Yes   Procedure Documentation  Procedure: intrathecal injection       Patient Position: sitting       Patient Prep/Sterile Barriers: sterile gloves, mask       Skin prep: Betadine       Insertion Site: L2-3. (midline approach).       Needle Gauge: 22.        Needle Length (Inches): 3.5        Spinal Needle Type: Del tipNo introducer used       # of attempts: 1 and  # of redirects:  1    Assessment/Narrative         CSF fluid: clear.    Medication(s) Administered   0.75% Hyperbaric Bupivacaine (Intrathecal) - Intrathecal   1.8 mL - 8/4/2022 12:55:00 PM  Epinephrine 1000 mcg/mL (Intrathecal) - Intrathecal   0.2 mg - 8/4/2022 12:56:00 PM

## 2022-08-04 NOTE — ANESTHESIA PROCEDURE NOTES
Adductor canal Procedure Note    Pre-Procedure   Staff -        CRNA: Ashvin Tinsley APRN CRNA       Performed By: CRNA       Location: post-op       Pre-Anesthestic Checklist: patient identified, IV checked, site marked, risks and benefits discussed, informed consent, monitors and equipment checked, pre-op evaluation, at physician/surgeon's request and post-op pain management  Timeout:       Correct Patient: Yes        Correct Procedure: Yes        Correct Site: Yes        Correct Position: Yes        Correct Laterality: Yes        Site Marked: Yes  Procedure Documentation  Procedure: Adductor canal       Laterality: right       Patient Position: supine       Patient Prep/Sterile Barriers: sterile gloves, mask, patient draped       Skin prep: DuraPrep       Needle Type: insulated and Touhy needle       Needle Gauge: 21.        Needle Length (millimeters): 100        Ultrasound guided       1. Ultrasound was used to identify targeted nerve, plexus, vascular marker, or fascial plane and place a needle adjacent to it in real-time.       2. Ultrasound was used to visualize the spread of anesthetic in close proximity to the above referenced structure.       3. A permanent image is entered into the patient's record.       4. The visualized anatomic structures appeared normal.       5. There were no apparent abnormal pathologic findings.    Assessment/Narrative         The placement was negative for: blood aspirated and painful injection       Paresthesias: No.       Test dose of 5 mL lidocaine 2% w/ 1:200,000 epinephrine at.         Test dose negative, 3 minutes after injection, for signs of intravascular, subdural, or intrathecal injection.       Bolus given via needle. no blood aspirated via catheter.        Secured via.        Insertion/Infusion Method: Single Shot       Complications: none       Injection made incrementally with aspirations every 5 mL.

## 2022-08-04 NOTE — PROGRESS NOTES
Pt requires heavy assistance of 1-2 with transfers and mobility prior to surgery, incontinent at baseline. Pt states she cannot swallow pills without pudding, anesthesia updated and aware that TXA could not be given orally and refused Tylenol. Up to commode prior to surgery. Allergy to Ancef addressed and discussed with anesthesia. All questions answered and concerns addressed. Pt now ready for surgery and aware of post operative care.

## 2022-08-04 NOTE — ANESTHESIA POSTPROCEDURE EVALUATION
Patient: Yudelka Ledesma    Procedure: Procedure(s):  TOTAL Knee Arthroplasty, right       Anesthesia Type:  Spinal    Note:  Disposition: Outpatient   Postop Pain Control: Uneventful            Sign Out: Well controlled pain   PONV: No   Neuro/Psych: Uneventful            Sign Out: Acceptable/Baseline neuro status   Airway/Respiratory: Uneventful            Sign Out: Acceptable/Baseline resp. status   CV/Hemodynamics: Uneventful            Sign Out: Acceptable CV status; No obvious hypovolemia; No obvious fluid overload   Other NRE: NONE   DID A NON-ROUTINE EVENT OCCUR? No           Last vitals:  Vitals Value Taken Time   /54 08/04/22 1520   Temp 36.8  C (98.2  F) 08/04/22 1500   Pulse 69 08/04/22 1526   Resp 10 08/04/22 1526   SpO2 100 % 08/04/22 1526   Vitals shown include unvalidated device data.    Electronically Signed By: Andrew Barragan CRNA, APRN CRNA  August 4, 2022  3:55 PM

## 2022-08-05 ENCOUNTER — APPOINTMENT (OUTPATIENT)
Dept: PHYSICAL THERAPY | Facility: CLINIC | Age: 82
End: 2022-08-05
Attending: ORTHOPAEDIC SURGERY
Payer: COMMERCIAL

## 2022-08-05 VITALS
BODY MASS INDEX: 28.12 KG/M2 | HEART RATE: 64 BPM | RESPIRATION RATE: 18 BRPM | HEIGHT: 63 IN | WEIGHT: 158.73 LBS | OXYGEN SATURATION: 97 % | SYSTOLIC BLOOD PRESSURE: 128 MMHG | DIASTOLIC BLOOD PRESSURE: 63 MMHG | TEMPERATURE: 98.3 F

## 2022-08-05 LAB
FASTING STATUS PATIENT QL REPORTED: ABNORMAL
GLUCOSE BLD-MCNC: 166 MG/DL (ref 70–99)
GLUCOSE BLDC GLUCOMTR-MCNC: 179 MG/DL (ref 70–99)
HGB BLD-MCNC: 9.3 G/DL (ref 11.7–15.7)

## 2022-08-05 PROCEDURE — 82947 ASSAY GLUCOSE BLOOD QUANT: CPT | Performed by: ORTHOPAEDIC SURGERY

## 2022-08-05 PROCEDURE — 85018 HEMOGLOBIN: CPT | Performed by: ORTHOPAEDIC SURGERY

## 2022-08-05 PROCEDURE — 250N000011 HC RX IP 250 OP 636: Performed by: ORTHOPAEDIC SURGERY

## 2022-08-05 PROCEDURE — 36415 COLL VENOUS BLD VENIPUNCTURE: CPT | Performed by: ORTHOPAEDIC SURGERY

## 2022-08-05 PROCEDURE — 97116 GAIT TRAINING THERAPY: CPT | Mod: GP

## 2022-08-05 PROCEDURE — 82962 GLUCOSE BLOOD TEST: CPT

## 2022-08-05 PROCEDURE — 97530 THERAPEUTIC ACTIVITIES: CPT | Mod: GP

## 2022-08-05 PROCEDURE — 97161 PT EVAL LOW COMPLEX 20 MIN: CPT | Mod: GP

## 2022-08-05 PROCEDURE — 250N000013 HC RX MED GY IP 250 OP 250 PS 637: Performed by: PHYSICIAN ASSISTANT

## 2022-08-05 PROCEDURE — 250N000013 HC RX MED GY IP 250 OP 250 PS 637: Performed by: ORTHOPAEDIC SURGERY

## 2022-08-05 PROCEDURE — 97110 THERAPEUTIC EXERCISES: CPT | Mod: GP

## 2022-08-05 RX ADMIN — CEFAZOLIN SODIUM 2 G: 2 INJECTION, SOLUTION INTRAVENOUS at 04:53

## 2022-08-05 RX ADMIN — KETOROLAC TROMETHAMINE 15 MG: 15 INJECTION, SOLUTION INTRAMUSCULAR; INTRAVENOUS at 10:39

## 2022-08-05 RX ADMIN — HYDROXYZINE HYDROCHLORIDE 25 MG: 25 TABLET ORAL at 14:42

## 2022-08-05 RX ADMIN — PANTOPRAZOLE SODIUM 40 MG: 40 TABLET, DELAYED RELEASE ORAL at 05:36

## 2022-08-05 RX ADMIN — OXYCODONE HYDROCHLORIDE 5 MG: 5 TABLET ORAL at 11:24

## 2022-08-05 RX ADMIN — KETOROLAC TROMETHAMINE 15 MG: 15 INJECTION, SOLUTION INTRAMUSCULAR; INTRAVENOUS at 04:54

## 2022-08-05 RX ADMIN — ACETAMINOPHEN 975 MG: 325 TABLET, FILM COATED ORAL at 09:40

## 2022-08-05 RX ADMIN — SENNOSIDES AND DOCUSATE SODIUM 1 TABLET: 50; 8.6 TABLET ORAL at 09:40

## 2022-08-05 NOTE — DISCHARGE SUMMARY
[facility] Discharge Summary                                       JULES LICONA [id number]   Age: 81 year old  PCP: Azalea Ochoa, [phone] 1940     Date of Admission:  8/4/2022  Date of Discharge::  8/5/2022  Discharge Physician:  Latrice Mathis PA-C    Code status:  Full Code    Admission Information:  Admission Diagnosis:  Arthritis of right knee [M17.11]  Status post total knee replacement [Z96.659]    Post-Operative Day: 1 Day Post-Op     Reason for admission:  The patient was admitted for the following:Procedure(s) (LRB):  TOTAL Knee Arthroplasty, right (Right)    Principal Problem:    Status post total knee replacement  Active Problems:    Essential hypertension with goal blood pressure less than 140/90    Esophageal reflux    Hypothyroidism    Mild intermittent asthma, uncomplicated    Hyperlipidemia LDL goal <130    Other iron deficiency anemia      Allergies:  Augmentin [aspartame], Doxycycline, Gabapentin, Levaquin [levofloxacin hemihydrate], Prednisolone, Seasonal allergies, and Sulfa drugs    Following the procedure noted above the patient was transferred to the post-op floor and started on:    Therapy:  physical therapy and occupational therapy  Anticoagulation Plan: scoanticoagulationlist2:  mg daily  for 30 days  Pain Management: scopainmedication: oxycodone and tylenol  Weight bearing status: Weight bearing as tolerated     The patient was followed and co-managed by the hospitalist service during the inpatient treatment course  Complications:  None  Consultations:  None     Pertinent Labs   Lab Results: personally reviewed.     Recent Labs   Lab Test 08/05/22  0406 07/12/22  0954 05/19/22  1406 04/19/22  1515 02/10/22  1540 11/30/21  1613   HGB 9.3* 11.1* 10.5* 10.5* 11.0* 11.2*   HCT  --  35.3 32.9* 34.4* 33.2* 34.7*   MCV  --  92 92 96 91 91   PLT  --  362 369 384 392 398   NA  --   --   --  138 138 138   CRP  --   --   --   --   <2.9  --           Discharge Information:  Condition at discharge: Stable  Discharge destination:  Discharged to home     Medications at discharge:  Current Discharge Medication List      START taking these medications    Details   aspirin (ASA) 325 MG EC tablet Take 1 tablet (325 mg) by mouth daily  Qty: 30 tablet, Refills: 0    Associated Diagnoses: Status post right knee replacement      hydrOXYzine (ATARAX) 25 MG tablet Take 1-2 tablets (25-50 mg) by mouth every 6 hours as needed for itching or anxiety (with pain, moderate pain)  Qty: 30 tablet, Refills: 0    Associated Diagnoses: Status post right knee replacement         CONTINUE these medications which have CHANGED    Details   acetaminophen (TYLENOL) 325 MG tablet Take 2 tablets (650 mg) by mouth every 4 hours as needed for other (mild pain)  Qty: 100 tablet, Refills: 0    Associated Diagnoses: Status post right knee replacement      oxyCODONE (ROXICODONE) 5 MG tablet Take 1-2 tablets (5-10 mg) by mouth every 4 hours as needed for moderate to severe pain  Qty: 33 tablet, Refills: 0    Associated Diagnoses: Status post right knee replacement         CONTINUE these medications which have NOT CHANGED    Details   albuterol (PROAIR HFA/PROVENTIL HFA/VENTOLIN HFA) 108 (90 Base) MCG/ACT inhaler Inhale 1-2 puffs into the lungs every 4 hours as needed for shortness of breath / dyspnea or wheezing  Qty: 9 g, Refills: 3    Comments: Pharmacy may dispense brand covered by insurance (Proair, or proventil or ventolin or generic albuterol inhaler)  Associated Diagnoses: Mild intermittent asthma, uncomplicated      atorvastatin (LIPITOR) 40 MG tablet Take 1 tablet by mouth once daily  Qty: 180 tablet, Refills: 3    Associated Diagnoses: Hyperlipidemia LDL goal <130      chlorhexidine (PERIDEX) 0.12 % solution FILL CAP TO FILL LINE (15 ML). SWISH IN MOUTH UNDILUTED FOR 30 SECONDS AND THEN SPIT OUT, TWO TIMES A DAY      diltiazem ER (DILT-XR) 240 MG 24 hr ER beaded capsule  Take 1 capsule by mouth once daily  Qty: 90 capsule, Refills: 3    Associated Diagnoses: Essential hypertension with goal blood pressure less than 140/90      esomeprazole (NEXIUM) 20 MG DR capsule Take 1 capsule (20 mg) by mouth every morning (before breakfast) One hour before meals fill when needed    Associated Diagnoses: Gastroesophageal reflux disease without esophagitis      ipratropium (ATROVENT HFA) 17 MCG/ACT inhaler INHALE 2 PUFFS INTO THE LUNGS FOUR TIMES A DAY AS NEEDED FOR WHEEZING  Qty: 12.9 g, Refills: 3    Associated Diagnoses: Mild intermittent asthma, uncomplicated      levothyroxine (SYNTHROID/LEVOTHROID) 88 MCG tablet Take 1 tablet (88 mcg) by mouth daily  Qty: 90 tablet, Refills: 3    Associated Diagnoses: Acquired hypothyroidism      lisinopril (ZESTRIL) 20 MG tablet Take 1 tablet by mouth once daily  Qty: 90 tablet, Refills: 1    Associated Diagnoses: Essential hypertension with goal blood pressure less than 140/90         STOP taking these medications       ibuprofen (ADVIL/MOTRIN) 200 MG tablet Comments:   Reason for Stopping:                          Follow-Up Care:  Patient should be seen by [facility] in 10-14 days by the patient's Orthopedic Surgeon/Physician Assistant.  Call [phone] for appointment or questions.    Latrice Mathis PA-C

## 2022-08-05 NOTE — PROGRESS NOTES
Neuro: A&Ox4. Alert, anxious upon coming up to the floor due to pain, now resolved due to getting pain meds.  Cardiac: Afebrile. VSS.   Respiratory: Sating mid 90's on 1-2L  GI/: Adequate urine output per commode, heavy assist of 2, difficult going back to bed from commode, unable to stand up. Gait belt used.  Diet/appetite: Tolerating minimally some food intake per pt ordered chopped up food, has swallow issues with pills needs pudding. Slow with intake.  Activity:  Assist of 2, pt has ability to feel down to toes now and able to move fairly ok to commode with assist of 2 but back to bed was very difficult and pt unable to fully stand on own. Caution with moving.   Pain: At acceptable level on current regimen.   Skin: perineal area pink in groin area.   LDA's: Piv infusing LR    Plan: Pt came up from pacu in a lot of pain and had some unmet expectations of not having any pain, Rn explained that she will have some pain but we are trying to get her meds from pharmacy approved. Oxycodone/toradol given, she had an oxycodone/hydroxyzine in pacu also and not well controlled. Pain management, pain improved with oxycodone and toradol. Continue with POC. Notify primary team with changes.

## 2022-08-05 NOTE — PROGRESS NOTES
CLINICAL NUTRITION SERVICES - BRIEF NOTE    RD received Positive MST screen    A full Nutrition Assessment will be deferred at this time as patient is currently outpatient status.      Pt can be referred to outpatient RD by primary care provider after discharge as appropriate.      Should patient's status change to Inpatient, we will be available for a full Nutrition Assessment with a consult.     Marissa Roper RDN, MARLEE  Clinical Dietitian  Office: [phone]  [facility] pager: [phone]     not applicable (Male)

## 2022-08-05 NOTE — PROGRESS NOTES
[facility]      OUTPATIENT PHYSICAL THERAPY EVALUATION  PLAN OF TREATMENT FOR OUTPATIENT REHABILITATION  (COMPLETE FOR INITIAL CLAIMS ONLY)  Patient's Last Name, First Name, M.I.  YOB: 1940  Dada LedesmaYudelka  HANNAH                        Provider's Name  [facility] Medical Record No.  [id number]                               Onset Date:  08/04/22   Start of Care Date:  08/05/22      Type:     _X_PT   ___OT   ___SLP Medical Diagnosis:  s/p R TKA                        PT Diagnosis:  impaired mobility s/p R TKA   Visits from SOC:  1   _________________________________________________________________________________  Plan of Treatment/Functional Goals    Planned Interventions: balance training, bed mobility training, cryotherapy, gait training, home exercise program, patient/family education, ROM (range of motion), stair training, stretching, strengthening, transfer training     Goals: See Physical Therapy Goals on Care Plan in [facility] electronic health record.    Therapy Frequency: 2x/day  Predicted Duration of Therapy Intervention: 08/10/22  _________________________________________________________________________________    I CERTIFY THE NEED FOR THESE SERVICES FURNISHED UNDER        THIS PLAN OF TREATMENT AND WHILE UNDER MY CARE     (Physician co-signature of this document indicates review and certification of the therapy plan).                Certification date from: 08/05/22, Certification date to: 08/10/22    Referring Physician: Sheldon Peters MD            Initial Assessment        See Physical Therapy evaluation dated 08/05/22 in Epic electronic health record.

## 2022-08-05 NOTE — PLAN OF CARE
Physical Therapy Discharge Summary    Reason for therapy discharge:    All goals and outcomes met, no further needs identified.    Progress towards therapy goal(s). See goals on Care Plan in [facility] electronic health record for goal details.  Goals met    Therapy recommendation(s):    Continued therapy is recommended.  Rationale/Recommendations:  Recommend home care PT to increase safety and indep in own environment. Patient has demonstrated ability to ambulate household distances with SBA and 2WW and complete stairs with CGA. Spouse plans to be with patient for mobility due to fear of falling. No further IP PT needs, patient is safe to return home once medically stable.

## 2022-08-05 NOTE — PLAN OF CARE
[place] [facility] DISCHARGE NOTE    Patient discharged to home at 4:00 PM via wheel chair. Accompanied by spouse and staff. Discharge instructions reviewed with patient and spouse, opportunity offered to ask questions. Prescriptions sent with patient to fill . All belongings sent with patient.    Yakelin Shah RN    Goal Outcome Evaluation:    Plan of Care Reviewed With: patient, spouse

## 2022-08-05 NOTE — PROGRESS NOTES
08/05/22 0820   Quick Adds   Quick Adds Certification   Type of Visit Initial PT Evaluation   Living Environment   People in Home spouse   Current Living Arrangements house   Home Accessibility stairs to enter home   Number of Stairs, Main Entrance 4   Stair Railings, Main Entrance railings safe and in good condition   Number of Stairs, Within Home, Primary none   Transportation Anticipated family or friend will provide   Living Environment Comments Has upstairs but does not need to access   Self-Care   Equipment Currently Used at Home cane, quad;walker, standard;walker, rolling;commode chair;shower chair   Fall history within last six months no   Activity/Exercise/Self-Care Comment Indep with ADL's. Tub shower combo with tub transfer bench, no grab bars so always sits for showers. Use of quad cane in home, 2WW/4WW outside of home.   General Information   Onset of Illness/Injury or Date of Surgery 08/04/22   Referring Physician Sheldon Peters MD   Patient/Family Therapy Goals Statement (PT) Hoping to return home however very fearful of mobility due to pain   Pertinent History of Current Problem (include personal factors and/or comorbidities that impact the POC) 81 y.o. female s/p R TKA performed 8/4/22, now WBAT.   Cognition   Affect/Mental Status (Cognition) WFL   Orientation Status (Cognition) oriented x 3   Follows Commands (Cognition) WFL   Pain Assessment   Patient Currently in Pain Yes, see Vital Sign flowsheet   Range of Motion (ROM)   Range of Motion ROM deficits secondary to surgical procedure;ROM deficits secondary to pain   ROM Comment R knee approx 0-3-45 deg   Strength (Manual Muscle Testing)   Strength (Manual Muscle Testing) Able to perform R SLR;Able to perform L SLR   Strength Comments Strength limited due to pain   Bed Mobility   Comment, (Bed Mobility) supine<>sit with SBA, increased time due to pain   Transfers   Comment, (Transfers) sit<>stand from EOB with CGA and 2WW, fair standing  balance   Gait/Stairs (Locomotion)   [place] Level (Gait) contact guard   Assistive Device (Gait) walker, front-wheeled   Distance in Feet (Required for LE Total Joints) 40   Pattern (Gait) step-through   Deviations/Abnormal Patterns (Gait) antalgic;gait speed decreased   Comment, (Gait/Stairs) Good stability with ambulation, hesitant to ambulate in hallway due to fear of falling and pain   Clinical Impression   Criteria for Skilled Therapeutic Intervention Yes, treatment indicated   PT Diagnosis (PT) impaired mobility s/p R TKA   Influenced by the following impairments pain, reduced ROM, LE weakness, reduced activity tolerance   Functional limitations due to impairments impaired transfers, ambulation   Clinical Presentation (PT Evaluation Complexity) Stable/Uncomplicated   Clinical Presentation Rationale clinical reasoning and chart review   Clinical Decision Making (Complexity) low complexity   Planned Therapy Interventions (PT) balance training;bed mobility training;cryotherapy;gait training;home exercise program;patient/family education;ROM (range of motion);stair training;stretching;strengthening;transfer training   Anticipated Equipment Needs at Discharge (PT)   (has all equipment)   Risk & Benefits of therapy have been explained evaluation/treatment results reviewed;care plan/treatment goals reviewed;risks/benefits reviewed;current/potential barriers reviewed;participants voiced agreement with care plan;patient;participants included   Clinical Impression Comments Pt requires increased time to complete tasks due to fear of pain and falling. Needs reassurance of proper achieved. Pt reporting feeling she needs someone with her at all times for mobility, discussed option for spouse to be present with her or TCU for continued gains in mobility however pt declined need for TCU.   PT Discharge Planning   PT Discharge Recommendation (DC Rec) home with home care physical therapy;home with assist   PT Rationale  for DC Rec Mobilizing with CGA and 2WW, would benefit from home safety assessment   PT Brief overview of current status 40 feet amb with 2WW, CGA transfers   Therapy Certification   Start of care date 08/05/22   Certification date from 08/05/22   Certification date to 08/10/22   Medical Diagnosis s/p R TKA   Total Evaluation Time   Total Evaluation Time (Minutes) 15   Physical Therapy Goals   PT Frequency 2x/day   PT Predicted Duration/Target Date for Goal Attainment 08/10/22   PT Goals Bed Mobility;Transfers;Gait;Stairs   PT: Bed Mobility Supine to/from sit;Supervision/stand-by assist;Goal Met   PT: Transfers Supervision/stand-by assist;Bed to/from chair   PT: Gait Supervision/stand-by assist;Standard walker;100 feet   PT: Stairs Supervision/stand-by assist;4 stairs;Rail on both sides

## 2022-08-05 NOTE — PROGRESS NOTES
"                  [facility] Progress Note      Post-operative Day: 1 Day Post-Op    Procedure(s):  TOTAL Knee Arthroplasty, right      Subjective:    Pain: minimal - not having much pain at rest, but struggles with weakness when getting up to ambulate  Chest pain, SOB:  No  Urinating: Yes  Flatus: Nom but active BS  Nausea/vomiting: No    Objective:  Blood pressure 128/63, pulse 64, temperature 98.3  F (36.8  C), temperature source Oral, resp. rate 16, height 1.6 m (5' 3\"), weight 72 kg (158 lb 11.7 oz), SpO2 97 %, not currently breastfeeding.    Motor function, sensation, and circulation intact   Yes  Wound status: Dressings are clean dry and intact. No - Aquacel bandage full of bloody drainage. Removed, underlying incision intact, no bleeding or drainage able to be expressed. Reapproximated with steristrips and new Aquacel applied  Calf tenderness: Bilateral  No    Pertinent Labs   Lab Results: personally reviewed.     Recent Labs   Lab Test 08/05/22  0406 07/12/22  0954 05/19/22  1406 04/19/22  1515 02/10/22  1540 11/30/21  1613   HGB 9.3* 11.1* 10.5* 10.5* 11.0* 11.2*   HCT  --  35.3 32.9* 34.4* 33.2* 34.7*   MCV  --  92 92 96 91 91   PLT  --  362 369 384 392 398   NA  --   --   --  138 138 138   CRP  --   --   --   --  <2.9  --        Plan: Anticoagulation protocol: scoanticoagulationlist2:  mg daily  x 30  days            Pain medications:  scopainmedication: oxycodone and tylenol            Weight bearing status:  WBAT            Disposition:  Home this afternoon after PT if cleared and meets goals. Pt has 4 stairs to get into house. If not safe to return home today, will discharge home tomorrow. Home PT services             Continue cares and rehabilitation     Report completed by:  Latrice Mathis PA-C  Date: 8/5/2022  Time: 11:46 AM    "

## 2022-08-05 NOTE — PROGRESS NOTES
Patient vital signs are at baseline: Yes  Patient able to ambulate as they were prior to admission or with assist devices provided by therapies during their stay:  No,  Reason:  Pt is a heavy assist of 2-3 staff to transfer and take any steps. Not moving well. Severe pain when up.  Patient MUST void prior to discharge:  Yes  Patient able to tolerate oral intake:  Yes  Pain has adequate pain control using Oral analgesics: Pain was challenging to control when patient first came to floor. Now at rest it has been tolerable.    Does patient have an identified :  Yes  Has goal D/C date and time been discussed with patient:  Yes. Pt needs to see therapies for recommendations.

## 2022-08-05 NOTE — CONSULTS
Care Management Note:     Care Management team received referral from Ortho team to assist pt with Home Care services post surgical services.     Per IDT rounds, EMR review, and discussion with PT/OT staff, it has been determined that pt will discharge to home with pre-planned [facility] (Phone: [phone]) PT & RN cares.     SW met at bedside with pt and discussed home care services, Medicare homebound status and initial visit/call.  Pt is in agreement with plan of care & hopes to discharge later today with the support of her spouse.     Spouse to transport at time of discharge.     SW informed Ortho PA to writer home care orders & [facility].    MARQUITA Alonzo  Care Transitions   Tele: [phone]

## 2022-08-16 ENCOUNTER — TRANSCRIBE ORDERS (OUTPATIENT)
Dept: OTHER | Age: 82
End: 2022-08-16

## 2022-08-16 DIAGNOSIS — Z96.651 S/P TOTAL KNEE ARTHROPLASTY, RIGHT: Primary | ICD-10-CM

## 2022-08-30 ENCOUNTER — HOSPITAL ENCOUNTER (OUTPATIENT)
Dept: PHYSICAL THERAPY | Facility: CLINIC | Age: 82
Setting detail: THERAPIES SERIES
Discharge: HOME | End: 2022-08-30
Attending: PHYSICIAN ASSISTANT
Payer: COMMERCIAL

## 2022-08-30 DIAGNOSIS — Z96.651 S/P TOTAL KNEE ARTHROPLASTY, RIGHT: ICD-10-CM

## 2022-08-30 PROCEDURE — 97110 THERAPEUTIC EXERCISES: CPT | Mod: GP | Performed by: PHYSICAL THERAPIST

## 2022-08-30 PROCEDURE — 97161 PT EVAL LOW COMPLEX 20 MIN: CPT | Mod: GP | Performed by: PHYSICAL THERAPIST

## 2022-08-30 NOTE — PROGRESS NOTES
[facility]    OUTPATIENT PHYSICAL THERAPY ORTHOPEDIC EVALUATION  PLAN OF TREATMENT FOR OUTPATIENT REHABILITATION  (COMPLETE FOR INITIAL CLAIMS ONLY)  Patient's Last Name, First Name, M.I.  YOB: 1940  Yudelka Hendrix    Provider s Name:  [facility]   Medical Record No.  [id number]   Start of Care Date:  08/30/22   Onset Date:      Type:     _X__PT   ___OT   ___SLP Medical Diagnosis:        PT Diagnosis:  S/P total knee arthroplasty, right   Visits from SOC:  1      _________________________________________________________________________________  Plan of Treatment/Functional Goals:  ADL retraining, balance training, gait training, joint mobilization, manual therapy, neuromuscular re-education, motor coordination training, ROM, strengthening, stretching, bed mobility training     Cryotherapy, Electrical stimulation, TENS, Hot packs     Goals  Goal Identifier: Housework  Goal Description: Pt will be able to complete ADL's independently using walker or cane with <3/10 pain in R knee  Target Date: 09/27/22    Goal Identifier: Taking care of pony  Goal Description: Pt will be able to squat and reach to the floor with full knee flexion ROM in order to take care of her pony.       Goal Identifier: Walking  Goal Description: Pt will achieve 0 deg knee extension in order to achieve normal gait mechanics without pain  Target Date: 10/25/22    Goal Identifier: HEP  Goal Description: Pt will be compliant with HEP in order to gain knee strength and ROM and return to PLOF  Target Date: 10/25/22    Therapy Frequency:  2 times/Week  Predicted Duration of Therapy Intervention:  8 wks    Arabella Enriquez, PT                 I CERTIFY THE NEED FOR THESE SERVICES FURNISHED UNDER        THIS PLAN OF TREATMENT AND WHILE UNDER MY CARE .             Physician Signature                Date    X_____________________________________________________                         Certification Date From:   8/30/2022  Certification Date To:   10/25/2022    Referring Provider:  Latrice Mathis PA-C    Initial Assessment        See Epic Evaluation Start of Care Date: 08/30/22                                                                    Physical Therapy Evaluation  08/30/22 1500   General Information   Type of Visit Initial OP Ortho PT Evaluation   Start of Care Date 08/30/22   Referring Physician Latrice Mathis PA-C   Patient/Family Goals Statement Pt reports wanting to return to housework, taking care of her pony, and walking   Orders Evaluate and Treat   Date of Order 08/16/22   Medical Diagnosis S/P total knee arthroplasty, right   Surgical/Medical history reviewed Yes   Precautions/Limitations no known precautions/limitations   General Information Comments PMH: Anemia, Asthma, High Blood Pressure, Arthritis, Unexplained weight loss   Body Part(s)   Body Part(s) Knee   Presentation and Etiology   Pertinent history of current problem (include personal factors and/or comorbidities that impact the POC) Pt reports numbness and swelling in lower leg. Pt had in home therapy and is currently completing exercises in sitting and standing-no change in sx throughout the day. Pt reports decreased ability to sleep. Pt reports taking Rx pain meds for knee.   Impairments A. Pain;C. Swelling;E. Decreased flexibility;G. Impaired balance;K. Numbness   Functional Limitations perform activities of daily living;perform desired leisure / sports activities   Symptom Location R knee   How/Where did it occur From Degenerative Joint Disease   Chronicity New   Pain rating (0-10 point scale) Best (/10);Worst (/10)   Best (/10) 1   Worst (/10) 5   Pain quality A. Sharp;C. Aching;D. Burning   Frequency of pain/symptoms A. Constant   Pain/symptoms are: The same all the time   Pain/symptoms exacerbated by G.  Certain positions   Pain/symptoms eased by A. Sitting;C. Rest;E. Changing positions   Progression of symptoms since onset: Unchanged   Current Level of Function   Current Community Support Family/friend caregiver   Patient role/employment history F. Retired   [facility]   Current equipment-Gait/Locomotion Quad cane;Walker   Fall Risk Screen   Fall screen completed by PT   Have you fallen 2 or more times in the past year? No   Have you fallen and had an injury in the past year? No   Abuse Screen (yes response referral indicated)   Feels Unsafe at Home or Work/School no   Feels Threatened by Someone no   Does Anyone Try to Keep You From Having Contact with Others or Doing Things Outside Your Home? no   Physical Signs of Abuse Present no   Knee Objective Findings   Gait/Locomotion Slow, shortened step length, cautious walking   Knee ROM Comment Pain with knee extension   Palpation Significant swelling around R knee, especially on medial side   Observation Pt sitting in high chair in waiting room, moves from sit to stand slowly and with caution, slow and short step length, pt uses walker and has quad cane with her.   Integumentary  Increased swelling in R leg, significantly more on medial side; R LE 47 cm knee joint line and R ankle superior sock line 31 cm   Posture Rounded shoulders   Side (if bilateral, select both right and left) Right   Right Knee Extension AROM lacking 20 deg   Right Knee Flexion AROM 95 deg   Right Knee Flexion Strength 5/5   Right Knee Extension Strength 4/5   Right Quad Set Strength Sufficient quad activation for SLR and quad sets   Planned Therapy Interventions   Planned Therapy Interventions ADL retraining;balance training;gait training;joint mobilization;manual therapy;neuromuscular re-education;motor coordination training;ROM;strengthening;stretching;bed mobility training   Planned Modality Interventions   Planned Modality Interventions Cryotherapy;Electrical  stimulation;TENS;Hot packs   Clinical Impression   Criteria for Skilled Therapeutic Interventions Met yes, treatment indicated   PT Diagnosis S/P total knee arthroplasty, right   Influenced by the following impairments Decreased R knee ROM, strength, and increased pain with walking and sleeping   Functional limitations due to impairments Walking, ADL's, Taking care of pets   Clinical Presentation Stable/Uncomplicated   Clinical Presentation Rationale Decrease in pain and swelling since surgery. Pt reports progress with exercises given from home health PT   Clinical Decision Making (Complexity) Low complexity   Therapy Frequency 2 times/Week   Predicted Duration of Therapy Intervention (days/wks) 8 wks   Risk & Benefits of therapy have been explained Yes   Patient, Family & other staff in agreement with plan of care Yes   Clinical Impression Comments Pt is an 80 yo female s/p R total knee arthoplasty.  Pt presents to PT with c/o knee pain, swelling, and decreased mobility impacting her ability to ambulate, complete chores around her house, and take care of her horse.  Skilled PT is indicated to address limitations in strength and ROM and proper gait mechanics.  Pt prognosis is fair due to current progression in exercises and decreased swelling since surgery.   Education Assessment   Preferred Learning Style Listening;Reading;Demonstration;Pictures/video   Barriers to Learning No barriers   ORTHO GOALS   PT Ortho Eval Goals 1;2;3;4   Ortho Goal 1   Goal Identifier Housework   Goal Description Pt will be able to complete ADL's independently using walker or cane with <3/10 pain in R knee   Target Date 09/27/22   Ortho Goal 2   Goal Identifier Taking care of pony   Goal Description Pt will be able to squat and reach to the floor with full knee flexion ROM in order to take care of her pony.   Ortho Goal 3   Goal Identifier Walking   Goal Description Pt will achieve 0 deg knee extension in order to achieve normal gait  mechanics without pain   Target Date 10/25/22   Ortho Goal 4   Goal Identifier HEP   Goal Description Pt will be compliant with HEP in order to gain knee strength and ROM and return to PLOF   Target Date 10/25/22   Total Evaluation Time   PT Eval, Low Complexity Minutes (80659) 25     Arabella Enriquez, PT, DPT, CLT  Physical Therapist & Certified Lymphedema Therapist  [facility]

## 2022-08-31 DIAGNOSIS — E03.9 ACQUIRED HYPOTHYROIDISM: ICD-10-CM

## 2022-08-31 RX ORDER — LEVOTHYROXINE SODIUM 88 UG/1
TABLET ORAL
Qty: 90 TABLET | Refills: 0 | Status: SHIPPED | OUTPATIENT
Start: 2022-08-31 | End: 2022-11-29

## 2022-09-06 ENCOUNTER — HOSPITAL ENCOUNTER (OUTPATIENT)
Dept: PHYSICAL THERAPY | Facility: CLINIC | Age: 82
Setting detail: THERAPIES SERIES
Discharge: HOME | End: 2022-09-06
Attending: PHYSICIAN ASSISTANT
Payer: COMMERCIAL

## 2022-09-06 PROCEDURE — 97535 SELF CARE MNGMENT TRAINING: CPT | Mod: GP | Performed by: PHYSICAL THERAPIST

## 2022-09-06 PROCEDURE — 97116 GAIT TRAINING THERAPY: CPT | Mod: GP | Performed by: PHYSICAL THERAPIST

## 2022-09-13 ENCOUNTER — OFFICE VISIT (OUTPATIENT)
Dept: FAMILY MEDICINE | Facility: CLINIC | Age: 82
End: 2022-09-13
Payer: COMMERCIAL

## 2022-09-13 ENCOUNTER — HOSPITAL ENCOUNTER (OUTPATIENT)
Dept: PHYSICAL THERAPY | Facility: CLINIC | Age: 82
Setting detail: THERAPIES SERIES
Discharge: HOME | End: 2022-09-13
Attending: PHYSICIAN ASSISTANT
Payer: COMMERCIAL

## 2022-09-13 VITALS
WEIGHT: 145 LBS | DIASTOLIC BLOOD PRESSURE: 80 MMHG | TEMPERATURE: 98.4 F | HEART RATE: 84 BPM | RESPIRATION RATE: 20 BRPM | OXYGEN SATURATION: 98 % | BODY MASS INDEX: 25.69 KG/M2 | SYSTOLIC BLOOD PRESSURE: 118 MMHG

## 2022-09-13 DIAGNOSIS — M54.16 LUMBAR BACK PAIN WITH RADICULOPATHY AFFECTING LEFT LOWER EXTREMITY: ICD-10-CM

## 2022-09-13 DIAGNOSIS — E03.9 ACQUIRED HYPOTHYROIDISM: ICD-10-CM

## 2022-09-13 DIAGNOSIS — D50.9 IRON DEFICIENCY ANEMIA, UNSPECIFIED IRON DEFICIENCY ANEMIA TYPE: ICD-10-CM

## 2022-09-13 DIAGNOSIS — R73.03 PREDIABETES: ICD-10-CM

## 2022-09-13 DIAGNOSIS — I89.0 LYMPHEDEMA OF BOTH LOWER EXTREMITIES: ICD-10-CM

## 2022-09-13 DIAGNOSIS — R63.4 WEIGHT LOSS: ICD-10-CM

## 2022-09-13 DIAGNOSIS — G25.0 ESSENTIAL TREMOR: ICD-10-CM

## 2022-09-13 DIAGNOSIS — G56.03 BILATERAL CARPAL TUNNEL SYNDROME: ICD-10-CM

## 2022-09-13 DIAGNOSIS — I10 ESSENTIAL HYPERTENSION WITH GOAL BLOOD PRESSURE LESS THAN 140/90: Primary | ICD-10-CM

## 2022-09-13 LAB
ALBUMIN SERPL BCG-MCNC: 4 G/DL (ref 3.5–5.2)
ALP SERPL-CCNC: 176 U/L (ref 35–104)
ALT SERPL W P-5'-P-CCNC: 9 U/L (ref 10–35)
ANION GAP SERPL CALCULATED.3IONS-SCNC: 12 MMOL/L (ref 7–15)
AST SERPL W P-5'-P-CCNC: 16 U/L (ref 10–35)
BASOPHILS # BLD AUTO: 0 10E3/UL (ref 0–0.2)
BASOPHILS NFR BLD AUTO: 1 %
BILIRUB SERPL-MCNC: 0.2 MG/DL
BUN SERPL-MCNC: 19.2 MG/DL (ref 8–23)
CALCIUM SERPL-MCNC: 9.7 MG/DL (ref 8.8–10.2)
CHLORIDE SERPL-SCNC: 103 MMOL/L (ref 98–107)
CREAT SERPL-MCNC: 0.72 MG/DL (ref 0.51–0.95)
DEPRECATED HCO3 PLAS-SCNC: 26 MMOL/L (ref 22–29)
EOSINOPHIL # BLD AUTO: 0.3 10E3/UL (ref 0–0.7)
EOSINOPHIL NFR BLD AUTO: 5 %
ERYTHROCYTE [DISTWIDTH] IN BLOOD BY AUTOMATED COUNT: 14.4 % (ref 10–15)
GFR SERPL CREATININE-BSD FRML MDRD: 84 ML/MIN/1.73M2
GLUCOSE SERPL-MCNC: 100 MG/DL (ref 70–99)
HCT VFR BLD AUTO: 32.1 % (ref 35–47)
HGB BLD-MCNC: 10.1 G/DL (ref 11.7–15.7)
IMM GRANULOCYTES # BLD: 0 10E3/UL
IMM GRANULOCYTES NFR BLD: 0 %
LYMPHOCYTES # BLD AUTO: 1.8 10E3/UL (ref 0.8–5.3)
LYMPHOCYTES NFR BLD AUTO: 28 %
MCH RBC QN AUTO: 30.7 PG (ref 26.5–33)
MCHC RBC AUTO-ENTMCNC: 31.5 G/DL (ref 31.5–36.5)
MCV RBC AUTO: 98 FL (ref 78–100)
MONOCYTES # BLD AUTO: 0.5 10E3/UL (ref 0–1.3)
MONOCYTES NFR BLD AUTO: 8 %
NEUTROPHILS # BLD AUTO: 3.8 10E3/UL (ref 1.6–8.3)
NEUTROPHILS NFR BLD AUTO: 59 %
PLATELET # BLD AUTO: 398 10E3/UL (ref 150–450)
POTASSIUM SERPL-SCNC: 4.4 MMOL/L (ref 3.4–5.3)
PROT SERPL-MCNC: 7.7 G/DL (ref 6.4–8.3)
RBC # BLD AUTO: 3.29 10E6/UL (ref 3.8–5.2)
SODIUM SERPL-SCNC: 141 MMOL/L (ref 136–145)
TSH SERPL DL<=0.005 MIU/L-ACNC: 4.11 UIU/ML (ref 0.3–4.2)
WBC # BLD AUTO: 6.5 10E3/UL (ref 4–11)

## 2022-09-13 PROCEDURE — 99215 OFFICE O/P EST HI 40 MIN: CPT | Performed by: FAMILY MEDICINE

## 2022-09-13 PROCEDURE — 99417 PROLNG OP E/M EACH 15 MIN: CPT | Performed by: FAMILY MEDICINE

## 2022-09-13 PROCEDURE — 84443 ASSAY THYROID STIM HORMONE: CPT | Performed by: FAMILY MEDICINE

## 2022-09-13 PROCEDURE — 36415 COLL VENOUS BLD VENIPUNCTURE: CPT | Performed by: FAMILY MEDICINE

## 2022-09-13 PROCEDURE — 85025 COMPLETE CBC W/AUTO DIFF WBC: CPT | Performed by: FAMILY MEDICINE

## 2022-09-13 PROCEDURE — 80053 COMPREHEN METABOLIC PANEL: CPT | Performed by: FAMILY MEDICINE

## 2022-09-13 PROCEDURE — 97110 THERAPEUTIC EXERCISES: CPT | Mod: GP | Performed by: PHYSICAL THERAPIST

## 2022-09-13 RX ORDER — RIVAROXABAN 10 MG/1
10 TABLET, FILM COATED ORAL DAILY
COMMUNITY
Start: 2022-08-15 | End: 2022-11-15

## 2022-09-13 ASSESSMENT — ASTHMA QUESTIONNAIRES
QUESTION_3 LAST FOUR WEEKS HOW OFTEN DID YOUR ASTHMA SYMPTOMS (WHEEZING, COUGHING, SHORTNESS OF BREATH, CHEST TIGHTNESS OR PAIN) WAKE YOU UP AT NIGHT OR EARLIER THAN USUAL IN THE MORNING: NOT AT ALL
QUESTION_5 LAST FOUR WEEKS HOW WOULD YOU RATE YOUR ASTHMA CONTROL: WELL CONTROLLED
QUESTION_4 LAST FOUR WEEKS HOW OFTEN HAVE YOU USED YOUR RESCUE INHALER OR NEBULIZER MEDICATION (SUCH AS ALBUTEROL): ONE OR TWO TIMES PER DAY
QUESTION_2 LAST FOUR WEEKS HOW OFTEN HAVE YOU HAD SHORTNESS OF BREATH: ONCE OR TWICE A WEEK
ACT_TOTALSCORE: 20
ACT_TOTALSCORE: 20
QUESTION_1 LAST FOUR WEEKS HOW MUCH OF THE TIME DID YOUR ASTHMA KEEP YOU FROM GETTING AS MUCH DONE AT WORK, SCHOOL OR AT HOME: NONE OF THE TIME

## 2022-09-13 NOTE — PATIENT INSTRUCTIONS
Consider propranolol for your tremor     Labs today    Physical therapy for back    Ensure daily

## 2022-09-13 NOTE — PROGRESS NOTES
Assessment & Plan     Essential hypertension with goal blood pressure less than 140/90  Well controlled  - Comprehensive metabolic panel (BMP + Alb, Alk Phos, ALT, AST, Total. Bili, TP); Future  - Comprehensive metabolic panel (BMP + Alb, Alk Phos, ALT, AST, Total. Bili, TP)  Continue diltiazem and lisinopril    Acquired hypothyroidism  With weight loss, recheck TSH  - TSH; Future  - TSH    Iron deficiency anemia, unspecified iron deficiency anemia type  Followed by hematology  - CBC with platelets and differential; Future  - CBC with platelets and differential    Prediabetes  Lab Results   Component Value Date    A1C 6.0 07/12/2022    A1C 6.4 02/10/2022    A1C 6.4 08/11/2011       Weight loss  Uncertain etiology  Will monitor for now, take supplements daily and recheck in 2 months    Essential tremor  Discussed using propranolol for control, she doesn't want to start another medication at this time, will consider    Lumbar back pain with radiculopathy affecting left lower extremity  Likely source of her instability of left leg  - Physical Therapy Referral; Future  Consider MRI    Lymphedema of both lower extremities  Offered lymphedema clinic, she wants to pull off for now    Bilateral carpal tunnel  Had EMG in past  Recommend she see ortho hand, but patient will put off until knee is better as needs hands to use walker    Patient Instructions   Consider propranolol for your tremor     Labs today    Physical therapy for back    Ensure daily                   I spent a total of 74 minutes on the day of the visit.   Time spent doing chart review, history and exam, documentation and further activities per the note             Return in about 2 months (around 11/13/2022) for recheck weight.    Azalea Meehan MD  [facility]    Bacilio casanova is a 81 year old, presenting for the following health issues:  Weight Loss    Wt Readings from Last 4 Encounters:   09/13/22 65.8 kg (145 lb)    08/04/22 72 kg (158 lb 11.7 oz)   07/28/22 68.2 kg (150 lb 6.4 oz)   07/14/22 68 kg (150 lb)     History of Present Illness     Asthma:  She presents for follow up of asthma.  She has no cough, no wheezing, and no shortness of breath. She is using a relief medication a few times a week. She typically misses taking her controller medication 2 time(s) per week.Patient is aware of the following triggers: pollens. The patient has had a visit to the Emergency Room, Urgent Care or Hospital due to asthma since the last clinic visit. She has been to the Emergency Room or Urgent Care 0 times.She has had a Hospitalization 0 times.    Hypertension: She presents for follow up of hypertension.  She does not check blood pressure  regularly outside of the clinic. Outpatient blood pressures have not been over 140/90. She does not follow a low salt diet.       Peripheral edema  Has been since before surgery, both legs, but especially the left   Much better than it was. Worse end of day, better in am.     hypertension   On lisinopril 20 mg  BP Readings from Last 6 Encounters:   09/13/22 118/80   08/05/22 128/63   07/28/22 (!) 142/72   07/14/22 (!) 141/60   06/30/22 120/80   06/17/22 (!) 141/58   on diltiazem, lisinopril    Weight loss  Went through a stage of decreased appetite  Thinks it is coming back  She likes the strawberry ensure but hasn't had any for awhile  Wt Readings from Last 5 Encounters:   09/13/22 65.8 kg (145 lb)   08/04/22 72 kg (158 lb 11.7 oz)   07/28/22 68.2 kg (150 lb 6.4 oz)   07/14/22 68 kg (150 lb)   06/30/22 67.1 kg (148 lb)      hypothyroidism   On levothyroxine  Date Value Ref Range Status   02/10/2022 2.26 0.40 - 4.00 mU/L Final   04/05/2021 3.76 0.40 - 4.00 mU/L Final        Left hip  Is hard to walk without support, doesn't feel it is her knee.   Feels it is her left hip. Is on her left side. Has had a lot of low back pain.   Can't support herself, has to walk with a walker.     Tremor  Writing,  holding things, putting on lip balm is more noticeable.   Worse at times, but not always bad..     Carpal tunnel  Still having numbness and tingling in hands  Would like to put off until after knee heals     Iron def anemia  Seeing hematology, had iron infusions    Review of Systems   ROS: 5 point ROS negative except as noted above in HPI, including Gen., Resp., CV, GI &  system review.       Objective    /80 (BP Location: Right arm)   Pulse 84   Temp 98.4  F (36.9  C) (Tympanic)   Resp 20   Wt 65.8 kg (145 lb)   SpO2 98%   BMI 25.69 kg/m    Body mass index is 25.69 kg/m .  Physical Exam   GENERAL: healthy, alert and no distress  NECK: no adenopathy, no asymmetry, masses, or scars and thyroid normal to palpation  RESP: lungs clear to auscultation - no rales, rhonchi or wheezes  CV: regular rate and rhythm, normal S1 S2, no S3 or S4, no murmur, click or rub  Plus 1 edema left leg, plus 1-2 edema right lower leg, with small area of erythema on shin  ABDOMEN: soft, nontender, no hepatosplenomegaly, no masses and bowel sounds normal  MS: walks favoring left leg, with tenderness in sciatic notch, lower extremities with good strength and sensation, DTRs brisk decreased, left hip with full non tender range of motion

## 2022-09-15 ENCOUNTER — HOSPITAL ENCOUNTER (OUTPATIENT)
Facility: CLINIC | Age: 82
End: 2022-09-15
Attending: ORTHOPAEDIC SURGERY | Admitting: ORTHOPAEDIC SURGERY
Payer: COMMERCIAL

## 2022-09-20 ENCOUNTER — HOSPITAL ENCOUNTER (OUTPATIENT)
Dept: PHYSICAL THERAPY | Facility: CLINIC | Age: 82
Setting detail: THERAPIES SERIES
Discharge: HOME | End: 2022-09-20
Attending: PHYSICIAN ASSISTANT
Payer: COMMERCIAL

## 2022-09-20 PROCEDURE — 97110 THERAPEUTIC EXERCISES: CPT | Mod: GP | Performed by: PHYSICAL THERAPIST

## 2022-09-20 NOTE — PROGRESS NOTES
[facility]    Outpatient Physical Therapy Discharge Note  Patient: Yudelka Ledesma  : 1940    Beginning/End Dates of Reporting Period:  22 to 22    Referring Provider: Latrice Mathis PA-C    Therapy Diagnosis: s/p L knee      Client Self Report: Pt relates she saw surgeon and knee looks good. Pt relates she saw her PCP and she is having LBP and neck pain. Pt relats she is having just too many appointments    Objective Measurements:  Objective Measure: R Knee ROM  Details: 0-10 - 105 : pt relates she does not need to bend it any farther     Objective Measure: R knee MMT  Details: flex  : ext:3    Objective Measure: LEFS  Details:     Objective Measure: Observation  Details: Mod swelling in R knee and LE         Goals:  Goal Identifier Housework   Goal Description Pt will be able to complete ADL's independently using walker or cane with <3/10 pain in R knee   Target Date 22   Date Met      Progress (detail required for progress note): slow but able to compelte     Goal Identifier Taking care of pony   Goal Description Pt will be able to squat and reach to the floor with full knee flexion ROM in order to take care of her pony.   Target Date 10/25/22   Date Met  22   Progress (detail required for progress note): doesnt need to get to floor, no issues     Goal Identifier Walking   Goal Description Pt will achieve 0 deg knee extension in order to achieve normal gait mechanics without pain   Target Date 10/25/22   Date Met      Progress (detail required for progress note): pt has minimal pain and increased mobility using walker     Goal Identifier HEP   Goal Description Pt will be compliant with HEP in order to gain knee strength and ROM and return to PLOF   Target Date 10/25/22   Date Met  22   Progress (detail required for progress note): Pt continues to  work through HEP       Pt has been seen for 4 visits over this POC. Pt is limited in knee ext however relates she has too many things going on to come to many appts. Pt is able to complete all functional tasks at home and independent with HEP thus pt is appropriate to discharge at this time. Pt does have order for LBP however pt may f/u in a few months pending busy home schedule.       Plan:  Discharge from therapy.    Discharge:    Reason for Discharge: No further expectation of progress.  Patient chooses to discontinue therapy.    Equipment Issued: NA    Discharge Plan: Patient to continue home program.

## 2022-10-30 ENCOUNTER — HEALTH MAINTENANCE LETTER (OUTPATIENT)
Age: 82
End: 2022-10-30

## 2022-10-31 DIAGNOSIS — E78.5 HYPERLIPIDEMIA LDL GOAL <130: ICD-10-CM

## 2022-10-31 RX ORDER — ATORVASTATIN CALCIUM 40 MG/1
TABLET, FILM COATED ORAL
Qty: 90 TABLET | Refills: 1 | Status: SHIPPED | OUTPATIENT
Start: 2022-10-31 | End: 2023-04-26

## 2022-11-08 ENCOUNTER — LAB (OUTPATIENT)
Dept: LAB | Facility: CLINIC | Age: 82
End: 2022-11-08
Payer: COMMERCIAL

## 2022-11-08 DIAGNOSIS — D50.8 OTHER IRON DEFICIENCY ANEMIA: ICD-10-CM

## 2022-11-08 LAB
BASOPHILS # BLD AUTO: 0.1 10E3/UL (ref 0–0.2)
BASOPHILS NFR BLD AUTO: 1 %
EOSINOPHIL # BLD AUTO: 0.5 10E3/UL (ref 0–0.7)
EOSINOPHIL NFR BLD AUTO: 6 %
ERYTHROCYTE [DISTWIDTH] IN BLOOD BY AUTOMATED COUNT: 12.5 % (ref 10–15)
FERRITIN SERPL-MCNC: 231 NG/ML (ref 11–328)
HCT VFR BLD AUTO: 34.5 % (ref 35–47)
HGB BLD-MCNC: 10.7 G/DL (ref 11.7–15.7)
IMM GRANULOCYTES # BLD: 0 10E3/UL
IMM GRANULOCYTES NFR BLD: 0 %
IRON BINDING CAPACITY (ROCHE): 271 UG/DL (ref 240–430)
IRON SATN MFR SERPL: 20 % (ref 15–46)
IRON SERPL-MCNC: 55 UG/DL (ref 37–145)
LYMPHOCYTES # BLD AUTO: 2.1 10E3/UL (ref 0.8–5.3)
LYMPHOCYTES NFR BLD AUTO: 24 %
MCH RBC QN AUTO: 29.4 PG (ref 26.5–33)
MCHC RBC AUTO-ENTMCNC: 31 G/DL (ref 31.5–36.5)
MCV RBC AUTO: 95 FL (ref 78–100)
MONOCYTES # BLD AUTO: 0.7 10E3/UL (ref 0–1.3)
MONOCYTES NFR BLD AUTO: 8 %
NEUTROPHILS # BLD AUTO: 5.2 10E3/UL (ref 1.6–8.3)
NEUTROPHILS NFR BLD AUTO: 61 %
PLATELET # BLD AUTO: 402 10E3/UL (ref 150–450)
RBC # BLD AUTO: 3.64 10E6/UL (ref 3.8–5.2)
WBC # BLD AUTO: 8.5 10E3/UL (ref 4–11)

## 2022-11-08 PROCEDURE — 85025 COMPLETE CBC W/AUTO DIFF WBC: CPT

## 2022-11-08 PROCEDURE — 36415 COLL VENOUS BLD VENIPUNCTURE: CPT

## 2022-11-08 PROCEDURE — 82728 ASSAY OF FERRITIN: CPT

## 2022-11-08 PROCEDURE — 83540 ASSAY OF IRON: CPT

## 2022-11-08 PROCEDURE — 83550 IRON BINDING TEST: CPT

## 2022-11-10 ENCOUNTER — ONCOLOGY VISIT (OUTPATIENT)
Dept: ONCOLOGY | Facility: CLINIC | Age: 82
End: 2022-11-10
Attending: INTERNAL MEDICINE
Payer: COMMERCIAL

## 2022-11-10 VITALS
RESPIRATION RATE: 12 BRPM | HEART RATE: 87 BPM | TEMPERATURE: 98.3 F | OXYGEN SATURATION: 98 % | BODY MASS INDEX: 27.81 KG/M2 | DIASTOLIC BLOOD PRESSURE: 66 MMHG | WEIGHT: 157 LBS | SYSTOLIC BLOOD PRESSURE: 152 MMHG

## 2022-11-10 DIAGNOSIS — K90.89 POOR IRON ABSORPTION: ICD-10-CM

## 2022-11-10 DIAGNOSIS — D50.8 OTHER IRON DEFICIENCY ANEMIA: Primary | ICD-10-CM

## 2022-11-10 PROCEDURE — G0463 HOSPITAL OUTPT CLINIC VISIT: HCPCS

## 2022-11-10 PROCEDURE — 99213 OFFICE O/P EST LOW 20 MIN: CPT | Performed by: INTERNAL MEDICINE

## 2022-11-10 RX ORDER — EPINEPHRINE 1 MG/ML
0.3 INJECTION, SOLUTION, CONCENTRATE INTRAVENOUS EVERY 5 MIN PRN
Status: CANCELLED | OUTPATIENT
Start: 2022-11-15

## 2022-11-10 RX ORDER — METHYLPREDNISOLONE SODIUM SUCCINATE 125 MG/2ML
125 INJECTION, POWDER, LYOPHILIZED, FOR SOLUTION INTRAMUSCULAR; INTRAVENOUS
Status: CANCELLED
Start: 2022-11-15

## 2022-11-10 RX ORDER — MEPERIDINE HYDROCHLORIDE 25 MG/ML
25 INJECTION INTRAMUSCULAR; INTRAVENOUS; SUBCUTANEOUS EVERY 30 MIN PRN
Status: CANCELLED | OUTPATIENT
Start: 2022-11-15

## 2022-11-10 RX ORDER — HEPARIN SODIUM,PORCINE 10 UNIT/ML
5 VIAL (ML) INTRAVENOUS
Status: CANCELLED | OUTPATIENT
Start: 2022-11-15

## 2022-11-10 RX ORDER — DIPHENHYDRAMINE HYDROCHLORIDE 50 MG/ML
50 INJECTION INTRAMUSCULAR; INTRAVENOUS
Status: CANCELLED
Start: 2022-11-15

## 2022-11-10 RX ORDER — ALBUTEROL SULFATE 0.83 MG/ML
2.5 SOLUTION RESPIRATORY (INHALATION)
Status: CANCELLED | OUTPATIENT
Start: 2022-11-15

## 2022-11-10 RX ORDER — ALBUTEROL SULFATE 90 UG/1
1-2 AEROSOL, METERED RESPIRATORY (INHALATION)
Status: CANCELLED
Start: 2022-11-15

## 2022-11-10 RX ORDER — HEPARIN SODIUM (PORCINE) LOCK FLUSH IV SOLN 100 UNIT/ML 100 UNIT/ML
5 SOLUTION INTRAVENOUS
Status: CANCELLED | OUTPATIENT
Start: 2022-11-15

## 2022-11-10 ASSESSMENT — PAIN SCALES - GENERAL: PAINLEVEL: NO PAIN (0)

## 2022-11-10 NOTE — PATIENT INSTRUCTIONS
It was a pleasure to evaluate you for the following issues today:    1. Other iron deficiency anemia    2. Poor iron absorption      Your blood work from 2 days ago was reviewed by me today.    White blood cell count and platelet count were normal.  Hemoglobin was still a little low at 10.7 g/DL with normal MCV.    Although ferritin was normal at 231, the iron saturation index was low at 20%.    I recommend to repeat intravenous iron replacement with Venofer 300 mg IV x 3 doses.    Please return in 4 months for CBC and iron studies followed by office visit with me 1 day later.    Orders Placed This Encounter   Procedures    Ferritin    Iron and iron binding capacity    CBC with platelets and differential

## 2022-11-10 NOTE — PROGRESS NOTES
[facility] Hematology and Oncology Outpatient Clinic Visit Note    Patient: Yudelka Ledesma  MRN: [id number]        Reason for Visit    Anemia.    Assessment/Plan    It was a pleasure to evaluate you for the following issues today:    1. Other iron deficiency anemia    2. Poor iron absorption        Your blood work from 2 days ago was reviewed by me today.    White blood cell count and platelet count were normal.  Hemoglobin was still a little low at 10.7 g/DL with normal MCV.    Although ferritin was normal at 231, the iron saturation index was low at 20%.    I recommend to repeat intravenous iron replacement with Venofer 300 mg IV x 3 doses.    Please return in 4 months for CBC and iron studies followed by office visit with me 1 day later.    Orders Placed This Encounter   Procedures     Ferritin     Iron and iron binding capacity     CBC with platelets and differential       History:    Ms. Yudelka Ledesma is a 81 year old on systemic anticoagulation with Xarelto 10 mg p.o. once daily with mild anemia in the setting of iron deficiency which had persisted for 1 year with an iron saturation index between 15 to 19%.  She was started on oral iron supplementation for about 2 months but remained iron deficient as evidenced by an iron saturation index 19%.      Therefore, I recommended to discontinue oral iron due to lack of absorption and she received intravenous iron replacement with 5 doses of Venofer 200 mg over the summer.    She has not had any gastrointestinal bleeding or tarry colored stools.  She has not had any hematuria.    She cannot have colonoscopies as she had a very difficult colonoscopy years ago and they had to abort the procedure.  Occult blood screen FIT testing was ordered for her by Dr. Tobar and found to be negative in July 2022.    She underwent knee surgery in August.        Wt Readings from Last 4 Encounters:   11/10/22 71.2 kg (157 lb)   09/13/22 65.8 kg (145 lb)    08/04/22 72 kg (158 lb 11.7 oz)   07/28/22 68.2 kg (150 lb 6.4 oz)       Patient Active Problem List   Diagnosis     Essential hypertension with goal blood pressure less than 140/90     Esophageal reflux     Hypothyroidism     Mild intermittent asthma, uncomplicated     Abnormal glucose     Nephrolithiasis     Elevated serum alkaline phosphatase level     S/P hysterectomy     Hyperlipidemia LDL goal <130     Colon stricture (H)     Carpal tunnel syndrome     Cervical radiculopathy     Advanced directives, counseling/discussion     Scoliosis     Undiagnosed cardiac murmurs     Varicose veins of both lower extremities     Other idiopathic scoliosis     Essential tremor     Elevated blood sugar     Chronic pain of right knee     Primary osteoarthritis of right knee     Other iron deficiency anemia     Poor iron absorption     Status post total knee replacement       Current Outpatient Medications   Medication     acetaminophen (TYLENOL) 325 MG tablet     albuterol (PROAIR HFA/PROVENTIL HFA/VENTOLIN HFA) 108 (90 Base) MCG/ACT inhaler     aspirin (ASA) 325 MG EC tablet     atorvastatin (LIPITOR) 40 MG tablet     chlorhexidine (PERIDEX) 0.12 % solution     diltiazem ER (DILT-XR) 240 MG 24 hr ER beaded capsule     esomeprazole (NEXIUM) 20 MG DR capsule     EUTHYROX 88 MCG tablet     hydrOXYzine (ATARAX) 25 MG tablet     ipratropium (ATROVENT HFA) 17 MCG/ACT inhaler     lisinopril (ZESTRIL) 20 MG tablet     oxyCODONE (ROXICODONE) 5 MG tablet     XARELTO ANTICOAGULANT 10 MG TABS tablet     No current facility-administered medications for this visit.       Past History  Past Medical History:   Diagnosis Date     Basal cell carcinoma      Esophageal reflux      Other and unspecified hyperlipidemia      Unspecified essential hypertension      Unspecified hypothyroidism      Past Surgical History:   Procedure Laterality Date     ARTHROPLASTY KNEE Right 8/4/2022    Procedure: TOTAL Knee Arthroplasty, right;  Surgeon: Fran  Sheldon Sheets MD;  Location: [facility]     COLONOSCOPY  2011    COLONOSCOPY performed by BHARTI DUNHAM at [facility]     Family History   Problem Relation Age of Onset     Cerebrovascular Disease Mother      Heart Disease Father      Cancer Maternal Grandmother      Diabetes Paternal Grandmother      Social History     Socioeconomic History     Marital status:      Spouse name: None     Number of children: None     Years of education: None     Highest education level: None   Tobacco Use     Smoking status: Former Smoker     Years: 15.00     Types: Cigarettes     Quit date: 1985     Years since quittin.4     Smokeless tobacco: Never Used   Vaping Use     Vaping Use: Never used   Substance and Sexual Activity     Alcohol use: No     Drug use: No     Sexual activity: Not Currently   Other Topics Concern      Service No     Blood Transfusions No     Caffeine Concern No     Occupational Exposure No     Hobby Hazards No     Sleep Concern No     Stress Concern Yes     Comment: just todays visit     Weight Concern No     Special Diet No     Back Care No     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 No       Allergies    Allergies   Allergen Reactions     Augmentin [Aspartame]      Doxycycline      Gabapentin      Gave nightmares     Levaquin [Levofloxacin Hemihydrate]      Prednisolone      Seasonal Allergies      Sulfa Drugs        Current Outpatient Medications   Medication     acetaminophen (TYLENOL) 325 MG tablet     albuterol (PROAIR HFA/PROVENTIL HFA/VENTOLIN HFA) 108 (90 Base) MCG/ACT inhaler     aspirin (ASA) 325 MG EC tablet     atorvastatin (LIPITOR) 40 MG tablet     chlorhexidine (PERIDEX) 0.12 % solution     diltiazem ER (DILT-XR) 240 MG 24 hr ER beaded capsule     esomeprazole (NEXIUM) 20 MG DR capsule     EUTHYROX 88 MCG tablet      hydrOXYzine (ATARAX) 25 MG tablet     ipratropium (ATROVENT HFA) 17 MCG/ACT inhaler     lisinopril (ZESTRIL) 20 MG tablet     oxyCODONE (ROXICODONE) 5 MG tablet     XARELTO ANTICOAGULANT 10 MG TABS tablet     No current facility-administered medications for this visit.       Physical Exam  BP (!) 152/66 (BP Location: Right arm, Patient Position: Sitting, Cuff Size: Adult Regular)   Pulse 87   Temp 98.3  F (36.8  C) (Tympanic)   Resp 12   Wt 71.2 kg (157 lb)   SpO2 98%   BMI 27.81 kg/m       Wt Readings from Last 3 Encounters:   11/10/22 71.2 kg (157 lb)   09/13/22 65.8 kg (145 lb)   08/04/22 72 kg (158 lb 11.7 oz)       ECOG Performance Status: 2.  General: Todays' vital signs reviewed in the EMR.    HEENT: No scleral icterus  Cardiovascular: Cor RRR  Respiratory: Lungs clear to auscultation bilaterally      Signed by: Enio Carmona MD

## 2022-11-10 NOTE — LETTER
"    11/10/2022         RE: Yudelka Ledesma  [id number] [facility] [id number]        Dear Colleague,    Thank you for referring your patient, Yudelka Ledesma, to the [facility] [place]. Please see a copy of my visit note below.    Oncology Rooming Note    November 10, 2022 2:34 PM   Yudelka Ledesma is a 82 year old female who presents for:    Chief Complaint   Patient presents with     Hematology     Normocytic anemia - Provider visit only     Initial Vitals: BP (!) 152/66 (BP Location: Right arm, Patient Position: Sitting, Cuff Size: Adult Regular)   Pulse 87   Temp 98.3  F (36.8  C) (Tympanic)   Resp 12   Wt 71.2 kg (157 lb)   SpO2 98%   BMI 27.81 kg/m   Estimated body mass index is 27.81 kg/m  as calculated from the following:    Height as of 8/4/22: 1.6 m (5' 3\").    Weight as of this encounter: 71.2 kg (157 lb). Body surface area is 1.78 meters squared.  No Pain (0) Comment: Data Unavailable   No LMP recorded. Patient has had a hysterectomy.  Allergies reviewed: Yes  Medications reviewed: Yes    Medications: Medication refills not needed today.  Pharmacy name entered into [employer]:    [facility] - [place], [place] - [address] [place] [address]    Clinical concerns:  None      Elizabeth Boyer, [facility] Hematology and Oncology Outpatient Clinic Visit Note    Patient: Yudelka Ledesma  MRN: [id number]        Reason for Visit    Anemia.    Assessment/Plan    It was a pleasure to evaluate you for the following issues today:    1. Other iron deficiency anemia    2. Poor iron absorption        Your blood work from 2 days ago was reviewed by me today.    White blood cell count and platelet count were normal.  Hemoglobin was still a little low at 10.7 g/DL with normal MCV.    Although ferritin was normal at 231, the iron saturation index was low at 20%.    I recommend to " repeat intravenous iron replacement with Venofer 300 mg IV x 3 doses.    Please return in 4 months for CBC and iron studies followed by office visit with me 1 day later.    Orders Placed This Encounter   Procedures     Ferritin     Iron and iron binding capacity     CBC with platelets and differential       History:    Ms. Yudelka Ledesma is a 81 year old on systemic anticoagulation with Xarelto 10 mg p.o. once daily with mild anemia in the setting of iron deficiency which had persisted for 1 year with an iron saturation index between 15 to 19%.  She was started on oral iron supplementation for about 2 months but remained iron deficient as evidenced by an iron saturation index 19%.      Therefore, I recommended to discontinue oral iron due to lack of absorption and she received intravenous iron replacement with 5 doses of Venofer 200 mg over the summer.    She has not had any gastrointestinal bleeding or tarry colored stools.  She has not had any hematuria.    She cannot have colonoscopies as she had a very difficult colonoscopy years ago and they had to abort the procedure.  Occult blood screen FIT testing was ordered for her by Dr. Tobar and found to be negative in July 2022.    She underwent knee surgery in August.        Wt Readings from Last 4 Encounters:   11/10/22 71.2 kg (157 lb)   09/13/22 65.8 kg (145 lb)   08/04/22 72 kg (158 lb 11.7 oz)   07/28/22 68.2 kg (150 lb 6.4 oz)       Patient Active Problem List   Diagnosis     Essential hypertension with goal blood pressure less than 140/90     Esophageal reflux     Hypothyroidism     Mild intermittent asthma, uncomplicated     Abnormal glucose     Nephrolithiasis     Elevated serum alkaline phosphatase level     S/P hysterectomy     Hyperlipidemia LDL goal <130     Colon stricture (H)     Carpal tunnel syndrome     Cervical radiculopathy     Advanced directives, counseling/discussion     Scoliosis     Undiagnosed cardiac murmurs     Varicose veins  of both lower extremities     Other idiopathic scoliosis     Essential tremor     Elevated blood sugar     Chronic pain of right knee     Primary osteoarthritis of right knee     Other iron deficiency anemia     Poor iron absorption     Status post total knee replacement       Current Outpatient Medications   Medication     acetaminophen (TYLENOL) 325 MG tablet     albuterol (PROAIR HFA/PROVENTIL HFA/VENTOLIN HFA) 108 (90 Base) MCG/ACT inhaler     aspirin (ASA) 325 MG EC tablet     atorvastatin (LIPITOR) 40 MG tablet     chlorhexidine (PERIDEX) 0.12 % solution     diltiazem ER (DILT-XR) 240 MG 24 hr ER beaded capsule     esomeprazole (NEXIUM) 20 MG DR capsule     EUTHYROX 88 MCG tablet     hydrOXYzine (ATARAX) 25 MG tablet     ipratropium (ATROVENT HFA) 17 MCG/ACT inhaler     lisinopril (ZESTRIL) 20 MG tablet     oxyCODONE (ROXICODONE) 5 MG tablet     XARELTO ANTICOAGULANT 10 MG TABS tablet     No current facility-administered medications for this visit.       Past History  Past Medical History:   Diagnosis Date     Basal cell carcinoma      Esophageal reflux      Other and unspecified hyperlipidemia      Unspecified essential hypertension      Unspecified hypothyroidism      Past Surgical History:   Procedure Laterality Date     ARTHROPLASTY KNEE Right 8/4/2022    Procedure: TOTAL Knee Arthroplasty, right;  Surgeon: Sheldon Peters MD;  Location: [facility]     COLONOSCOPY  2/7/2011    COLONOSCOPY performed by BHARTI DUNHAM at [facility]     Family History   Problem Relation Age of Onset     Cerebrovascular Disease Mother      Heart Disease Father      Cancer Maternal Grandmother      Diabetes Paternal Grandmother      Social History     Socioeconomic History     Marital status:      Spouse name: None     Number of children: None     Years of education: None     Highest education level: None   Tobacco Use     Smoking status: Former Smoker     Years: 15.00     Types: Cigarettes     Quit date: 1/1/1985      Years since quittin.4     Smokeless tobacco: Never Used   Vaping Use     Vaping Use: Never used   Substance and Sexual Activity     Alcohol use: No     Drug use: No     Sexual activity: Not Currently   Other Topics Concern      Service No     Blood Transfusions No     Caffeine Concern No     Occupational Exposure No     Hobby Hazards No     Sleep Concern No     Stress Concern Yes     Comment: just todays visit     Weight Concern No     Special Diet No     Back Care No     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 No       Allergies    Allergies   Allergen Reactions     Augmentin [Aspartame]      Doxycycline      Gabapentin      Gave nightmares     Levaquin [Levofloxacin Hemihydrate]      Prednisolone      Seasonal Allergies      Sulfa Drugs        Current Outpatient Medications   Medication     acetaminophen (TYLENOL) 325 MG tablet     albuterol (PROAIR HFA/PROVENTIL HFA/VENTOLIN HFA) 108 (90 Base) MCG/ACT inhaler     aspirin (ASA) 325 MG EC tablet     atorvastatin (LIPITOR) 40 MG tablet     chlorhexidine (PERIDEX) 0.12 % solution     diltiazem ER (DILT-XR) 240 MG 24 hr ER beaded capsule     esomeprazole (NEXIUM) 20 MG DR capsule     EUTHYROX 88 MCG tablet     hydrOXYzine (ATARAX) 25 MG tablet     ipratropium (ATROVENT HFA) 17 MCG/ACT inhaler     lisinopril (ZESTRIL) 20 MG tablet     oxyCODONE (ROXICODONE) 5 MG tablet     XARELTO ANTICOAGULANT 10 MG TABS tablet     No current facility-administered medications for this visit.       Physical Exam  BP (!) 152/66 (BP Location: Right arm, Patient Position: Sitting, Cuff Size: Adult Regular)   Pulse 87   Temp 98.3  F (36.8  C) (Tympanic)   Resp 12   Wt 71.2 kg (157 lb)   SpO2 98%   BMI 27.81 kg/m       Wt Readings from Last 3 Encounters:   11/10/22 71.2 kg (157 lb)   22 65.8 kg (145 lb)    08/04/22 72 kg (158 lb 11.7 oz)       ECOG Performance Status: 2.  General: Todays' vital signs reviewed in the EMR.    HEENT: No scleral icterus  Cardiovascular: Cor RRR  Respiratory: Lungs clear to auscultation bilaterally      Signed by: Enio Carmona MD        Again, thank you for allowing me to participate in the care of your patient.        Sincerely,        Enio Carmona MD

## 2022-11-10 NOTE — PROGRESS NOTES
"Oncology Rooming Note    November 10, 2022 2:34 PM   Yudelka Ledesma is a 82 year old female who presents for:    Chief Complaint   Patient presents with     Hematology     Normocytic anemia - Provider visit only     Initial Vitals: BP (!) 152/66 (BP Location: Right arm, Patient Position: Sitting, Cuff Size: Adult Regular)   Pulse 87   Temp 98.3  F (36.8  C) (Tympanic)   Resp 12   Wt 71.2 kg (157 lb)   SpO2 98%   BMI 27.81 kg/m   Estimated body mass index is 27.81 kg/m  as calculated from the following:    Height as of 8/4/22: 1.6 m (5' 3\").    Weight as of this encounter: 71.2 kg (157 lb). Body surface area is 1.78 meters squared.  No Pain (0) Comment: Data Unavailable   No LMP recorded. Patient has had a hysterectomy.  Allergies reviewed: Yes  Medications reviewed: Yes    Medications: Medication refills not needed today.  Pharmacy name entered into [employer]:    [facility] - [place], [place] - [address] [facility] [place], [place] - [address] [place] [address]    Clinical concerns:  None      Elizabeth Boyer CMA            "

## 2022-11-15 ENCOUNTER — ANCILLARY PROCEDURE (OUTPATIENT)
Dept: GENERAL RADIOLOGY | Facility: CLINIC | Age: 82
End: 2022-11-15
Attending: FAMILY MEDICINE
Payer: COMMERCIAL

## 2022-11-15 ENCOUNTER — OFFICE VISIT (OUTPATIENT)
Dept: FAMILY MEDICINE | Facility: CLINIC | Age: 82
End: 2022-11-15
Payer: COMMERCIAL

## 2022-11-15 VITALS
BODY MASS INDEX: 27.49 KG/M2 | OXYGEN SATURATION: 98 % | HEIGHT: 64 IN | DIASTOLIC BLOOD PRESSURE: 62 MMHG | WEIGHT: 161 LBS | TEMPERATURE: 98.6 F | RESPIRATION RATE: 16 BRPM | HEART RATE: 85 BPM | SYSTOLIC BLOOD PRESSURE: 139 MMHG

## 2022-11-15 DIAGNOSIS — M41.50 OTHER SECONDARY SCOLIOSIS, UNSPECIFIED SPINAL REGION: ICD-10-CM

## 2022-11-15 DIAGNOSIS — M54.50 CHRONIC LEFT-SIDED LOW BACK PAIN WITHOUT SCIATICA: ICD-10-CM

## 2022-11-15 DIAGNOSIS — G89.29 CHRONIC LEFT-SIDED LOW BACK PAIN WITHOUT SCIATICA: ICD-10-CM

## 2022-11-15 DIAGNOSIS — I10 ESSENTIAL HYPERTENSION WITH GOAL BLOOD PRESSURE LESS THAN 140/90: ICD-10-CM

## 2022-11-15 DIAGNOSIS — R01.1 SYSTOLIC MURMUR: ICD-10-CM

## 2022-11-15 DIAGNOSIS — G89.29 CHRONIC NECK PAIN: ICD-10-CM

## 2022-11-15 DIAGNOSIS — K90.89 POOR IRON ABSORPTION: Primary | ICD-10-CM

## 2022-11-15 DIAGNOSIS — M54.2 CHRONIC NECK PAIN: ICD-10-CM

## 2022-11-15 DIAGNOSIS — G25.81 RESTLESS LEGS SYNDROME: Primary | ICD-10-CM

## 2022-11-15 DIAGNOSIS — D50.8 OTHER IRON DEFICIENCY ANEMIA: ICD-10-CM

## 2022-11-15 DIAGNOSIS — R60.0 BILATERAL LEG EDEMA: ICD-10-CM

## 2022-11-15 DIAGNOSIS — Z96.651 STATUS POST RIGHT KNEE REPLACEMENT: ICD-10-CM

## 2022-11-15 PROCEDURE — 99215 OFFICE O/P EST HI 40 MIN: CPT | Performed by: FAMILY MEDICINE

## 2022-11-15 PROCEDURE — 72040 X-RAY EXAM NECK SPINE 2-3 VW: CPT | Mod: TC | Performed by: RADIOLOGY

## 2022-11-15 PROCEDURE — 72100 X-RAY EXAM L-S SPINE 2/3 VWS: CPT | Mod: TC | Performed by: RADIOLOGY

## 2022-11-15 RX ORDER — HEPARIN SODIUM,PORCINE 10 UNIT/ML
5 VIAL (ML) INTRAVENOUS
Status: CANCELLED | OUTPATIENT
Start: 2022-11-15

## 2022-11-15 RX ORDER — DOCUSATE SODIUM 100 MG/1
100 CAPSULE, LIQUID FILLED ORAL 2 TIMES DAILY
COMMUNITY

## 2022-11-15 RX ORDER — OXYCODONE HYDROCHLORIDE 5 MG/1
TABLET ORAL
Qty: 30 TABLET | Refills: 0 | Status: SHIPPED | OUTPATIENT
Start: 2022-11-15 | End: 2022-12-16

## 2022-11-15 RX ORDER — MEPERIDINE HYDROCHLORIDE 25 MG/ML
25 INJECTION INTRAMUSCULAR; INTRAVENOUS; SUBCUTANEOUS EVERY 30 MIN PRN
Status: CANCELLED | OUTPATIENT
Start: 2022-11-15

## 2022-11-15 RX ORDER — ALBUTEROL SULFATE 90 UG/1
1-2 AEROSOL, METERED RESPIRATORY (INHALATION)
Status: CANCELLED
Start: 2022-11-15

## 2022-11-15 RX ORDER — ALBUTEROL SULFATE 0.83 MG/ML
2.5 SOLUTION RESPIRATORY (INHALATION)
Status: CANCELLED | OUTPATIENT
Start: 2022-11-15

## 2022-11-15 RX ORDER — EPINEPHRINE 1 MG/ML
0.3 INJECTION, SOLUTION INTRAMUSCULAR; SUBCUTANEOUS EVERY 5 MIN PRN
Status: CANCELLED | OUTPATIENT
Start: 2022-11-15

## 2022-11-15 RX ORDER — FUROSEMIDE 20 MG
20 TABLET ORAL DAILY
Qty: 30 TABLET | Refills: 1 | Status: SHIPPED | OUTPATIENT
Start: 2022-11-15 | End: 2023-02-16

## 2022-11-15 RX ORDER — HEPARIN SODIUM (PORCINE) LOCK FLUSH IV SOLN 100 UNIT/ML 100 UNIT/ML
5 SOLUTION INTRAVENOUS
Status: CANCELLED | OUTPATIENT
Start: 2022-11-15

## 2022-11-15 RX ORDER — DIPHENHYDRAMINE HYDROCHLORIDE 50 MG/ML
50 INJECTION INTRAMUSCULAR; INTRAVENOUS
Status: CANCELLED
Start: 2022-11-15

## 2022-11-15 NOTE — PATIENT INSTRUCTIONS
Take the water pill (furosemide) once a day in the am for three days  Mychart me how that is going, for the fluid in the legs and for the restless legs    Let me know if the iron infusions help the restless legs    Try exercises that Vani have on YouTube for neck and back    Echocardiogram in [place]

## 2022-11-15 NOTE — PROGRESS NOTES
Assessment & Plan     Restless legs syndrome  Will see if decreased edema helps  Also if iron infusions help  Consider Mirapex if not  For now may continue with oxycodone  - oxyCODONE (ROXICODONE) 5 MG tablet; Take at bedtime PRN for restless legs    Essential hypertension with goal blood pressure less than 140/90  Well controlled  Continue diltiazem, lisinopril  Get echocardiogram     Bilateral leg edema  Will see if furosemide helps edema  Echocardiogram   - furosemide (LASIX) 20 MG tablet; Take 1 tablet (20 mg) by mouth daily    Chronic neck pain  Severe arthritis on xray  She declines getting an MRI, or any physical therapy for now  - XR Cervical Spine 2/3 Views; Future    Chronic left-sided low back pain without sciatica  Scoliosis  I think her pain is coming from her back  Hips are normal on xray  Significant osteoarthritis seen  She declines MRI and/or physical therapy for now  - XR Lumbar Spine 2/3 Views; Future    Systolic murmur  Aortic sclerosis on echocardiogram 2018  recheck  - Echocardiogram Complete; Future    Status post right knee replacement  Doing well    Patient Instructions   Take the water pill (furosemide) once a day in the am for three days  Mychart me how that is going, for the fluid in the legs and for the restless legs    Let me know if the iron infusions help the restless legs    Try exercises that Vani have on YouTube for neck and back    Echocardiogram in [place]       I spent a total of 53 minutes on the day of the visit.   Time spent doing chart review, history and exam, documentation and further activities per the note           Return in about 2 months (around 1/15/2023).    Azalea Meehan MD  [facility]    Bacilio casanova is a 82 year old, presenting for the following health issues:  Weight Check    Wt Readings from Last 4 Encounters:   11/15/22 73 kg (161 lb)   11/10/22 71.2 kg (157 lb)   09/13/22 65.8 kg (145 lb)   08/04/22 72 kg (158 lb  "11.7 oz)     Leg swelling  Has bilateral leg swelling since before surgery  Note weight is up  Tried compression stockings x 2 weeks, didn't seem to help  Slowly, very slowly getting better    Is having restless leg symptoms   Has spasm, tingling and twitching in her legs  Happens at night, can't sleep with it.    She tried a pain pill for it and that helped.  So now takes a pain pill at night    Iron deficiency anemia  Is seeing hematology    Neck pain  Long history dating back years  Has gotten worse, hears the bones crunching and cracking  She can't turn it to the left     Has a lot of low back pain  On left side  Has to hold on to something    HPI     Annual Wellness Visit    Patient has been advised of split billing requirements and indicates understanding: Yes     Are you in the first 12 months of your Medicare Part B coverage?  No    Physical Health:    In general, how would you rate your overall physical health? good    Outside of work, how many days during the week do you exercise?6-7 days/week    Outside of work, approximately how many minutes a day do you exercise?45-60 minutes    If you drink alcohol do you typically have >3 drinks per day or >7 drinks per week? No    Do you usually eat at least 4 servings of fruit and vegetables a day, include whole grains & fiber and avoid regularly eating high fat or \"junk\" foods? NO    Do you have any problems taking medications regularly? No    Do you have any side effects from medications? none    Needs assistance for the following daily activities: no assistance needed    Which of the following safety concerns are present in your home?  none identified     Hearing impairment: No    In the past 6 months, have you been bothered by leaking of urine? yes    Mental Health:    In general, how would you rate your overall mental or emotional health? good  PHQ-2 Score:      Do you feel safe in your environment? Yes    Have you ever done Advance Care Planning? (For example, " "a Health Directive, POLST, or a discussion with a medical provider or your loved ones about your wishes)? Yes, advance care planning is on file.    Fall risk:  Fallen 2 or more times in the past year?: No  Any fall with injury in the past year?: No  click delete button to remove this line now  Cognitive Screenin) Repeat 3 items (Leader, Season, Table)    2) Clock draw: NORMAL  3) 3 item recall: Recalls 3 objects  Results: 3 items recalled: COGNITIVE IMPAIRMENT LESS LIKELY    Mini-CogTM Copyright S [place]. Licensed by the author for use in [place] [employer]; reprinted with permission (nuris@[facility]). All rights reserved.      Do you have sleep apnea, excessive snoring or daytime drowsiness?: no    Current providers sharing in care for this patient include:   Patient Care Team:  Azalea Ochoa MD as PCP - General (Family Practice)  Azalea Ochoa MD as Assigned PCP  Mairssa Lester MD as Assigned Musculoskeletal Provider  Enio Carmona MD as Assigned Cancer Care Provider    Patient has been advised of split billing requirements and indicates understanding: Yes      Review of Systems   ROS: 5 point ROS negative except as noted above in HPI, including Gen., Resp., CV, GI &  system review.       Objective    /62 (BP Location: Right arm)   Pulse 85   Temp 98.6  F (37  C) (Tympanic)   Resp 16   Ht 1.613 m (5' 3.5\")   Wt 73 kg (161 lb)   SpO2 98%   BMI 28.07 kg/m    Body mass index is 28.07 kg/m .  Physical Exam   GENERAL: healthy, alert and no distress  NECK: no adenopathy, no asymmetry, masses, or scars and thyroid normal to palpation  RESP: lungs clear to auscultation - no rales, rhonchi or wheezes  CV: regular rates and rhythm, normal S1 S2, no S3 or S4, grade 2/6 systolic murmur heard best over the LSB   ABDOMEN: soft, nontender, no hepatosplenomegaly, no masses and bowel sounds normal  MS: Neck with very poor range of motion, especially to left where she is very limited, muscles " are spastic and tender, back as good range of motion, legs and arms with good strength, walks with assist of walker  Is tender in left upper buttock area    I independently visualized the xrays: there is significant arthritis at every level in neck and lower back, with scoliosis present

## 2022-11-16 PROBLEM — R60.0 BILATERAL LEG EDEMA: Status: ACTIVE | Noted: 2022-11-16

## 2022-11-16 PROBLEM — G89.29 CHRONIC NECK PAIN: Status: ACTIVE | Noted: 2022-11-16

## 2022-11-16 PROBLEM — G25.81 RESTLESS LEGS SYNDROME: Status: ACTIVE | Noted: 2022-11-16

## 2022-11-16 PROBLEM — M54.2 CHRONIC NECK PAIN: Status: ACTIVE | Noted: 2022-11-16

## 2022-11-16 PROBLEM — M54.50 CHRONIC LEFT-SIDED LOW BACK PAIN WITHOUT SCIATICA: Status: ACTIVE | Noted: 2022-11-16

## 2022-11-16 PROBLEM — G89.29 CHRONIC LEFT-SIDED LOW BACK PAIN WITHOUT SCIATICA: Status: ACTIVE | Noted: 2022-11-16

## 2022-11-28 ENCOUNTER — TELEPHONE (OUTPATIENT)
Dept: INFUSION THERAPY | Facility: CLINIC | Age: 82
End: 2022-11-28

## 2022-11-28 NOTE — TELEPHONE ENCOUNTER
FYI    Sharon called today to cancel her 12/1 infusion due to not feeling well. She stated that she has been feeling ill for a little while now and that it is just lingering.   She asked to be RS for closer to 2 weeks out due to this illness sticking with her.    She is now scheduled for 12/13

## 2022-11-29 DIAGNOSIS — E03.9 ACQUIRED HYPOTHYROIDISM: ICD-10-CM

## 2022-11-29 RX ORDER — LEVOTHYROXINE SODIUM 88 UG/1
TABLET ORAL
Qty: 90 TABLET | Refills: 2 | Status: SHIPPED | OUTPATIENT
Start: 2022-11-29 | End: 2023-05-26

## 2022-12-20 ENCOUNTER — INFUSION THERAPY VISIT (OUTPATIENT)
Dept: INFUSION THERAPY | Facility: CLINIC | Age: 82
End: 2022-12-20
Attending: INTERNAL MEDICINE
Payer: COMMERCIAL

## 2022-12-20 VITALS
RESPIRATION RATE: 16 BRPM | DIASTOLIC BLOOD PRESSURE: 66 MMHG | TEMPERATURE: 98.2 F | SYSTOLIC BLOOD PRESSURE: 140 MMHG | HEART RATE: 73 BPM

## 2022-12-20 DIAGNOSIS — K90.89 POOR IRON ABSORPTION: Primary | ICD-10-CM

## 2022-12-20 DIAGNOSIS — D50.8 OTHER IRON DEFICIENCY ANEMIA: ICD-10-CM

## 2022-12-20 PROCEDURE — 258N000003 HC RX IP 258 OP 636: Performed by: INTERNAL MEDICINE

## 2022-12-20 PROCEDURE — 250N000011 HC RX IP 250 OP 636: Performed by: INTERNAL MEDICINE

## 2022-12-20 RX ORDER — ALBUTEROL SULFATE 90 UG/1
1-2 AEROSOL, METERED RESPIRATORY (INHALATION)
Status: CANCELLED
Start: 2022-12-22

## 2022-12-20 RX ORDER — EPINEPHRINE 1 MG/ML
0.3 INJECTION, SOLUTION, CONCENTRATE INTRAVENOUS EVERY 5 MIN PRN
Status: CANCELLED | OUTPATIENT
Start: 2022-12-22

## 2022-12-20 RX ORDER — DIPHENHYDRAMINE HYDROCHLORIDE 50 MG/ML
50 INJECTION INTRAMUSCULAR; INTRAVENOUS
Status: CANCELLED
Start: 2022-12-22

## 2022-12-20 RX ORDER — HEPARIN SODIUM (PORCINE) LOCK FLUSH IV SOLN 100 UNIT/ML 100 UNIT/ML
5 SOLUTION INTRAVENOUS
Status: CANCELLED | OUTPATIENT
Start: 2022-12-22

## 2022-12-20 RX ORDER — METHYLPREDNISOLONE SODIUM SUCCINATE 125 MG/2ML
125 INJECTION, POWDER, LYOPHILIZED, FOR SOLUTION INTRAMUSCULAR; INTRAVENOUS
Status: CANCELLED
Start: 2022-12-22

## 2022-12-20 RX ORDER — MEPERIDINE HYDROCHLORIDE 25 MG/ML
25 INJECTION INTRAMUSCULAR; INTRAVENOUS; SUBCUTANEOUS EVERY 30 MIN PRN
Status: CANCELLED | OUTPATIENT
Start: 2022-12-22

## 2022-12-20 RX ORDER — ALBUTEROL SULFATE 0.83 MG/ML
2.5 SOLUTION RESPIRATORY (INHALATION)
Status: CANCELLED | OUTPATIENT
Start: 2022-12-22

## 2022-12-20 RX ORDER — HEPARIN SODIUM,PORCINE 10 UNIT/ML
5 VIAL (ML) INTRAVENOUS
Status: CANCELLED | OUTPATIENT
Start: 2022-12-22

## 2022-12-20 RX ADMIN — IRON SUCROSE 200 MG: 20 INJECTION, SOLUTION INTRAVENOUS at 13:52

## 2022-12-20 RX ADMIN — SODIUM CHLORIDE 250 ML: 9 INJECTION, SOLUTION INTRAVENOUS at 13:52

## 2022-12-20 ASSESSMENT — PAIN SCALES - GENERAL: PAINLEVEL: NO PAIN (0)

## 2022-12-20 NOTE — PROGRESS NOTES
Infusion Nursing Note:  Yudelkasindy Ledesma presents today for Venofer 1/5.  Patient seen by provider today: No   present during visit today: Not Applicable.    Note: N/A.    Intravenous Access:  Peripheral IV placed.    Treatment Conditions:  Results reviewed, labs MET treatment parameters, ok to proceed with treatment.    Post Infusion Assessment:  Patient tolerated infusion without incident.  Patient observed for 30 minutes post Venofer per protocol.  Site patent and intact, free from redness, edema or discomfort.  No evidence of extravasations.  Access discontinued per protocol.     Discharge Plan:   Patient discharged in stable condition accompanied by: self.  Departure Mode: Ambulatory w/ walker.      Nancy Wood RN

## 2022-12-27 ENCOUNTER — INFUSION THERAPY VISIT (OUTPATIENT)
Dept: INFUSION THERAPY | Facility: CLINIC | Age: 82
End: 2022-12-27
Attending: INTERNAL MEDICINE
Payer: COMMERCIAL

## 2022-12-27 VITALS — SYSTOLIC BLOOD PRESSURE: 145 MMHG | TEMPERATURE: 98.3 F | HEART RATE: 85 BPM | DIASTOLIC BLOOD PRESSURE: 79 MMHG

## 2022-12-27 DIAGNOSIS — D50.8 OTHER IRON DEFICIENCY ANEMIA: ICD-10-CM

## 2022-12-27 DIAGNOSIS — K90.89 POOR IRON ABSORPTION: Primary | ICD-10-CM

## 2022-12-27 PROCEDURE — 250N000011 HC RX IP 250 OP 636: Performed by: INTERNAL MEDICINE

## 2022-12-27 PROCEDURE — 96365 THER/PROPH/DIAG IV INF INIT: CPT

## 2022-12-27 PROCEDURE — 258N000003 HC RX IP 258 OP 636: Performed by: INTERNAL MEDICINE

## 2022-12-27 RX ORDER — ALBUTEROL SULFATE 0.83 MG/ML
2.5 SOLUTION RESPIRATORY (INHALATION)
Status: CANCELLED | OUTPATIENT
Start: 2022-12-28

## 2022-12-27 RX ORDER — HEPARIN SODIUM (PORCINE) LOCK FLUSH IV SOLN 100 UNIT/ML 100 UNIT/ML
5 SOLUTION INTRAVENOUS
Status: CANCELLED | OUTPATIENT
Start: 2022-12-28

## 2022-12-27 RX ORDER — METHYLPREDNISOLONE SODIUM SUCCINATE 125 MG/2ML
125 INJECTION, POWDER, LYOPHILIZED, FOR SOLUTION INTRAMUSCULAR; INTRAVENOUS
Status: CANCELLED
Start: 2022-12-28

## 2022-12-27 RX ORDER — MEPERIDINE HYDROCHLORIDE 25 MG/ML
25 INJECTION INTRAMUSCULAR; INTRAVENOUS; SUBCUTANEOUS EVERY 30 MIN PRN
Status: CANCELLED | OUTPATIENT
Start: 2022-12-28

## 2022-12-27 RX ORDER — HEPARIN SODIUM,PORCINE 10 UNIT/ML
5 VIAL (ML) INTRAVENOUS
Status: CANCELLED | OUTPATIENT
Start: 2022-12-28

## 2022-12-27 RX ORDER — DIPHENHYDRAMINE HYDROCHLORIDE 50 MG/ML
50 INJECTION INTRAMUSCULAR; INTRAVENOUS
Status: CANCELLED
Start: 2022-12-28

## 2022-12-27 RX ORDER — ALBUTEROL SULFATE 90 UG/1
1-2 AEROSOL, METERED RESPIRATORY (INHALATION)
Status: CANCELLED
Start: 2022-12-28

## 2022-12-27 RX ORDER — EPINEPHRINE 1 MG/ML
0.3 INJECTION, SOLUTION, CONCENTRATE INTRAVENOUS EVERY 5 MIN PRN
Status: CANCELLED | OUTPATIENT
Start: 2022-12-28

## 2022-12-27 RX ADMIN — IRON SUCROSE 200 MG: 20 INJECTION, SOLUTION INTRAVENOUS at 14:02

## 2022-12-27 RX ADMIN — SODIUM CHLORIDE 250 ML: 9 INJECTION, SOLUTION INTRAVENOUS at 13:58

## 2022-12-27 NOTE — PROGRESS NOTES
Infusion Nursing Note:  Yudelka Ledesma presents today for Venofer infusion.    Patient seen by provider today: No   present during visit today: Not Applicable.    Note: NA.    Intravenous Access:  Peripheral IV placed.    Treatment Conditions:  Not Applicable.    Post Infusion Assessment:  Patient tolerated infusion without incident.  Patient observed for 30 minutes post Venofer per protocol.  Blood return noted pre and post infusion.  Site patent and intact, free from redness, edema or discomfort.  Access discontinued per protocol.     Discharge Plan:   Patient discharged in stable condition accompanied by: self.  Departure Mode: Ambulatory.      Krupa Carrasco RN

## 2023-01-04 ENCOUNTER — TELEPHONE (OUTPATIENT)
Dept: ONCOLOGY | Facility: CLINIC | Age: 83
End: 2023-01-04

## 2023-01-04 NOTE — TELEPHONE ENCOUNTER
Per Dr. Carmona, patient notified we will cancel next 3 iron infusions and will repeat labs and follow up in 3 months. Patient is already scheduled for these. Patient in agreement with plan.Jody Conteh RN on 1/4/2023 at 1:04 PM

## 2023-01-04 NOTE — TELEPHONE ENCOUNTER
----- Message from Enio Carmona MD sent at 1/4/2023 12:49 PM CST -----  Regarding: RE: VENOFER COVERAGE ISSUE  Her iron deficiency was mild and we can cancel further iron infusions for now. Dottie, please schedule her for a CBC and iron studies in three months, followed by office visit. Copy to chart.    ----- Message -----  From: Sandy Martinez  Sent: 1/4/2023  11:48 AM CST  To: Enio Carmona MD, Jody Conteh, RN, #  Subject: RE: VENOFER COVERAGE ISSUE                       Hello,    Yes, to meet policy we will have to have iron product switched to infed.    -Sandy  ----- Message -----  From: Enio Carmona MD  Sent: 1/4/2023  11:46 AM CST  To: Jody Richards, RN, #  Subject: RE: VENOFER COVERAGE ISSUE                       It looks like she received Venofer on 12/20 and 12/27. We still need to change her to Infed?  ----- Message -----  From: Jody Conteh RN  Sent: 1/4/2023  11:05 AM CST  To: Enio Carmona MD, Sandy Martinez, #  Subject: RE: VENOFER COVERAGE ISSUE                       It looks like she has received infed, injectafer and venofer in the past  Dottie  ----- Message -----  From: Enio Carmona MD  Sent: 1/4/2023  10:51 AM CST  To: Jody Richards, RN, #  Subject: RE: VENOFER COVERAGE ISSUE                       We can change her to Infed. Has she received any Venofer or Ferrlecit yet?  ----- Message -----  From: Sandy Martinez  Sent: 1/3/2023  11:39 PM CST  To: Enio Carmona MD, Jody Conteh RN, #  Subject: VENOFER COVERAGE ISSUE                           Hello,    Patient is on schedule at [facility] 01.05.23 . This patient's insurance plan has a policy that requires an additional dx of chronic kidney disease to be added to plan for Venofer and Ferrlecit. I do not see any mention of CKD in patient's chart. If appropriate please add CKD dx code to plan. If patient does not have CKD can iron product be updated to Infed for coverage? Please let us know how  you would like to proceed.    Thank you,  Sandy Martinez

## 2023-01-12 ENCOUNTER — HOSPITAL ENCOUNTER (OUTPATIENT)
Dept: CARDIOLOGY | Facility: CLINIC | Age: 83
Discharge: HOME | End: 2023-01-12
Attending: FAMILY MEDICINE | Admitting: FAMILY MEDICINE
Payer: COMMERCIAL

## 2023-01-12 DIAGNOSIS — R01.1 SYSTOLIC MURMUR: ICD-10-CM

## 2023-01-12 LAB — LVEF ECHO: NORMAL

## 2023-01-12 PROCEDURE — 93306 TTE W/DOPPLER COMPLETE: CPT

## 2023-01-12 PROCEDURE — 93306 TTE W/DOPPLER COMPLETE: CPT | Mod: 26 | Performed by: INTERNAL MEDICINE

## 2023-01-17 ENCOUNTER — OFFICE VISIT (OUTPATIENT)
Dept: FAMILY MEDICINE | Facility: CLINIC | Age: 83
End: 2023-01-17
Payer: COMMERCIAL

## 2023-01-17 VITALS
TEMPERATURE: 98.9 F | RESPIRATION RATE: 24 BRPM | BODY MASS INDEX: 27.29 KG/M2 | SYSTOLIC BLOOD PRESSURE: 132 MMHG | HEART RATE: 95 BPM | DIASTOLIC BLOOD PRESSURE: 68 MMHG | HEIGHT: 63 IN | WEIGHT: 154 LBS | OXYGEN SATURATION: 96 %

## 2023-01-17 DIAGNOSIS — G25.81 RESTLESS LEGS SYNDROME: ICD-10-CM

## 2023-01-17 DIAGNOSIS — M54.50 CHRONIC LEFT-SIDED LOW BACK PAIN WITHOUT SCIATICA: ICD-10-CM

## 2023-01-17 DIAGNOSIS — G89.29 CHRONIC LEFT-SIDED LOW BACK PAIN WITHOUT SCIATICA: ICD-10-CM

## 2023-01-17 DIAGNOSIS — I50.32 CHRONIC HEART FAILURE WITH PRESERVED EJECTION FRACTION (HFPEF) (H): Primary | ICD-10-CM

## 2023-01-17 DIAGNOSIS — R60.0 BILATERAL LEG EDEMA: ICD-10-CM

## 2023-01-17 DIAGNOSIS — M54.2 CHRONIC NECK PAIN: ICD-10-CM

## 2023-01-17 DIAGNOSIS — J45.20 MILD INTERMITTENT ASTHMA, UNCOMPLICATED: ICD-10-CM

## 2023-01-17 DIAGNOSIS — Z78.0 ASYMPTOMATIC MENOPAUSE: ICD-10-CM

## 2023-01-17 DIAGNOSIS — G89.29 CHRONIC NECK PAIN: ICD-10-CM

## 2023-01-17 LAB
ANION GAP SERPL CALCULATED.3IONS-SCNC: 10 MMOL/L (ref 7–15)
BUN SERPL-MCNC: 26.4 MG/DL (ref 8–23)
CALCIUM SERPL-MCNC: 10.1 MG/DL (ref 8.8–10.2)
CHLORIDE SERPL-SCNC: 101 MMOL/L (ref 98–107)
CREAT SERPL-MCNC: 0.99 MG/DL (ref 0.51–0.95)
DEPRECATED HCO3 PLAS-SCNC: 29 MMOL/L (ref 22–29)
GFR SERPL CREATININE-BSD FRML MDRD: 57 ML/MIN/1.73M2
GLUCOSE SERPL-MCNC: 110 MG/DL (ref 70–99)
POTASSIUM SERPL-SCNC: 4.1 MMOL/L (ref 3.4–5.3)
SODIUM SERPL-SCNC: 140 MMOL/L (ref 136–145)

## 2023-01-17 PROCEDURE — 80048 BASIC METABOLIC PNL TOTAL CA: CPT | Performed by: FAMILY MEDICINE

## 2023-01-17 PROCEDURE — 36415 COLL VENOUS BLD VENIPUNCTURE: CPT | Performed by: FAMILY MEDICINE

## 2023-01-17 PROCEDURE — 99215 OFFICE O/P EST HI 40 MIN: CPT | Performed by: FAMILY MEDICINE

## 2023-01-17 RX ORDER — OXYCODONE HYDROCHLORIDE 5 MG/1
5 TABLET ORAL AT BEDTIME
Qty: 30 TABLET | Refills: 0 | Status: SHIPPED | OUTPATIENT
Start: 2023-02-16 | End: 2023-03-15

## 2023-01-17 RX ORDER — OXYCODONE HYDROCHLORIDE 5 MG/1
TABLET ORAL
Qty: 30 TABLET | Refills: 0 | Status: SHIPPED | OUTPATIENT
Start: 2023-01-17 | End: 2023-03-21

## 2023-01-17 ASSESSMENT — PAIN SCALES - GENERAL: PAINLEVEL: MODERATE PAIN (4)

## 2023-01-17 NOTE — PROGRESS NOTES
"  Assessment & Plan     Chronic heart failure with preserved ejection fraction (HFpEF) (H)  Symptomatic  Continue furosemide, check potassium and replace if needed  - Basic metabolic panel  (Ca, Cl, CO2, Creat, Gluc, K, Na, BUN); Future  - Basic metabolic panel  (Ca, Cl, CO2, Creat, Gluc, K, Na, BUN)    Restless legs syndrome  improved  - oxyCODONE (ROXICODONE) 5 MG tablet; Take at bedtime PRN for restless legs  - oxyCODONE (ROXICODONE) 5 MG tablet; Take 1 tablet (5 mg) by mouth At Bedtime    Bilateral leg edema  Improved  Continue furosemide    Chronic left-sided low back pain without sciatica  Recommend MRI, she declines  Will agree to get a CT scan  - oxyCODONE (ROXICODONE) 5 MG tablet; Take 1 tablet (5 mg) by mouth At Bedtime    Chronic neck pain  Agrees to get CT scan  Consider physical therapy and or injection  - CT Cervical Spine w/o Contrast; Future    Asymptomatic menopause  Some bone thinning on xray  - DEXA HIP/PELVIS/SPINE - Future; Future    Mild intermittent asthma, uncomplicated  Doing well  Continue albuterol/ipratropium as needed        I spent a total of 60 minutes on the day of the visit.   Time spent doing chart review, history and exam, documentation and further activities per the note       BMI:   Estimated body mass index is 27.28 kg/m  as calculated from the following:    Height as of this encounter: 1.6 m (5' 3\").    Weight as of this encounter: 69.9 kg (154 lb).       Patient Instructions   Labs today    Get bone density  CT neck and lumbar spine        Return in about 2 months (around 3/17/2023).    Azalea Meehan MD  [facility]    Bacilio casanova is a 82 year old, presenting for the following health issues:  Back Pain      History of Present Illness       Back Pain:  She presents for follow up of back pain. Patient's back pain is a chronic problem.  Location of back pain:  Right lower back, left lower back, right side of neck, left side of neck, right " shoulder and left shoulder  Description of back pain: other  Back pain spreads: nowhere    Since patient first noticed back pain, pain is: always present, but gets better and worse  Does back pain interfere with her job:  Not applicable      Vascular Disease:  She presents for follow up of vascular disease.  She never takes nitroglycerin. She is not taking daily aspirin.    She eats 2-3 servings of fruits and vegetables daily.She consumes 3 sweetened beverage(s) daily.She exercises with enough effort to increase her heart rate 60 or more minutes per day.  She exercises with enough effort to increase her heart rate 6 days per week.   She is taking medications regularly.     Chronic low back pain/scoliosis  Significant osteoarthritis   Declined physical therapy or MRI  I asked her to check on [facility] [employer] and [employer] exercises    Restless legs  Had iron infusions x 2, then were cancelled due to insurance reasons.   Put on furosemide for edema.   Thinks they are better, spasms are better, still has some restlessness.   On oxycodone every night.   Took last one last night.   [place] prescription monitoring system accessed, last filled 12/16/22.   Has some urinary incontinence    Bilateral leg edema  Has been taking furosemide every day and has really helped, has gone done an inch in circumference so far (each)shad echocardiogram done Left ventricular systolic function is normal.  The visual ejection fraction is 60-65%.  Grade I or early diastolic dysfunction.  The right ventricle is normal in structure, function and size.  No significant valve dysfunction. Mildly thickened mitral valve leaflets  without stenosis. Trace MR.  The inferior vena cava was normal in size with preserved respiratory  variability.  There is no pericardial effusion.     Compared to prior study dated 5/25/2018, there is no major change.    Neck pain  Arthritis on xray  Declined physical therapy or MRI, and still doesn't want it    Review of Systems   ROS:  "5 point ROS negative except as noted above in HPI, including Gen., Resp., CV, GI &  system review.       Objective    /68 (BP Location: Right arm)   Pulse 95   Temp 98.9  F (37.2  C) (Tympanic)   Resp 24   Ht 1.6 m (5' 3\")   Wt 69.9 kg (154 lb)   SpO2 96%   BMI 27.28 kg/m    Body mass index is 27.28 kg/m .  Physical Exam   GENERAL: healthy, alert and no distress  NECK: no adenopathy, no asymmetry, masses, or scars and thyroid normal to palpation  RESP: lungs clear to auscultation - no rales, rhonchi or wheezes  CV: regular rate and rhythm, normal S1 and S2, 2/6 systolic ejection murmur LUSB  MS: back exam: walks with a assisted gait,  there tenderness to palpation of back, fair range of motion at waist, lower extremities with good strength and sensation,Right hip flexors weak                "

## 2023-01-27 DIAGNOSIS — I10 ESSENTIAL HYPERTENSION WITH GOAL BLOOD PRESSURE LESS THAN 140/90: ICD-10-CM

## 2023-01-30 RX ORDER — LISINOPRIL 20 MG/1
TABLET ORAL
Qty: 90 TABLET | Refills: 1 | Status: SHIPPED | OUTPATIENT
Start: 2023-01-30 | End: 2023-07-24

## 2023-02-14 DIAGNOSIS — R60.0 BILATERAL LEG EDEMA: ICD-10-CM

## 2023-02-16 RX ORDER — FUROSEMIDE 20 MG
TABLET ORAL
Qty: 30 TABLET | Refills: 0 | Status: SHIPPED | OUTPATIENT
Start: 2023-02-16 | End: 2023-03-20

## 2023-02-16 NOTE — TELEPHONE ENCOUNTER
"Requested Prescriptions   Pending Prescriptions Disp Refills    furosemide (LASIX) 20 MG tablet [Pharmacy Med Name: Furosemide 20 MG Oral Tablet] 30 tablet 0     Sig: Take 1 tablet by mouth once daily       Diuretics (Including Combos) Protocol Failed - 2/14/2023 11:44 AM        Failed - Normal serum creatinine on file in past 12 months     Recent Labs   Lab Test 01/17/23  1538   CR 0.99*              Passed - Blood pressure under 140/90 in past 12 months     BP Readings from Last 3 Encounters:   01/17/23 132/68   12/27/22 (!) 145/79   12/20/22 (!) 140/66                 Passed - Recent (12 mo) or future (30 days) visit within the authorizing provider's specialty     Patient has had an office visit with the authorizing provider or a provider within the authorizing providers department within the previous 12 mos or has a future within next 30 days. See \"Patient Info\" tab in inbasket, or \"Choose Columns\" in Meds & Orders section of the refill encounter.              Passed - Medication is active on med list        Passed - Patient is age 18 or older        Passed - No active pregancy on record        Passed - Normal serum potassium on file in past 12 months     Recent Labs   Lab Test 01/17/23  1538   POTASSIUM 4.1                    Passed - Normal serum sodium on file in past 12 months     Recent Labs   Lab Test 01/17/23  1538                 Passed - No positive pregnancy test in past 12 months           Leda Lyons RN     "

## 2023-03-03 ENCOUNTER — LAB (OUTPATIENT)
Dept: LAB | Facility: CLINIC | Age: 83
End: 2023-03-03
Payer: COMMERCIAL

## 2023-03-03 DIAGNOSIS — D50.8 OTHER IRON DEFICIENCY ANEMIA: ICD-10-CM

## 2023-03-03 DIAGNOSIS — K90.89 POOR IRON ABSORPTION: ICD-10-CM

## 2023-03-03 LAB
BASOPHILS # BLD AUTO: 0.1 10E3/UL (ref 0–0.2)
BASOPHILS NFR BLD AUTO: 1 %
EOSINOPHIL # BLD AUTO: 0.5 10E3/UL (ref 0–0.7)
EOSINOPHIL NFR BLD AUTO: 6 %
ERYTHROCYTE [DISTWIDTH] IN BLOOD BY AUTOMATED COUNT: 14.4 % (ref 10–15)
FERRITIN SERPL-MCNC: 335 NG/ML (ref 11–328)
HCT VFR BLD AUTO: 34 % (ref 35–47)
HGB BLD-MCNC: 10.8 G/DL (ref 11.7–15.7)
IMM GRANULOCYTES # BLD: 0 10E3/UL
IMM GRANULOCYTES NFR BLD: 0 %
IRON BINDING CAPACITY (ROCHE): 268 UG/DL (ref 240–430)
IRON SATN MFR SERPL: 22 % (ref 15–46)
IRON SERPL-MCNC: 58 UG/DL (ref 37–145)
LYMPHOCYTES # BLD AUTO: 1.9 10E3/UL (ref 0.8–5.3)
LYMPHOCYTES NFR BLD AUTO: 23 %
MCH RBC QN AUTO: 29.1 PG (ref 26.5–33)
MCHC RBC AUTO-ENTMCNC: 31.8 G/DL (ref 31.5–36.5)
MCV RBC AUTO: 92 FL (ref 78–100)
MONOCYTES # BLD AUTO: 0.7 10E3/UL (ref 0–1.3)
MONOCYTES NFR BLD AUTO: 9 %
NEUTROPHILS # BLD AUTO: 4.9 10E3/UL (ref 1.6–8.3)
NEUTROPHILS NFR BLD AUTO: 61 %
PLATELET # BLD AUTO: 312 10E3/UL (ref 150–450)
RBC # BLD AUTO: 3.71 10E6/UL (ref 3.8–5.2)
WBC # BLD AUTO: 8 10E3/UL (ref 4–11)

## 2023-03-03 PROCEDURE — 85025 COMPLETE CBC W/AUTO DIFF WBC: CPT

## 2023-03-03 PROCEDURE — 82728 ASSAY OF FERRITIN: CPT

## 2023-03-03 PROCEDURE — 36415 COLL VENOUS BLD VENIPUNCTURE: CPT

## 2023-03-03 PROCEDURE — 83540 ASSAY OF IRON: CPT

## 2023-03-03 PROCEDURE — 83550 IRON BINDING TEST: CPT

## 2023-03-07 DIAGNOSIS — K90.89 POOR IRON ABSORPTION: ICD-10-CM

## 2023-03-07 DIAGNOSIS — D64.9 NORMOCYTIC ANEMIA: Primary | ICD-10-CM

## 2023-03-14 ENCOUNTER — MYC MEDICAL ADVICE (OUTPATIENT)
Dept: FAMILY MEDICINE | Facility: CLINIC | Age: 83
End: 2023-03-14
Payer: COMMERCIAL

## 2023-03-14 DIAGNOSIS — M54.50 CHRONIC LEFT-SIDED LOW BACK PAIN WITHOUT SCIATICA: ICD-10-CM

## 2023-03-14 DIAGNOSIS — G25.81 RESTLESS LEGS SYNDROME: ICD-10-CM

## 2023-03-14 DIAGNOSIS — G89.29 CHRONIC LEFT-SIDED LOW BACK PAIN WITHOUT SCIATICA: ICD-10-CM

## 2023-03-15 RX ORDER — OXYCODONE HYDROCHLORIDE 5 MG/1
5 TABLET ORAL AT BEDTIME
Qty: 30 TABLET | Refills: 0 | Status: SHIPPED | OUTPATIENT
Start: 2023-03-15 | End: 2023-03-21

## 2023-03-21 ENCOUNTER — OFFICE VISIT (OUTPATIENT)
Dept: FAMILY MEDICINE | Facility: CLINIC | Age: 83
End: 2023-03-21
Payer: COMMERCIAL

## 2023-03-21 VITALS
RESPIRATION RATE: 20 BRPM | BODY MASS INDEX: 28.88 KG/M2 | OXYGEN SATURATION: 99 % | HEART RATE: 87 BPM | HEIGHT: 63 IN | WEIGHT: 163 LBS | TEMPERATURE: 97.9 F | SYSTOLIC BLOOD PRESSURE: 134 MMHG | DIASTOLIC BLOOD PRESSURE: 82 MMHG

## 2023-03-21 DIAGNOSIS — G25.81 RESTLESS LEGS SYNDROME: ICD-10-CM

## 2023-03-21 DIAGNOSIS — M54.50 CHRONIC LEFT-SIDED LOW BACK PAIN WITHOUT SCIATICA: Primary | ICD-10-CM

## 2023-03-21 DIAGNOSIS — G89.29 CHRONIC LEFT-SIDED LOW BACK PAIN WITHOUT SCIATICA: Primary | ICD-10-CM

## 2023-03-21 DIAGNOSIS — R74.8 ELEVATED ALKALINE PHOSPHATASE LEVEL: ICD-10-CM

## 2023-03-21 DIAGNOSIS — R06.02 SOB (SHORTNESS OF BREATH): ICD-10-CM

## 2023-03-21 DIAGNOSIS — G89.29 CHRONIC NECK PAIN: ICD-10-CM

## 2023-03-21 DIAGNOSIS — E03.9 ACQUIRED HYPOTHYROIDISM: ICD-10-CM

## 2023-03-21 DIAGNOSIS — I50.32 CHRONIC DIASTOLIC HEART FAILURE (H): ICD-10-CM

## 2023-03-21 DIAGNOSIS — J45.20 MILD INTERMITTENT ASTHMA, UNCOMPLICATED: ICD-10-CM

## 2023-03-21 DIAGNOSIS — R60.0 BILATERAL LEG EDEMA: ICD-10-CM

## 2023-03-21 DIAGNOSIS — J32.9 CHRONIC SINUSITIS, UNSPECIFIED LOCATION: ICD-10-CM

## 2023-03-21 DIAGNOSIS — M54.2 CHRONIC NECK PAIN: ICD-10-CM

## 2023-03-21 DIAGNOSIS — I10 ESSENTIAL HYPERTENSION WITH GOAL BLOOD PRESSURE LESS THAN 140/90: ICD-10-CM

## 2023-03-21 DIAGNOSIS — D50.9 IRON DEFICIENCY ANEMIA, UNSPECIFIED IRON DEFICIENCY ANEMIA TYPE: ICD-10-CM

## 2023-03-21 LAB
ALBUMIN SERPL BCG-MCNC: 4.4 G/DL (ref 3.5–5.2)
ALP SERPL-CCNC: 194 U/L (ref 35–104)
ALT SERPL W P-5'-P-CCNC: 11 U/L (ref 10–35)
ANION GAP SERPL CALCULATED.3IONS-SCNC: 9 MMOL/L (ref 7–15)
AST SERPL W P-5'-P-CCNC: 14 U/L (ref 10–35)
BILIRUB SERPL-MCNC: 0.3 MG/DL
BUN SERPL-MCNC: 31.1 MG/DL (ref 8–23)
CALCIUM SERPL-MCNC: 10.3 MG/DL (ref 8.8–10.2)
CHLORIDE SERPL-SCNC: 101 MMOL/L (ref 98–107)
CREAT SERPL-MCNC: 0.95 MG/DL (ref 0.51–0.95)
DEPRECATED HCO3 PLAS-SCNC: 28 MMOL/L (ref 22–29)
GFR SERPL CREATININE-BSD FRML MDRD: 60 ML/MIN/1.73M2
GLUCOSE SERPL-MCNC: 103 MG/DL (ref 70–99)
NT-PROBNP SERPL-MCNC: 75 PG/ML (ref 0–1800)
POTASSIUM SERPL-SCNC: 4.4 MMOL/L (ref 3.4–5.3)
PROT SERPL-MCNC: 8.3 G/DL (ref 6.4–8.3)
SODIUM SERPL-SCNC: 138 MMOL/L (ref 136–145)
TSH SERPL DL<=0.005 MIU/L-ACNC: 5.4 UIU/ML (ref 0.3–4.2)

## 2023-03-21 PROCEDURE — 80053 COMPREHEN METABOLIC PANEL: CPT | Performed by: FAMILY MEDICINE

## 2023-03-21 PROCEDURE — 99215 OFFICE O/P EST HI 40 MIN: CPT | Performed by: FAMILY MEDICINE

## 2023-03-21 PROCEDURE — 84443 ASSAY THYROID STIM HORMONE: CPT | Performed by: FAMILY MEDICINE

## 2023-03-21 PROCEDURE — 83880 ASSAY OF NATRIURETIC PEPTIDE: CPT | Performed by: FAMILY MEDICINE

## 2023-03-21 PROCEDURE — 36415 COLL VENOUS BLD VENIPUNCTURE: CPT | Performed by: FAMILY MEDICINE

## 2023-03-21 RX ORDER — OXYCODONE HYDROCHLORIDE 5 MG/1
5 TABLET ORAL
Qty: 30 TABLET | Refills: 0 | Status: SHIPPED | OUTPATIENT
Start: 2023-04-14 | End: 2023-04-06

## 2023-03-21 RX ORDER — OXYCODONE HYDROCHLORIDE 5 MG/1
5 TABLET ORAL AT BEDTIME
Qty: 30 TABLET | Refills: 0 | Status: SHIPPED | OUTPATIENT
Start: 2023-05-19 | End: 2023-05-23

## 2023-03-21 ASSESSMENT — ASTHMA QUESTIONNAIRES
QUESTION_4 LAST FOUR WEEKS HOW OFTEN HAVE YOU USED YOUR RESCUE INHALER OR NEBULIZER MEDICATION (SUCH AS ALBUTEROL): ONCE A WEEK OR LESS
ACT_TOTALSCORE: 23
QUESTION_1 LAST FOUR WEEKS HOW MUCH OF THE TIME DID YOUR ASTHMA KEEP YOU FROM GETTING AS MUCH DONE AT WORK, SCHOOL OR AT HOME: NONE OF THE TIME
QUESTION_2 LAST FOUR WEEKS HOW OFTEN HAVE YOU HAD SHORTNESS OF BREATH: NOT AT ALL
QUESTION_3 LAST FOUR WEEKS HOW OFTEN DID YOUR ASTHMA SYMPTOMS (WHEEZING, COUGHING, SHORTNESS OF BREATH, CHEST TIGHTNESS OR PAIN) WAKE YOU UP AT NIGHT OR EARLIER THAN USUAL IN THE MORNING: NOT AT ALL
ACT_TOTALSCORE: 23
QUESTION_5 LAST FOUR WEEKS HOW WOULD YOU RATE YOUR ASTHMA CONTROL: WELL CONTROLLED

## 2023-03-21 ASSESSMENT — PAIN SCALES - GENERAL: PAINLEVEL: MILD PAIN (3)

## 2023-03-21 NOTE — PATIENT INSTRUCTIONS
Get your CT scans done in April or May    Consider physical therapy     Ok to take oxycodone if needed for back, 1/2 at a time, let me know    Labs today

## 2023-04-08 ENCOUNTER — HEALTH MAINTENANCE LETTER (OUTPATIENT)
Age: 83
End: 2023-04-08

## 2023-04-26 DIAGNOSIS — E78.5 HYPERLIPIDEMIA LDL GOAL <130: ICD-10-CM

## 2023-04-26 DIAGNOSIS — I10 ESSENTIAL HYPERTENSION WITH GOAL BLOOD PRESSURE LESS THAN 140/90: ICD-10-CM

## 2023-04-26 RX ORDER — DILTIAZEM HYDROCHLORIDE 240 MG/1
CAPSULE, EXTENDED RELEASE ORAL
Qty: 90 CAPSULE | Refills: 0 | Status: SHIPPED | OUTPATIENT
Start: 2023-04-26 | End: 2023-07-24

## 2023-04-26 RX ORDER — ATORVASTATIN CALCIUM 40 MG/1
TABLET, FILM COATED ORAL
Qty: 90 TABLET | Refills: 0 | Status: SHIPPED | OUTPATIENT
Start: 2023-04-26 | End: 2023-07-24

## 2023-05-02 ENCOUNTER — LAB (OUTPATIENT)
Dept: LAB | Facility: CLINIC | Age: 83
End: 2023-05-02
Payer: COMMERCIAL

## 2023-05-02 DIAGNOSIS — K90.89 POOR IRON ABSORPTION: ICD-10-CM

## 2023-05-02 DIAGNOSIS — D64.9 NORMOCYTIC ANEMIA: ICD-10-CM

## 2023-05-02 LAB
BASOPHILS # BLD AUTO: 0.1 10E3/UL (ref 0–0.2)
BASOPHILS NFR BLD AUTO: 1 %
EOSINOPHIL # BLD AUTO: 0.5 10E3/UL (ref 0–0.7)
EOSINOPHIL NFR BLD AUTO: 8 %
ERYTHROCYTE [DISTWIDTH] IN BLOOD BY AUTOMATED COUNT: 12.7 % (ref 10–15)
FERRITIN SERPL-MCNC: 309 NG/ML (ref 11–328)
HCT VFR BLD AUTO: 34.4 % (ref 35–47)
HGB BLD-MCNC: 11.1 G/DL (ref 11.7–15.7)
IMM GRANULOCYTES # BLD: 0 10E3/UL
IMM GRANULOCYTES NFR BLD: 0 %
IRON BINDING CAPACITY (ROCHE): 277 UG/DL (ref 240–430)
IRON SATN MFR SERPL: 27 % (ref 15–46)
IRON SERPL-MCNC: 74 UG/DL (ref 37–145)
LYMPHOCYTES # BLD AUTO: 1.9 10E3/UL (ref 0.8–5.3)
LYMPHOCYTES NFR BLD AUTO: 29 %
MCH RBC QN AUTO: 29.8 PG (ref 26.5–33)
MCHC RBC AUTO-ENTMCNC: 32.3 G/DL (ref 31.5–36.5)
MCV RBC AUTO: 92 FL (ref 78–100)
MONOCYTES # BLD AUTO: 0.8 10E3/UL (ref 0–1.3)
MONOCYTES NFR BLD AUTO: 11 %
NEUTROPHILS # BLD AUTO: 3.4 10E3/UL (ref 1.6–8.3)
NEUTROPHILS NFR BLD AUTO: 51 %
PLATELET # BLD AUTO: 305 10E3/UL (ref 150–450)
RBC # BLD AUTO: 3.73 10E6/UL (ref 3.8–5.2)
WBC # BLD AUTO: 6.6 10E3/UL (ref 4–11)

## 2023-05-02 PROCEDURE — 36415 COLL VENOUS BLD VENIPUNCTURE: CPT

## 2023-05-02 PROCEDURE — 82728 ASSAY OF FERRITIN: CPT

## 2023-05-02 PROCEDURE — 85025 COMPLETE CBC W/AUTO DIFF WBC: CPT

## 2023-05-02 PROCEDURE — 83540 ASSAY OF IRON: CPT

## 2023-05-02 PROCEDURE — 83550 IRON BINDING TEST: CPT

## 2023-05-04 ENCOUNTER — MYC MEDICAL ADVICE (OUTPATIENT)
Dept: FAMILY MEDICINE | Facility: CLINIC | Age: 83
End: 2023-05-04
Payer: COMMERCIAL

## 2023-05-04 DIAGNOSIS — G25.81 RESTLESS LEGS SYNDROME: ICD-10-CM

## 2023-05-04 RX ORDER — OXYCODONE HYDROCHLORIDE 5 MG/1
5 TABLET ORAL
Qty: 40 TABLET | Refills: 0 | Status: SHIPPED | OUTPATIENT
Start: 2023-05-04 | End: 2023-05-23

## 2023-05-04 NOTE — TELEPHONE ENCOUNTER
It looks like it was sent by you on 3/21/23. Would you like me to cancel the script dated 5/19/23?    Pls advise.  Julie Behrendt RN

## 2023-05-05 DIAGNOSIS — R60.0 BILATERAL LEG EDEMA: ICD-10-CM

## 2023-05-05 RX ORDER — FUROSEMIDE 20 MG
TABLET ORAL
Qty: 30 TABLET | Refills: 4 | Status: SHIPPED | OUTPATIENT
Start: 2023-05-05 | End: 2023-08-22

## 2023-05-05 NOTE — TELEPHONE ENCOUNTER
Prescription approved per [facility] Refill Protocol.  Julie Behrendt RN

## 2023-05-05 NOTE — TELEPHONE ENCOUNTER
Canceled oxycodone script dated 5/19/23 at [facility] in .    Julie Behrendt RN

## 2023-05-08 ENCOUNTER — ONCOLOGY VISIT (OUTPATIENT)
Dept: ONCOLOGY | Facility: CLINIC | Age: 83
End: 2023-05-08
Attending: INTERNAL MEDICINE
Payer: COMMERCIAL

## 2023-05-08 VITALS
HEIGHT: 63 IN | HEART RATE: 78 BPM | DIASTOLIC BLOOD PRESSURE: 70 MMHG | RESPIRATION RATE: 16 BRPM | SYSTOLIC BLOOD PRESSURE: 161 MMHG | WEIGHT: 168 LBS | BODY MASS INDEX: 29.77 KG/M2 | OXYGEN SATURATION: 97 % | TEMPERATURE: 98 F

## 2023-05-08 DIAGNOSIS — D64.9 NORMOCYTIC ANEMIA: Primary | ICD-10-CM

## 2023-05-08 PROCEDURE — G0463 HOSPITAL OUTPT CLINIC VISIT: HCPCS | Performed by: INTERNAL MEDICINE

## 2023-05-08 PROCEDURE — 99214 OFFICE O/P EST MOD 30 MIN: CPT | Performed by: INTERNAL MEDICINE

## 2023-05-08 ASSESSMENT — PAIN SCALES - GENERAL: PAINLEVEL: NO PAIN (0)

## 2023-05-08 NOTE — PROGRESS NOTES
"Oncology Rooming Note    May 8, 2023 3:30 PM   Yudelka Ledesma is a 82 year old female who presents for:    Chief Complaint   Patient presents with     Hematology     Anemia - provider visit only     Initial Vitals: BP (!) 161/70 (BP Location: Right arm, Patient Position: Sitting, Cuff Size: Adult Regular)   Pulse 78   Temp 98  F (36.7  C) (Tympanic)   Resp 16   Ht 1.6 m (5' 2.99\")   Wt 76.2 kg (168 lb)   SpO2 97%   BMI 29.77 kg/m   Estimated body mass index is 29.77 kg/m  as calculated from the following:    Height as of this encounter: 1.6 m (5' 2.99\").    Weight as of this encounter: 76.2 kg (168 lb). Body surface area is 1.84 meters squared.  No Pain (0) Comment: Data Unavailable   No LMP recorded. Patient has had a hysterectomy.  Allergies reviewed: Yes  Medications reviewed: Yes    Medications: Medication refills not needed today.  Pharmacy name entered into [facility]:    [facility] [place] - [place], [place] - [address] [facility] - [place], [place] - [address] [place] [address]    Clinical concerns:  None      Elizabeth oByer CMA            "

## 2023-05-08 NOTE — LETTER
"    5/8/2023         RE: Yudelka Ledesma  [id number] [facility] [id number]        Dear Colleague,    Thank you for referring your patient, Yudelka Ledesma, to the [facility]. Please see a copy of my visit note below.    Oncology Rooming Note    May 8, 2023 3:30 PM   Yudelka Ledesma is a 82 year old female who presents for:    Chief Complaint   Patient presents with     Hematology     Anemia - provider visit only     Initial Vitals: BP (!) 161/70 (BP Location: Right arm, Patient Position: Sitting, Cuff Size: Adult Regular)   Pulse 78   Temp 98  F (36.7  C) (Tympanic)   Resp 16   Ht 1.6 m (5' 2.99\")   Wt 76.2 kg (168 lb)   SpO2 97%   BMI 29.77 kg/m   Estimated body mass index is 29.77 kg/m  as calculated from the following:    Height as of this encounter: 1.6 m (5' 2.99\").    Weight as of this encounter: 76.2 kg (168 lb). Body surface area is 1.84 meters squared.  No Pain (0) Comment: Data Unavailable   No LMP recorded. Patient has had a hysterectomy.  Allergies reviewed: Yes  Medications reviewed: Yes    Medications: Medication refills not needed today.  Pharmacy name entered into [employer]:    [facility] [place] - [place], [place] - [address] [facility] [place] [address] [address] [place] [address]    Clinical concerns:  None      Elizabeth Boyer, [facility] Hematology and Oncology Progress Note    Patient: Yudelka Ledesma  MRN: [id number]  5/08/23        Reason for Visit    Chief Complaint   Patient presents with     Hematology     Anemia - provider visit only         Problem List Items Addressed This Visit    None  Visit Diagnoses     Normocytic anemia    -  Primary    Relevant Orders    CBC with Platelets & Differential    Ferritin    Iron & Iron Binding Capacity            Assessment and Plan  Anemia, iron deficiency  She is status post IV Venofer.  She received first series of infusions in July 2022.  " And had a second course late last year.  Labs from last week reviewed today.  Hemoglobin is still low at 11.1.  Iron studies are within normal limits.  Transferrin saturation is 27%.  TIBC is 277 and ferritin is 309.  I think her iron studies are adequate.  Etiology for her anemia despite adequate iron supplementation is not entirely clear.  She also has hypothyroidism.  Recent TSH was 5.9.  B12 levels were normal in the past.  Repeat her labs in 3 months.  He continues to have significant anemia and potentially could consider bone marrow biopsy.    Cancer Staging   No matching staging information was found for the patient.    ECOG Performance    0 - Independent         Problem List    Patient Active Problem List   Diagnosis     Essential hypertension with goal blood pressure less than 140/90     Esophageal reflux     Hypothyroidism     Mild intermittent asthma, uncomplicated     Abnormal glucose     Nephrolithiasis     Elevated serum alkaline phosphatase level     S/P hysterectomy     Hyperlipidemia LDL goal <130     Colon stricture (H)     Carpal tunnel syndrome     Cervical radiculopathy     Advanced directives, counseling/discussion     Scoliosis     Systolic murmur     Varicose veins of both lower extremities     Other idiopathic scoliosis     Essential tremor     Elevated blood sugar     Chronic pain of right knee     Primary osteoarthritis of right knee     Other iron deficiency anemia     Poor iron absorption     Status post total knee replacement     Chronic left-sided low back pain without sciatica     Chronic neck pain     Bilateral leg edema     Restless legs syndrome     Chronic heart failure with preserved ejection fraction (HFpEF) (H)     Prediabetes         Interval History   Yudelka Ledesma is a 82 year old female with history of allergies anemia status post IV iron infusions who is here for follow-up.    This my first time seeing her.  She does have a history of iron deficiency anemia  which did not respond to oral iron supplementation.  She received IV iron on 2 different occasions last year.  Here with repeat labs.  Denies any new issues today.  Denies any blood in stools.  Looks like she cannot have any colonoscopies due to history of difficult procedure in the past.      Review of Systems  A comprehensive review of systems was negative except for what is noted in the interval history    Current Outpatient Medications   Medication     albuterol (PROAIR HFA/PROVENTIL HFA/VENTOLIN HFA) 108 (90 Base) MCG/ACT inhaler     atorvastatin (LIPITOR) 40 MG tablet     chlorhexidine (PERIDEX) 0.12 % solution     diltiazem ER (DILT-XR) 240 MG 24 hr ER beaded capsule     docusate sodium (COLACE) 100 MG capsule     esomeprazole (NEXIUM) 20 MG DR capsule     furosemide (LASIX) 20 MG tablet     lisinopril (ZESTRIL) 20 MG tablet     acetaminophen (TYLENOL) 325 MG tablet     levothyroxine (SYNTHROID/LEVOTHROID) 88 MCG tablet     [START ON 7/1/2023] oxyCODONE (ROXICODONE) 5 MG tablet     [START ON 6/3/2023] oxyCODONE (ROXICODONE) 5 MG tablet     No current facility-administered medications for this visit.        Physical Exam    No flowsheet data found.    General: alert and cooperative  HEENT: Head: Normal, normocephalic, atraumatic.  Eye: Normal external eye, conjunctiva, lids cornea, ZACK.  Chest: Clear to auscultation bilaterally  Cardiac: S1, S2 normal, regular rate and rhythm  Abdomen: abdomen is soft   Extremities: atraumatic, no peripheral edema  Skin: No rashes  CNS: Alert and oriented x3, neurologic exam grossly normal.      Lab Results    No results found for this or any previous visit (from the past 168 hour(s)).    Imaging          Signed by: Missy Quijano MD      Again, thank you for allowing me to participate in the care of your patient.        Sincerely,        Missy Quijano MD

## 2023-05-18 ENCOUNTER — HOSPITAL ENCOUNTER (OUTPATIENT)
Dept: CT IMAGING | Facility: CLINIC | Age: 83
Discharge: HOME | End: 2023-05-18
Attending: FAMILY MEDICINE | Admitting: FAMILY MEDICINE
Payer: COMMERCIAL

## 2023-05-18 DIAGNOSIS — M54.50 CHRONIC LEFT-SIDED LOW BACK PAIN WITHOUT SCIATICA: ICD-10-CM

## 2023-05-18 DIAGNOSIS — G89.29 CHRONIC LEFT-SIDED LOW BACK PAIN WITHOUT SCIATICA: ICD-10-CM

## 2023-05-18 PROCEDURE — 72131 CT LUMBAR SPINE W/O DYE: CPT

## 2023-05-23 ENCOUNTER — OFFICE VISIT (OUTPATIENT)
Dept: FAMILY MEDICINE | Facility: CLINIC | Age: 83
End: 2023-05-23
Payer: COMMERCIAL

## 2023-05-23 VITALS
SYSTOLIC BLOOD PRESSURE: 122 MMHG | HEART RATE: 83 BPM | HEIGHT: 63 IN | TEMPERATURE: 98.6 F | OXYGEN SATURATION: 98 % | RESPIRATION RATE: 20 BRPM | BODY MASS INDEX: 29.23 KG/M2 | DIASTOLIC BLOOD PRESSURE: 60 MMHG | WEIGHT: 165 LBS

## 2023-05-23 DIAGNOSIS — G25.81 RESTLESS LEGS SYNDROME: ICD-10-CM

## 2023-05-23 DIAGNOSIS — E03.9 HYPOTHYROIDISM, UNSPECIFIED TYPE: ICD-10-CM

## 2023-05-23 DIAGNOSIS — R73.03 PREDIABETES: ICD-10-CM

## 2023-05-23 DIAGNOSIS — E03.9 ACQUIRED HYPOTHYROIDISM: ICD-10-CM

## 2023-05-23 DIAGNOSIS — E78.5 HYPERLIPIDEMIA LDL GOAL <130: ICD-10-CM

## 2023-05-23 DIAGNOSIS — Z00.00 MEDICARE ANNUAL WELLNESS VISIT, SUBSEQUENT: ICD-10-CM

## 2023-05-23 DIAGNOSIS — E83.52 SERUM CALCIUM ELEVATED: ICD-10-CM

## 2023-05-23 DIAGNOSIS — R60.0 BILATERAL LEG EDEMA: ICD-10-CM

## 2023-05-23 DIAGNOSIS — M54.50 CHRONIC LEFT-SIDED LOW BACK PAIN WITHOUT SCIATICA: Primary | ICD-10-CM

## 2023-05-23 DIAGNOSIS — G89.29 CHRONIC LEFT-SIDED LOW BACK PAIN WITHOUT SCIATICA: Primary | ICD-10-CM

## 2023-05-23 DIAGNOSIS — I10 ESSENTIAL HYPERTENSION WITH GOAL BLOOD PRESSURE LESS THAN 140/90: ICD-10-CM

## 2023-05-23 LAB
ANION GAP SERPL CALCULATED.3IONS-SCNC: 12 MMOL/L (ref 7–15)
BUN SERPL-MCNC: 31.4 MG/DL (ref 8–23)
CALCIUM SERPL-MCNC: 9.7 MG/DL (ref 8.8–10.2)
CHLORIDE SERPL-SCNC: 101 MMOL/L (ref 98–107)
CHOLEST SERPL-MCNC: 187 MG/DL
CREAT SERPL-MCNC: 1.02 MG/DL (ref 0.51–0.95)
CREAT UR-MCNC: 94 MG/DL
DEPRECATED HCO3 PLAS-SCNC: 25 MMOL/L (ref 22–29)
GFR SERPL CREATININE-BSD FRML MDRD: 55 ML/MIN/1.73M2
GLUCOSE SERPL-MCNC: 116 MG/DL (ref 70–99)
HBA1C MFR BLD: 6.1 % (ref 0–5.6)
HDLC SERPL-MCNC: 42 MG/DL
LDLC SERPL CALC-MCNC: 82 MG/DL
NONHDLC SERPL-MCNC: 145 MG/DL
POTASSIUM SERPL-SCNC: 4.4 MMOL/L (ref 3.4–5.3)
PTH-INTACT SERPL-MCNC: 61 PG/ML (ref 15–65)
SODIUM SERPL-SCNC: 138 MMOL/L (ref 136–145)
TRIGL SERPL-MCNC: 314 MG/DL
TSH SERPL DL<=0.005 MIU/L-ACNC: 3.53 UIU/ML (ref 0.3–4.2)

## 2023-05-23 PROCEDURE — 36415 COLL VENOUS BLD VENIPUNCTURE: CPT | Performed by: FAMILY MEDICINE

## 2023-05-23 PROCEDURE — 83036 HEMOGLOBIN GLYCOSYLATED A1C: CPT | Performed by: FAMILY MEDICINE

## 2023-05-23 PROCEDURE — 80048 BASIC METABOLIC PNL TOTAL CA: CPT | Performed by: FAMILY MEDICINE

## 2023-05-23 PROCEDURE — 83970 ASSAY OF PARATHORMONE: CPT | Performed by: FAMILY MEDICINE

## 2023-05-23 PROCEDURE — 84443 ASSAY THYROID STIM HORMONE: CPT | Performed by: FAMILY MEDICINE

## 2023-05-23 PROCEDURE — 80061 LIPID PANEL: CPT | Performed by: FAMILY MEDICINE

## 2023-05-23 PROCEDURE — 99215 OFFICE O/P EST HI 40 MIN: CPT | Mod: 25 | Performed by: FAMILY MEDICINE

## 2023-05-23 PROCEDURE — G0439 PPPS, SUBSEQ VISIT: HCPCS | Performed by: FAMILY MEDICINE

## 2023-05-23 RX ORDER — OXYCODONE HYDROCHLORIDE 5 MG/1
TABLET ORAL
Qty: 45 TABLET | Refills: 0 | Status: SHIPPED | OUTPATIENT
Start: 2023-06-03 | End: 2023-08-02

## 2023-05-23 RX ORDER — OXYCODONE HYDROCHLORIDE 5 MG/1
TABLET ORAL
Qty: 45 TABLET | Refills: 0 | Status: SHIPPED | OUTPATIENT
Start: 2023-07-01 | End: 2023-06-13

## 2023-05-23 ASSESSMENT — PAIN SCALES - GENERAL: PAINLEVEL: NO PAIN (0)

## 2023-05-23 NOTE — PROGRESS NOTES
Assessment & Plan     Chronic left-sided low back pain without sciatica  Comfortably managed with oxycodone 0.5 in am, 1 in pm  - oxyCODONE (ROXICODONE) 5 MG tablet; Take 1 tablet (5 mg) by mouth every evening for 30 days, THEN 0.5 tablets (2.5 mg) every morning for 30 days. One at bedtime and 1/2 during day  - Drug Confirmation Panel Urine with Creat - lab collect; Future  - Drug Confirmation Panel Urine with Creat - lab collect    Restless legs syndrome  Controlled with oxycodone as well  - oxyCODONE (ROXICODONE) 5 MG tablet; Take 1 tablet (5 mg) by mouth every evening for 30 days, THEN 0.5 tablets (2.5 mg) every morning for 30 days. One at bedtime and 1/2 during day  - oxyCODONE (ROXICODONE) 5 MG tablet; Take 1 tablet (5 mg) by mouth At Bedtime for 30 days, THEN 0.5 tablets (2.5 mg) every morning for 30 days. restless legs, may use 1/2 tab during day prn    Bilateral leg edema  Uncertain etiology  Suspect lymphedema as is not responding to diuretics, normal BNP  Does have diastolic dysfunction  Will try lymphedema clinic  - Lymphedema Therapy Referral; Future    Essential hypertension with goal blood pressure less than 140/90  Well controlled  Continue current antihypersensives  - Basic metabolic panel  (Ca, Cl, CO2, Creat, Gluc, K, Na, BUN); Future  - Basic metabolic panel  (Ca, Cl, CO2, Creat, Gluc, K, Na, BUN)    Acquired hypothyroidism  On levothyroxine, adjust as needed   - TSH; Future  - TSH    Prediabetes  - Hemoglobin A1c; Future  - Hemoglobin A1c    Hyperlipidemia LDL goal <130  On statin  - Lipid panel reflex to direct LDL Fasting; Future  - Lipid panel reflex to direct LDL Fasting    Serum calcium elevated  - Parathyroid Hormone Intact; Future  - Parathyroid Hormone Intact    Medicare annual wellness visit, subsequent    Patient Instructions   See the lymphedema folks for the legs    Labs today    See you in 3 months           40 minutes spent by me on the date of the encounter doing chart review,  history and exam, documentation and further activities per the note in addition to the annual wellness visit             Azalea Meehan MD  [facility]    Bacilio casanova is a 82 year old, presenting for the following health issues:  Pain        5/23/2023     2:55 PM   Additional Questions   Roomed by Yamilet     History of Present Illness       Back Pain:  She presents for follow up of back pain. Patient's back pain is a chronic problem.  Location of back pain:  Left lower back  Description of back pain: other  Back pain spreads: nowhere    Since patient first noticed back pain, pain is: always present, but gets better and worse  Does back pain interfere with her job:  Not applicable      Hypothyroidism:     Since last visit, patient describes the following symptoms::  None    Vascular Disease:  She presents for follow up of vascular disease.  She never takes nitroglycerin. She is not taking daily aspirin.    She eats 0-1 servings of fruits and vegetables daily.She consumes 2 sweetened beverage(s) daily.She exercises with enough effort to increase her heart rate 20 to 29 minutes per day.  She exercises with enough effort to increase her heart rate 6 days per week.   She is taking medications regularly.     hypothyroidism   TSH   Date Value Ref Range Status   03/21/2023 5.40 (H) 0.30 - 4.20 uIU/mL Final   02/10/2022 2.26 0.40 - 4.00 mU/L Final   04/05/2021 3.76 0.40 - 4.00 mU/L Final   on levothyroxine, taking 88 mcg daily except 1.5 on Sundays    Bilateral leg swelling  BNP was normal  Echocardiogram   Interpretation Summary     Left ventricular systolic function is normal.  The visual ejection fraction is 60-65%.  Grade I or early diastolic dysfunction.  The right ventricle is normal in structure, function and size.  No significant valve dysfunction. Mildly thickened mitral valve leaflets  without stenosis. Trace MR.  The inferior vena cava was normal in size with preserved  respiratory  variability.  There is no pericardial effusion.     Compared to prior study dated 5/25/2018, there is no major change.    Tried compresonsion stockings, not helpful  On furosemide, which doesn't seem to help    anemia  Saw hematology 5/8, but note not available  Patient tells me that he told her just to monitor, that she doesn't need iron infusions    Chronic back pain  Had a CT scan  Results for orders placed or performed during the hospital encounter of 05/18/23   CT Lumbar Spine w/o Contrast    Narrative    CT LUMBAR SPINE WITHOUT CONTRAST  5/18/2023 2:25 PM     HISTORY: Chronic left-sided low back pain.     TECHNIQUE: Axial images of the lumbar spine were obtained without  intravenous contrast. Multiplanar reformations were performed.   Radiation dose for this scan was reduced using automated exposure  control, adjustment of the mA and/or kV according to patient size, or  iterative reconstruction technique.     COMPARISON: Lumbar spine x-ray 11/15/2022.     FINDINGS:  There are 5 lumbar-type vertebral bodies assumed for the  purposes of this dictation.     Convex left curvature of the lumbar spine centered at L1-L2. Grade 1  anterolisthesis of L4 on L5 due to facet arthropathy. No acute lucent  fracture lines. No significant loss of vertebral body height. No focal  destructive bony lesions.    Level by level as follows:     T10-T11: Marked loss of disc height with degenerative endplate changes  more pronounced towards the left. No significant disc herniation. Left  greater than right facet hypertrophy. No spinal canal narrowing. No  right neural foraminal narrowing. Mild left neural foraminal  narrowing.    T11-T12: Marked loss of disc height with degenerative endplate  changes. Circumferential disc bulge with mild endplate osteophytic  spurring. Left greater than right facet hypertrophy. No spinal canal  narrowing. No significant right neural foraminal narrowing. Moderate  left neural foraminal  narrowing.     T12-L1: Marked loss of disc height with degenerative endplate changes.  Circumferential disc bulge with endplate osteophytic spurring. Normal  facets. No significant spinal canal narrowing. Moderate right neural  foraminal narrowing. Moderate left neural foraminal narrowing.    L1-L2: Marked loss of disc height with degenerative endplate changes  more pronounced towards the right. Circumferential disc bulge with  endplate osteophytic spurring more pronounced towards the right  foraminal region. Normal facets. No spinal canal narrowing. Moderate  right neural foraminal narrowing. No left neural foraminal narrowing.     L2-L3: Marked loss of disc height with degenerative endplate changes  more pronounced towards the right. Circumferential disc bulge with  endplate osteophytic spurring. Normal facets. No significant spinal  canal narrowing. Moderate right neural foraminal narrowing. No left  neural foraminal narrowing.     L3-L4: Marked loss of disc height with degenerative endplate changes.  Circumferential disc bulge with endplate osteophytic spurring. Mild  facet hypertrophy. Mild spinal canal narrowing. No significant right  neural foraminal narrowing. Mild left neural foraminal narrowing.     L4-L5: Grade 1 anterolisthesis with uncovering of the disc. Moderate  loss of disc height. Posterior disc bulge. Marked bilateral facet  hypertrophy. Thickening of the ligamentum flavum. Moderate to severe  spinal canal narrowing. Moderate right neural foraminal narrowing.  Moderate left neural foraminal narrowing.     L5-S1: Moderate loss of disc height. Circumferential disc bulge with  endplate osteophytic spurring which is more pronounced from the left  foraminal region. Bilateral facet hypertrophy. No spinal canal  narrowing. No right neural foraminal narrowing. Mild left neural  foraminal narrowing.     Visualized paraspinous tissues: Unremarkable.       Impression    IMPRESSION:    1. Marked multilevel  degenerative changes throughout the lumbar spine  as detailed above. Convex left curvature of the lumbar spine centered  at L1-L2.  2. No acute fracture or subluxation in the lumbar spine.  3. Grade 1 anterolisthesis of L4 on L5 due to degenerative facet  arthropathy.  4. Moderate to severe spinal canal narrowing at L4-L5.  5. Multiple levels of neural foraminal narrowing with particular  narrowings on the left at T11-T12, T12-L1, and L1-L2, on the right at  L2-L3 and bilaterally at L4-L5.    ERIC CASTLE MD         SYSTEM ID:  [id number]     She is not interested in seeing a back doctor or getting surgery. The pain pills really help.   Takes oxycodone, one pill at bedtime, and 1/2 tab in am.   [place] monitoring [place], last filled 5/4/23 #40    Recent Labs   Lab Test 04/19/22  1515 04/05/21  1417 12/05/16  1057 10/27/15  1103   CHOL 183 198   < > 177   HDL 44* 55   < > 50*   * 107*   < > 90   TRIG 173* 180*   < > 184*   CHOLHDLRATIO  --   --   --  3.5    < > = values in this interval not displayed.      prediabetes  Lab Results   Component Value Date    A1C 6.0 07/12/2022    A1C 6.4 02/10/2022    A1C 6.4 08/11/2011        hyperlipidemia   On atorvastatin  Recent Labs   Lab Test 05/23/23  1630 04/19/22  1515 12/05/16  1057 10/27/15  1103   CHOL 187 183   < > 177   HDL 42* 44*   < > 50*   LDL 82 104*   < > 90   TRIG 314* 173*   < > 184*   CHOLHDLRATIO  --   --   --  3.5    < > = values in this interval not displayed.      hypertension   On diltiazem, lasix, lisinopril  BP Readings from Last 6 Encounters:   05/23/23 122/60   05/08/23 (!) 161/70   03/21/23 134/82   01/17/23 132/68   12/27/22 (!) 145/79   12/20/22 (!) 140/66        Annual Wellness Visit    Patient has been advised of split billing requirements and indicates understanding: Yes     Are you in the first 12 months of your Medicare Part B coverage?  No    Physical Health:    In general, how would you rate your overall physical  "health? good    Outside of work, how many days during the week do you exercise?none    Outside of work, approximately how many minutes a day do you exercise?not applicable    If you drink alcohol do you typically have >3 drinks per day or >7 drinks per week? No    Do you usually eat at least 4 servings of fruit and vegetables a day, include whole grains & fiber and avoid regularly eating high fat or \"junk\" foods? Yes    Do you have any problems taking medications regularly? No    Do you have any side effects from medications? none    Needs assistance for the following daily activities: transportation    Which of the following safety concerns are present in your home?  none identified     Hearing impairment: No    In the past 6 months, have you been bothered by leaking of urine? yes    Mental Health:    In general, how would you rate your overall mental or emotional health? good  PHQ-2 Score: 1    Do you feel safe in your environment? Yes    Have you ever done Advance Care Planning? (For example, a Health Directive, POLST, or a discussion with a medical provider or your loved ones about your wishes)? Yes, advance care planning is on file.    Fall risk:  Fallen 2 or more times in the past year?: No  Any fall with injury in the past year?: No    Cognitive Screenin) Repeat 3 items (Leader, Season, Table)    2) Clock draw: NORMAL  3) 3 item recall: Recalls 2 objects   Results: NORMAL clock, 1-2 items recalled: COGNITIVE IMPAIRMENT LESS LIKELY    Mini-CogTM Copyright [employer] [place]. Licensed by the author for use in [facility]; reprinted with permission (nuris@.[facility]). All rights reserved.      Do you have sleep apnea, excessive snoring or daytime drowsiness?: no    Social History     Tobacco Use     Smoking status: Former     Years: 15.00     Types: Cigarettes     Quit date: 1985     Years since quittin.4     Smokeless tobacco: Never   Vaping Use     Vaping status: Never Used   Substance Use Topics " "    Alcohol use: No             4/11/2019     1:13 PM   Alcohol Use   Prescreen: >3 drinks/day or >7 drinks/week? Not Applicable     Do you have a current opioid prescription? Yes   How severe is your pain on a scale from 1-10?          Do you use any other controlled substances or medications that are not prescribed by a provider? None    Current providers sharing in care for this patient include:   Patient Care Team:  Azalea Ochoa MD as PCP - General (Family Practice)  Azalea Ochoa MD as Assigned PCP  Enio Carmona MD as Assigned Cancer Care Provider  Azalea Ochoa MD as Assigned Pain Medication Provider    Patient has been advised of split billing requirements and indicates understanding: Yes      Review of Systems   ROS: 5 point ROS negative except as noted above in HPI, including Gen., Resp., CV, GI &  system review.       Objective    /60   Pulse 83   Temp 98.6  F (37  C) (Tympanic)   Resp 20   Ht 1.6 m (5' 3\")   Wt 74.8 kg (165 lb)   SpO2 98%   BMI 29.23 kg/m    Body mass index is 29.23 kg/m .  Physical Exam   GENERAL: healthy, alert and no distress  NECK: no adenopathy, no asymmetry, masses, or scars and thyroid normal to palpation  RESP: lungs clear to auscultation - no rales, rhonchi or wheezes  CV: regular rate and rhythm, normal S1 S2, no S3 or S4, no murmur, click or rub   ABDOMEN: soft, nontender, no hepatosplenomegaly, no masses and bowel sounds normal  MS: scoliosis present, walks with assist of cane, antalgic gait  Plus 2 edema to just below knees                "

## 2023-05-23 NOTE — LETTER
Opioid / Opioid Plus Controlled Substance Agreement    This is an agreement between you and your provider about the safe and appropriate use of controlled substance/opioids prescribed by your care team. Controlled substances are medicines that can cause physical and mental dependence (abuse).    There are strict laws about having and using these medicines. We here at [facility] are committing to working with you in your efforts to get better. To support you in this work, we ll help you schedule regular office appointments for medicine refills. If we must cancel or change your appointment for any reason, we ll make sure you have enough medicine to last until your next appointment.     As a Provider, I will:  Listen carefully to your concerns and treat you with respect.   Recommend a treatment plan that I believe is in your best interest. This plan may involve therapies other than opioid pain medication.   Talk with you often about the possible benefits, and the risk of harm of any medicine that we prescribe for you.   Provide a plan on how to taper (discontinue or go off) using this medicine if the decision is made to stop its use.    As a Patient, I understand that opioid(s):   Are a controlled substance prescribed by my care team to help me function or work and manage my condition(s).   Are strong medicines and can cause serious side effects such as:  Drowsiness, which can seriously affect my driving ability  A lower breathing rate, enough to cause death  Harm to my thinking ability   Depression   Abuse of and addiction to this medicine  Need to be taken exactly as prescribed. Combining opioids with certain medicines or chemicals (such as illegal drugs, sedatives, sleeping pills, and benzodiazepines) can be dangerous or even fatal. If I stop opioids suddenly, I may have severe withdrawal symptoms.  Do not work for all types of pain nor for all patients. If they re not helpful, I may be asked to stop  them.        The risks, benefits and side effects of these medicine(s) were explained to me. I agree that:  I will take part in other treatments as advised by my care team. This may be psychiatry or counseling, physical therapy, behavioral therapy, group treatment or a referral to a specialist.     I will keep all my appointments. I understand that this is part of the monitoring of opioids. My care team may require an office visit for EVERY opioid/controlled substance refill. If I miss appointments or don t follow instructions, my care team may stop my medicine.    I will take my medicines as prescribed. I will not change the dose or schedule unless my care team tells me to. There will be no refills if I run out early.     I may be asked to come to the clinic and complete a urine drug test or complete a pill count at any time. If I don t give a urine sample or participate in a pill count, the care team may stop my medicine.    I will only receive prescriptions from this clinic for chronic pain. If I am treated by another provider for acute pain issues, I will tell them that I am taking opioid pain medication for chronic pain and that I have a treatment agreement with this provider. I will inform my [facility] care team within one business day if I am given a prescription for any pain medication by another healthcare provider. My [facility] care team can contact other providers and pharmacists about my use of any medicines.    It is up to me to make sure that I don t run out of my medicines on weekends or holidays. If my care team is willing to refill my opioid prescription without a visit, I must request refills only during office hours. Refills may take up to 3 business days to process. I will use one pharmacy to fill all my opioid and other controlled substance prescriptions. I will notify the clinic about any changes to my insurance or medication availability.    I am responsible for my  prescriptions. If the medicine/prescription is lost, stolen or destroyed, it will not be replaced. I also agree not to share controlled substance medicines with anyone.    I am aware I should not use any illegal or recreational drugs. I agree not to drink alcohol unless my care team says I can.       If I enroll in the [place] [facility] program, I will tell my care team prior to my next refill.     I will tell my care team right away if I become pregnant, have a new medical problem treated outside of my regular clinic, or have a change in my medications.    I understand that this medicine can affect my thinking, judgment and reaction time. Alcohol and drugs affect the brain and body, which can affect the safety of my driving. Being under the influence of alcohol or drugs can affect my decision-making, behaviors, personal safety, and the safety of others. Driving while impaired (DWI) can occur if a person is driving, operating, or in physical control of a car, motorcycle, boat, snowmobile, ATV, motorbike, off-road vehicle, or any other motor vehicle ([place] Statute 169A.20). I understand the risk if I choose to drive or operate any vehicle or machinery.    I understand that if I do not follow any of the conditions above, my prescriptions or treatment may be stopped or changed.          Opioids  What You Need to Know    What are opioids?   Opioids are pain medicines that must be prescribed by a doctor. They are also known as narcotics.     Examples are:   morphine (MS Contin, Merna)  oxycodone (Oxycontin)  oxycodone and acetaminophen (Percocet)  hydrocodone and acetaminophen (Vicodin, Norco)   fentanyl patch (Duragesic)   hydromorphone (Dilaudid)   methadone  codeine (Tylenol #3)     What do opioids do well?   Opioids are best for severe short-term pain such as after a surgery or injury. They may work well for cancer pain. They may help some people with long-lasting (chronic) pain.     What do opioids NOT do  well?   Opioids never get rid of pain entirely, and they don t work well for most patients with chronic pain. Opioids don t reduce swelling, one of the causes of pain.                                    Other ways to manage chronic pain and improve function include:     Treat the health problem that may be causing pain  Anti-inflammation medicines, which reduce swelling and tenderness, such as ibuprofen (Advil, Motrin) or naproxen (Aleve)  Acetaminophen (Tylenol)  Antidepressants and anti-seizure medicines, especially for nerve pain  Topical treatments such as patches or creams  Injections or nerve blocks  Chiropractic or osteopathic treatment  Acupuncture, massage, deep breathing, meditation, visual imagery, aromatherapy  Use heat or ice at the pain site  Physical therapy   Exercise  Stop smoking  Take part in therapy       Risks and side effects     Talk to your doctor before you start or decide to keep taking opioids. Possible side effects include:    Lowering your breathing rate enough to cause death  Overdose, including death, especially if taking higher than prescribed doses  Worse depression symptoms; less pleasure in things you usually enjoy  Feeling tired or sluggish  Slower thoughts or cloudy thinking  Being more sensitive to pain over time; pain is harder to control  Trouble sleeping or restless sleep  Changes in hormone levels (for example, less testosterone)  Changes in sex drive or ability to have sex  Constipation  Unsafe driving  Itching and sweating  Dizziness  Nausea, throwing up and dry mouth    What else should I know about opioids?    Opioids may lead to dependence, tolerance, or addiction.    Dependence means that if you stop or reduce the medicine too quickly, you will have withdrawal symptoms. These include loose poop (diarrhea), jitters, flu-like symptoms, nervousness and tremors. Dependence is not the same as addiction.                     Tolerance means needing higher doses over time to  get the same effect. This may increase the chance of serious side effects.    Addiction is when people improperly use a substance that harms their body, their mind or their relations with others. Use of opiates can cause a relapse of addiction if you have a history of drug or alcohol abuse.    People who have used opioids for a long time may have a lower quality of life, worse depression, higher levels of pain and more visits to doctors.    You can overdose on opioids. Take these steps to lower your risk of overdose:    Recognize the signs:  Signs of overdose include decrease or loss of consciousness (blackout), slowed breathing, trouble waking up and blue lips. If someone is worried about overdose, they should call 911.    Talk to your doctor about Narcan (naloxone).   If you are at risk for overdose, you may be given a prescription for Narcan. This medicine very quickly reverses the effects of opioids.   If you overdose, a friend or family member can give you Narcan while waiting for the ambulance. They need to know the signs of overdose and how to give Narcan.     Don't use alcohol or street drugs.   Taking them with opioids can cause death.    Do not take any of these medicines unless your doctor says it s OK. Taking these with opioids can cause death:  Benzodiazepines, such as lorazepam (Ativan), alprazolam (Xanax) or diazepam (Valium)  Muscle relaxers, such as cyclobenzaprine (Flexeril)  Sleeping pills like zolpidem (Ambien)   Other opioids      How to keep you and other people safe while taking opioids:    Never share your opioids with others.  Opioid medicines are regulated by the Drug Enforcement Agency ([place]). Selling or sharing medications is a criminal act.    2. Be sure to store opioids in a secure place, locked up if possible. Young children can easily swallow them and overdose.    3. When you are traveling with your medicines, keep them in the original bottles. If you use a pill box, be sure you also  carry a copy of your medicine list from your clinic or pharmacy.    4. Safe disposal of opioids    Most pharmacies have places to get rid of medicine, called disposal kiosks. Medicine disposal options are also available in every [facility]. Search your county and  medication disposal  to find more options. You can find more details at:  https://www.[facility]./living-green/managing-unwanted-medications     I agree that my provider, clinic care team, and pharmacy may work with any city, state or federal law enforcement agency that investigates the misuse, sale, or other diversion of my controlled medicine. I will allow my provider to discuss my care with, or share a copy of, this agreement with any other treating provider, pharmacy or emergency room where I receive care.    I have read this agreement and have asked questions about anything I did not understand.    _______________________________________________________  Patient Signature - Yudelka Ledesma _____________________                   Date     _______________________________________________________  Provider Signature - Azalea Meehan MD   _____________________                   Date     _______________________________________________________  Witness Signature (required if provider not present while patient signing)   _____________________                   Date

## 2023-05-25 LAB
OXYCODONE UR CFM-MCNC: 479 NG/ML
OXYCODONE/CREAT UR: 510 NG/MG {CREAT}
OXYMORPHONE UR CFM-MCNC: 1360 NG/ML
OXYMORPHONE/CREAT UR: 1447 NG/MG {CREAT}

## 2023-05-26 PROBLEM — R73.03 PREDIABETES: Status: ACTIVE | Noted: 2023-05-26

## 2023-05-26 RX ORDER — LEVOTHYROXINE SODIUM 88 UG/1
TABLET ORAL
Qty: 96 TABLET | Refills: 1 | Status: SHIPPED | OUTPATIENT
Start: 2023-05-26 | End: 2023-07-24

## 2023-05-26 ASSESSMENT — ACTIVITIES OF DAILY LIVING (ADL): CURRENT_FUNCTION: NEEDS ASSISTANCE

## 2023-06-01 NOTE — PROGRESS NOTES
[facility] Hematology and Oncology Progress Note    Patient: Yudelka Ledesma  MRN: [id number]  5/08/23        Reason for Visit    Chief Complaint   Patient presents with     Hematology     Anemia - provider visit only         Problem List Items Addressed This Visit    None  Visit Diagnoses     Normocytic anemia    -  Primary    Relevant Orders    CBC with Platelets & Differential    Ferritin    Iron & Iron Binding Capacity            Assessment and Plan  Anemia, iron deficiency  She is status post IV Venofer.  She received first series of infusions in July 2022.  And had a second course late last year.  Labs from last week reviewed today.  Hemoglobin is still low at 11.1.  Iron studies are within normal limits.  Transferrin saturation is 27%.  TIBC is 277 and ferritin is 309.  I think her iron studies are adequate.  Etiology for her anemia despite adequate iron supplementation is not entirely clear.  She also has hypothyroidism.  Recent TSH was 5.9.  B12 levels were normal in the past.  Repeat her labs in 3 months.  He continues to have significant anemia and potentially could consider bone marrow biopsy.    Cancer Staging   No matching staging information was found for the patient.    ECOG Performance    0 - Independent         Problem List    Patient Active Problem List   Diagnosis     Essential hypertension with goal blood pressure less than 140/90     Esophageal reflux     Hypothyroidism     Mild intermittent asthma, uncomplicated     Abnormal glucose     Nephrolithiasis     Elevated serum alkaline phosphatase level     S/P hysterectomy     Hyperlipidemia LDL goal <130     Colon stricture (H)     Carpal tunnel syndrome     Cervical radiculopathy     Advanced directives, counseling/discussion     Scoliosis     Systolic murmur     Varicose veins of both lower extremities     Other idiopathic scoliosis     Essential tremor     Elevated blood sugar     Chronic pain of right knee     Primary  osteoarthritis of right knee     Other iron deficiency anemia     Poor iron absorption     Status post total knee replacement     Chronic left-sided low back pain without sciatica     Chronic neck pain     Bilateral leg edema     Restless legs syndrome     Chronic heart failure with preserved ejection fraction (HFpEF) (H)     Prediabetes         Interval History   Yudelka Ledesma is a 82 year old female with history of allergies anemia status post IV iron infusions who is here for follow-up.    This my first time seeing her.  She does have a history of iron deficiency anemia which did not respond to oral iron supplementation.  She received IV iron on 2 different occasions last year.  Here with repeat labs.  Denies any new issues today.  Denies any blood in stools.  Looks like she cannot have any colonoscopies due to history of difficult procedure in the past.      Review of Systems  A comprehensive review of systems was negative except for what is noted in the interval history    Current Outpatient Medications   Medication     albuterol (PROAIR HFA/PROVENTIL HFA/VENTOLIN HFA) 108 (90 Base) MCG/ACT inhaler     atorvastatin (LIPITOR) 40 MG tablet     chlorhexidine (PERIDEX) 0.12 % solution     diltiazem ER (DILT-XR) 240 MG 24 hr ER beaded capsule     docusate sodium (COLACE) 100 MG capsule     esomeprazole (NEXIUM) 20 MG DR capsule     furosemide (LASIX) 20 MG tablet     lisinopril (ZESTRIL) 20 MG tablet     acetaminophen (TYLENOL) 325 MG tablet     levothyroxine (SYNTHROID/LEVOTHROID) 88 MCG tablet     [START ON 7/1/2023] oxyCODONE (ROXICODONE) 5 MG tablet     [START ON 6/3/2023] oxyCODONE (ROXICODONE) 5 MG tablet     No current facility-administered medications for this visit.        Physical Exam    No flowsheet data found.    General: alert and cooperative  HEENT: Head: Normal, normocephalic, atraumatic.  Eye: Normal external eye, conjunctiva, lids cornea, ZACK.  Chest: Clear to auscultation  bilaterally  Cardiac: S1, S2 normal, regular rate and rhythm  Abdomen: abdomen is soft   Extremities: atraumatic, no peripheral edema  Skin: No rashes  CNS: Alert and oriented x3, neurologic exam grossly normal.      Lab Results    No results found for this or any previous visit (from the past 168 hour(s)).    Imaging          Signed by: Missy Quijano MD

## 2023-06-12 ENCOUNTER — MYC MEDICAL ADVICE (OUTPATIENT)
Dept: FAMILY MEDICINE | Facility: CLINIC | Age: 83
End: 2023-06-12

## 2023-06-12 DIAGNOSIS — G25.81 RESTLESS LEGS SYNDROME: ICD-10-CM

## 2023-06-12 DIAGNOSIS — G89.29 CHRONIC LEFT-SIDED LOW BACK PAIN WITHOUT SCIATICA: ICD-10-CM

## 2023-06-12 DIAGNOSIS — M54.50 CHRONIC LEFT-SIDED LOW BACK PAIN WITHOUT SCIATICA: ICD-10-CM

## 2023-06-13 RX ORDER — OXYCODONE HYDROCHLORIDE 5 MG/1
TABLET ORAL
Qty: 45 TABLET | Refills: 0 | Status: SHIPPED | OUTPATIENT
Start: 2023-07-01 | End: 2023-08-22

## 2023-06-20 ENCOUNTER — THERAPY VISIT (OUTPATIENT)
Dept: PHYSICAL THERAPY | Facility: CLINIC | Age: 83
End: 2023-06-20
Attending: FAMILY MEDICINE
Payer: COMMERCIAL

## 2023-06-20 DIAGNOSIS — R60.0 BILATERAL LEG EDEMA: ICD-10-CM

## 2023-06-20 PROCEDURE — 97140 MANUAL THERAPY 1/> REGIONS: CPT | Mod: GP | Performed by: PHYSICAL THERAPIST

## 2023-06-20 PROCEDURE — 97161 PT EVAL LOW COMPLEX 20 MIN: CPT | Mod: GP | Performed by: PHYSICAL THERAPIST

## 2023-06-20 NOTE — PROGRESS NOTES
PHYSICAL THERAPY EVALUATION  Type of Visit: Evaluation    See electronic medical record for Abuse and Falls Screening details.    Subjective      Presenting condition or subjective complaint:  Reports has had swelling for a long time and that her swelling has actually gotten a little bit better.  Reports that diuretics did not change her edema at all.  Not sure what is going on with her legs and is unsure of what lymphedema therapy is.  Date of onset: 05/23/23 (date of referral (chronic issue))    Relevant medical history:   CHF  Dates & types of surgery:  TKA 8/2022    Prior diagnostic imaging/testing results:     see epic  Prior therapy history for the same diagnosis, illness or injury:    chronic edema, CHF    Description of Symptoms: tight and heavy  Previous Lymphedema Therapies Attempted: compression stockings  Previous Compression Modalities Attempted: compression stocking    Prior Level of Function   Transfers: Assistive equipment, SEC  Ambulation: Assistive equipment, Walking and SEC  ADL: Assistive equipment, shower bench  IADL: did not specify    Living Environment  Social support:     Type of home:   house  Stairs to enter the home:       4 [address]  Ramp:     Stairs inside the home:       flight of stairs to upstair  Help at home:    Equipment owned:   SEC and Wheeled Walker    Employment:    retired  Hobbies/Interests:  Taking care of animals (dog, cats, and pony), [employer]    Patient goals for therapy:  Decrease swelling and improve restless leg syndrome    Pain assessment: Pain denied Restless legs, spasms, aching     Objective      EDEMA EVALUATION  Additional history:  Body part affected by edema:  B LE  If cancer related, treatment:    If not cancer related, problems with veins or cause of swelling:    Distance able to walk:    Time able to stand:    Sensation problems in hands/feet:      Edema etiology: Chronic Venous Insufficiency    FUNCTIONAL SCALES  Lower Extremity  Functional Scale (score out of 80). A lower score indicates greater impairment:    Shoulder Pain and Disability Index (score out of 100).  A higher score indicates greater impairment:      Cognitive Status Examination  Orientation: Oriented to person, place and time     EDEMA  Skin Condition: Intact, Non-pitting, Venous distention, fibrosis to B ankles, thickening of skin at B ankles, wart like bumps to B ankles  Scar: Yes  Location: R TKA  Mobility: mobile  Capillary Refill: Symmetrical  Radial Pulse: Symmetrical  Dorsal Pedal Pulse: Symmetrical  Stemmer Sign: -  Ulceration: No    GIRTH MEASUREMENTS: Refer to separate girth measurement flowsheet.     VOLUME LE  Right LE (mL) 3411.3   Left LE (mL) 3532.9   LE Volume Comparison LLE volume greater than RLE volume   % Difference    Head/Neck Volume     Trunk Volume    Genital Volume       RANGE OF MOTION: LE ROM WFL  STRENGTH: LE Strength WFL  POSTURE: Sitting Posture: Rounded shoulders, Forward head  PALPATION: TTP B ankles    BED MOBILITY:   TRANSFERS:   GAIT/LOCOMOTION: ambulated shuffling gait with 4 wheeled walker  BALANCE:   SENSATION: LE Sensation WNL  VASCULAR: Vascular concerns  COORDINATION:   MUSCLE TONE:     Assessment & Plan   CLINICAL IMPRESSIONS   Medical Diagnosis: Bilateral leg edema    Treatment Diagnosis: 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  The following significant findings have been identified: Edema. These impairments interfere with their ability to perform household mobility as compared to previous level of function.     Clinical Decision Making (Complexity):   Clinical Presentation: Evolving/Changing  Clinical Presentation Rationale: based on medical and personal factors listed in PT evaluation  Clinical Decision Making (Complexity): Low complexity    PLAN OF CARE  Treatment Interventions:  Modalities: Cryotherapy, Hot Pack,  Ultrasound  Interventions: Gait Training, Manual Therapy, Neuromuscular Re-education, Therapeutic Activity, Therapeutic Exercise, Self-Care/Home Management, Gradient Compression Bandaging    Long Term Goals     PT Goal 1  Goal Identifier: stg  Goal Description: Pt will report round the clock tolerance to spandigrip in order to have edema reduction.  Target Date: 07/18/23  PT Goal 2  Goal Identifier: ltg  Goal Description: Pt/spouse will be independent with don/doffing compression garments in order to maintain edema reductions upon discharge.  Target Date: 08/29/23      Frequency of Treatment: 2 x a week  Duration of Treatment: 10 week    Recommended Referrals to Other Professionals: none  Education Assessment:        Risks and benefits of evaluation/treatment have been explained.   Patient/Family/caregiver agrees with Plan of Care.     Evaluation Time:     PT Eval, Low Complexity Minutes (93636): 30      Signing Clinician: Arabella Enriquez PT      [facility]                                                                                   OUTPATIENT PHYSICAL THERAPY      PLAN OF TREATMENT FOR OUTPATIENT REHABILITATION   Patient's Last Name, First Name, M.Yudelka Cabral YOB: 1940   Provider's Name   [facility]   Medical Record No.  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                  Referring Provider:  Azalea Ochoa      Initial Assessment  See Epic Evaluation- Start of Care Date: 06/20/23

## 2023-06-27 ENCOUNTER — THERAPY VISIT (OUTPATIENT)
Dept: PHYSICAL THERAPY | Facility: CLINIC | Age: 83
End: 2023-06-27
Attending: FAMILY MEDICINE
Payer: COMMERCIAL

## 2023-06-27 DIAGNOSIS — R60.0 BILATERAL LEG EDEMA: Primary | ICD-10-CM

## 2023-06-27 PROCEDURE — 97140 MANUAL THERAPY 1/> REGIONS: CPT | Mod: GP | Performed by: PHYSICAL THERAPIST

## 2023-07-11 ENCOUNTER — THERAPY VISIT (OUTPATIENT)
Dept: PHYSICAL THERAPY | Facility: CLINIC | Age: 83
End: 2023-07-11
Attending: FAMILY MEDICINE
Payer: COMMERCIAL

## 2023-07-11 DIAGNOSIS — R60.0 BILATERAL LEG EDEMA: Primary | ICD-10-CM

## 2023-07-11 PROCEDURE — 97140 MANUAL THERAPY 1/> REGIONS: CPT | Mod: GP | Performed by: PHYSICAL THERAPIST

## 2023-07-24 DIAGNOSIS — J45.20 MILD INTERMITTENT ASTHMA, UNCOMPLICATED: ICD-10-CM

## 2023-07-24 DIAGNOSIS — I10 ESSENTIAL HYPERTENSION WITH GOAL BLOOD PRESSURE LESS THAN 140/90: ICD-10-CM

## 2023-07-24 DIAGNOSIS — E78.5 HYPERLIPIDEMIA LDL GOAL <130: ICD-10-CM

## 2023-07-24 DIAGNOSIS — E03.9 ACQUIRED HYPOTHYROIDISM: ICD-10-CM

## 2023-07-24 RX ORDER — ATORVASTATIN CALCIUM 40 MG/1
TABLET, FILM COATED ORAL
Qty: 90 TABLET | Refills: 2 | Status: SHIPPED | OUTPATIENT
Start: 2023-07-24 | End: 2024-01-18

## 2023-07-24 RX ORDER — LEVOTHYROXINE SODIUM 88 UG/1
TABLET ORAL
Qty: 90 TABLET | Refills: 2 | Status: SHIPPED | OUTPATIENT
Start: 2023-07-24 | End: 2023-10-13

## 2023-07-24 RX ORDER — DILTIAZEM HYDROCHLORIDE 240 MG/1
CAPSULE, EXTENDED RELEASE ORAL
Qty: 90 CAPSULE | Refills: 0 | Status: SHIPPED | OUTPATIENT
Start: 2023-07-24 | End: 2023-10-23

## 2023-07-24 RX ORDER — LISINOPRIL 20 MG/1
TABLET ORAL
Qty: 90 TABLET | Refills: 0 | Status: SHIPPED | OUTPATIENT
Start: 2023-07-24 | End: 2023-10-23

## 2023-07-24 RX ORDER — ALBUTEROL SULFATE 90 UG/1
AEROSOL, METERED RESPIRATORY (INHALATION)
Qty: 9 G | Refills: 3 | Status: SHIPPED | OUTPATIENT
Start: 2023-07-24 | End: 2024-08-16

## 2023-07-24 NOTE — TELEPHONE ENCOUNTER
Routing refill request to provider for review/approval because:  Labs out of range:    Creatinine   Date Value Ref Range Status   05/23/2023 1.02 (H) 0.51 - 0.95 mg/dL Final   06/24/2021 0.86 0.52 - 1.04 mg/dL Final     Julie Behrendt RN

## 2023-08-22 ENCOUNTER — OFFICE VISIT (OUTPATIENT)
Dept: FAMILY MEDICINE | Facility: CLINIC | Age: 83
End: 2023-08-22
Payer: COMMERCIAL

## 2023-08-22 VITALS
TEMPERATURE: 97.2 F | OXYGEN SATURATION: 98 % | RESPIRATION RATE: 22 BRPM | BODY MASS INDEX: 29.59 KG/M2 | DIASTOLIC BLOOD PRESSURE: 64 MMHG | HEIGHT: 63 IN | HEART RATE: 74 BPM | WEIGHT: 167 LBS | SYSTOLIC BLOOD PRESSURE: 130 MMHG

## 2023-08-22 DIAGNOSIS — E03.9 ACQUIRED HYPOTHYROIDISM: ICD-10-CM

## 2023-08-22 DIAGNOSIS — Z01.818 PREOP GENERAL PHYSICAL EXAM: Primary | ICD-10-CM

## 2023-08-22 DIAGNOSIS — D50.9 IRON DEFICIENCY ANEMIA, UNSPECIFIED IRON DEFICIENCY ANEMIA TYPE: ICD-10-CM

## 2023-08-22 DIAGNOSIS — R60.0 BILATERAL LEG EDEMA: ICD-10-CM

## 2023-08-22 DIAGNOSIS — G25.81 RESTLESS LEGS SYNDROME: ICD-10-CM

## 2023-08-22 DIAGNOSIS — G56.01 CARPAL TUNNEL SYNDROME OF RIGHT WRIST: ICD-10-CM

## 2023-08-22 DIAGNOSIS — M54.50 CHRONIC LEFT-SIDED LOW BACK PAIN WITHOUT SCIATICA: ICD-10-CM

## 2023-08-22 DIAGNOSIS — R73.03 PREDIABETES: ICD-10-CM

## 2023-08-22 DIAGNOSIS — G89.29 CHRONIC LEFT-SIDED LOW BACK PAIN WITHOUT SCIATICA: ICD-10-CM

## 2023-08-22 DIAGNOSIS — M47.812 CHRONIC NECK PAIN WITH OSTEOARTHRITIS DETERMINED BY X-RAY: ICD-10-CM

## 2023-08-22 DIAGNOSIS — I10 ESSENTIAL HYPERTENSION WITH GOAL BLOOD PRESSURE LESS THAN 140/90: ICD-10-CM

## 2023-08-22 DIAGNOSIS — G89.29 CHRONIC NECK PAIN WITH OSTEOARTHRITIS DETERMINED BY X-RAY: ICD-10-CM

## 2023-08-22 PROCEDURE — 99215 OFFICE O/P EST HI 40 MIN: CPT | Performed by: FAMILY MEDICINE

## 2023-08-22 RX ORDER — OXYCODONE HYDROCHLORIDE 5 MG/1
TABLET ORAL
Qty: 45 TABLET | Refills: 0 | Status: SHIPPED | OUTPATIENT
Start: 2023-08-22 | End: 2023-09-24

## 2023-08-22 ASSESSMENT — PAIN SCALES - GENERAL: PAINLEVEL: MILD PAIN (2)

## 2023-08-22 NOTE — PATIENT INSTRUCTIONS
Hold the ibuprofen 5 days prior to surgery  Otherwise take what you usually do      Preparing for Your Surgery  Getting started  A nurse will call you to review your health history and instructions. They will give you an arrival time based on your scheduled surgery time. Please be ready to share:  Your doctor's clinic name and phone number  Your medical, surgical, and anesthesia history  A list of allergies and sensitivities  A list of medicines, including herbal treatments and over-the-counter drugs  Whether the patient has a legal guardian (ask how to send us the papers in advance)  Please tell us if you're pregnant--or if there's any chance you might be pregnant. Some surgeries may injure a fetus (unborn baby), so they require a pregnancy test. Surgeries that are safe for a fetus don't always need a test, and you can choose whether to have one.   If you have a child who's having surgery, please ask for a copy of Preparing for Your Child's Surgery.    Preparing for surgery  Within 10 to 30 days of surgery: Have a pre-op exam (sometimes called an H&P, or History and Physical). This can be done at a clinic or pre-operative center.  If you're having a , you may not need this exam. Talk to your care team.  At your pre-op exam, talk to your care team about all medicines you take. If you need to stop any medicines before surgery, ask when to start taking them again.  We do this for your safety. Many medicines can make you bleed too much during surgery. Some change how well surgery (anesthesia) drugs work.  Call your insurance company to let them know you're having surgery. (If you don't have insurance, call [phone].)  Call your clinic if there's any change in your health. This includes signs of a cold or flu (sore throat, runny nose, cough, rash, fever). It also includes a scrape or scratch near the surgery site.  If you have questions on the day of surgery, call your hospital or surgery center.  Eating  and drinking guidelines  For your safety: Unless your surgeon tells you otherwise, follow the guidelines below.  Eat and drink as usual until 8 hours before you arrive for surgery. After that, no food or milk.  Drink clear liquids until 2 hours before you arrive. These are liquids you can see through, like water, Gatorade, and Propel Water. They also include plain black coffee and tea (no cream or milk), candy, and breath mints. You can spit out gum when you arrive.  If you drink alcohol: Stop drinking it the night before surgery.  If your care team tells you to take medicine on the morning of surgery, it's okay to take it with a sip of water.  Preventing infection  Shower or bathe the night before and morning of your surgery. Follow the instructions your clinic gave you. (If no instructions, use regular soap.)  Don't shave or clip hair near your surgery site. We'll remove the hair if needed.  Don't smoke or vape the morning of surgery. You may chew nicotine gum up to 2 hours before surgery. A nicotine patch is okay.  Note: Some surgeries require you to completely quit smoking and nicotine. Check with your surgeon.  Your care team will make every effort to keep you safe from infection. We will:  Clean our hands often with soap and water (or an alcohol-based hand rub).  Clean the skin at your surgery site with a special soap that kills germs.  Give you a special gown to keep you warm. (Cold raises the risk of infection.)  Wear special hair covers, masks, gowns and gloves during surgery.  Give antibiotic medicine, if prescribed. Not all surgeries need antibiotics.  What to bring on the day of surgery  Photo ID and insurance card  Copy of your health care directive, if you have one  Glasses and hearing aids (bring cases)  You can't wear contacts during surgery  Inhaler and eye drops, if you use them (tell us about these when you arrive)  CPAP machine or breathing device, if you use them  A few personal items, if  spending the night  If you have . . .  A pacemaker, ICD (cardiac defibrillator) or other implant: Bring the ID card.  An implanted stimulator: Bring the remote control.  A legal guardian: Bring a copy of the certified (court-stamped) guardianship papers.  Please remove any jewelry, including body piercings. Leave jewelry and other valuables at home.  If you're going home the day of surgery  You must have a responsible adult drive you home. They should stay with you overnight as well.  If you don't have someone to stay with you, and you aren't safe to go home alone, we may keep you overnight. Insurance often won't pay for this.  After surgery  If it's hard to control your pain or you need more pain medicine, please call your surgeon's office.  Questions?   If you have any questions for your care team, list them here: _________________________________________________________________________________________________________________________________________________________________________ ____________________________________ ____________________________________ ____________________________________

## 2023-08-22 NOTE — PROGRESS NOTES
[facility]  [address] [place] [id number]  Phone: [phone]  Fax: [phone]  Primary Provider: Aga Ochoa  Pre-op Performing Provider: AGA OCHOA      PREOPERATIVE EVALUATION:  Today's date: 8/22/2023    Yudelka Ledesma is a 82 year old female who presents for a preoperative evaluation.      8/22/2023     1:40 PM   Additional Questions   Roomed by Ca JENKINS   Accompanied by self       Surgical Information:  Surgery/Procedure: Rt Carpal Tunnel Release   Surgery Location: [place]  Surgeon: Dr. Peters  Surgery Date: 09/21/2023  Time of Surgery:   Where patient plans to recover: At home with family  Fax number for surgical facility: Note does not need to be faxed, will be available electronically in Epic.    Assessment & Plan     The proposed surgical procedure is considered LOW risk.    Preop general physical exam  Medically optimized for surgery    Carpal tunnel syndrome of right wrist  Severe, surgery planned    Restless legs syndrome  Well controlled, continue small dose oxycodone  - oxyCODONE (ROXICODONE) 5 MG tablet; 1/2 during day, and one at night    Chronic neck pain with osteoarthritis determined by xray  Chronic left-sided low back pain without sciatica  Ongoing, due to significant arthritis  Again offered her to see spine doctor, physical therapy, TENS unit  Offered MRI of neck  She declines for now  - oxyCODONE (ROXICODONE) 5 MG tablet; 1/2 during day, and one at night    Essential hypertension with goal blood pressure less than 140/90  Well controlled  Continue diltiazem, lisinopril at current doses  - Comprehensive metabolic panel (BMP + Alb, Alk Phos, ALT, AST, Total. Bili, TP); Future    Acquired hypothyroidism  Well controlled  Continue levothyroxine     Bilateral leg edema  Continue with leg wraps    Prediabetes  Monitor  Low carb diet  - Hemoglobin A1c; Future    Iron deficiency anemia, unspecified iron deficiency anemia  type  Mild  Continue to monitor  Consider iron infusions          - No identified additional risk factors other than previously addressed    Antiplatelet or Anticoagulation Medication Instructions:   - Patient is on no antiplatelet or anticoagulation medications.   - Bleeding risk is low for this procedure (e.g. dental, skin, cataract).    Additional Medication Instructions:  Patient is to take all scheduled medications on the day of surgery    RECOMMENDATION:  APPROVAL GIVEN to proceed with proposed procedure, without further diagnostic evaluation.      I spent a total of 59 minutes on the day of the visit.   Time spent by me doing chart review, history and exam, documentation and further activities per the note      Subjective       HPI related to upcoming procedure: long history of carpal tunnel in right wrist, EMG done in 2011 showed carpal tunnel but wasn't bad enough to fix, kept putting it off until now right hand is now numb except the pinkie finger and is very uncomfortable.           8/22/2023     1:46 PM   Preop Questions   1. Have you ever had a heart attack or stroke? No   2. Have you ever had surgery on your heart or blood vessels, such as a stent placement, a coronary artery bypass, or surgery on an artery in your head, neck, heart, or legs? No   3. Do you have chest pain with activity? No   4. Do you have a history of  heart failure? No   5. Do you currently have a cold, bronchitis or symptoms of other infection? No   6. Do you have a cough, shortness of breath, or wheezing? No   7. Do you or anyone in your family have previous history of blood clots? No   8. Do you or does anyone in your family have a serious bleeding problem such as prolonged bleeding following surgeries or cuts? No   9. Have you ever had problems with anemia or been told to take iron pills? YES - mild iron def anemia   10. Have you had any abnormal blood loss such as black, tarry or bloody stools, or abnormal vaginal bleeding? No    11. Have you ever had a blood transfusion? No   12. Are you willing to have a blood transfusion if it is medically needed before, during, or after your surgery? Yes   13. Have you or any of your relatives ever had problems with anesthesia? No   14. Do you have sleep apnea, excessive snoring or daytime drowsiness? No   15. Do you have any artifical heart valves or other implanted medical devices like a pacemaker, defibrillator, or continuous glucose monitor? No   16. Do you have artificial joints? YES -TKA   17. Are you allergic to latex? No     Health Care Directive:  Patient does not have a Health Care Directive or Living Will: Discussed advance care planning with patient; however, patient declined at this time.    Preoperative Review of :   reviewed - controlled substances reflected in medication list.      [place] accessed, last filled 7/8/23 oxycodone 5 mg #45  Uses one at night to sleep and for restless legs, and 1/2 during day for pain    Hypertension  On diltiazem and lisinopril  BP Readings from Last 6 Encounters:   08/22/23 130/64   05/23/23 122/60   05/08/23 (!) 161/70   03/21/23 134/82   01/17/23 132/68   12/27/22 (!) 145/79      hypothyroidism   On levothyroxine   TSH   Date Value Ref Range Status   05/23/2023 3.53 0.30 - 4.20 uIU/mL Final   02/10/2022 2.26 0.40 - 4.00 mU/L Final   04/05/2021 3.76 0.40 - 4.00 mU/L Final        Iron deficiency anemia  mild  Followed by hematology, gets labs next week  Last hemoglobin   Hemoglobin   Date Value Ref Range Status   05/02/2023 11.1 (L) 11.7 - 15.7 g/dL Final   06/24/2021 11.3 (L) 11.7 - 15.7 g/dL Final     Chronic neck pain  Chronic low back pain  Low back is managed with oxycodone one at night, 1/2 during the day  Continues to have a lot of pain, grinding in her neck  But is not interested in physical therapy, surgery, can't have prednisone  Last xray neck 11/15/22:   FINDINGS: Mild levoscoliosis centered at the  cervicothoracic junction.  Straightening of the usual cervical lordosis. Otherwise normal  alignment. Diffuse osteopenia. Probable ankylosis of the disc spaces  at C3-4 and C4-C5. Severe disc space narrowing at C5-C6 and C6-C7 with  endplate sclerosis and marginal osteophytes. Mild to moderate facet  arthropathy throughout cervical spine. The prevertebral soft tissues  and visualized lung apices are unremarkable.     CAITLIN CATALAN MD     Last MRI back   Results for orders placed or performed during the hospital encounter of 05/18/23   CT Lumbar Spine w/o Contrast    Narrative    CT LUMBAR SPINE WITHOUT CONTRAST  5/18/2023 2:25 PM     HISTORY: Chronic left-sided low back pain.     TECHNIQUE: Axial images of the lumbar spine were obtained without  intravenous contrast. Multiplanar reformations were performed.   Radiation dose for this scan was reduced using automated exposure  control, adjustment of the mA and/or kV according to patient size, or  iterative reconstruction technique.     COMPARISON: Lumbar spine x-ray 11/15/2022.     FINDINGS:  There are 5 lumbar-type vertebral bodies assumed for the  purposes of this dictation.     Convex left curvature of the lumbar spine centered at L1-L2. Grade 1  anterolisthesis of L4 on L5 due to facet arthropathy. No acute lucent  fracture lines. No significant loss of vertebral body height. No focal  destructive bony lesions.    Level by level as follows:     T10-T11: Marked loss of disc height with degenerative endplate changes  more pronounced towards the left. No significant disc herniation. Left  greater than right facet hypertrophy. No spinal canal narrowing. No  right neural foraminal narrowing. Mild left neural foraminal  narrowing.    T11-T12: Marked loss of disc height with degenerative endplate  changes. Circumferential disc bulge with mild endplate osteophytic  spurring. Left greater than right facet hypertrophy. No spinal canal  narrowing. No significant right  neural foraminal narrowing. Moderate  left neural foraminal narrowing.     T12-L1: Marked loss of disc height with degenerative endplate changes.  Circumferential disc bulge with endplate osteophytic spurring. Normal  facets. No significant spinal canal narrowing. Moderate right neural  foraminal narrowing. Moderate left neural foraminal narrowing.    L1-L2: Marked loss of disc height with degenerative endplate changes  more pronounced towards the right. Circumferential disc bulge with  endplate osteophytic spurring more pronounced towards the right  foraminal region. Normal facets. No spinal canal narrowing. Moderate  right neural foraminal narrowing. No left neural foraminal narrowing.     L2-L3: Marked loss of disc height with degenerative endplate changes  more pronounced towards the right. Circumferential disc bulge with  endplate osteophytic spurring. Normal facets. No significant spinal  canal narrowing. Moderate right neural foraminal narrowing. No left  neural foraminal narrowing.     L3-L4: Marked loss of disc height with degenerative endplate changes.  Circumferential disc bulge with endplate osteophytic spurring. Mild  facet hypertrophy. Mild spinal canal narrowing. No significant right  neural foraminal narrowing. Mild left neural foraminal narrowing.     L4-L5: Grade 1 anterolisthesis with uncovering of the disc. Moderate  loss of disc height. Posterior disc bulge. Marked bilateral facet  hypertrophy. Thickening of the ligamentum flavum. Moderate to severe  spinal canal narrowing. Moderate right neural foraminal narrowing.  Moderate left neural foraminal narrowing.     L5-S1: Moderate loss of disc height. Circumferential disc bulge with  endplate osteophytic spurring which is more pronounced from the left  foraminal region. Bilateral facet hypertrophy. No spinal canal  narrowing. No right neural foraminal narrowing. Mild left neural  foraminal narrowing.     Visualized paraspinous tissues:  Unremarkable.       Impression    IMPRESSION:    1. Marked multilevel degenerative changes throughout the lumbar spine  as detailed above. Convex left curvature of the lumbar spine centered  at L1-L2.  2. No acute fracture or subluxation in the lumbar spine.  3. Grade 1 anterolisthesis of L4 on L5 due to degenerative facet  arthropathy.  4. Moderate to severe spinal canal narrowing at L4-L5.  5. Multiple levels of neural foraminal narrowing with particular  narrowings on the left at T11-T12, T12-L1, and L1-L2, on the right at  L2-L3 and bilaterally at L4-L5.    ERIC CASTLE MD         SYSTEM ID:  [id number]       Lymphedema of bilateral extremities  Is seeing lymphedema clinic  Has wraps on legs, is helping    Restless legs  Controlled with oxycodone    Prediabetes  Lab Results   Component Value Date    A1C 6.1 05/23/2023    A1C 6.0 07/12/2022    A1C 6.4 02/10/2022    A1C 6.4 08/11/2011        Review of Systems  ROS: 5 point ROS negative except as noted above in HPI, including Gen., Resp., CV, GI &  system review.     Patient Active Problem List    Diagnosis Date Noted    Prediabetes 05/26/2023     Priority: Medium    Chronic heart failure with preserved ejection fraction (HFpEF) (H) 01/17/2023     Priority: Medium    Chronic left-sided low back pain without sciatica 11/16/2022     Priority: Medium    Chronic neck pain 11/16/2022     Priority: Medium    Bilateral leg edema 11/16/2022     Priority: Medium    Restless legs syndrome 11/16/2022     Priority: Medium    Status post total knee replacement 08/04/2022     Priority: Medium    Other iron deficiency anemia 05/19/2022     Priority: Medium    Poor iron absorption 05/19/2022     Priority: Medium    Essential tremor 04/19/2022     Priority: Medium    Elevated blood sugar 04/19/2022     Priority: Medium    Chronic pain of right knee 04/19/2022     Priority: Medium    Primary osteoarthritis of right knee 04/19/2022     Priority: Medium    Other idiopathic scoliosis  "08/22/2016     Priority: Medium    Varicose veins of both lower extremities 07/07/2016     Priority: Medium    Systolic murmur 05/14/2015     Priority: Medium    Advanced directives, counseling/discussion 02/11/2013     Priority: Medium     Discussed advance care planning with patient; however, patient declined at this time. 2/11/2013         Scoliosis 02/11/2013     Priority: Medium    Carpal tunnel syndrome 08/25/2011     Priority: Medium     Positive EMG 10/2011, saw Dr Galindo who recommended surgical release. Patient did not go through with it.       Cervical radiculopathy 08/25/2011     Priority: Medium    Colon stricture (H) 02/15/2011     Priority: Medium    Hyperlipidemia LDL goal <130 10/31/2010     Priority: Medium    S/P hysterectomy 02/10/2010     Priority: Medium     Done for irregular bleeding in the 1970's.  No cancer.  Has had one ovary removed due to tubal pregnancy, she thinks it was the R.  She thinks she still has the L ovary.  \"they left one, I think it was the left.\"      Elevated serum alkaline phosphatase level 08/27/2009     Priority: Medium     Has had work-up with normal RUQ ultrasound and negative ELP, Hep A, Hep C ab, Hep B Ag, normal LFTs- 2004 by Internal medicine      Nephrolithiasis 06/11/2008     Priority: Medium     8/07- ureteroscopy with lasar lithotripsy for large 15 mm stone, stent placed at [facility]. Has 6 mm left lower kidney pole calculus- pt has elected to watch. Sees Dr. Lutz at [facility].       Abnormal glucose 09/03/2007     Priority: Medium     Problem list name updated by automated process. Provider to review      Essential hypertension with goal blood pressure less than 140/90 05/18/2005     Priority: Medium     Problem list name updated by automated process. Provider to review      Esophageal reflux 05/18/2005     Priority: Medium     EGD 11/02 showing small hiatal hernia      Hypothyroidism 05/18/2005     Priority: Medium     Problem list name updated by automated " process. Provider to review      Mild intermittent asthma, uncomplicated 05/18/2005     Priority: Medium      Past Medical History:   Diagnosis Date    Basal cell carcinoma     Esophageal reflux     Other and unspecified hyperlipidemia     Unspecified essential hypertension     Unspecified hypothyroidism      Past Surgical History:   Procedure Laterality Date    ARTHROPLASTY KNEE Right 8/4/2022    Procedure: TOTAL Knee Arthroplasty, right;  Surgeon: Sheldon Peters MD;  Location: [facility]    COLONOSCOPY  2/7/2011    COLONOSCOPY performed by BHARTI DUNHAM at [facility]     Current Outpatient Medications   Medication Sig Dispense Refill    albuterol (PROAIR HFA/PROVENTIL HFA/VENTOLIN HFA) 108 (90 Base) MCG/ACT inhaler INHALE 1 TO 2 PUFFS BY MOUTH EVERY 4 HOURS AS NEEDED FOR SHORTNESS OF BREATH /  DYSPNEA  OR  WHEEZING 9 g 3    atorvastatin (LIPITOR) 40 MG tablet Take 1 tablet by mouth once daily 90 tablet 2    diltiazem ER (DILT-XR) 240 MG 24 hr ER beaded capsule Take 1 capsule by mouth once daily 90 capsule 0    docusate sodium (COLACE) 100 MG capsule Take 100 mg by mouth 2 times daily      esomeprazole (NEXIUM) 20 MG DR capsule Take 1 capsule (20 mg) by mouth every morning (before breakfast) One hour before meals fill when needed      levothyroxine (SYNTHROID/LEVOTHROID) 88 MCG tablet Take 1 tablet by mouth once daily 90 tablet 2    lisinopril (ZESTRIL) 20 MG tablet Take 1 tablet by mouth once daily 90 tablet 0    oxyCODONE (ROXICODONE) 5 MG tablet 1/2 during day, and one at night 45 tablet 0    acetaminophen (TYLENOL) 325 MG tablet Take 2 tablets (650 mg) by mouth every 4 hours as needed for other (mild pain) (Patient not taking: Reported on 5/8/2023) 100 tablet 0    chlorhexidine (PERIDEX) 0.12 % solution FILL CAP TO FILL LINE (15 ML). SWISH IN MOUTH UNDILUTED FOR 30 SECONDS AND THEN SPIT OUT, TWO TIMES A DAY      furosemide (LASIX) 20 MG tablet Take 1 tablet by mouth once daily (Patient not taking:  "Reported on 2023) 30 tablet 4       Allergies   Allergen Reactions    Augmentin [Aspartame]     Doxycycline     Gabapentin      Gave nightmares    Levaquin [Levofloxacin Hemihydrate]     Prednisolone     Seasonal Allergies     Sulfa Antibiotics         Social History     Tobacco Use    Smoking status: Former     Years: 15.00     Types: Cigarettes     Quit date: 1985     Years since quittin.6    Smokeless tobacco: Never   Substance Use Topics    Alcohol use: No       History   Drug Use No         Objective     /64   Pulse 74   Temp 97.2  F (36.2  C) (Tympanic)   Resp 22   Ht 1.6 m (5' 3\")   Wt 75.8 kg (167 lb)   LMP  (LMP Unknown)   SpO2 98%   BMI 29.58 kg/m      Physical Exam  GENERAL APPEARANCE: healthy, alert and no distress  EYES: Eyes grossly normal to inspection, PERRL, and conjunctivae and sclerae normal  RESP: lungs clear to auscultation - no rales, rhonchi or wheezes  CV: regular rate and rhythm, normal S1 S2, no S3 or S4 soft 1/6 systolic murmur, no click or rub   ABDOMEN: soft, nontender, no HSM or masses and bowel sounds normal  NEURO: Normal strength and tone, sensory exam grossly normal, mentation intact and speech normal  MS: right wrist with no erythema, ecchymosis, warmth or edema, Tinel's positive    Recent Labs   Lab Test 23  1630 23  1416 23  1547 23  1411 22  0406 22  0954   HGB  --  11.1*  --  10.8*   < > 11.1*   PLT  --  305  --  312   < > 362     --  138  --    < >  --    POTASSIUM 4.4  --  4.4  --    < >  --    CR 1.02*  --  0.95  --    < >  --    A1C 6.1*  --   --   --   --  6.0*    < > = values in this interval not displayed.        Diagnostics:  Labs ordered for next week with future lab draw.  Results will be reviewed when available.   No EKG required for low risk surgery (cataract, skin procedure, breast biopsy, etc).    Revised Cardiac Risk Index (RCRI):  The patient has the following serious cardiovascular risks for " perioperative complications:   - No serious cardiac risks = 0 points     RCRI Interpretation: 0 points: Class I (very low risk - 0.4% complication rate)         Signed Electronically by: Azalea Meehan MD  Copy of this evaluation report is provided to requesting physician.

## 2023-08-22 NOTE — NURSING NOTE
"Chief Complaint   Patient presents with    Pre-Op Exam       Initial /64   Pulse 74   Temp 97.2  F (36.2  C) (Tympanic)   Resp 22   Ht 1.6 m (5' 3\")   Wt 75.8 kg (167 lb)   LMP  (LMP Unknown)   SpO2 98%   BMI 29.58 kg/m   Estimated body mass index is 29.58 kg/m  as calculated from the following:    Height as of this encounter: 1.6 m (5' 3\").    Weight as of this encounter: 75.8 kg (167 lb).    Patient presents to the clinic using Walker    Is there anyone who you would like to be able to receive your results? No  If yes have patient fill out LIGIA      "

## 2023-08-29 ENCOUNTER — THERAPY VISIT (OUTPATIENT)
Dept: PHYSICAL THERAPY | Facility: CLINIC | Age: 83
End: 2023-08-29
Attending: FAMILY MEDICINE
Payer: COMMERCIAL

## 2023-08-29 DIAGNOSIS — R60.0 BILATERAL LEG EDEMA: Primary | ICD-10-CM

## 2023-08-29 PROCEDURE — 97140 MANUAL THERAPY 1/> REGIONS: CPT | Mod: GP | Performed by: PHYSICAL THERAPIST

## 2023-08-31 ENCOUNTER — LAB (OUTPATIENT)
Dept: LAB | Facility: CLINIC | Age: 83
End: 2023-08-31
Payer: COMMERCIAL

## 2023-08-31 DIAGNOSIS — R73.03 PREDIABETES: ICD-10-CM

## 2023-08-31 DIAGNOSIS — E03.9 ACQUIRED HYPOTHYROIDISM: ICD-10-CM

## 2023-08-31 DIAGNOSIS — I10 ESSENTIAL HYPERTENSION WITH GOAL BLOOD PRESSURE LESS THAN 140/90: ICD-10-CM

## 2023-08-31 DIAGNOSIS — D64.9 NORMOCYTIC ANEMIA: ICD-10-CM

## 2023-08-31 LAB
ALBUMIN SERPL BCG-MCNC: 4.4 G/DL (ref 3.5–5.2)
ALP SERPL-CCNC: 174 U/L (ref 35–104)
ALT SERPL W P-5'-P-CCNC: 12 U/L (ref 0–50)
ANION GAP SERPL CALCULATED.3IONS-SCNC: 9 MMOL/L (ref 7–15)
AST SERPL W P-5'-P-CCNC: 17 U/L (ref 0–45)
BASOPHILS # BLD AUTO: 0.1 10E3/UL (ref 0–0.2)
BASOPHILS NFR BLD AUTO: 1 %
BILIRUB SERPL-MCNC: 0.3 MG/DL
BUN SERPL-MCNC: 18.1 MG/DL (ref 8–23)
CALCIUM SERPL-MCNC: 10.1 MG/DL (ref 8.8–10.2)
CHLORIDE SERPL-SCNC: 103 MMOL/L (ref 98–107)
CREAT SERPL-MCNC: 0.83 MG/DL (ref 0.51–0.95)
DEPRECATED HCO3 PLAS-SCNC: 27 MMOL/L (ref 22–29)
EOSINOPHIL # BLD AUTO: 0.6 10E3/UL (ref 0–0.7)
EOSINOPHIL NFR BLD AUTO: 8 %
ERYTHROCYTE [DISTWIDTH] IN BLOOD BY AUTOMATED COUNT: 12.7 % (ref 10–15)
FERRITIN SERPL-MCNC: 276 NG/ML (ref 11–328)
GFR SERPL CREATININE-BSD FRML MDRD: 70 ML/MIN/1.73M2
GLUCOSE SERPL-MCNC: 136 MG/DL (ref 70–99)
HBA1C MFR BLD: 6.2 % (ref 0–5.6)
HCT VFR BLD AUTO: 35.5 % (ref 35–47)
HGB BLD-MCNC: 11.7 G/DL (ref 11.7–15.7)
IMM GRANULOCYTES # BLD: 0 10E3/UL
IMM GRANULOCYTES NFR BLD: 0 %
IRON BINDING CAPACITY (ROCHE): 275 UG/DL (ref 240–430)
IRON SATN MFR SERPL: 21 % (ref 15–46)
IRON SERPL-MCNC: 59 UG/DL (ref 37–145)
LYMPHOCYTES # BLD AUTO: 1.8 10E3/UL (ref 0.8–5.3)
LYMPHOCYTES NFR BLD AUTO: 26 %
MCH RBC QN AUTO: 30.7 PG (ref 26.5–33)
MCHC RBC AUTO-ENTMCNC: 33 G/DL (ref 31.5–36.5)
MCV RBC AUTO: 93 FL (ref 78–100)
MONOCYTES # BLD AUTO: 0.7 10E3/UL (ref 0–1.3)
MONOCYTES NFR BLD AUTO: 10 %
NEUTROPHILS # BLD AUTO: 3.8 10E3/UL (ref 1.6–8.3)
NEUTROPHILS NFR BLD AUTO: 54 %
PLATELET # BLD AUTO: 305 10E3/UL (ref 150–450)
POTASSIUM SERPL-SCNC: 5.1 MMOL/L (ref 3.4–5.3)
PROT SERPL-MCNC: 7.4 G/DL (ref 6.4–8.3)
RBC # BLD AUTO: 3.81 10E6/UL (ref 3.8–5.2)
SODIUM SERPL-SCNC: 139 MMOL/L (ref 136–145)
TSH SERPL DL<=0.005 MIU/L-ACNC: 13.75 UIU/ML (ref 0.3–4.2)
WBC # BLD AUTO: 7 10E3/UL (ref 4–11)

## 2023-08-31 PROCEDURE — 83036 HEMOGLOBIN GLYCOSYLATED A1C: CPT

## 2023-08-31 PROCEDURE — 83550 IRON BINDING TEST: CPT

## 2023-08-31 PROCEDURE — 36415 COLL VENOUS BLD VENIPUNCTURE: CPT

## 2023-08-31 PROCEDURE — 80053 COMPREHEN METABOLIC PANEL: CPT

## 2023-08-31 PROCEDURE — 85025 COMPLETE CBC W/AUTO DIFF WBC: CPT

## 2023-08-31 PROCEDURE — 84443 ASSAY THYROID STIM HORMONE: CPT

## 2023-08-31 PROCEDURE — 82728 ASSAY OF FERRITIN: CPT

## 2023-08-31 PROCEDURE — 83540 ASSAY OF IRON: CPT

## 2023-09-05 ENCOUNTER — MYC MEDICAL ADVICE (OUTPATIENT)
Dept: FAMILY MEDICINE | Facility: CLINIC | Age: 83
End: 2023-09-05

## 2023-09-05 ENCOUNTER — ONCOLOGY VISIT (OUTPATIENT)
Dept: ONCOLOGY | Facility: CLINIC | Age: 83
End: 2023-09-05
Attending: INTERNAL MEDICINE
Payer: COMMERCIAL

## 2023-09-05 VITALS
WEIGHT: 164 LBS | TEMPERATURE: 98.6 F | HEIGHT: 63 IN | RESPIRATION RATE: 16 BRPM | SYSTOLIC BLOOD PRESSURE: 160 MMHG | OXYGEN SATURATION: 98 % | HEART RATE: 87 BPM | BODY MASS INDEX: 29.06 KG/M2 | DIASTOLIC BLOOD PRESSURE: 70 MMHG

## 2023-09-05 DIAGNOSIS — D50.8 OTHER IRON DEFICIENCY ANEMIA: Primary | ICD-10-CM

## 2023-09-05 DIAGNOSIS — E03.9 ACQUIRED HYPOTHYROIDISM: Primary | ICD-10-CM

## 2023-09-05 PROCEDURE — 99213 OFFICE O/P EST LOW 20 MIN: CPT | Performed by: INTERNAL MEDICINE

## 2023-09-05 PROCEDURE — G0463 HOSPITAL OUTPT CLINIC VISIT: HCPCS | Performed by: INTERNAL MEDICINE

## 2023-09-05 ASSESSMENT — PAIN SCALES - GENERAL: PAINLEVEL: MILD PAIN (3)

## 2023-09-05 NOTE — PROGRESS NOTES
[facility] Hematology and Oncology Progress Note    Patient: Yudelka Ledesma  MRN: [id number]  9/05/23        Reason for Visit    Chief Complaint   Patient presents with    Oncology Clinic Visit     Normocytic anemia - Provider visit only         Problem List Items Addressed This Visit          Hematologic    Other iron deficiency anemia - Primary    Relevant Orders    CBC with Platelets & Differential    Ferritin    Iron & Iron Binding Capacity           Assessment and Plan  Anemia, iron deficiency  She is status post IV Venofer.  She received first series of infusions in July 2022.  And had a second course late last year.      Labs from 8/31/2023 reviewed today.  Serum chemistries normal except for mildly elevated alkaline phosphatase at 174.  She has had this for quite some time.  Other LFTs are within normal limits.  TSH is up at 13.75.  She is on levothyroxine.  CBC shows hemoglobin of 11.7.  MCV normal at 93.  Normal platelet count and white counts.  For now she seems to be iron sufficient and has near normal hemoglobin levels.  Not very symptomatic.  We will continue observation.  We will continue to monitor her labs periodically.  We will get labs in 3 months and see her back in 6 months with repeat labs.     Cancer Staging   No matching staging information was found for the patient.    ECOG Performance    0 - Independent         Problem List    Patient Active Problem List   Diagnosis    Essential hypertension with goal blood pressure less than 140/90    Esophageal reflux    Hypothyroidism    Mild intermittent asthma, uncomplicated    Abnormal glucose    Nephrolithiasis    Elevated serum alkaline phosphatase level    S/P hysterectomy    Hyperlipidemia LDL goal <130    Colon stricture (H)    Carpal tunnel syndrome    Cervical radiculopathy    Advanced directives, counseling/discussion    Scoliosis    Systolic murmur    Varicose veins of both lower extremities    Other idiopathic scoliosis     Essential tremor    Elevated blood sugar    Chronic pain of right knee    Primary osteoarthritis of right knee    Other iron deficiency anemia    Poor iron absorption    Status post total knee replacement    Chronic left-sided low back pain without sciatica    Chronic neck pain    Bilateral leg edema    Restless legs syndrome    Chronic heart failure with preserved ejection fraction (HFpEF) (H)    Prediabetes         Interval History   Yudelka Ledesma is an 83 year old female with history of iron deficiency anemia status post IV iron infusions who is here for follow-up.    She is doing well since last visit.  Denies any new issues today.  Denies any black tarry stools or bright red blood per rectum.  Denies any worsening shortness of breath.  No chest pain.  No dizziness or lightheadedness.  Here with repeat labs.    Review of Systems  A comprehensive review of systems was negative except for what is noted in the interval history    Current Outpatient Medications   Medication    albuterol (PROAIR HFA/PROVENTIL HFA/VENTOLIN HFA) 108 (90 Base) MCG/ACT inhaler    atorvastatin (LIPITOR) 40 MG tablet    chlorhexidine (PERIDEX) 0.12 % solution    diltiazem ER (DILT-XR) 240 MG 24 hr ER beaded capsule    docusate sodium (COLACE) 100 MG capsule    esomeprazole (NEXIUM) 20 MG DR capsule    levothyroxine (SYNTHROID/LEVOTHROID) 88 MCG tablet    lisinopril (ZESTRIL) 20 MG tablet    acetaminophen (TYLENOL) 325 MG tablet    diclofenac (VOLTAREN) 1 % topical gel    oxyCODONE (ROXICODONE) 5 MG tablet     No current facility-administered medications for this visit.        Physical Exam        General: alert and cooperative  Chest: CTA bilaterally  Cardiac: Regular rate and rhythm, S1-S2 heard  Abdomen: Soft and nontender  Extremities: atraumatic, no peripheral edema  Skin: No skin rashes  CNS: Alert and oriented x3, neurologic exam grossly normal.      Lab Results    No results found for this or any previous visit (from the  past 168 hour(s)).      Imaging          Signed by: Missy Quijano MD

## 2023-09-05 NOTE — LETTER
"    9/5/2023         RE: Yudelka Ledesma  [id number] [facility] [id number]        Dear Colleague,    Thank you for referring your patient, Yudelka Ledesma, to the [facility] [place]. Please see a copy of my visit note below.    Oncology Rooming Note    September 5, 2023 3:56 PM   Yudelka Ledesma is a 82 year old female who presents for:    Chief Complaint   Patient presents with     Oncology Clinic Visit     Normocytic anemia - Provider visit only     Initial Vitals: BP (!) 160/70 (BP Location: Right arm, Patient Position: Sitting, Cuff Size: Adult Regular)   Pulse 87   Temp 98.6  F (37  C) (Temporal)   Resp 16   Ht 1.6 m (5' 3\")   Wt 74.4 kg (164 lb)   LMP  (LMP Unknown)   SpO2 98%   BMI 29.05 kg/m   Estimated body mass index is 29.05 kg/m  as calculated from the following:    Height as of this encounter: 1.6 m (5' 3\").    Weight as of this encounter: 74.4 kg (164 lb). Body surface area is 1.82 meters squared.  Mild Pain (3) Comment: Data Unavailable   No LMP recorded (lmp unknown). Patient has had a hysterectomy.  Allergies reviewed: Yes  Medications reviewed: Yes    Medications: Medication refills not needed today.  Pharmacy name entered into [email or web address]:    [facility] - [place], [place] - [address] [facility] [address] [place] [address] [address] [place] [address]    Clinical concerns:  None      Elizabeth Boyer, [facility] Hematology and Oncology Progress Note    Patient: Yudelka Ledesma  MRN: [id number]  9/05/23        Reason for Visit    Chief Complaint   Patient presents with     Oncology Clinic Visit     Normocytic anemia - Provider visit only         Problem List Items Addressed This Visit    None          Assessment and Plan  Anemia, iron deficiency  She is status post IV Venofer.  She received first series of infusions in July 2022.  And had a second course late last year.  Labs from last week reviewed " today.  Hemoglobin is still low at 11.1.  Iron studies are within normal limits.  Transferrin saturation is 27%.  TIBC is 277 and ferritin is 309.  I think her iron studies are adequate.  Etiology for her anemia despite adequate iron supplementation is not entirely clear.  She also has hypothyroidism.  Recent TSH was 5.9.  B12 levels were normal in the past.  Repeat her labs in 3 months.  He continues to have significant anemia and potentially could consider bone marrow biopsy.     Cancer Staging   No matching staging information was found for the patient.    ECOG Performance    0 - Independent         Problem List    Patient Active Problem List   Diagnosis     Essential hypertension with goal blood pressure less than 140/90     Esophageal reflux     Hypothyroidism     Mild intermittent asthma, uncomplicated     Abnormal glucose     Nephrolithiasis     Elevated serum alkaline phosphatase level     S/P hysterectomy     Hyperlipidemia LDL goal <130     Colon stricture (H)     Carpal tunnel syndrome     Cervical radiculopathy     Advanced directives, counseling/discussion     Scoliosis     Systolic murmur     Varicose veins of both lower extremities     Other idiopathic scoliosis     Essential tremor     Elevated blood sugar     Chronic pain of right knee     Primary osteoarthritis of right knee     Other iron deficiency anemia     Poor iron absorption     Status post total knee replacement     Chronic left-sided low back pain without sciatica     Chronic neck pain     Bilateral leg edema     Restless legs syndrome     Chronic heart failure with preserved ejection fraction (HFpEF) (H)     Prediabetes         Interval History   Yudelka Ledesma is a 82 year old female with history of allergies anemia status post IV iron infusions who is here for follow-up.    This my first time seeing her.  She does have a history of iron deficiency anemia which did not respond to oral iron supplementation.  She received IV iron  on 2 different occasions last year.  Here with repeat labs.  Denies any new issues today.  Denies any blood in stools.  Looks like she cannot have any colonoscopies due to history of difficult procedure in the past.      Review of Systems  A comprehensive review of systems was negative except for what is noted in the interval history    Current Outpatient Medications   Medication     albuterol (PROAIR HFA/PROVENTIL HFA/VENTOLIN HFA) 108 (90 Base) MCG/ACT inhaler     atorvastatin (LIPITOR) 40 MG tablet     chlorhexidine (PERIDEX) 0.12 % solution     diltiazem ER (DILT-XR) 240 MG 24 hr ER beaded capsule     docusate sodium (COLACE) 100 MG capsule     esomeprazole (NEXIUM) 20 MG DR capsule     levothyroxine (SYNTHROID/LEVOTHROID) 88 MCG tablet     lisinopril (ZESTRIL) 20 MG tablet     oxyCODONE (ROXICODONE) 5 MG tablet     acetaminophen (TYLENOL) 325 MG tablet     No current facility-administered medications for this visit.        Physical Exam        General: alert and cooperative  HEENT: Head: Normal, normocephalic, atraumatic.  Eye: Normal external eye, conjunctiva, lids cornea, ZACK.  Chest: Clear to auscultation bilaterally  Cardiac: S1, S2 normal, regular rate and rhythm  Abdomen: abdomen is soft   Extremities: atraumatic, no peripheral edema  Skin: No rashes  CNS: Alert and oriented x3, neurologic exam grossly normal.      Lab Results    Recent Results (from the past 168 hour(s))   Hemoglobin A1c   Result Value Ref Range    Hemoglobin A1C 6.2 (H) 0.0 - 5.6 %   Comprehensive metabolic panel (BMP + Alb, Alk Phos, ALT, AST, Total. Bili, TP)   Result Value Ref Range    Sodium 139 136 - 145 mmol/L    Potassium 5.1 3.4 - 5.3 mmol/L    Chloride 103 98 - 107 mmol/L    Carbon Dioxide (CO2) 27 22 - 29 mmol/L    Anion Gap 9 7 - 15 mmol/L    Urea Nitrogen 18.1 8.0 - 23.0 mg/dL    Creatinine 0.83 0.51 - 0.95 mg/dL    Calcium 10.1 8.8 - 10.2 mg/dL    Glucose 136 (H) 70 - 99 mg/dL    Alkaline Phosphatase 174 (H) 35 - 104 U/L     AST 17 0 - 45 U/L    ALT 12 0 - 50 U/L    Protein Total 7.4 6.4 - 8.3 g/dL    Albumin 4.4 3.5 - 5.2 g/dL    Bilirubin Total 0.3 <=1.2 mg/dL    GFR Estimate 70 >60 mL/min/1.73m2   Iron & Iron Binding Capacity   Result Value Ref Range    Iron 59 37 - 145 ug/dL    Iron Binding Capacity 275 240 - 430 ug/dL    Iron Sat Index 21 15 - 46 %   Ferritin   Result Value Ref Range    Ferritin 276 11 - 328 ng/mL   TSH   Result Value Ref Range    TSH 13.75 (H) 0.30 - 4.20 uIU/mL   CBC with platelets and differential   Result Value Ref Range    WBC Count 7.0 4.0 - 11.0 10e3/uL    RBC Count 3.81 3.80 - 5.20 10e6/uL    Hemoglobin 11.7 11.7 - 15.7 g/dL    Hematocrit 35.5 35.0 - 47.0 %    MCV 93 78 - 100 fL    MCH 30.7 26.5 - 33.0 pg    MCHC 33.0 31.5 - 36.5 g/dL    RDW 12.7 10.0 - 15.0 %    Platelet Count 305 150 - 450 10e3/uL    % Neutrophils 54 %    % Lymphocytes 26 %    % Monocytes 10 %    % Eosinophils 8 %    % Basophils 1 %    % Immature Granulocytes 0 %    Absolute Neutrophils 3.8 1.6 - 8.3 10e3/uL    Absolute Lymphocytes 1.8 0.8 - 5.3 10e3/uL    Absolute Monocytes 0.7 0.0 - 1.3 10e3/uL    Absolute Eosinophils 0.6 0.0 - 0.7 10e3/uL    Absolute Basophils 0.1 0.0 - 0.2 10e3/uL    Absolute Immature Granulocytes 0.0 <=0.4 10e3/uL       Imaging          Signed by: Missy Quijano MD      Again, thank you for allowing me to participate in the care of your patient.        Sincerely,        Missy Quijano MD

## 2023-09-05 NOTE — PROGRESS NOTES
"Oncology Rooming Note    September 5, 2023 3:56 PM   Yudelka Ledesma is a 82 year old female who presents for:    Chief Complaint   Patient presents with    Oncology Clinic Visit     Normocytic anemia - Provider visit only     Initial Vitals: BP (!) 160/70 (BP Location: Right arm, Patient Position: Sitting, Cuff Size: Adult Regular)   Pulse 87   Temp 98.6  F (37  C) (Temporal)   Resp 16   Ht 1.6 m (5' 3\")   Wt 74.4 kg (164 lb)   LMP  (LMP Unknown)   SpO2 98%   BMI 29.05 kg/m   Estimated body mass index is 29.05 kg/m  as calculated from the following:    Height as of this encounter: 1.6 m (5' 3\").    Weight as of this encounter: 74.4 kg (164 lb). Body surface area is 1.82 meters squared.  Mild Pain (3) Comment: Data Unavailable   No LMP recorded (lmp unknown). Patient has had a hysterectomy.  Allergies reviewed: Yes  Medications reviewed: Yes    Medications: Medication refills not needed today.  Pharmacy name entered into [employer]:    [facility] - [place], [place] - [address] [facility]ACY [facility] - [place], [place] - [address] [place] [address]    Clinical concerns:  None      Elizabeth Boyer CMA            "

## 2023-09-14 ENCOUNTER — ANESTHESIA EVENT (OUTPATIENT)
Dept: SURGERY | Facility: CLINIC | Age: 83
End: 2023-09-14
Payer: COMMERCIAL

## 2023-09-14 ASSESSMENT — LIFESTYLE VARIABLES: TOBACCO_USE: 1

## 2023-09-14 NOTE — ANESTHESIA PREPROCEDURE EVALUATION
Anesthesia Pre-Procedure Evaluation    Patient: Yudelka Ledesma   MRN: [id number] : 1940        Procedure : Procedure(s):  Carpal tunnel release          Past Medical History:   Diagnosis Date    Basal cell carcinoma     Esophageal reflux     Other and unspecified hyperlipidemia     Unspecified essential hypertension     Unspecified hypothyroidism       Past Surgical History:   Procedure Laterality Date    ARTHROPLASTY KNEE Right 2022    Procedure: TOTAL Knee Arthroplasty, right;  Surgeon: Sheldon Peters MD;  Location: [facility]    COLONOSCOPY  2011    COLONOSCOPY performed by BHARTI DUNHAM at [facility]      Allergies   Allergen Reactions    Augmentin [Aspartame]     Doxycycline     Gabapentin      Gave nightmares    Levaquin [Levofloxacin Hemihydrate]     Prednisolone     Seasonal Allergies     Sulfa Antibiotics       Social History     Tobacco Use    Smoking status: Former     Years: 15.00     Types: Cigarettes     Quit date: 1985     Years since quittin.7    Smokeless tobacco: Never   Substance Use Topics    Alcohol use: No      Wt Readings from Last 1 Encounters:   23 74.4 kg (164 lb)        Anesthesia Evaluation   Pt has had prior anesthetic. Type: General and MAC.        ROS/MED HX  ENT/Pulmonary:     (+)                tobacco use, Past use,    asthma                  Neurologic:  - neg neurologic ROS     Cardiovascular:     (+) Dyslipidemia hypertension- -   -  - -      CHF etiology: Chronic heart failure with preserved ejection fraction (HFpEF) Last EF: 60-65% date: 2023                  valvular problems/murmurs (TR) type: MR systolic murmur, aortic sclerosis.    Previous cardiac testing   Echo: Date: 23 Results:  Interpretation Summary     Left ventricular systolic function is normal.  The visual ejection fraction is 60-65%.  Grade I or early diastolic dysfunction.  The right ventricle is normal in structure, function and size.  No significant valve  dysfunction. Mildly thickened mitral valve leaflets  without stenosis. Trace MR.  The inferior vena cava was normal in size with preserved respiratory  variability.  There is no pericardial effusion.     Compared to prior study dated 5/25/2018, there is no major change.    Stress Test:  Date: Results:    ECG Reviewed:  Date: 7/28/22 Results:  Sinus  Rhythm   WITHIN NORMAL LIMITS  Cath:  Date: Results:      METS/Exercise Tolerance:     Hematologic:     (+)      anemia,          Musculoskeletal: Comment: Cervical radiculopathy  (+)  arthritis,             GI/Hepatic:     (+) GERD,                   Renal/Genitourinary:     (+)       Nephrolithiasis ,       Endo:     (+)          thyroid problem, hypothyroidism,           Psychiatric/Substance Use:       Infectious Disease:  - neg infectious disease ROS     Malignancy:   (+) Malignancy, History of Skin.    Other:      (+)  , H/O Chronic Pain,            OUTSIDE LABS:  CBC:   Lab Results   Component Value Date    WBC 7.0 08/31/2023    WBC 6.6 05/02/2023    HGB 11.7 08/31/2023    HGB 11.1 (L) 05/02/2023    HCT 35.5 08/31/2023    HCT 34.4 (L) 05/02/2023     08/31/2023     05/02/2023     BMP:   Lab Results   Component Value Date     08/31/2023     05/23/2023    POTASSIUM 5.1 08/31/2023    POTASSIUM 4.4 05/23/2023    CHLORIDE 103 08/31/2023    CHLORIDE 101 05/23/2023    CO2 27 08/31/2023    CO2 25 05/23/2023    BUN 18.1 08/31/2023    BUN 31.4 (H) 05/23/2023    CR 0.83 08/31/2023    CR 1.02 (H) 05/23/2023     (H) 08/31/2023     (H) 05/23/2023     COAGS: No results found for: PTT, INR, FIBR  POC:   Lab Results   Component Value Date    HCG Negative 07/19/2007     HEPATIC:   Lab Results   Component Value Date    ALBUMIN 4.4 08/31/2023    PROTTOTAL 7.4 08/31/2023    ALT 12 08/31/2023    AST 17 08/31/2023    GGT 9 04/24/2021    ALKPHOS 174 (H) 08/31/2023    BILITOTAL 0.3 08/31/2023     OTHER:   Lab Results   Component Value Date    A1C 6.2  (H) 08/31/2023    VARSHA 10.1 08/31/2023    MAG 1.9 05/14/2015    LIPASE 135 06/10/2006    TSH 13.75 (H) 08/31/2023    CRP <2.9 02/10/2022    SED 32 (H) 02/10/2022               DARIUSZ Hampton CRNA

## 2023-09-19 ENCOUNTER — OFFICE VISIT (OUTPATIENT)
Dept: URGENT CARE | Facility: URGENT CARE | Age: 83
End: 2023-09-19
Payer: COMMERCIAL

## 2023-09-19 VITALS
DIASTOLIC BLOOD PRESSURE: 72 MMHG | RESPIRATION RATE: 18 BRPM | WEIGHT: 168 LBS | OXYGEN SATURATION: 98 % | HEART RATE: 92 BPM | TEMPERATURE: 98.3 F | BODY MASS INDEX: 29.76 KG/M2 | SYSTOLIC BLOOD PRESSURE: 150 MMHG

## 2023-09-19 DIAGNOSIS — M25.562 ACUTE PAIN OF LEFT KNEE: Primary | ICD-10-CM

## 2023-09-19 PROCEDURE — 99213 OFFICE O/P EST LOW 20 MIN: CPT

## 2023-09-19 NOTE — PATIENT INSTRUCTIONS
"Diagnosis: Muscular strain/sprain injury   Today we did:  Xray:  not wanted today    Plan:   Avoid or restrict any activities that may aggravate your symptoms.   Cease exacerbating activity for 2 to 6 weeks, then gradually return to exercise/sport as tolerated.   wear brace for the next few weeks to assist with support.}  light stretching (when able to tolerate) to reduce your risks of developing a frozen joint or stiffness in joint   This will also help to increase strength and flexibility over time related to injury   Recovery expected within 2-6 weeks depending on severity, but some may take up to 6-12 months.  If symptoms fail to resolve or worsen recommending follow-up with orthopedics/physical therapy after allowing adequate time for healing. - will place referral today     P.R.I.C.E.  For musculoskeletal injuries including: sprains, strains, bruises - use the acronym P.R.I.C.E. for symptomatic treatment:   Prevent & Protect further injury.  Rest affected area   Ice applied (or heat, or can alternate)   Compression of injured area (ACE bandage/splint).  Elevation of injured area.    Pain  Ibuprofen / tylenol for pain   - Ibuprofen 600mg Q6hr as needed  (can use celebrex if GI upset or GI bleed risk)    - tylenol 500mg Q8hr   - Can use aleve / naproxen / naprosyn also   - if no contraindications recommend naproxen and or Gels/creams such as a Voltaren, and lidocaine patches or creams   No narcotics - no evidence to support this use and people tend to over use \"injured\" extremity when not having pain    (Pain is body's way of making you take it easy for awhile)   - orthopedic speciality will discuss stronger medications if needed indicated at that time    Monitor for:   Worsening pain   Worsening numbness and tingling   Loss of range of motion or strength   Redness, warmth or infection at site   Fevers, chills    "

## 2023-09-19 NOTE — PROGRESS NOTES
URGENT CARE  Assessment & Plan   Assessment:   Yudelka Ledesma is a 82 year old female who's clinical presentation today is consistent with:   1. Acute pain of left knee  - Orthopedic  Referral; Future  Plan:  Patient refused radiological images today, also refused knee brace stating she has one at home.  I suspect ligamentous vs tendon pathology and  will treat patient at this time symptomatically and supportively, this will include encouraging: using NSAIDs/Tylenol to help decrease pain and inflammation, resting, applying ice/heat as needed, compression and elevation, recommend she use the brace she has at home and continue to use her walker for support.   Educated patient to follow-up with their PCP or ortho in the next 1-2 weeks for further evaluation and reassessment also discussed to return immediatly  if symptoms worsen after today's visit.   No alarm signs or symptoms present   Differential Diagnoses for this patient's chief complaint that I considered include:  fracture, dislocation, Ligamentous vs tendon pathology, musculoskeletal injury, soft tissue injury     Patient is} agreeable to treatment plan and state they will follow-up if symptoms do not improve and/or if symptoms worsen   see patient's AVS 'monitor for' section for specific patient instructions given and discussed regarding what to watch for and when to follow up    DARIUSZ [facility] [place]      ______________________________________________________________________      Subjective     HPI: Yudelka Ledesma  is a 82 year old  female who presents today for evaluation the following concerns:   Patient presents endorsing left knee pain which started 5 days  on 9/14/23   Patient states this pain is not  related to a traumatic injury/accident and is acute    patient states that they were just getting up out of a chair and heard a pop    Patient localizes the pain to the anterior aspect  , and states there is no radiation of the pain to the posterior aspect, sides of the knee, thigh or calf/shin.    patient denies any associated} symptoms such as swelling, redness or bruising   Patient states their: Skin is intact}. ROM is limited with flexion , and their strength is normal}   Patient reports sensation is without numbness or tingling.   Self care to this point includes: resting , and the helpfulness was minimal    Patient refusing xrays today, stating she didn't have an accident and doesn't think its broke      Review of Systems:  Pertinent review of systems as reflected in HPI, otherwise negative.     Objective    Physical Exam:  Vitals:    09/19/23 1708 09/19/23 1713   BP: (!) 162/80 (!) 150/72   Pulse: 92    Resp: 18    Temp: 98.3  F (36.8  C)    TempSrc: Tympanic    SpO2: 98%    Weight: 76.2 kg (168 lb)       General:   alert and oriented, no acute distress, non ill-appearing   Vital signs reviewed: afebrile   Msk:   Left  knee: no  erythema, ecchymosis, but moderate  inflammation present on the anterior  aspect(s).   Increased tenderness/pain with palpation on the anterior aspect(s).   Decreased range of motion with flexion   Temperature equal} to body temperature.  No crepitus, no gross deformity, joint laxity, skin intact, and no laceration(s) present.   Can bear weight but with increased pain.     ______________________________________________________________________    I explained my diagnostic considerations and recommendations to the patient, who voiced understanding and agreement with the treatment plan.   All questions were answered.   We discussed potential side effects, risks and benefits of any prescribed or recommended therapies, as well as expectations for response to treatments.  Please see AVS for any patient instructions & handouts given.   Patient was advised to contact the Nurse Care Line, their Primary Care provider, Urgent Care, or the Emergency Department if there are new  or worsening symptoms, or call 911 for emergencies.

## 2023-09-21 ENCOUNTER — ANESTHESIA (OUTPATIENT)
Dept: SURGERY | Facility: CLINIC | Age: 83
End: 2023-09-21
Payer: COMMERCIAL

## 2023-09-24 ENCOUNTER — MYC REFILL (OUTPATIENT)
Dept: FAMILY MEDICINE | Facility: CLINIC | Age: 83
End: 2023-09-24
Payer: COMMERCIAL

## 2023-09-24 DIAGNOSIS — M54.50 CHRONIC LEFT-SIDED LOW BACK PAIN WITHOUT SCIATICA: ICD-10-CM

## 2023-09-24 DIAGNOSIS — G25.81 RESTLESS LEGS SYNDROME: ICD-10-CM

## 2023-09-24 DIAGNOSIS — G89.29 CHRONIC LEFT-SIDED LOW BACK PAIN WITHOUT SCIATICA: ICD-10-CM

## 2023-09-25 ENCOUNTER — ANCILLARY PROCEDURE (OUTPATIENT)
Dept: GENERAL RADIOLOGY | Facility: CLINIC | Age: 83
End: 2023-09-25
Attending: FAMILY MEDICINE
Payer: COMMERCIAL

## 2023-09-25 ENCOUNTER — OFFICE VISIT (OUTPATIENT)
Dept: ORTHOPEDICS | Facility: CLINIC | Age: 83
End: 2023-09-25
Payer: COMMERCIAL

## 2023-09-25 ENCOUNTER — MYC REFILL (OUTPATIENT)
Dept: FAMILY MEDICINE | Facility: CLINIC | Age: 83
End: 2023-09-25

## 2023-09-25 VITALS — WEIGHT: 168 LBS | HEIGHT: 63 IN | BODY MASS INDEX: 29.77 KG/M2

## 2023-09-25 DIAGNOSIS — G25.81 RESTLESS LEGS SYNDROME: ICD-10-CM

## 2023-09-25 DIAGNOSIS — M17.12 ARTHRITIS OF LEFT KNEE: Primary | ICD-10-CM

## 2023-09-25 DIAGNOSIS — M25.562 LEFT KNEE PAIN: ICD-10-CM

## 2023-09-25 DIAGNOSIS — M54.50 CHRONIC LEFT-SIDED LOW BACK PAIN WITHOUT SCIATICA: ICD-10-CM

## 2023-09-25 DIAGNOSIS — G89.29 CHRONIC LEFT-SIDED LOW BACK PAIN WITHOUT SCIATICA: ICD-10-CM

## 2023-09-25 PROCEDURE — 99213 OFFICE O/P EST LOW 20 MIN: CPT | Performed by: FAMILY MEDICINE

## 2023-09-25 PROCEDURE — 73562 X-RAY EXAM OF KNEE 3: CPT | Mod: TC | Performed by: RADIOLOGY

## 2023-09-25 RX ORDER — OXYCODONE HYDROCHLORIDE 5 MG/1
TABLET ORAL
Qty: 45 TABLET | Refills: 0 | Status: CANCELLED | OUTPATIENT
Start: 2023-09-25

## 2023-09-25 ASSESSMENT — PAIN SCALES - GENERAL: PAINLEVEL: MODERATE PAIN (5)

## 2023-09-25 NOTE — PATIENT INSTRUCTIONS
# Left Knee Arthritis: Yudelka Ledesma  was seen today for left knee pain. Symptoms had been going on since 9/14/23. Pain affecting ability to walk/stand. On examination there are positive findings of tenderness to palpation over the . Imaging findings showed left lateral knee arthritis. Likely cause of patient's condition due to flare of knee arthritis.   Counseled patient on nature of condition and treatment options.  Given this plan as below, follow-up 1 month as needed.     Image Findings: Left knee arthritis worse in the lateral and patellofemoral compartment  Treatment: Activities as tolerated, home exercises given today  Medications/Injections: Limited tylenol/ibuprofen for pain for 1-2 weeks, Topical Voltaren gel, none today  Follow-up: In one month if symptoms do not improve, sooner if worsening  Can consider gel injection    Please call [phone]   Ask for my team if you have any questions or concerns    If you have not yet received the influenza vaccine but would like to get one, please call  [phone] or you can schedule via [employer]    It was great seeing you today!    Rhys Burrows MD, [facility] [place]

## 2023-09-25 NOTE — LETTER
9/25/2023         RE: Yudelka Ledesma  [address] [facility] [address]        Dear Colleague,    Thank you for referring your patient, Yudelka Ledesma, to the [facility]. Please see a copy of my visit note below.    ASSESSMENT & PLAN    Yudelka was seen today for pain.    Diagnoses and all orders for this visit:    Arthritis of left knee  -     XR Knee Left 3 Views; Future  -     diclofenac (VOLTAREN) 1 % topical gel; Apply 4 g topically 4 times daily  -     Physical Therapy Referral; Future      This issue is acute and Worsening.    # Left Knee Arthritis: Yudelka Ledesma  was seen today for left knee pain. Symptoms had been going on since 9/14/23. Pain affecting ability to walk/stand. On examination there are positive findings of tenderness to palpation over the left knee joint lines, mild pain over patellofemoral compartment. Imaging findings showed left lateral knee arthritis. Likely cause of patient's condition due to flare of knee arthritis.   Counseled patient on nature of condition and treatment options.  Given this plan as below, follow-up 1 month as needed.     Image Findings: Left knee arthritis worse in the lateral and patellofemoral compartment  Treatment: Activities as tolerated, home exercises given today  Medications/Injections: Limited tylenol/ibuprofen for pain for 1-2 weeks, Topical Voltaren gel, none today  Follow-up: In one month if symptoms do not improve, sooner if worsening  Can consider gel injection    Rhys Burrows MD  [facility]    -----  Chief Complaint   Patient presents with     Left Knee - Pain       SUBJECTIVE  Yudelka Ledesma is a/an 83 year old female who is seen as a self referral for evaluation of left knee pain.     The patient is seen by themselves.      Onset: 9/14/23, 1.5 week(s) ago. Reports insidious onset without acute precipitating  "event.  Location of Pain: left knee-diffuse   Worsened by: bending  Better with: rest  Treatments tried:  walker, oxycodone (chronic neck and back pain), ibuprofen   Associated symptoms: swelling    Orthopedic/Surgical history: YES - Right  TKA, lymphedema   Social History/Occupation: Retired     No family history pertinent to patient's problem today.      REVIEW OF SYSTEMS:  Review of Systems  Constitutional, HEENT, cardiovascular, pulmonary, gi and gu systems are negative, except as otherwise noted.    OBJECTIVE:  Ht 1.6 m (5' 3\")   Wt 76.2 kg (168 lb)   LMP  (LMP Unknown)   BMI 29.76 kg/m     General: healthy, alert and in no distress  HEENT: no scleral icterus or conjunctival erythema  Skin: no suspicious lesions or rash. No jaundice.  CV: distal perfusion intact    Resp: normal respiratory effort without conversational dyspnea   Psych: normal mood and affect  Gait: normal steady gait with appropriate coordination and balance    Neuro: Normal light sensory exam of left lower extremity     Ortho Exam   LEFT KNEE  Inspection:    Normal alignment; no edema, erythema, or ecchymosis present  Palpation:    Tender about the lateral joint line and medial joint line. Remainder of bony and ligamentous landmarks are nontender.    Mild effusion is present    Patellofemoral crepitus is Present  Range of Motion:     00 extension to 1350 flexion  Strength:    Quadriceps 5/5, hamstrings 5/5, gastrocsoleus 5/5, and tibialis anterior 5/5    Extensor mechanism intact  Special Tests:    Positive: Patellar grind    Negative: MCL/valgus stress (0 & 30 deg), LCL/varus stress (0 & 30 deg), anterior drawer, posterior drawer    RADIOLOGY:  I independently ordered, visualized and reviewed these images with the patient  Lateral and patellofemoral knee arthritis, no fracture      Review of external notes as documented elsewhere in note  Review of the result(s) of each unique test - left knee x-rays       Disclaimer: This note consists of " symbols derived from keyboarding, dictation and/or voice recognition software. As a result, there may be errors in the script that have gone undetected. Please consider this when interpreting information found in this chart.      Again, thank you for allowing me to participate in the care of your patient.        Sincerely,        Rhys Burrows MD

## 2023-09-25 NOTE — PROGRESS NOTES
ASSESSMENT & PLAN    Yudelka was seen today for pain.    Diagnoses and all orders for this visit:    Arthritis of left knee  -     XR Knee Left 3 Views; Future  -     diclofenac (VOLTAREN) 1 % topical gel; Apply 4 g topically 4 times daily  -     Physical Therapy Referral; Future      This issue is acute and Worsening.    # Left Knee Arthritis: Yudelka Ledesma  was seen today for left knee pain. Symptoms had been going on since 9/14/23. Pain affecting ability to walk/stand. On examination there are positive findings of tenderness to palpation over the left knee joint lines, mild pain over patellofemoral compartment. Imaging findings showed left lateral knee arthritis. Likely cause of patient's condition due to flare of knee arthritis.   Counseled patient on nature of condition and treatment options.  Given this plan as below, follow-up 1 month as needed.     Image Findings: Left knee arthritis worse in the lateral and patellofemoral compartment  Treatment: Activities as tolerated, home exercises given today  Medications/Injections: Limited tylenol/ibuprofen for pain for 1-2 weeks, Topical Voltaren gel, none today  Follow-up: In one month if symptoms do not improve, sooner if worsening  Can consider gel injection    Rhys Burrows MD  [facility] [place]    -----  Chief Complaint   Patient presents with    Left Knee - Pain       SUBJECTIVE  Yudelka Ledesma is a/an 83 year old female who is seen as a self referral for evaluation of left knee pain.     The patient is seen by themselves.      Onset: 9/14/23, 1.5 week(s) ago. Reports insidious onset without acute precipitating event.  Location of Pain: left knee-diffuse   Worsened by: bending  Better with: rest  Treatments tried:  walker, oxycodone (chronic neck and back pain), ibuprofen   Associated symptoms: swelling    Orthopedic/Surgical history: YES - Right  TKA, lymphedema   Social History/Occupation: Retired  "    No family history pertinent to patient's problem today.      REVIEW OF SYSTEMS:  Review of Systems  Constitutional, HEENT, cardiovascular, pulmonary, gi and gu systems are negative, except as otherwise noted.    OBJECTIVE:  Ht 1.6 m (5' 3\")   Wt 76.2 kg (168 lb)   LMP  (LMP Unknown)   BMI 29.76 kg/m     General: healthy, alert and in no distress  HEENT: no scleral icterus or conjunctival erythema  Skin: no suspicious lesions or rash. No jaundice.  CV: distal perfusion intact    Resp: normal respiratory effort without conversational dyspnea   Psych: normal mood and affect  Gait: normal steady gait with appropriate coordination and balance    Neuro: Normal light sensory exam of left lower extremity     Ortho Exam   LEFT KNEE  Inspection:    Normal alignment; no edema, erythema, or ecchymosis present  Palpation:    Tender about the lateral joint line and medial joint line. Remainder of bony and ligamentous landmarks are nontender.    Mild effusion is present    Patellofemoral crepitus is Present  Range of Motion:     00 extension to 1350 flexion  Strength:    Quadriceps 5/5, hamstrings 5/5, gastrocsoleus 5/5, and tibialis anterior 5/5    Extensor mechanism intact  Special Tests:    Positive: Patellar grind    Negative: MCL/valgus stress (0 & 30 deg), LCL/varus stress (0 & 30 deg), anterior drawer, posterior drawer    RADIOLOGY:  I independently ordered, visualized and reviewed these images with the patient  Lateral and patellofemoral knee arthritis, no fracture      Review of external notes as documented elsewhere in note  Review of the result(s) of each unique test - left knee x-rays       Disclaimer: This note consists of symbols derived from keyboarding, dictation and/or voice recognition software. As a result, there may be errors in the script that have gone undetected. Please consider this when interpreting information found in this chart.    "

## 2023-09-26 ENCOUNTER — TELEPHONE (OUTPATIENT)
Dept: FAMILY MEDICINE | Facility: CLINIC | Age: 83
End: 2023-09-26
Payer: COMMERCIAL

## 2023-09-26 DIAGNOSIS — G25.81 RESTLESS LEGS SYNDROME: ICD-10-CM

## 2023-09-26 DIAGNOSIS — G89.29 CHRONIC LEFT-SIDED LOW BACK PAIN WITHOUT SCIATICA: ICD-10-CM

## 2023-09-26 DIAGNOSIS — M54.50 CHRONIC LEFT-SIDED LOW BACK PAIN WITHOUT SCIATICA: ICD-10-CM

## 2023-09-26 RX ORDER — OXYCODONE HYDROCHLORIDE 5 MG/1
TABLET ORAL
Qty: 45 TABLET | Refills: 0 | Status: SHIPPED | OUTPATIENT
Start: 2023-09-26 | End: 2023-09-26

## 2023-09-26 RX ORDER — OXYCODONE HYDROCHLORIDE 5 MG/1
TABLET ORAL
Qty: 45 TABLET | Refills: 0 | Status: SHIPPED | OUTPATIENT
Start: 2023-09-26 | End: 2023-10-13

## 2023-09-26 NOTE — TELEPHONE ENCOUNTER
Pt said they are out of the 5 mg strength and won't get it in until next Tues.  Requesting to be sent to [facility] today.  Melissa [place] Sec

## 2023-10-05 ENCOUNTER — TELEPHONE (OUTPATIENT)
Dept: FAMILY MEDICINE | Facility: CLINIC | Age: 83
End: 2023-10-05
Payer: COMMERCIAL

## 2023-10-05 NOTE — TELEPHONE ENCOUNTER
Patient Quality Outreach    Patient is due for the following:   Chronic Opioid Use -  NANCY-7 and PHQ-9    Next Steps:   Patient has upcoming appointment, these items will be addressed at that time.    Type of outreach:    Chart review performed, no outreach needed.      Questions for provider review:    None           Yamilet Branch CMA

## 2023-10-12 ENCOUNTER — LAB (OUTPATIENT)
Dept: LAB | Facility: CLINIC | Age: 83
End: 2023-10-12
Payer: COMMERCIAL

## 2023-10-12 ENCOUNTER — TELEPHONE (OUTPATIENT)
Dept: FAMILY MEDICINE | Facility: CLINIC | Age: 83
End: 2023-10-12

## 2023-10-12 DIAGNOSIS — E03.9 ACQUIRED HYPOTHYROIDISM: ICD-10-CM

## 2023-10-12 LAB — TSH SERPL DL<=0.005 MIU/L-ACNC: 13.87 UIU/ML (ref 0.3–4.2)

## 2023-10-12 PROCEDURE — 84443 ASSAY THYROID STIM HORMONE: CPT

## 2023-10-12 PROCEDURE — 36415 COLL VENOUS BLD VENIPUNCTURE: CPT

## 2023-10-12 ASSESSMENT — ANXIETY QUESTIONNAIRES
5. BEING SO RESTLESS THAT IT IS HARD TO SIT STILL: NOT AT ALL
GAD7 TOTAL SCORE: 0
GAD7 TOTAL SCORE: 0
3. WORRYING TOO MUCH ABOUT DIFFERENT THINGS: NOT AT ALL
IF YOU CHECKED OFF ANY PROBLEMS ON THIS QUESTIONNAIRE, HOW DIFFICULT HAVE THESE PROBLEMS MADE IT FOR YOU TO DO YOUR WORK, TAKE CARE OF THINGS AT HOME, OR GET ALONG WITH OTHER PEOPLE: NOT DIFFICULT AT ALL
6. BECOMING EASILY ANNOYED OR IRRITABLE: NOT AT ALL
2. NOT BEING ABLE TO STOP OR CONTROL WORRYING: NOT AT ALL
7. FEELING AFRAID AS IF SOMETHING AWFUL MIGHT HAPPEN: NOT AT ALL
1. FEELING NERVOUS, ANXIOUS, OR ON EDGE: NOT AT ALL

## 2023-10-12 ASSESSMENT — PATIENT HEALTH QUESTIONNAIRE - PHQ9
5. POOR APPETITE OR OVEREATING: NOT AT ALL
SUM OF ALL RESPONSES TO PHQ QUESTIONS 1-9: 0

## 2023-10-12 NOTE — TELEPHONE ENCOUNTER
Patient Quality Outreach    Patient is due for the following:   Chronic Opioid Use -  NANCY-7 and PHQ-9    Next Steps:   Patient was here for lab    Type of outreach:    Updated phq-9 and NANCY      Questions for provider review:    None           Yamilet Branch, CMA

## 2023-10-13 ENCOUNTER — MYC MEDICAL ADVICE (OUTPATIENT)
Dept: FAMILY MEDICINE | Facility: CLINIC | Age: 83
End: 2023-10-13
Payer: COMMERCIAL

## 2023-10-13 DIAGNOSIS — M54.50 CHRONIC LEFT-SIDED LOW BACK PAIN WITHOUT SCIATICA: ICD-10-CM

## 2023-10-13 DIAGNOSIS — E03.9 ACQUIRED HYPOTHYROIDISM: ICD-10-CM

## 2023-10-13 DIAGNOSIS — G89.29 CHRONIC LEFT-SIDED LOW BACK PAIN WITHOUT SCIATICA: ICD-10-CM

## 2023-10-13 DIAGNOSIS — G25.81 RESTLESS LEGS SYNDROME: ICD-10-CM

## 2023-10-13 RX ORDER — LEVOTHYROXINE SODIUM 112 UG/1
TABLET ORAL
Qty: 90 TABLET | Refills: 0 | Status: SHIPPED | OUTPATIENT
Start: 2023-10-13 | End: 2023-10-26

## 2023-10-13 RX ORDER — OXYCODONE HYDROCHLORIDE 5 MG/1
TABLET ORAL
Qty: 60 TABLET | Refills: 0 | Status: SHIPPED | OUTPATIENT
Start: 2023-10-23 | End: 2023-11-14

## 2023-10-19 ENCOUNTER — TELEPHONE (OUTPATIENT)
Dept: FAMILY MEDICINE | Facility: CLINIC | Age: 83
End: 2023-10-19

## 2023-10-19 DIAGNOSIS — E03.9 ACQUIRED HYPOTHYROIDISM: ICD-10-CM

## 2023-10-19 NOTE — TELEPHONE ENCOUNTER
Patient calling. Her levothyroxine was increased from 88MCG to 112 MCG. Patient reports that after taking for a few days she was shaking uncontrollably, breathing fast, and had trouble talking. Patient reports she did not take Levothyroxine today. Wondering if there is a smaller dose that she could be increased to instead of the 112 MCG?    Patient also reports that her Oxycodone was increase, but she is unable to  until Monday 10/23. Patient states that she only has 4 tablets left and has been taking more frequently due to increased pain. Patient is asking for the Ok to refill early or asking for a small 2 day script to be sent to the pharmacy to bridge the weekend?       Preferred Pharmacy:     [facility] [address] [place] [id number]  Phone: [phone] Fax: [phone]        Could we send this information to you in [facility] or would you prefer to receive a phone call?:   Patient would prefer a phone call   Okay to leave a detailed message?: Yes at Home number on file [phone] (home)

## 2023-10-19 NOTE — TELEPHONE ENCOUNTER
1) Re: 10-12-23 TSH, levothyroxine dose change-   TSH   Date Value Ref Range Status   10/12/2023 13.87 (H) 0.30 - 4.20 uIU/mL Final   2023 13.75 (H) 0.30 - 4.20 uIU/mL Final   2023 3.53 0.30 - 4.20 uIU/mL Final   2023 5.40 (H) 0.30 - 4.20 uIU/mL Final   2022 4.11 0.30 - 4.20 uIU/mL Final   02/10/2022 2.26 0.40 - 4.00 mU/L Final   2021 3.76 0.40 - 4.00 mU/L Final   2020 3.51 0.40 - 4.00 mU/L Final   2019 2.02 0.40 - 4.00 mU/L Final   2018 1.70 0.40 - 4.00 mU/L Final   2017 3.35 0.40 - 4.00 mU/L Final      Hi Yudelka,  Thyroid is still way off. I've increased your levothyroxine from 88 mcg daily to 112 mcg daily. We'll recheck it again in 6-8 weeks.  Azalea Meehan MD    States since taking increased levothyroxine dose has feeling shaky, breathing faster.  Denies chest pain, palpitations, dizziness, lightheadedness. Do not detect SOA or difficulty breathing with conversation.   Requesting lowered dose.    2) Re: acute lt knee pain, increased oxycodone per 10-13-23 [employer] message however start date 10-23-23. -   oxyCODONE (ROXICODONE) 5 MG tablet 60 tablet 0 10/23/2023  No   Si/2 tab in am, one in pm, and one at night   Sent to pharmacy as: oxyCODONE HCl 5 MG Oral Tablet (ROXICODONE)       Since pt taking increased oxycodone now has 4 tabs left of previous prescription. Not able to fill above script until 10/23. In addition to oxycodone taking ibu 200 mg up to 8/ day. No improvement in knee pain since saw ortho 9/25. Does not have F/U ortho appt scheduled.  Has appt with Dr. Meehan .   Pt requesting oxycodone refill to bridge until able to fill above Rx on 10/23 or OK for early fill.      Forwarded to Dr. Escobar in PCP absence.  ELYSSA Fernandez RN

## 2023-10-19 NOTE — TELEPHONE ENCOUNTER
RN spoke with [facility] pharmacy, [place], gave verbal approval for early oxycodone refill per Dr. Escobar. Able to fill today.   Pt informed can pickup oxycodone today. Also advised to go back to previous levothyroxine dose 88 mcg daily.   Forwarded to Dr. Meehan for review when back in clinic 10/26.  ELYSSA Fernandez RN

## 2023-10-19 NOTE — TELEPHONE ENCOUNTER
I would ok early refill of oxycodone   Needs to take this as prescribed to 2 1/2 tabs per day     She should go back to her previous dose of synthroid and follow up with Dr Meehan

## 2023-10-23 DIAGNOSIS — I10 ESSENTIAL HYPERTENSION WITH GOAL BLOOD PRESSURE LESS THAN 140/90: ICD-10-CM

## 2023-10-23 RX ORDER — LISINOPRIL 20 MG/1
TABLET ORAL
Qty: 90 TABLET | Refills: 0 | Status: SHIPPED | OUTPATIENT
Start: 2023-10-23 | End: 2024-01-18

## 2023-10-23 RX ORDER — DILTIAZEM HYDROCHLORIDE 240 MG/1
CAPSULE, EXTENDED RELEASE ORAL
Qty: 90 CAPSULE | Refills: 0 | Status: SHIPPED | OUTPATIENT
Start: 2023-10-23 | End: 2024-01-18

## 2023-10-23 NOTE — TELEPHONE ENCOUNTER
Routing refill request to provider for review/approval because:  Labs out of range:    BP Readings from Last 3 Encounters:   09/19/23 (!) 150/72   09/05/23 (!) 160/70   08/22/23 130/64     Last Written Prescription Date:  7/24/23  Last Fill Quantity: 90,  # refills: 0   Last office visit: 8/22/2023 ; last virtual visit: Visit date not found with prescribing provider:     Future Office Visit:   Next 5 appointments (look out 90 days)      Nov 16, 2023  1:30 PM  (Arrive by 1:10 PM)  Provider Visit with Azalea Ochoa MD  [facility] ([facility] ) [address] [place] [id number] [phone]           Julie Behrendt RN

## 2023-10-24 ENCOUNTER — THERAPY VISIT (OUTPATIENT)
Dept: PHYSICAL THERAPY | Facility: CLINIC | Age: 83
End: 2023-10-24
Payer: COMMERCIAL

## 2023-10-24 DIAGNOSIS — R60.0 BILATERAL LEG EDEMA: Primary | ICD-10-CM

## 2023-10-24 PROCEDURE — 97140 MANUAL THERAPY 1/> REGIONS: CPT | Mod: GP | Performed by: PHYSICAL THERAPIST

## 2023-10-24 NOTE — PROGRESS NOTES
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   Target Date 07/18/23   Date Met 06/27/23   PT Goal 2   Goal Identifier ltg   Goal Description Pt/spouse will be independent with don/doffing compression garments in order to maintain edema reductions upon discharge.   Target Date 08/29/23   Date Met 08/29/23   PT Goal 3   Goal Identifier ltg 2   Goal Description Pt will demonstrate edema reductions while wearing long term compression garments.   Target Date 11/14/23   Subjective Report   Subjective Report Current compression has not been working, Left leg conitnues to get larger.   Objective Measures   Objective Measures Objective Measure 1;Objective Measure 2   Objective Measure 1   Objective Measure Girth measurements   Details R LE: -9.6%; L LE: +10.5% since 8/29/2023   Objective Measure 2   Objective Measure Garment measurements   Details Compeflex size medium tall B LE   Treatment Interventions (PT)   Interventions Manual Therapy   Manual Therapy   Manual Therapy: Mobilization, MFR, MLD, friction massage minutes (03196) 45   Manual Therapy 1 partial CDT   Manual Therapy 1 - Details gith measurements; BLE cleansed and cetaphil applied. L LE Spandidgrip size E from metatarsals to distal ankle  and size F from ankle to knee both doubled to increase compression bilaterally R LE compreflex garment - pt reports comfort.   Skilled Intervention partial CDT for edema reduction   Patient Response/Progress verbalized and demonstrated understanding   Plan   Home program tolerance to spandigrip   Plan for next session girth measurements and assess tolerance to spandigrip, instruct on garments to order if new option is needed   Total Session Time   Timed Code Treatment Minutes 45   Total Treatment Time (sum of timed and untimed services) 45         PLAN  Pt continues to demonstrate increasing left leg swelling due to poor tolerance to garments.  Pt would benefit from continued lymphedema therapy to address compression garment issues and increased edema.    Beginning/End Dates of Progress Note Reporting Period:    to 10/24/2023    Referring Provider:  Azalea JENKINS [facility]                                                                                   OUTPATIENT PHYSICAL THERAPY    PLAN OF TREATMENT FOR OUTPATIENT REHABILITATION   Patient's Last Name, First Name, Yudelka Reynolds YOB: 1940   Provider's Name   [facility]   Medical Record No.  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                Referring  Provider:  Azalea Ochoa      Initial Assessment  See Epic Evaluation- Start of Care Date: 06/20/23

## 2023-10-26 ENCOUNTER — MYC MEDICAL ADVICE (OUTPATIENT)
Dept: FAMILY MEDICINE | Facility: CLINIC | Age: 83
End: 2023-10-26

## 2023-10-26 RX ORDER — LEVOTHYROXINE SODIUM 100 UG/1
100 TABLET ORAL DAILY
Qty: 90 TABLET | Refills: 0 | Status: SHIPPED | OUTPATIENT
Start: 2023-10-26 | End: 2024-01-18

## 2023-10-27 NOTE — TELEPHONE ENCOUNTER
First make sure she is taking her levothyroxine on an empty stomach an hour prior to eating. If so, then have her try going up to 100 mcg/day.   If she is not doing above, stay on 88 mcg and we'll recheck on that dose. Either way, we'll recheck in 4-6 weeks.

## 2023-11-14 ENCOUNTER — THERAPY VISIT (OUTPATIENT)
Dept: PHYSICAL THERAPY | Facility: CLINIC | Age: 83
End: 2023-11-14
Attending: FAMILY MEDICINE
Payer: COMMERCIAL

## 2023-11-14 ENCOUNTER — MYC MEDICAL ADVICE (OUTPATIENT)
Dept: FAMILY MEDICINE | Facility: CLINIC | Age: 83
End: 2023-11-14

## 2023-11-14 DIAGNOSIS — G89.29 CHRONIC LEFT-SIDED LOW BACK PAIN WITHOUT SCIATICA: ICD-10-CM

## 2023-11-14 DIAGNOSIS — R60.0 BILATERAL LEG EDEMA: Primary | ICD-10-CM

## 2023-11-14 DIAGNOSIS — M54.50 CHRONIC LEFT-SIDED LOW BACK PAIN WITHOUT SCIATICA: ICD-10-CM

## 2023-11-14 DIAGNOSIS — G25.81 RESTLESS LEGS SYNDROME: ICD-10-CM

## 2023-11-14 PROCEDURE — 97140 MANUAL THERAPY 1/> REGIONS: CPT | Mod: GP | Performed by: PHYSICAL THERAPIST

## 2023-11-14 NOTE — PROGRESS NOTES
Lymphedema Physical Therapy Progress Note and Certification      [facility]                                                                                   OUTPATIENT PHYSICAL THERAPY    PLAN OF TREATMENT FOR OUTPATIENT REHABILITATION   Patient's Last Name, First Name, Yudelka Reynolds YOB: 1940   Provider's Name   [facility]   Medical Record No.  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              Referring Provider:  Azalea Ochoa    Initial Assessment  See Epic Evaluation- Start of Care Date: 06/20/23            PLAN  Continue therapy per current plan of care.  Increasing edema and worsening pain in L knee.  Due to minimal change with previous successful treatments pt was instructed to return to referring provider.    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   Target Date 07/18/23   Date Met 06/27/23   PT Goal 2   Goal Identifier ltg   Goal Description Pt/spouse will be independent with don/doffing compression garments in order to maintain edema reductions upon discharge.   Target Date 08/29/23   Date Met 08/29/23   PT Goal 3   Goal Identifier ltg 2   Goal Description Pt will demonstrate edema reductions while wearing long term compression garments.   Goal Progress no change - increasing edema in L LE   Target Date 12/26/23   Subjective Report   Subjective Report Current compression has not been working, Left leg conitnues to get larger.   Objective Measures   Objective Measures Objective Measure 1;Objective Measure 2;Objective Measure 3   Objective Measure 1   Objective Measure Girth measurements   Details L LE: -2.5% since 10/24/23 however on 10/24/23 L LE was +10.5% since 8/29/2023 appointment   Objective Measure 2   Objective Measure Garment measurements   Details Compeflex size medium tall B LE   Objective Measure 3   Objective Measure Tarsha's test   Details inconclusive due to pt having baseline calf pain and sensitivity - no worse than normal sensitivity   Treatment Interventions (PT)   Interventions Manual Therapy   Manual Therapy   Manual Therapy: Mobilization, MFR, MLD, friction massage minutes (78545) 45   Manual Therapy 1 partial CDT   Manual Therapy 1 - Details gith measurements; BLE cleansed and cetaphil applied. L LE Spandidgrip size E from metatarsals to distal ankle and size F from ankle to knee both doubled to increase compression bilaterally R LE compreflex garment - pt reports comfort.  Added Spandigrip size J to L distal knee to mid thigh and instructed to remove if increased pain and/or rolling.  Instructed to not allow spandigrip to roll due to increased tourniquet .  Also instructed on need to f/u with referring provider due to minimal change in symptoms with compression and worsening symptoms in L knee.   Skilled Intervention partial CDT for edema reduction   Patient Response/Progress Increasing edema and worsening pain in L knee.  Due to minimal change with previous successful treatments pt was instructed to return to referring provider.   Plan   Home program tolerance to spandigrip   Plan for next session girth measurements and assess tolerance to spandigrip, instruct on garments to order if new option is needed   Comments   Comments Inbox sent to Dr. Ochoa

## 2023-11-14 NOTE — TELEPHONE ENCOUNTER
Last Written Prescription Date:  10/23/23  Last Fill Quantity: 60,  # refills: 0   Last office visit: 8/22/2023 ; last virtual visit: Visit date not found with prescribing provider:     Future Office Visit:   Next 5 appointments (look out 90 days)      Nov 16, 2023  1:30 PM  (Arrive by 1:10 PM)  Provider Visit with Azalea Ochoa MD  [facility] ([facility] ) [address] [place] [id number]  [phone]           Rx pended and message routed to provider for consideration.    Julie Behrendt RN

## 2023-11-15 RX ORDER — OXYCODONE HYDROCHLORIDE 5 MG/1
TABLET ORAL
Qty: 60 TABLET | Refills: 0 | Status: SHIPPED | OUTPATIENT
Start: 2023-11-15 | End: 2023-12-06

## 2023-11-16 ENCOUNTER — OFFICE VISIT (OUTPATIENT)
Dept: FAMILY MEDICINE | Facility: CLINIC | Age: 83
End: 2023-11-16
Payer: COMMERCIAL

## 2023-11-16 ENCOUNTER — HOSPITAL ENCOUNTER (OUTPATIENT)
Dept: ULTRASOUND IMAGING | Facility: CLINIC | Age: 83
Discharge: HOME | End: 2023-11-16
Attending: FAMILY MEDICINE | Admitting: FAMILY MEDICINE
Payer: COMMERCIAL

## 2023-11-16 VITALS
BODY MASS INDEX: 29.06 KG/M2 | TEMPERATURE: 98.5 F | DIASTOLIC BLOOD PRESSURE: 80 MMHG | HEART RATE: 82 BPM | WEIGHT: 164 LBS | RESPIRATION RATE: 20 BRPM | SYSTOLIC BLOOD PRESSURE: 144 MMHG | OXYGEN SATURATION: 98 % | HEIGHT: 63 IN

## 2023-11-16 DIAGNOSIS — M79.662 PAIN OF LEFT LOWER LEG: Primary | ICD-10-CM

## 2023-11-16 DIAGNOSIS — R60.0 LEG EDEMA, LEFT: ICD-10-CM

## 2023-11-16 DIAGNOSIS — D50.8 OTHER IRON DEFICIENCY ANEMIA: ICD-10-CM

## 2023-11-16 DIAGNOSIS — M54.50 CHRONIC LEFT-SIDED LOW BACK PAIN WITHOUT SCIATICA: ICD-10-CM

## 2023-11-16 DIAGNOSIS — E03.9 ACQUIRED HYPOTHYROIDISM: ICD-10-CM

## 2023-11-16 DIAGNOSIS — I10 ESSENTIAL HYPERTENSION WITH GOAL BLOOD PRESSURE LESS THAN 140/90: ICD-10-CM

## 2023-11-16 DIAGNOSIS — M79.662 PAIN OF LEFT LOWER LEG: ICD-10-CM

## 2023-11-16 DIAGNOSIS — G89.29 CHRONIC LEFT-SIDED LOW BACK PAIN WITHOUT SCIATICA: ICD-10-CM

## 2023-11-16 DIAGNOSIS — R73.03 PREDIABETES: ICD-10-CM

## 2023-11-16 LAB
BASOPHILS # BLD AUTO: 0.1 10E3/UL (ref 0–0.2)
BASOPHILS NFR BLD AUTO: 1 %
EOSINOPHIL # BLD AUTO: 0.3 10E3/UL (ref 0–0.7)
EOSINOPHIL NFR BLD AUTO: 4 %
ERYTHROCYTE [DISTWIDTH] IN BLOOD BY AUTOMATED COUNT: 12.2 % (ref 10–15)
FERRITIN SERPL-MCNC: 351 NG/ML (ref 11–328)
HCT VFR BLD AUTO: 36.3 % (ref 35–47)
HGB BLD-MCNC: 11.8 G/DL (ref 11.7–15.7)
IMM GRANULOCYTES # BLD: 0 10E3/UL
IMM GRANULOCYTES NFR BLD: 0 %
IRON BINDING CAPACITY (ROCHE): 257 UG/DL (ref 240–430)
IRON SATN MFR SERPL: 22 % (ref 15–46)
IRON SERPL-MCNC: 56 UG/DL (ref 37–145)
LYMPHOCYTES # BLD AUTO: 2.2 10E3/UL (ref 0.8–5.3)
LYMPHOCYTES NFR BLD AUTO: 27 %
MCH RBC QN AUTO: 30.5 PG (ref 26.5–33)
MCHC RBC AUTO-ENTMCNC: 32.5 G/DL (ref 31.5–36.5)
MCV RBC AUTO: 94 FL (ref 78–100)
MONOCYTES # BLD AUTO: 0.7 10E3/UL (ref 0–1.3)
MONOCYTES NFR BLD AUTO: 8 %
NEUTROPHILS # BLD AUTO: 4.9 10E3/UL (ref 1.6–8.3)
NEUTROPHILS NFR BLD AUTO: 60 %
PLATELET # BLD AUTO: 359 10E3/UL (ref 150–450)
RBC # BLD AUTO: 3.87 10E6/UL (ref 3.8–5.2)
TSH SERPL DL<=0.005 MIU/L-ACNC: 7.15 UIU/ML (ref 0.3–4.2)
WBC # BLD AUTO: 8.2 10E3/UL (ref 4–11)

## 2023-11-16 PROCEDURE — 83550 IRON BINDING TEST: CPT | Performed by: FAMILY MEDICINE

## 2023-11-16 PROCEDURE — 84443 ASSAY THYROID STIM HORMONE: CPT | Performed by: FAMILY MEDICINE

## 2023-11-16 PROCEDURE — 82728 ASSAY OF FERRITIN: CPT | Performed by: FAMILY MEDICINE

## 2023-11-16 PROCEDURE — 83540 ASSAY OF IRON: CPT | Performed by: FAMILY MEDICINE

## 2023-11-16 PROCEDURE — 93971 EXTREMITY STUDY: CPT | Mod: LT

## 2023-11-16 PROCEDURE — 85025 COMPLETE CBC W/AUTO DIFF WBC: CPT | Performed by: FAMILY MEDICINE

## 2023-11-16 PROCEDURE — 99215 OFFICE O/P EST HI 40 MIN: CPT | Performed by: FAMILY MEDICINE

## 2023-11-16 PROCEDURE — 36415 COLL VENOUS BLD VENIPUNCTURE: CPT | Performed by: FAMILY MEDICINE

## 2023-11-16 RX ORDER — RESPIRATORY SYNCYTIAL VIRUS VACCINE 120MCG/0.5
0.5 KIT INTRAMUSCULAR ONCE
Qty: 1 EACH | Refills: 0 | Status: CANCELLED | OUTPATIENT
Start: 2023-11-16 | End: 2023-11-16

## 2023-11-16 ASSESSMENT — PAIN SCALES - GENERAL: PAINLEVEL: MILD PAIN (2)

## 2023-11-16 ASSESSMENT — ASTHMA QUESTIONNAIRES: ACT_TOTALSCORE: 21

## 2023-11-16 NOTE — PROGRESS NOTES
"  Assessment & Plan   Leg edema, left  Pain of left lower leg  Will pain and unilateral swelling, patient was sent down to have ultrasound of leg as they were leaving in an hour, she left to immediately go down  DVT ruled out, did have a Baker's Cyst  Patient came back for labs and saw me again  Recommend she wrap, see ortho again  - US Lower Extremity Venous Duplex Left; Future    Prediabetes  Diet and exercise  Recheck in 2 months    Chronic left-sided low back pain without sciatica  Pain controlled with oxycodone    Other iron deficiency anemia  Followed by hematology  Had iron infusions  - CBC with Platelets & Differential  - Ferritin  - Iron & Iron Binding Capacity    Acquired hypothyroidism  Adjust levothyroxine as needed now that is taking appropriately  - TSH    Essential hypertension with goal blood pressure less than 140/90  Fair control  Monitor  Continue diltiazem, lisinopril      I spent a total of 41 minutes on the day of the visit.   Time spent by me doing chart review, history and exam, documentation and further activities per the note       BMI:   Estimated body mass index is 29.05 kg/m  as calculated from the following:    Height as of this encounter: 1.6 m (5' 3\").    Weight as of this encounter: 74.4 kg (164 lb).       Azalea Meehan MD  [facility]    Bacilio Jha is a 83 year old, presenting for the following health issues:  Knee Problem        11/16/2023     1:25 PM   Additional Questions   Roomed by Yamilet       History of Present Illness       Reason for visit:  Knee pain    She eats 0-1 servings of fruits and vegetables daily.She consumes 2 sweetened beverage(s) daily.She exercises with enough effort to increase her heart rate 10 to 19 minutes per day.  She exercises with enough effort to increase her heart rate 5 days per week.   She is taking medications regularly.      hypertension   On lisinopril 20 mg, diltiazem 240 mg  BP Readings from Last 6 " "Encounters:   11/16/23 (!) 144/80   09/19/23 (!) 150/72   09/05/23 (!) 160/70   08/22/23 130/64   05/23/23 122/60   05/08/23 (!) 161/70      hypothyroidism   On levothyroxine   Last TSH was 13.87  levothyroxine was increased, she took it for a week, she got tachycardia, shortness of breath, felt very unwell. Was scary for her.  We put her dose back down, and she feels much better.   At that time she was not taking it by itself.   Had elevated heart rate for a week after that.   Now is taking it first thing in the am by itself and would like to recheck    Edema  Has a lot of swelling of her left leg  Much more than her right   Lymphedema clinic sent me a note that she is not making progress and was worried something else was going on    Chronic left knee pain  [place] monitoring [place], last filled oxycodone 5 mg #60 10/19/23. Has a script at the pharmacy.   Has been taking 1/2 tab in am, one pm and hs.   2 months ago had shooting pain in left knee, no trauma, just happened when stood up. Went to emergency department, then ortho. 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 No  acute fracture. Borderline joint effusion. Bones are demineralized.     SERGEI BLANKENSHIP MD     prediabetes  Lab Results   Component Value Date    A1C 6.2 08/31/2023    A1C 6.1 05/23/2023    A1C 6.0 07/12/2022    A1C 6.4 02/10/2022    A1C 6.4 08/11/2011      Iron def anemia  Had iron infusions  Followed by hematology    Review of Systems   ROS: 5 point ROS negative except as noted above in HPI, including Gen., Resp., CV, GI &  system review.       Objective    BP (!) 144/80 (BP Location: Right arm)   Pulse 82   Temp 98.5  F (36.9  C) (Tympanic)   Resp 20   Ht 1.6 m (5' 3\")   Wt 74.4 kg (164 lb)   LMP 11/16/1975   SpO2 98%   BMI 29.05 kg/m    Body mass index is 29.05 kg/m .  Physical Exam   GENERAL: " healthy, alert and no distress  NECK: no adenopathy, no asymmetry, masses, or scars and thyroid normal to palpation  RESP: lungs clear to auscultation - no rales, rhonchi or wheezes  CV: regular rate and rhythm, normal S1 S2, no S3 or S4, no murmur, click or rub, no peripheral edema   ABDOMEN: soft, nontender, no hepatosplenomegaly, no masses and bowel sounds normal  MS: bilateral leg edema, but left leg edema more  with tenderness and edema behind and medial to left knee

## 2023-11-27 ENCOUNTER — TELEPHONE (OUTPATIENT)
Dept: FAMILY MEDICINE | Facility: CLINIC | Age: 83
End: 2023-11-27
Payer: COMMERCIAL

## 2023-11-27 NOTE — TELEPHONE ENCOUNTER
Reason for Call:  Appointment Request    Patient requesting this type of appt: Pre-op    Requested provider: Azalea Ochoa    Reason patient unable to be scheduled: Not within requested timeframe    When does patient want to be seen/preferred time: 1-2 days    Comments: Pt need an appt for preop; dos 12/7; carpal tunnel; [facility] [place]; Dr. Peters     Could we send this information to you in [place] or would you prefer to receive a phone call?:   Patient would prefer a phone call   Okay to leave a detailed message?: Yes at Home number on file [phone] (home)    Call taken on 11/27/2023 at 11:40 AM by Kaylene Melendrez

## 2023-12-04 RX ORDER — TRANEXAMIC ACID 650 MG/1
1950 TABLET ORAL ONCE
Status: CANCELLED | OUTPATIENT
Start: 2023-12-04 | End: 2023-12-04

## 2023-12-04 RX ORDER — CEFAZOLIN SODIUM/WATER 2 G/20 ML
2 SYRINGE (ML) INTRAVENOUS
Status: CANCELLED | OUTPATIENT
Start: 2023-12-04

## 2023-12-04 RX ORDER — CEFAZOLIN SODIUM/WATER 2 G/20 ML
2 SYRINGE (ML) INTRAVENOUS SEE ADMIN INSTRUCTIONS
Status: CANCELLED | OUTPATIENT
Start: 2023-12-04

## 2023-12-05 ENCOUNTER — MYC MEDICAL ADVICE (OUTPATIENT)
Dept: FAMILY MEDICINE | Facility: CLINIC | Age: 83
End: 2023-12-05

## 2023-12-05 DIAGNOSIS — J31.0 CHRONIC RHINITIS: Primary | ICD-10-CM

## 2023-12-06 ENCOUNTER — MYC MEDICAL ADVICE (OUTPATIENT)
Dept: FAMILY MEDICINE | Facility: CLINIC | Age: 83
End: 2023-12-06
Payer: COMMERCIAL

## 2023-12-06 DIAGNOSIS — G89.29 CHRONIC LEFT-SIDED LOW BACK PAIN WITHOUT SCIATICA: ICD-10-CM

## 2023-12-06 DIAGNOSIS — G25.81 RESTLESS LEGS SYNDROME: ICD-10-CM

## 2023-12-06 DIAGNOSIS — M54.50 CHRONIC LEFT-SIDED LOW BACK PAIN WITHOUT SCIATICA: ICD-10-CM

## 2023-12-06 RX ORDER — OXYCODONE HYDROCHLORIDE 5 MG/1
TABLET ORAL
Qty: 75 TABLET | Refills: 0 | Status: SHIPPED | OUTPATIENT
Start: 2023-12-06 | End: 2024-01-08

## 2023-12-06 RX ORDER — IPRATROPIUM BROMIDE 42 UG/1
1 SPRAY, METERED NASAL 3 TIMES DAILY
Qty: 15 ML | Refills: 1 | Status: SHIPPED | OUTPATIENT
Start: 2023-12-06 | End: 2024-07-08

## 2023-12-06 NOTE — TELEPHONE ENCOUNTER
Patient requesting refill of oxycodone 5 mg #60 last refilled on 11/15/23, date of last OV with Dr. Azalea Ochoa 11/16/23.    RX pended and message routed to Dr. Ochoa for consideration.     Julie Behrendt RN

## 2023-12-07 ENCOUNTER — HOSPITAL ENCOUNTER (OUTPATIENT)
Facility: CLINIC | Age: 83
Discharge: HOME | End: 2023-12-07
Attending: ORTHOPAEDIC SURGERY | Admitting: ORTHOPAEDIC SURGERY
Payer: COMMERCIAL

## 2023-12-07 VITALS
HEART RATE: 90 BPM | BODY MASS INDEX: 29.06 KG/M2 | HEIGHT: 63 IN | OXYGEN SATURATION: 95 % | RESPIRATION RATE: 18 BRPM | DIASTOLIC BLOOD PRESSURE: 75 MMHG | TEMPERATURE: 98.5 F | WEIGHT: 164 LBS | SYSTOLIC BLOOD PRESSURE: 144 MMHG

## 2023-12-07 DIAGNOSIS — G56.01 CARPAL TUNNEL SYNDROME OF RIGHT WRIST: Primary | ICD-10-CM

## 2023-12-07 PROCEDURE — 272N000001 HC OR GENERAL SUPPLY STERILE: Performed by: ORTHOPAEDIC SURGERY

## 2023-12-07 PROCEDURE — 250N000009 HC RX 250: Performed by: ORTHOPAEDIC SURGERY

## 2023-12-07 PROCEDURE — 710N000012 HC RECOVERY PHASE 2, PER MINUTE: Performed by: ORTHOPAEDIC SURGERY

## 2023-12-07 PROCEDURE — 999N000141 HC STATISTIC PRE-PROCEDURE NURSING ASSESSMENT: Performed by: ORTHOPAEDIC SURGERY

## 2023-12-07 PROCEDURE — 360N000075 HC SURGERY LEVEL 2, PER MIN: Performed by: ORTHOPAEDIC SURGERY

## 2023-12-07 RX ORDER — LIDOCAINE 40 MG/G
CREAM TOPICAL
Status: DISCONTINUED | OUTPATIENT
Start: 2023-12-07 | End: 2023-12-07 | Stop reason: HOSPADM

## 2023-12-07 RX ORDER — LIDOCAINE HYDROCHLORIDE 10 MG/ML
INJECTION, SOLUTION INFILTRATION; PERINEURAL PRN
Status: DISCONTINUED | OUTPATIENT
Start: 2023-12-07 | End: 2023-12-07 | Stop reason: HOSPADM

## 2023-12-07 RX ORDER — SODIUM CHLORIDE, SODIUM LACTATE, POTASSIUM CHLORIDE, CALCIUM CHLORIDE 600; 310; 30; 20 MG/100ML; MG/100ML; MG/100ML; MG/100ML
INJECTION, SOLUTION INTRAVENOUS CONTINUOUS
Status: DISCONTINUED | OUTPATIENT
Start: 2023-12-07 | End: 2023-12-07 | Stop reason: HOSPADM

## 2023-12-07 RX ORDER — ACETAMINOPHEN 500 MG
1000 TABLET ORAL EVERY 4 HOURS PRN
COMMUNITY
Start: 2023-12-07 | End: 2024-04-30 | Stop reason: ALTCHOICE

## 2023-12-07 RX ORDER — ACETAMINOPHEN 325 MG/1
975 TABLET ORAL ONCE
Status: DISCONTINUED | OUTPATIENT
Start: 2023-12-07 | End: 2023-12-07 | Stop reason: HOSPADM

## 2023-12-07 RX ORDER — OXYCODONE HYDROCHLORIDE 5 MG/1
5 TABLET ORAL EVERY 6 HOURS PRN
Qty: 12 TABLET | Refills: 0 | Status: SHIPPED | OUTPATIENT
Start: 2023-12-07 | End: 2023-12-10

## 2023-12-07 RX ORDER — OXYCODONE HYDROCHLORIDE 5 MG/1
5-10 TABLET ORAL EVERY 4 HOURS PRN
Qty: 12 TABLET | Refills: 0 | Status: SHIPPED | OUTPATIENT
Start: 2023-12-07 | End: 2024-01-20

## 2023-12-07 RX ORDER — BUPIVACAINE HYDROCHLORIDE AND EPINEPHRINE 5; 5 MG/ML; UG/ML
INJECTION, SOLUTION EPIDURAL; INTRACAUDAL; PERINEURAL PRN
Status: DISCONTINUED | OUTPATIENT
Start: 2023-12-07 | End: 2023-12-07 | Stop reason: HOSPADM

## 2023-12-07 ASSESSMENT — ACTIVITIES OF DAILY LIVING (ADL)
ADLS_ACUITY_SCORE: 16
ADLS_ACUITY_SCORE: 20

## 2023-12-07 NOTE — PROCEDURES
12/7/2023    PREOPERATIVE DIAGNOSIS: Right carpal tunnel syndrome    POSTOPERATIVE DIAGNOSIS: Right carpal tunnel syndrome    PROCEDURE: Right carpal tunnel release    SURGEON: Sheldon Peters MD    ASSISTANT: Nicolle Watt PA-c.  The presence of a PA was necessary for positioning retraction, and safe progression of the case    ANESTHESIA:  Local    BLOOD LOSS: 5cc    COMPLICATIONS: None apparent    DISPOSITION: Stable to PACU    IMPLANTS: None    INDICATIONS: Yudelka is an 83 year old female with a long history of right hand numbness due to carpal tunnel syndrome.  Unfortunately, she failed nonoperative management and her numbness progressed.  Given this, she had to move forward with a carpal tunnel release, understanding the risks of ongoing numbness, pillar pain, infection, damage to vessels or nerves, and risks inherent to anesthesia.    PROCEDURE: Yudelka was met in the preoperative holding area where the right hand was marked.  She was then transferred to the operating theater.  She was positioned supine with her arm out on a hand table.  A timeout was performed verifying the correct patient, surgery, and location.  She received preoperative antibiotics.  The right was then prepped and draped in a standard sterile fashion.  We then injected the area around her carpal tunnel with 6cc of 1% lidocaine with epinephrine    We then made a 3 cm incision over her carpal tunnel in line with the radial aspect of her ring finger.  Dissection was taken down through the skin and subcutaneous tissue.  We then identified the carpal tunnel.  This was released sharply with a knife, going distally until he saw perineural fat.  Then used a tenotomy scissors, with the tips point only, proximally to complete our release.  We evaluated the median nerve which had some erythema and appeared to be compressed.  Blunt digital dissection confirmed the carpal tunnel to be decompressed.  We closed the wound with interrupted nylon  sutures.  A sterile compressive dressing was applied and she was awoken from anesthesia and transferred to the recovery room in stable condition.    POSTOPERATIVE PLAN:    1. Discharge home today when meets same day discharge criteria   2. Keep dressing clean and dry.  OK to remove on post op day 3 (Sunday) and cover with bandaids after that.   3. Follow up in clinic in 2 weeks for suture removal    SYDNEY YING MD

## 2023-12-07 NOTE — DISCHARGE INSTRUCTIONS
Same Day Surgery Discharge Instructions  Special Precautions After Surgery - Adult    It is not unusual to feel lightheaded or faint, up to 24 hours after surgery or while taking pain medication.  If you have these symptoms; sit for a few minutes before standing and have someone assist you when getting up.  You should rest and relax for the next 24 hours and must have someone stay with you for at least 24 hours after your discharge.  DO NOT DRIVE any vehicle or operate mechanical equipment for 24 hours following the end of your surgery.  DO NOT DRIVE while taking narcotic pain medications that have been prescribed by your physician.  If you had a limb operated on, you must be able to use it fully to drive.  DO NOT drink alcoholic beverages for 24 hours following surgery or while taking prescription pain medication.  Drink clear liquids (apple juice, ginger ale, broth, 7-Up, etc.).  Progress to your regular diet as you feel able.  Any questions call your physician and do not make important decisions for 24 hours.    Nausea and Vomiting: Nausea and vomiting can occur any time after receiving anesthesia. If you experience nausea and vomiting we encourage you to move to a clear liquid diet and advance your diet as tolerated. If nausea and vomiting do not improve within 12 hours please call the surgeon or present to the Emergency department.     Break-through Bleeding: If your experience bleeding from your surgical site apply pressure and additional dressing per nurse instruction. For simple problems such as a saturated dressing, you may need to reinforce the dressing with more gauze and tape and put slight pressure on the site. If bleeding does not subside contact the surgeon or present to the Emergency Department.    Post-op Infection: If you develop a fever of 100.4 or greater, have pus like drainage, redness, swelling or severe pain at the surgical site not alleviated with pain medications; please  contact the surgeon or present to the Emergency Department.     Medications:  Acetaminophen (Tylenol):  Next dose: Start anytime.  Ibuprofen (Motrin, Advil):  Next dose: Start anytime.  Follow the instructions on the bottle.  __________________________________________________________________________________________________________________________________  IMPORTANT NUMBERS:    [facility] Main Number:  [phone], [phone]  Pharmacy:  [phone]  Same Day Surgery:  [phone], for general post-op questions call Monday - Thursday until 8:30 p.m., Fridays until 6:00 p.m.  Nurse Advice Line:  [phone]                                                                         [facility]:  [phone] option 1  [facility]:  [phone]

## 2024-01-08 ENCOUNTER — MYC MEDICAL ADVICE (OUTPATIENT)
Dept: FAMILY MEDICINE | Facility: CLINIC | Age: 84
End: 2024-01-08
Payer: COMMERCIAL

## 2024-01-08 DIAGNOSIS — G56.01 CARPAL TUNNEL SYNDROME OF RIGHT WRIST: ICD-10-CM

## 2024-01-08 DIAGNOSIS — G89.29 CHRONIC LEFT-SIDED LOW BACK PAIN WITHOUT SCIATICA: ICD-10-CM

## 2024-01-08 DIAGNOSIS — G25.81 RESTLESS LEGS SYNDROME: ICD-10-CM

## 2024-01-08 DIAGNOSIS — M54.50 CHRONIC LEFT-SIDED LOW BACK PAIN WITHOUT SCIATICA: ICD-10-CM

## 2024-01-08 RX ORDER — OXYCODONE HYDROCHLORIDE 5 MG/1
TABLET ORAL
Qty: 75 TABLET | Refills: 0 | Status: SHIPPED | OUTPATIENT
Start: 2024-01-08 | End: 2024-01-20

## 2024-01-18 ENCOUNTER — OFFICE VISIT (OUTPATIENT)
Dept: FAMILY MEDICINE | Facility: CLINIC | Age: 84
End: 2024-01-18
Payer: COMMERCIAL

## 2024-01-18 VITALS
TEMPERATURE: 98.8 F | DIASTOLIC BLOOD PRESSURE: 78 MMHG | SYSTOLIC BLOOD PRESSURE: 128 MMHG | HEART RATE: 70 BPM | RESPIRATION RATE: 16 BRPM | WEIGHT: 160 LBS | BODY MASS INDEX: 28.35 KG/M2 | OXYGEN SATURATION: 97 % | HEIGHT: 63 IN

## 2024-01-18 DIAGNOSIS — J01.90 ACUTE SINUSITIS WITH SYMPTOMS > 10 DAYS: Primary | ICD-10-CM

## 2024-01-18 DIAGNOSIS — E03.9 ACQUIRED HYPOTHYROIDISM: ICD-10-CM

## 2024-01-18 DIAGNOSIS — M54.50 CHRONIC LEFT-SIDED LOW BACK PAIN WITHOUT SCIATICA: ICD-10-CM

## 2024-01-18 DIAGNOSIS — I10 ESSENTIAL HYPERTENSION WITH GOAL BLOOD PRESSURE LESS THAN 140/90: ICD-10-CM

## 2024-01-18 DIAGNOSIS — G25.81 RESTLESS LEGS SYNDROME: ICD-10-CM

## 2024-01-18 DIAGNOSIS — G89.29 CHRONIC LEFT-SIDED LOW BACK PAIN WITHOUT SCIATICA: ICD-10-CM

## 2024-01-18 DIAGNOSIS — R73.03 PREDIABETES: ICD-10-CM

## 2024-01-18 DIAGNOSIS — E78.5 HYPERLIPIDEMIA LDL GOAL <130: ICD-10-CM

## 2024-01-18 LAB
ALBUMIN SERPL BCG-MCNC: 4.4 G/DL (ref 3.5–5.2)
ALP SERPL-CCNC: 170 U/L (ref 40–150)
ALT SERPL W P-5'-P-CCNC: 11 U/L (ref 0–50)
ANION GAP SERPL CALCULATED.3IONS-SCNC: 11 MMOL/L (ref 7–15)
AST SERPL W P-5'-P-CCNC: 17 U/L (ref 0–45)
BASOPHILS # BLD AUTO: 0 10E3/UL (ref 0–0.2)
BASOPHILS NFR BLD AUTO: 1 %
BILIRUB SERPL-MCNC: 0.3 MG/DL
BUN SERPL-MCNC: 29.4 MG/DL (ref 8–23)
CALCIUM SERPL-MCNC: 10.4 MG/DL (ref 8.8–10.2)
CHLORIDE SERPL-SCNC: 102 MMOL/L (ref 98–107)
CREAT SERPL-MCNC: 0.81 MG/DL (ref 0.51–0.95)
DEPRECATED HCO3 PLAS-SCNC: 25 MMOL/L (ref 22–29)
EGFRCR SERPLBLD CKD-EPI 2021: 72 ML/MIN/1.73M2
EOSINOPHIL # BLD AUTO: 0.4 10E3/UL (ref 0–0.7)
EOSINOPHIL NFR BLD AUTO: 6 %
ERYTHROCYTE [DISTWIDTH] IN BLOOD BY AUTOMATED COUNT: 13 % (ref 10–15)
GLUCOSE SERPL-MCNC: 106 MG/DL (ref 70–99)
HBA1C MFR BLD: 6.2 % (ref 0–5.6)
HCT VFR BLD AUTO: 36 % (ref 35–47)
HGB BLD-MCNC: 11.7 G/DL (ref 11.7–15.7)
IMM GRANULOCYTES # BLD: 0 10E3/UL
IMM GRANULOCYTES NFR BLD: 0 %
LYMPHOCYTES # BLD AUTO: 1.8 10E3/UL (ref 0.8–5.3)
LYMPHOCYTES NFR BLD AUTO: 26 %
MCH RBC QN AUTO: 30.2 PG (ref 26.5–33)
MCHC RBC AUTO-ENTMCNC: 32.5 G/DL (ref 31.5–36.5)
MCV RBC AUTO: 93 FL (ref 78–100)
MONOCYTES # BLD AUTO: 0.7 10E3/UL (ref 0–1.3)
MONOCYTES NFR BLD AUTO: 11 %
NEUTROPHILS # BLD AUTO: 4 10E3/UL (ref 1.6–8.3)
NEUTROPHILS NFR BLD AUTO: 57 %
PLATELET # BLD AUTO: 337 10E3/UL (ref 150–450)
POTASSIUM SERPL-SCNC: 4.6 MMOL/L (ref 3.4–5.3)
PROT SERPL-MCNC: 8.2 G/DL (ref 6.4–8.3)
RBC # BLD AUTO: 3.88 10E6/UL (ref 3.8–5.2)
SODIUM SERPL-SCNC: 138 MMOL/L (ref 135–145)
TSH SERPL DL<=0.005 MIU/L-ACNC: 4.51 UIU/ML (ref 0.3–4.2)
WBC # BLD AUTO: 6.9 10E3/UL (ref 4–11)

## 2024-01-18 PROCEDURE — 84443 ASSAY THYROID STIM HORMONE: CPT | Performed by: FAMILY MEDICINE

## 2024-01-18 PROCEDURE — 80053 COMPREHEN METABOLIC PANEL: CPT | Performed by: FAMILY MEDICINE

## 2024-01-18 PROCEDURE — 36415 COLL VENOUS BLD VENIPUNCTURE: CPT | Performed by: FAMILY MEDICINE

## 2024-01-18 PROCEDURE — 83036 HEMOGLOBIN GLYCOSYLATED A1C: CPT | Performed by: FAMILY MEDICINE

## 2024-01-18 PROCEDURE — 99214 OFFICE O/P EST MOD 30 MIN: CPT | Performed by: FAMILY MEDICINE

## 2024-01-18 PROCEDURE — 85025 COMPLETE CBC W/AUTO DIFF WBC: CPT | Performed by: FAMILY MEDICINE

## 2024-01-18 RX ORDER — RESPIRATORY SYNCYTIAL VIRUS VACCINE 120MCG/0.5
0.5 KIT INTRAMUSCULAR ONCE
Qty: 1 EACH | Refills: 0 | Status: CANCELLED | OUTPATIENT
Start: 2024-01-18 | End: 2024-01-18

## 2024-01-18 RX ORDER — DILTIAZEM HYDROCHLORIDE 240 MG/1
CAPSULE, EXTENDED RELEASE ORAL
Qty: 90 CAPSULE | Refills: 3 | Status: SHIPPED | OUTPATIENT
Start: 2024-01-18

## 2024-01-18 RX ORDER — LISINOPRIL 20 MG/1
20 TABLET ORAL DAILY
Qty: 90 TABLET | Refills: 3 | Status: SHIPPED | OUTPATIENT
Start: 2024-01-18

## 2024-01-18 RX ORDER — CEFDINIR 300 MG/1
300 CAPSULE ORAL 2 TIMES DAILY
Qty: 14 CAPSULE | Refills: 0 | Status: SHIPPED | OUTPATIENT
Start: 2024-01-18 | End: 2024-01-25

## 2024-01-18 RX ORDER — ATORVASTATIN CALCIUM 40 MG/1
40 TABLET, FILM COATED ORAL DAILY
Qty: 90 TABLET | Refills: 3 | Status: SHIPPED | OUTPATIENT
Start: 2024-01-18

## 2024-01-18 RX ORDER — MOMETASONE FUROATE MONOHYDRATE 50 UG/1
2 SPRAY, METERED NASAL DAILY
Qty: 17 G | Refills: 0 | Status: SHIPPED | OUTPATIENT
Start: 2024-01-18 | End: 2024-03-07

## 2024-01-18 RX ORDER — LEVOTHYROXINE SODIUM 100 UG/1
100 TABLET ORAL DAILY
Qty: 90 TABLET | Refills: 0 | Status: SHIPPED | OUTPATIENT
Start: 2024-01-18 | End: 2024-01-20

## 2024-01-18 ASSESSMENT — PAIN SCALES - GENERAL: PAINLEVEL: NO PAIN (0)

## 2024-01-18 NOTE — PROGRESS NOTES
Assessment & Plan     Acute sinusitis with symptoms > 10 days  Has multiple allergies  Will treat with cefdinir  - cefdinir (OMNICEF) 300 MG capsule; Take 1 capsule (300 mg) by mouth 2 times daily for 7 days  - mometasone (NASONEX) 50 MCG/ACT nasal spray; Spray 2 sprays into both nostrils daily    Essential hypertension with goal blood pressure less than 140/90  Well controlled  Continue current medications  - diltiazem ER (DILT-XR) 240 MG 24 hr ER beaded capsule; TAKE 1  BY MOUTH ONCE DAILY  - lisinopril (ZESTRIL) 20 MG tablet; Take 1 tablet (20 mg) by mouth daily  - Comprehensive metabolic panel (BMP + Alb, Alk Phos, ALT, AST, Total. Bili, TP); Future  - CBC with platelets and differential; Future  - Comprehensive metabolic panel (BMP + Alb, Alk Phos, ALT, AST, Total. Bili, TP)  - CBC with platelets and differential    Hyperlipidemia LDL goal <130  Continue statin  - atorvastatin (LIPITOR) 40 MG tablet; Take 1 tablet (40 mg) by mouth daily    Acquired hypothyroidism  Recheck and adjust levothyroxine as needed  - levothyroxine (SYNTHROID/LEVOTHROID) 100 MCG tablet; Take 1 tablet (100 mcg) by mouth daily  - TSH; Future  - Hemoglobin A1c; Future  - TSH  - Hemoglobin A1c    Prediabetes  - Hemoglobin A1c; Future  - Hemoglobin A1c    Restless legs syndrome  Well controlled  - oxyCODONE (ROXICODONE) 5 MG tablet; 1/2 tab in am, one in pm, and one at night    Chronic left-sided low back pain without sciatica  stable  - oxyCODONE (ROXICODONE) 5 MG tablet; 1/2 tab in am, one in pm, and one at night    Patient Instructions   Tayler Branch RN  Foot care in your home  [phone]  You can also make apt with Ofe ZAPATA RN here for nail clippings    Antibiotic for your sinuses  Hold the nasal spray you have now  Use the sinus washes  Use the nasal steroid spray  Let me know if you are not better         Subjective   Yudelka is a 83 year old, presenting for the following health issues:  Sinus Problem      1/18/2024     2:17 PM  "  Additional Questions   Roomed by Yamilet     Sinus Problem     History of Present Illness       Reason for visit:  Sinus conjestion    She eats 2-3 servings of fruits and vegetables daily.She consumes 1 sweetened beverage(s) daily.She exercises with enough effort to increase her heart rate 20 to 29 minutes per day.  She exercises with enough effort to increase her heart rate 7 days per week.   She is taking medications regularly.     Sinus congestion x 1 month  Left side is worse, can't breath. Is worse with laying down. Is chilled. Has been miserable. Completely blocked. Pressure, but no pain.   No cough. No fever.   Has a history of sinus surgeries x 3    Chronic pain  Knee, low back  [place] last [place] oxycodone 5 mg #75 1/8/24.   She is taking 2.5 daily, that is keeping her comfortable.     hypothyroidism   On levothyroxine  Due for recheck after dose adjustment   TSH   Date Value Ref Range Status   11/16/2023 7.15 (H) 0.30 - 4.20 uIU/mL Final   02/10/2022 2.26 0.40 - 4.00 mU/L Final   04/05/2021 3.76 0.40 - 4.00 mU/L Final      Prediabetes  Lab Results   Component Value Date    A1C 6.2 01/18/2024    A1C 6.2 08/31/2023    A1C 6.1 05/23/2023    A1C 6.0 07/12/2022    A1C 6.4 02/10/2022    A1C 6.4 08/11/2011      hypertension   On diltiazem and lisinopril  BP Readings from Last 6 Encounters:   01/18/24 128/78   12/07/23 (!) 144/75   11/16/23 (!) 144/80   09/19/23 (!) 150/72   09/05/23 (!) 160/70   08/22/23 130/64      hyperlipidemia   On atorvastatin  Recent Labs   Lab Test 05/23/23  1630 04/19/22  1515   CHOL 187 183   HDL 42* 44*   LDL 82 104*   TRIG 314* 173*        ROS: 5 point ROS negative except as noted above in HPI, including Gen., Resp., CV, GI &  system review.       Objective    /78 (BP Location: Right arm)   Pulse 70   Temp 98.8  F (37.1  C) (Tympanic)   Resp 16   Ht 1.6 m (5' 3\")   Wt 72.6 kg (160 lb)   LMP 11/16/1975   SpO2 97%   BMI 28.34 kg/m  "   Body mass index is 28.34 kg/m .  Physical Exam   GENERAL: alert and no distress  HEENT: obviously congested, with mucopurulent drainage from left, no tenderness over sinuses  NECK: no adenopathy, no asymmetry, masses, or scars  RESP: lungs clear to auscultation - no rales, rhonchi or wheezes  CV: regular rate and rhythm, normal S1 S2, no S3 or S4, 1-2/6 systolic ejection murmur, no click or rub, mild peripheral edema  ABDOMEN: soft, nontender, no hepatosplenomegaly, no masses and bowel sounds normal  MS: walks with assist of walker, antalgic gait        Signed Electronically by: Azalea Meehan MD

## 2024-01-18 NOTE — PATIENT INSTRUCTIONS
Tayler Branch RN  Foot care in your home  [phone]  You can also make apt with Ofe ZAPATA RN here for nail clippings    Antibiotic for your sinuses  Hold the nasal spray you have now  Use the sinus washes  Use the nasal steroid spray  Let me know if you are not better

## 2024-01-20 RX ORDER — OXYCODONE HYDROCHLORIDE 5 MG/1
TABLET ORAL
Qty: 75 TABLET | Refills: 0 | Status: SHIPPED | OUTPATIENT
Start: 2024-02-07 | End: 2024-03-03

## 2024-01-20 RX ORDER — LEVOTHYROXINE SODIUM 112 UG/1
112 TABLET ORAL DAILY
Qty: 90 TABLET | Refills: 0 | Status: SHIPPED | OUTPATIENT
Start: 2024-01-20 | End: 2024-01-22

## 2024-01-22 ENCOUNTER — MYC MEDICAL ADVICE (OUTPATIENT)
Dept: FAMILY MEDICINE | Facility: CLINIC | Age: 84
End: 2024-01-22

## 2024-01-22 DIAGNOSIS — E03.9 ACQUIRED HYPOTHYROIDISM: ICD-10-CM

## 2024-01-22 DIAGNOSIS — J01.90 ACUTE SINUSITIS WITH SYMPTOMS > 10 DAYS: Primary | ICD-10-CM

## 2024-01-22 RX ORDER — LEVOTHYROXINE SODIUM 100 UG/1
100 TABLET ORAL DAILY
Qty: 90 TABLET | Refills: 1 | Status: SHIPPED | OUTPATIENT
Start: 2024-01-22 | End: 2024-05-01

## 2024-01-22 RX ORDER — AZITHROMYCIN 250 MG/1
TABLET, FILM COATED ORAL
Qty: 6 TABLET | Refills: 0 | Status: SHIPPED | OUTPATIENT
Start: 2024-01-22 | End: 2024-01-27

## 2024-01-23 ENCOUNTER — THERAPY VISIT (OUTPATIENT)
Dept: PHYSICAL THERAPY | Facility: CLINIC | Age: 84
End: 2024-01-23
Attending: FAMILY MEDICINE
Payer: COMMERCIAL

## 2024-01-23 DIAGNOSIS — R60.0 BILATERAL LEG EDEMA: Primary | ICD-10-CM

## 2024-01-23 PROCEDURE — 97140 MANUAL THERAPY 1/> REGIONS: CPT | Mod: GP | Performed by: PHYSICAL THERAPIST

## 2024-01-23 NOTE — PROGRESS NOTES
Lymphedema Physical Therapy Recertification    [facility]                                                                                   OUTPATIENT PHYSICAL THERAPY    PLAN OF TREATMENT FOR OUTPATIENT REHABILITATION   Patient's Last Name, First Name, Yudelka Reynolds YOB: 1940   Provider's Name   [facility]   Medical Record No.  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              Referring Provider:  Azalea Ochoa    Initial Assessment  See Epic Evaluation- Start of Care Date: 06/20/23    PLAN  Pt demonstrates maintaining edema in R LE, however demonstrates increased edema in L LE foot and ankle with use of spandigrip.  Pt instructed to use compreflex garments vs. spandigrip on B LE and return in 2-3 weeks to assess tolerance.    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   Target Date 07/18/23   Date Met 06/27/23   PT Goal 2   Goal Identifier ltg   Goal Description Pt/spouse will be independent with don/doffing compression garments in order to maintain edema reductions upon discharge.   Target Date 08/29/23   Date Met 08/29/23   PT Goal 3   Goal Identifier ltg 2   Goal Description Pt will demonstrate edema reductions while wearing long term compression garments.   Goal Progress no change - increasing edema in L LE   Target Date 2/20/2024   Subjective Report   Subjective Report Has been holding on lymphedema therapy due to recent carpal tunnel surgery.   Objective Measures   Objective Measures Objective Measure 1;Objective Measure 2;Objective Measure 3   Objective Measure 1   Objective Measure Girth measurements   Details R LE since 11/14/23 -0.23%; L LE since 11/14/23 -6.1%   Objective Measure 2   Objective Measure Garment measurements   Details Compeflex size medium tall B LE   Objective Measure 3   Objective Measure Tarsha's test   Treatment Interventions (PT)   Interventions Manual Therapy   Manual Therapy   Manual Therapy: Mobilization, MFR, MLD, friction massage minutes ([id number]) 45   Manual Therapy 1 partial CDT   Manual Therapy 1 - Details Pt arrives with compreflex garment on R LE and spandigrip on LLE, therapist removes, gith measurements; BLE cleansed and cetaphil applied. L LE Spandidgrip size E from metatarsals to distal ankle and size F from ankle to knee both doubled to increase compression bilaterally R LE compreflex garment - pt reports comfort.  Pt instructed to  return to using compreflex garment on L LE and to f/u in 2-3 weeks to assess tolerance to compreflex garments on L LE and due to increased edema on L LE foot and ankle.   Skilled Intervention partial CDT for edema reduction   Patient Response/Progress Pt demonstrates maintaining edema in R LE, however demonstrates increased edema in L LE foot and ankle with use of spandigrip.  Pt instructed to use compreflex garments on B LE and return in 2-3 weeks to assess tolerance.   Plan   Home program tolerance to spandigrip   Plan for next session girth measurements and assess tolerance to compreflex garments   Total Session Time   Timed Code Treatment Minutes 45   Total Treatment Time (sum of timed and untimed services) 45

## 2024-01-24 ENCOUNTER — TELEPHONE (OUTPATIENT)
Dept: OTOLARYNGOLOGY | Facility: CLINIC | Age: 84
End: 2024-01-24
Payer: COMMERCIAL

## 2024-01-24 NOTE — TELEPHONE ENCOUNTER
Routing to the ENT provider pool to determine urgency due to type of referral placed.     Patient is not an established ENT patient.    Mojgan Wong RN on 1/24/2024 at 2:11 PM

## 2024-01-24 NOTE — TELEPHONE ENCOUNTER
Patient was scheduled with Dr. Arroyo in [place] on 3/7/24.     Mojgan Wong RN on 1/24/2024 at 2:24 PM

## 2024-01-24 NOTE — TELEPHONE ENCOUNTER
This encounter is being sent to inform the clinic that this patient has a referral from Azalea Ochoa for the diagnoses of     J01.90 (ICD-10-CM) - Acute sinusitis with symptoms    and has requested that this patient be seen within 1-2 weeks and/or with unknown.  Based on the availability of our provider(s), we are unable to accommodate this request.    Were all sites offered this patient?  Yes    Does scheduling algorithm request to schedule next available?  Patient has been scheduled for the first available opening with Dr Arroyo on 5/16/24.  We have informed the patient that the clinic will review their referral and reach out if a sooner appointment is medically necessary.

## 2024-02-01 ENCOUNTER — MYC MEDICAL ADVICE (OUTPATIENT)
Dept: FAMILY MEDICINE | Facility: CLINIC | Age: 84
End: 2024-02-01

## 2024-02-08 ENCOUNTER — MYC MEDICAL ADVICE (OUTPATIENT)
Dept: FAMILY MEDICINE | Facility: CLINIC | Age: 84
End: 2024-02-08
Payer: COMMERCIAL

## 2024-02-15 ENCOUNTER — THERAPY VISIT (OUTPATIENT)
Dept: PHYSICAL THERAPY | Facility: CLINIC | Age: 84
End: 2024-02-15
Attending: FAMILY MEDICINE
Payer: COMMERCIAL

## 2024-02-15 DIAGNOSIS — R60.0 BILATERAL LEG EDEMA: Primary | ICD-10-CM

## 2024-02-15 PROCEDURE — 97140 MANUAL THERAPY 1/> REGIONS: CPT | Mod: GP | Performed by: PHYSICAL THERAPIST

## 2024-02-15 NOTE — PROGRESS NOTES
Lymphedema Progress Note and Certification      [facility]                                                                                   OUTPATIENT PHYSICAL THERAPY    PLAN OF TREATMENT FOR OUTPATIENT REHABILITATION   Patient's Last Name, First Name, Yudelka Reynolds YOB: 1940   Provider's Name   [facility]   Medical Record No.  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              Referring Provider:  Azalea Ochoa    Initial Assessment  See Epic Evaluation- Start of Care Date: 06/20/23            PLAN  Continue therapy per current plan of care.  Pt trialing compression stockings instead of compreflex garments due to difficulty don/doffing.  Pt is to return to clinic to assess edema containment of compression stockings vs. Comprflex garments in 1 week.    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   Target Date 07/18/23   Date Met 06/27/23   PT Goal 2   Goal Identifier ltg   Goal Description Pt/spouse will be independent with don/doffing compression garments in order to maintain edema reductions upon discharge.   Target Date 08/29/23   Date Met 08/29/23   PT Goal 3   Goal Identifier ltg 2   Goal Description Pt will demonstrate edema reductions while wearing long term compression garments.   Goal Progress maintaining edema reductions with compreflex garments however pt reports would like to trial compression stockings instead.   Target Date 03/28/24   Subjective Report   Subjective Report Pt would like to trial compression stockings instead of compreflex garments.   Objective Measures   Objective Measures Objective Measure 1;Objective Measure 2;Objective Measure 3   Objective Measure 1   Objective Measure Girth measurements   Details R LE since 11/14/23 -0.23%; L LE since 11/14/23 -6.1%   Objective Measure 2   Objective Measure Garment measurements   Details Compeflex size medium tall B LE   Objective Measure 3   Objective Measure Tarsha's test   Treatment Interventions (PT)   Interventions Manual Therapy   Manual Therapy   Manual Therapy: Mobilization, MFR, MLD, friction massage minutes (02425) 25   Manual Therapy 1 partial CDT   Manual Therapy 1 - Details Girth measurements taken on B LE, instructed pt on don/doffing compression stockings.  Pt instructed to wear compression stockings in place of compreflex garments however if swelling increases or has a had time  don/doffing stockings she is to return to using compreflex garments.  Pt leaves session with Truform 20-30mmHg compression stockings from [employer] on B LE.   Skilled Intervention partial CDT for edema reduction   Patient Response/Progress Pt demonstrates maintaining edema in R LE, however demonstrates increased edema in L LE foot and ankle with use of spandigrip.  Pt instructed to use compreflex garments on B LE and return in 2-3 weeks to assess tolerance.   Plan   Home program tolerance to spandigrip   Plan for next session girth measurements and assess tolerance to compression stockings   Total Session Time   Timed Code Treatment Minutes 25   Total Treatment Time (sum of timed and untimed services) 25

## 2024-02-20 NOTE — PROGRESS NOTES
ENT Consultation    Yudelka Ledesma who is a 83 year old female seen in consultation at the request of Dr. Azalea Ochoa.      History of Present Illness - Yudelka Ledesma is a 83 year old female sinus   Infection.  Problem started about 3 months ago with severe congestion pressure in the face.  On the right side she gets somewhat better after short course of antibiotics and short course of nasal steroid spray and continues to saline.  However on the left side is still quite obstructive and middle without any facial pain still gets some drainage clear to yellowish and greenish.  Sense of smell and taste appear to be intact.  She recently feels that she can somewhat breathe through that side of the nose.      BP Readings from Last 1 Encounters:   03/07/24 130/70       BP noted to be well controlled today in office.     Yudelka IS NOT a smoker/uses chewing tobacco.      Past Medical History -   Past Medical History:   Diagnosis Date    Basal cell carcinoma     Esophageal reflux     Other and unspecified hyperlipidemia     Unspecified essential hypertension     Unspecified hypothyroidism        Current Medications -   Current Outpatient Medications:     albuterol (PROAIR HFA/PROVENTIL HFA/VENTOLIN HFA) 108 (90 Base) MCG/ACT inhaler, INHALE 1 TO 2 PUFFS BY MOUTH EVERY 4 HOURS AS NEEDED FOR SHORTNESS OF BREATH /  DYSPNEA  OR  WHEEZING, Disp: 9 g, Rfl: 3    atorvastatin (LIPITOR) 40 MG tablet, Take 1 tablet (40 mg) by mouth daily, Disp: 90 tablet, Rfl: 3    diltiazem ER (DILT-XR) 240 MG 24 hr ER beaded capsule, TAKE 1  BY MOUTH ONCE DAILY, Disp: 90 capsule, Rfl: 3    docusate sodium (COLACE) 100 MG capsule, Take 100 mg by mouth 2 times daily, Disp: , Rfl:     esomeprazole (NEXIUM) 20 MG DR capsule, Take 1 capsule (20 mg) by mouth every morning (before breakfast) One hour before meals fill when needed, Disp: , Rfl:     ipratropium (ATROVENT) 0.06 % nasal spray, Spray 1 spray into both nostrils 3 times  "daily, Disp: 15 mL, Rfl: 1    levothyroxine (SYNTHROID/LEVOTHROID) 100 MCG tablet, Take 1 tablet (100 mcg) by mouth daily, Disp: 90 tablet, Rfl: 1    lisinopril (ZESTRIL) 20 MG tablet, Take 1 tablet (20 mg) by mouth daily, Disp: 90 tablet, Rfl: 3    oxyCODONE (ROXICODONE) 5 MG tablet, 1/2 tab in am, one in pm, and one at night, Disp: 75 tablet, Rfl: 0    acetaminophen (TYLENOL) 500 MG tablet, Take 2 tablets (1,000 mg) by mouth every 4 hours as needed for mild pain (Patient not taking: Reported on 3/7/2024), Disp: , Rfl:     Allergies -   Allergies   Allergen Reactions    Epinephrine Palpitations     Gets \"cold, jittery, feels weird\"      (Had an injection of local anesthetic mixed with epi at dentist)    Augmentin [Aspartame]     Doxycycline     Gabapentin      Gave nightmares    Levaquin [Levofloxacin Hemihydrate]     Prednisolone     Seasonal Allergies     Sulfa Antibiotics Hives       Social History -   Social History     Socioeconomic History    Marital status:    Tobacco Use    Smoking status: Former     Years: 15     Types: Cigarettes     Quit date: 1985     Years since quittin.1    Smokeless tobacco: Never   Vaping Use    Vaping Use: Never used   Substance and Sexual Activity    Alcohol use: No    Drug use: No    Sexual activity: Not Currently   Other Topics Concern     Service No    Blood Transfusions No    Caffeine Concern No    Occupational Exposure No    Hobby Hazards No    Sleep Concern No    Stress Concern Yes     Comment: just todays visit    Weight Concern No    Special Diet No    Back Care No    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 No     Social Determinants of Health     Financial Resource Strain: Low Risk  (2024)    Financial Resource Strain     Within the past 12 months, have you or your family members you live " "with been unable to get utilities (heat, electricity) when it was really needed?: No   Food Insecurity: Low Risk  (1/18/2024)    Food Insecurity     Within the past 12 months, did you worry that your food would run out before you got money to buy more?: No     Within the past 12 months, did the food you bought just not last and you didn t have money to get more?: No   Transportation Needs: Low Risk  (1/18/2024)    Transportation Needs     Within the past 12 months, has lack of transportation kept you from medical appointments, getting your medicines, non-medical meetings or appointments, work, or from getting things that you need?: No   Interpersonal Safety: Low Risk  (11/16/2023)    Interpersonal Safety     Do you feel physically and emotionally safe where you currently live?: Yes     Within the past 12 months, have you been hit, slapped, kicked or otherwise physically hurt by someone?: No     Within the past 12 months, have you been humiliated or emotionally abused in other ways by your partner or ex-partner?: No   Housing Stability: Low Risk  (1/18/2024)    Housing Stability     Do you have housing? : Yes     Are you worried about losing your housing?: No       Family History -   Family History   Problem Relation Age of Onset    Cerebrovascular Disease Mother     Heart Disease Father     Cancer Maternal Grandmother     Diabetes Paternal Grandmother        Review of Systems - As per HPI and PMHx, otherwise review of system review of the head and neck negative. Otherwise 10+ review of system is negative    Physical Exam  /70   Temp 98.9  F (37.2  C) (Temporal)   Ht 1.6 m (5' 3\")   Wt 74.8 kg (165 lb)   LMP 11/16/1975   BMI 29.23 kg/m    BMI: Body mass index is 29.23 kg/m .    General - The patient is well nourished and well developed, and appears to have good nutritional status.  Alert and oriented to person and place, answers questions and cooperates with examination appropriately.    SKIN - No " suspicious lesions or rashes.  Respiration - No respiratory distress.  Head and Face - Normocephalic and atraumatic, with no gross asymmetry noted of the contour of the facial features.  The facial nerve is intact, with strong symmetric movements.    Voice and Breathing - The patient was breathing comfortably without the use of accessory muscles. The patients voice was clear and strong, and had appropriate pitch and quality.    Ears - Bilateral pinna and EACs with normal appearing overlying skin. Tympanic membrane intact with good mobility on pneumatic otoscopy bilaterally. Bony landmarks of the ossicular chain are normal. The tympanic membranes are normal in appearance. No retraction, perforation, or masses.  No fluid or purulence was seen in the external canal or the middle ear.     Eyes - Extraocular movements intact.  Sclera were not icteric or injected, conjunctiva were pink and moist.    Mouth - Examination of the oral cavity showed pink, healthy oral mucosa. No lesions or ulcerations noted.  The tongue was mobile and midline, and the dentition were in good condition.      Throat - The walls of the oropharynx were smooth, pink, moist, symmetric, and had no lesions or ulcerations.  The tonsillar pillars and soft palate were symmetric.  The uvula was midline on elevation.    Neck - Normal midline excursion of the laryngotracheal complex during swallowing.  Full range of motion on passive movement.  Palpation of the occipital, submental, submandibular, internal jugular chain, and supraclavicular nodes did not demonstrate any abnormal lymph nodes or masses.  The carotid pulse was palpable bilaterally.  Palpation of the thyroid was soft and smooth, with no nodules or goiter appreciated.  The trachea was mobile and midline.    Nose - External contour is symmetric, no gross deflection or scars.  Nasal mucosa is pink and moist with no abnormal mucus.  The septum was deviated to the left and mildly obstructive,  turbinates of normal size and position.  No polyps, masses, however some purulence noted on examination most on the left side.    Neuro - Nonfocal neuro exam is normal, CN 2 through 12 intact, normal gait and muscle tone.      Performed in clinic today:  To further evaluate the nasal cavity, I performed rigid nasal endoscopy.  I first sprayed the nasal cavity bilaterally with a mix of lidocaine and neosynephrine.  I then began on the left side using a 2.7mm, 30 degree rigid nasal endoscope.  The septum was deviated and the nasal airway was open.  Some purulence noted around the middle turbinate.  The left middle turbinate and middle meatus were clearly visualized and edematous erythematous in appearance.  Looking up, the olfactory cleft was unobstructed.  Going further back, the sphenoethmoid recess was normal in appearance, with healthy appearing mucosa on the face of the sphenoid.  The nasopharynx was unremarkable, and the eustachian tube opening on this side was unobstructed.  Culture aerobic over the purulence was performed.    I then turned my attention to the right side.  Once again, the septum was deviated, and the airway was open.  No abnormal secretions, purulence, polyps were noted.  The right middle turbinate and middle meatus were clearly visualized and erythematous edematous in appearance.  Looking up, the olfactory cleft was unobstructed.  Going further back the right sphenoethmoid recess was normal in appearance, and eustachian tube opening was unobstructed.   Blue/White - [id number] [facility]      A/P - Yudelka YOUNG Dada Ledesma is a 83 year old female with a now chronic sinusitis not as symptomatic as it used to be.  Her would like to start 2 more weeks of antibiotics.  Considering patient's allergies we will put her on clarithromycin twice daily for 2 weeks.  Also will switch her from Nasonex to fluticasone.  CT of the sinus will be obtained in the meantime.  Patient will follow-up in about 5  tessa.      Sebastian Arroyo MD

## 2024-02-22 ENCOUNTER — THERAPY VISIT (OUTPATIENT)
Dept: PHYSICAL THERAPY | Facility: CLINIC | Age: 84
End: 2024-02-22
Attending: FAMILY MEDICINE
Payer: COMMERCIAL

## 2024-02-22 DIAGNOSIS — R60.0 BILATERAL LEG EDEMA: Primary | ICD-10-CM

## 2024-02-22 PROCEDURE — 97140 MANUAL THERAPY 1/> REGIONS: CPT | Mod: GP | Performed by: PHYSICAL THERAPIST

## 2024-02-22 NOTE — PROGRESS NOTES
Physical Therapy Discharge Note    DISCHARGE  Reason for Discharge: Patient has met all goals.    Equipment Issued: Compreflex garments    Discharge Plan: Patient to continue home program.    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   Target Date 07/18/23   Date Met 06/27/23   PT Goal 2   Goal Identifier ltg   Goal Description Pt/spouse will be independent with don/doffing compression garments in order to maintain edema reductions upon discharge.   Target Date 08/29/23   Date Met 08/29/23   PT Goal 3   Goal Identifier ltg 2   Goal Description Pt will demonstrate edema reductions while wearing long term compression garments.   Goal Progress maintaining edema reductions with compreflex garments however pt reports would like to trial compression stockings instead.   Target Date 03/28/24   Date Met 02/22/24   Subjective Report   Subjective Report Decided the velcro compression garments worked the best and switched back to that.   Objective Measures   Objective Measures Objective Measure 1;Objective Measure 2;Objective Measure 3   Objective Measure 1   Objective Measure Girth measurements   Details R LE since eval: -35.8%; L LE since eval: -29.5%    Objective Measure 2   Objective Measure Garment measurements   Details Compeflex size medium tall B LE   Objective Measure 3   Objective Measure Tarsha's test   Treatment Interventions (PT)   Interventions Manual Therapy   Manual Therapy   Manual Therapy: Mobilization, MFR, MLD, friction massage minutes (16512) 10   Manual Therapy 1 partial CDT   Manual Therapy 1 - Details Girth measurements taken on B LE, instructed on discharge and if she needs to return to clinic she would need an MD order.  Pt instructed on how to purchase new garments and when to purchase new garments.  Therapist donned compreflex garments for patient.   Skilled Intervention partial CDT for edema reduction   Patient Response/Progress Pt demonstrates maintaining edema in R LE, however demonstrates increased edema in L LE foot and ankle with use of spandigrip.  Pt instructed to use compreflex garments on B LE and return in 2-3 weeks to assess tolerance.   Plan   Home program tolerance to spandigrip   Plan for next session girth measurements and assess tolerance to compression stockings   Total Session Time   Timed Code Treatment Minutes 10   Total Treatment Time (sum of timed and untimed services) 10

## 2024-03-03 DIAGNOSIS — G89.29 CHRONIC LEFT-SIDED LOW BACK PAIN WITHOUT SCIATICA: ICD-10-CM

## 2024-03-03 DIAGNOSIS — M54.50 CHRONIC LEFT-SIDED LOW BACK PAIN WITHOUT SCIATICA: ICD-10-CM

## 2024-03-03 DIAGNOSIS — G25.81 RESTLESS LEGS SYNDROME: ICD-10-CM

## 2024-03-04 ENCOUNTER — MYC MEDICAL ADVICE (OUTPATIENT)
Dept: FAMILY MEDICINE | Facility: CLINIC | Age: 84
End: 2024-03-04
Payer: COMMERCIAL

## 2024-03-04 ENCOUNTER — TELEPHONE (OUTPATIENT)
Dept: FAMILY MEDICINE | Facility: CLINIC | Age: 84
End: 2024-03-04
Payer: COMMERCIAL

## 2024-03-04 RX ORDER — OXYCODONE HYDROCHLORIDE 5 MG/1
TABLET ORAL
Qty: 75 TABLET | Refills: 0 | Status: SHIPPED | OUTPATIENT
Start: 2024-03-04 | End: 2024-04-30

## 2024-03-04 NOTE — TELEPHONE ENCOUNTER
FYI - Status Update    Who is Calling: patient    Update: patient called back at 5:59pm saying the clinic called her but I didn't see anything in the chart , she was wondering about the status of her prescription.     Does caller want a call/response back: Yes     Could we send this information to you in [facility] or would you prefer to receive a phone call?:   Patient would prefer a phone call   Okay to leave a detailed message?: Yes at Cell number on file:    Telephone Information:   Mobile [phone]

## 2024-03-04 NOTE — TELEPHONE ENCOUNTER
Patient calling about her Oxycodone refill. Patient isnt due for a refill until 3/6. However patient has lost her current bottle. Asking for OK for early refill.     Preferred Pharmacy:     [facility] [address] [place] [address] [address] [address]  Phone: [phone] Fax: [phone]    Could we send this information to you in [facility] or would you prefer to receive a phone call?:   Patient would prefer a phone call   Okay to leave a detailed message?: Yes at Home number on file [phone] (home)

## 2024-03-05 ENCOUNTER — LAB (OUTPATIENT)
Dept: LAB | Facility: CLINIC | Age: 84
End: 2024-03-05
Payer: COMMERCIAL

## 2024-03-05 DIAGNOSIS — D50.8 OTHER IRON DEFICIENCY ANEMIA: ICD-10-CM

## 2024-03-05 LAB
FERRITIN SERPL-MCNC: 275 NG/ML (ref 11–328)
IRON BINDING CAPACITY (ROCHE): 274 UG/DL (ref 240–430)
IRON SATN MFR SERPL: 26 % (ref 15–46)
IRON SERPL-MCNC: 71 UG/DL (ref 37–145)

## 2024-03-05 PROCEDURE — 36415 COLL VENOUS BLD VENIPUNCTURE: CPT

## 2024-03-05 PROCEDURE — 83550 IRON BINDING TEST: CPT

## 2024-03-05 PROCEDURE — 82728 ASSAY OF FERRITIN: CPT

## 2024-03-05 PROCEDURE — 83540 ASSAY OF IRON: CPT

## 2024-03-05 NOTE — TELEPHONE ENCOUNTER
oxyCODONE (ROXICODONE) 5 MG tablet 75 tablet 0 3/4/2024 -- No   Si/2 tab in am, one in pm, and one at night       Spoke with Adam Bedolla, gave verbal approval for above Rx- OK to fill today.   Pt joseph.  ELYSSA Fernandez RN

## 2024-03-06 ENCOUNTER — ALLIED HEALTH/NURSE VISIT (OUTPATIENT)
Dept: FAMILY MEDICINE | Facility: CLINIC | Age: 84
End: 2024-03-06
Payer: COMMERCIAL

## 2024-03-06 DIAGNOSIS — Z00.00 MEDICARE ANNUAL WELLNESS VISIT, SUBSEQUENT: Primary | ICD-10-CM

## 2024-03-06 PROCEDURE — 99207 PR NO CHARGE NURSE ONLY: CPT

## 2024-03-07 ENCOUNTER — OFFICE VISIT (OUTPATIENT)
Dept: OTOLARYNGOLOGY | Facility: CLINIC | Age: 84
End: 2024-03-07
Payer: COMMERCIAL

## 2024-03-07 ENCOUNTER — TELEPHONE (OUTPATIENT)
Dept: FAMILY MEDICINE | Facility: CLINIC | Age: 84
End: 2024-03-07

## 2024-03-07 VITALS
TEMPERATURE: 98.9 F | DIASTOLIC BLOOD PRESSURE: 70 MMHG | BODY MASS INDEX: 29.23 KG/M2 | SYSTOLIC BLOOD PRESSURE: 130 MMHG | HEIGHT: 63 IN | WEIGHT: 165 LBS

## 2024-03-07 DIAGNOSIS — J32.8 OTHER CHRONIC SINUSITIS: Primary | ICD-10-CM

## 2024-03-07 DIAGNOSIS — J32.4 CHRONIC PANSINUSITIS: Primary | ICD-10-CM

## 2024-03-07 DIAGNOSIS — J34.2 DEVIATED NASAL SEPTUM: ICD-10-CM

## 2024-03-07 PROCEDURE — 87077 CULTURE AEROBIC IDENTIFY: CPT | Performed by: OTOLARYNGOLOGY

## 2024-03-07 PROCEDURE — 99204 OFFICE O/P NEW MOD 45 MIN: CPT | Mod: 25 | Performed by: OTOLARYNGOLOGY

## 2024-03-07 PROCEDURE — 87186 SC STD MICRODIL/AGAR DIL: CPT | Performed by: OTOLARYNGOLOGY

## 2024-03-07 PROCEDURE — 31231 NASAL ENDOSCOPY DX: CPT | Performed by: OTOLARYNGOLOGY

## 2024-03-07 PROCEDURE — 87205 SMEAR GRAM STAIN: CPT | Performed by: OTOLARYNGOLOGY

## 2024-03-07 PROCEDURE — 87070 CULTURE OTHR SPECIMN AEROBIC: CPT | Performed by: OTOLARYNGOLOGY

## 2024-03-07 RX ORDER — CEFDINIR 300 MG/1
300 CAPSULE ORAL 2 TIMES DAILY
Qty: 28 CAPSULE | Refills: 0 | Status: SHIPPED | OUTPATIENT
Start: 2024-03-07 | End: 2024-03-21

## 2024-03-07 RX ORDER — FLUTICASONE PROPIONATE 50 MCG
2 SPRAY, SUSPENSION (ML) NASAL DAILY
Qty: 16 G | Refills: 1 | Status: SHIPPED | OUTPATIENT
Start: 2024-03-07 | End: 2024-04-06

## 2024-03-07 RX ORDER — CLARITHROMYCIN 500 MG
500 TABLET ORAL 2 TIMES DAILY
Qty: 28 TABLET | Refills: 0 | Status: SHIPPED | OUTPATIENT
Start: 2024-03-07 | End: 2024-03-21

## 2024-03-07 ASSESSMENT — PAIN SCALES - GENERAL: PAINLEVEL: NO PAIN (0)

## 2024-03-07 NOTE — LETTER
3/7/2024         RE: Yudelka Ledesma  [address] [facility] [address]        Dear Colleague,    Thank you for referring your patient, Yudelka Ledesma, to the [facility]. Please see a copy of my visit note below.    ENT Consultation    Yudelka Ledesma who is a 83 year old female seen in consultation at the request of Dr. Azalea Ochoa.      History of Present Illness - Yudelka Ledesma is a 83 year old female sinus   Infection.  Problem started about 3 months ago with severe congestion pressure in the face.  On the right side she gets somewhat better after short course of antibiotics and short course of nasal steroid spray and continues to saline.  However on the left side is still quite obstructive and middle without any facial pain still gets some drainage clear to yellowish and greenish.  Sense of smell and taste appear to be intact.  She recently feels that she can somewhat breathe through that side of the nose.      BP Readings from Last 1 Encounters:   03/07/24 130/70       BP noted to be well controlled today in office.     Yudelka IS NOT a smoker/uses chewing tobacco.      Past Medical History -   Past Medical History:   Diagnosis Date     Basal cell carcinoma      Esophageal reflux      Other and unspecified hyperlipidemia      Unspecified essential hypertension      Unspecified hypothyroidism        Current Medications -   Current Outpatient Medications:      albuterol (PROAIR HFA/PROVENTIL HFA/VENTOLIN HFA) 108 (90 Base) MCG/ACT inhaler, INHALE 1 TO 2 PUFFS BY MOUTH EVERY 4 HOURS AS NEEDED FOR SHORTNESS OF BREATH /  DYSPNEA  OR  WHEEZING, Disp: 9 g, Rfl: 3     atorvastatin (LIPITOR) 40 MG tablet, Take 1 tablet (40 mg) by mouth daily, Disp: 90 tablet, Rfl: 3     diltiazem ER (DILT-XR) 240 MG 24 hr ER beaded capsule, TAKE 1  BY MOUTH ONCE DAILY, Disp: 90 capsule, Rfl: 3     docusate sodium (COLACE) 100 MG capsule, Take 100 mg by mouth 2 times  "daily, Disp: , Rfl:      esomeprazole (NEXIUM) 20 MG DR capsule, Take 1 capsule (20 mg) by mouth every morning (before breakfast) One hour before meals fill when needed, Disp: , Rfl:      ipratropium (ATROVENT) 0.06 % nasal spray, Spray 1 spray into both nostrils 3 times daily, Disp: 15 mL, Rfl: 1     levothyroxine (SYNTHROID/LEVOTHROID) 100 MCG tablet, Take 1 tablet (100 mcg) by mouth daily, Disp: 90 tablet, Rfl: 1     lisinopril (ZESTRIL) 20 MG tablet, Take 1 tablet (20 mg) by mouth daily, Disp: 90 tablet, Rfl: 3     oxyCODONE (ROXICODONE) 5 MG tablet, 1/2 tab in am, one in pm, and one at night, Disp: 75 tablet, Rfl: 0     acetaminophen (TYLENOL) 500 MG tablet, Take 2 tablets (1,000 mg) by mouth every 4 hours as needed for mild pain (Patient not taking: Reported on 3/7/2024), Disp: , Rfl:     Allergies -   Allergies   Allergen Reactions     Epinephrine Palpitations     Gets \"cold, jittery, feels weird\"      (Had an injection of local anesthetic mixed with epi at dentist)     Augmentin [Aspartame]      Doxycycline      Gabapentin      Gave nightmares     Levaquin [Levofloxacin Hemihydrate]      Prednisolone      Seasonal Allergies      Sulfa Antibiotics Hives       Social History -   Social History     Socioeconomic History     Marital status:    Tobacco Use     Smoking status: Former     Years: 15     Types: Cigarettes     Quit date: 1985     Years since quittin.1     Smokeless tobacco: Never   Vaping Use     Vaping Use: Never used   Substance and Sexual Activity     Alcohol use: No     Drug use: No     Sexual activity: Not Currently   Other Topics Concern      Service No     Blood Transfusions No     Caffeine Concern No     Occupational Exposure No     Hobby Hazards No     Sleep Concern No     Stress Concern Yes     Comment: just todays visit     Weight Concern No     Special Diet No     Back Care No     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 No     Social Determinants of Health     Financial Resource Strain: Low Risk  (1/18/2024)    Financial Resource Strain      Within the past 12 months, have you or your family members you live with been unable to get utilities (heat, electricity) when it was really needed?: No   Food Insecurity: Low Risk  (1/18/2024)    Food Insecurity      Within the past 12 months, did you worry that your food would run out before you got money to buy more?: No      Within the past 12 months, did the food you bought just not last and you didn t have money to get more?: No   Transportation Needs: Low Risk  (1/18/2024)    Transportation Needs      Within the past 12 months, has lack of transportation kept you from medical appointments, getting your medicines, non-medical meetings or appointments, work, or from getting things that you need?: No   Interpersonal Safety: Low Risk  (11/16/2023)    Interpersonal Safety      Do you feel physically and emotionally safe where you currently live?: Yes      Within the past 12 months, have you been hit, slapped, kicked or otherwise physically hurt by someone?: No      Within the past 12 months, have you been humiliated or emotionally abused in other ways by your partner or ex-partner?: No   Housing Stability: Low Risk  (1/18/2024)    Housing Stability      Do you have housing? : Yes      Are you worried about losing your housing?: No       Family History -   Family History   Problem Relation Age of Onset     Cerebrovascular Disease Mother      Heart Disease Father      Cancer Maternal Grandmother      Diabetes Paternal Grandmother        Review of Systems - As per HPI and PMHx, otherwise review of system review of the head and neck negative. Otherwise 10+ review of system is negative    Physical Exam  /70   Temp 98.9  F (37.2  C) (Temporal)   Ht 1.6 m (5' 3\")   Wt 74.8 kg " (165 lb)   LMP 11/16/1975   BMI 29.23 kg/m    BMI: Body mass index is 29.23 kg/m .    General - The patient is well nourished and well developed, and appears to have good nutritional status.  Alert and oriented to person and place, answers questions and cooperates with examination appropriately.    SKIN - No suspicious lesions or rashes.  Respiration - No respiratory distress.  Head and Face - Normocephalic and atraumatic, with no gross asymmetry noted of the contour of the facial features.  The facial nerve is intact, with strong symmetric movements.    Voice and Breathing - The patient was breathing comfortably without the use of accessory muscles. The patients voice was clear and strong, and had appropriate pitch and quality.    Ears - Bilateral pinna and EACs with normal appearing overlying skin. Tympanic membrane intact with good mobility on pneumatic otoscopy bilaterally. Bony landmarks of the ossicular chain are normal. The tympanic membranes are normal in appearance. No retraction, perforation, or masses.  No fluid or purulence was seen in the external canal or the middle ear.     Eyes - Extraocular movements intact.  Sclera were not icteric or injected, conjunctiva were pink and moist.    Mouth - Examination of the oral cavity showed pink, healthy oral mucosa. No lesions or ulcerations noted.  The tongue was mobile and midline, and the dentition were in good condition.      Throat - The walls of the oropharynx were smooth, pink, moist, symmetric, and had no lesions or ulcerations.  The tonsillar pillars and soft palate were symmetric.  The uvula was midline on elevation.    Neck - Normal midline excursion of the laryngotracheal complex during swallowing.  Full range of motion on passive movement.  Palpation of the occipital, submental, submandibular, internal jugular chain, and supraclavicular nodes did not demonstrate any abnormal lymph nodes or masses.  The carotid pulse was palpable bilaterally.   Palpation of the thyroid was soft and smooth, with no nodules or goiter appreciated.  The trachea was mobile and midline.    Nose - External contour is symmetric, no gross deflection or scars.  Nasal mucosa is pink and moist with no abnormal mucus.  The septum was deviated to the left and mildly obstructive, turbinates of normal size and position.  No polyps, masses, however some purulence noted on examination most on the left side.    Neuro - Nonfocal neuro exam is normal, CN 2 through 12 intact, normal gait and muscle tone.      Performed in clinic today:  To further evaluate the nasal cavity, I performed rigid nasal endoscopy.  I first sprayed the nasal cavity bilaterally with a mix of lidocaine and neosynephrine.  I then began on the left side using a 2.7mm, 30 degree rigid nasal endoscope.  The septum was deviated and the nasal airway was open.  Some purulence noted around the middle turbinate.  The left middle turbinate and middle meatus were clearly visualized and edematous erythematous in appearance.  Looking up, the olfactory cleft was unobstructed.  Going further back, the sphenoethmoid recess was normal in appearance, with healthy appearing mucosa on the face of the sphenoid.  The nasopharynx was unremarkable, and the eustachian tube opening on this side was unobstructed.  Culture aerobic over the purulence was performed.    I then turned my attention to the right side.  Once again, the septum was deviated, and the airway was open.  No abnormal secretions, purulence, polyps were noted.  The right middle turbinate and middle meatus were clearly visualized and erythematous edematous in appearance.  Looking up, the olfactory cleft was unobstructed.  Going further back the right sphenoethmoid recess was normal in appearance, and eustachian tube opening was unobstructed.   Blue/White - [id number] Mytamed      A/P - Yudelka Garland Callie is a 83 year old female with a now chronic sinusitis not as symptomatic as  it used to be.  Her would like to start 2 more weeks of antibiotics.  Considering patient's allergies we will put her on clarithromycin twice daily for 2 weeks.  Also will switch her from Nasonex to fluticasone.  CT of the sinus will be obtained in the meantime.  Patient will follow-up in about 5 weeks.      Sebastian Arroyo MD       Again, thank you for allowing me to participate in the care of your patient.        Sincerely,        Sebastian Arroyo MD, MD

## 2024-03-07 NOTE — PROGRESS NOTES
Pharmacy called stating patient is on Oxycodone 5mg for osteoarthritis. Dr Arroyo recommended to decrease Oxy to 1 1/2 tablets. Dr Arroyo recommends to not fill Clairthomycin, and to have pharmacy reach out to PCP for instructions.  Pharmacist agrees.   Handy/MARLO EJNKINS [facility]

## 2024-03-08 ENCOUNTER — TELEPHONE (OUTPATIENT)
Dept: OTOLARYNGOLOGY | Facility: CLINIC | Age: 84
End: 2024-03-08
Payer: COMMERCIAL

## 2024-03-08 NOTE — TELEPHONE ENCOUNTER
DATE/TIME OF CALL RECEIVED FROM LAB:  03/08/24 at 12:58 PM   LAB TEST:  respiratory aerobic bacterial culture with gram stain  LAB VALUE:  3+ acid fast bacilli. Latrice from ID lab called about this result. Said if MD has concerns about AFB, need to re-collected by aspirate method (cannot send swab)  3+ Acid fast bacilli   Organism stained Acid Fast Positive by Auramine O-Rhodamine stain, however this result was unable to be confirmed by Routine Kinyouns stain.   1+ Gram negative bacilli      4+ WBC seen   Predominantly PMNs      If concerned for possible AFB infection, please collect an aspirate and send for Acid Fast Bacilli culture and stain via [facility]. [facility] does not accept swabbed specimens for AFB Culture and stain.     PROVIDER NAME: Dr. Arroyo  MECHANISM OF PROVIDER NOTIFICATION: Will review Face-To-Face on Monday  PROVIDER RESPONSE:

## 2024-03-10 LAB
BACTERIA SPEC CULT: ABNORMAL
GRAM STAIN RESULT: ABNORMAL

## 2024-03-11 NOTE — TELEPHONE ENCOUNTER
Dr. Arroyo reviewed the below information. He would like patient to proceed with the scheduled CT, continue with the antibiotics, then follow up for a clinic appointment.    She's actually taking cefdinir as the antibiotic. Not taking clarithromycin as the pharmacist said it could kill her as she takes oxycodone as well. Patient feels better at this point.    If patient clears up 100% before the CT, she's asking if it's ok to skip the CT and repeat culture? I will ask Dr. Arroyo Wednesday and call her back.    Yelitza Reynaga RN on 3/11/2024 at 2:52 PM

## 2024-03-12 ENCOUNTER — ONCOLOGY VISIT (OUTPATIENT)
Dept: ONCOLOGY | Facility: CLINIC | Age: 84
End: 2024-03-12
Attending: INTERNAL MEDICINE
Payer: COMMERCIAL

## 2024-03-12 VITALS
RESPIRATION RATE: 16 BRPM | WEIGHT: 166 LBS | OXYGEN SATURATION: 98 % | TEMPERATURE: 98 F | SYSTOLIC BLOOD PRESSURE: 152 MMHG | HEIGHT: 63 IN | HEART RATE: 82 BPM | BODY MASS INDEX: 29.41 KG/M2 | DIASTOLIC BLOOD PRESSURE: 88 MMHG

## 2024-03-12 DIAGNOSIS — D50.8 OTHER IRON DEFICIENCY ANEMIA: Primary | ICD-10-CM

## 2024-03-12 PROCEDURE — G0463 HOSPITAL OUTPT CLINIC VISIT: HCPCS | Performed by: INTERNAL MEDICINE

## 2024-03-12 PROCEDURE — 99213 OFFICE O/P EST LOW 20 MIN: CPT | Performed by: INTERNAL MEDICINE

## 2024-03-12 ASSESSMENT — PAIN SCALES - GENERAL: PAINLEVEL: NO PAIN (0)

## 2024-03-12 NOTE — LETTER
"    3/12/2024         RE: Yudelka Ledesma  [id number] [facility] [address]        Dear Colleague,    Thank you for referring your patient, Yudelka Ledesma, to the [facility]. Please see a copy of my visit note below.    Oncology Rooming Note    March 12, 2024 2:28 PM   Yudelka Ledesma is a 83 year old female who presents for:    Chief Complaint   Patient presents with     Oncology Clinic Visit     Normocytic anemia - Provider visit only     Initial Vitals: BP (!) 152/88 (BP Location: Right arm, Patient Position: Sitting, Cuff Size: Adult Regular)   Pulse 82   Temp 98  F (36.7  C) (Tympanic)   Resp 16   Ht 1.6 m (5' 3\")   Wt 75.3 kg (166 lb)   LMP 11/16/1975   SpO2 98%   BMI 29.41 kg/m   Estimated body mass index is 29.41 kg/m  as calculated from the following:    Height as of this encounter: 1.6 m (5' 3\").    Weight as of this encounter: 75.3 kg (166 lb). Body surface area is 1.83 meters squared.  No Pain (0) Comment: Data Unavailable   Patient's last menstrual period was 11/16/1975.  Allergies reviewed: Yes  Medications reviewed: Yes    Medications: Medication refills not needed today.  Pharmacy name entered into [employer]:    [facility] - [place], [place] - [address] [facility] [address] [place] [address] - [address] [place] [address]    Frailty Screening:   Is the patient here for a new oncology consult visit in cancer care? 2. No      Clinical concerns:  None      Elizabeth Boyer, [facility] Hematology and Oncology Progress Note    Patient: Yudelka Ledesma  MRN: [id number]  3/12/24        Reason for Visit    Chief Complaint   Patient presents with     Oncology Clinic Visit     Normocytic anemia - Provider visit only         Problem List Items Addressed This Visit          Hematologic    Other iron deficiency anemia - Primary             Assessment and Plan  Anemia, iron deficiency  She is status post IV " Venofer.  She received first series of infusions in July 2022.  And had a second course late 2022.      Lab studies reviewed today.  Iron studies are within normal limits.  Transferrin saturation is 26%.  Total iron is normal.  Ferritin is 275.  Hemoglobin was 11.7 in early January.  No clinical signs of bleeding.  Again reviewed etiologies.  Iron deficiency.  There is always concern for chronic GI blood loss.  She is currently not on any oral iron.    Takes ibuprofen daily. I asked to consider alternative meds for arthritis.    Plan is to recheck her labs only in 6 months.  If iron studies and hemoglobin are within normal limits then no need for further hematology follow-ups.  Would recommend checking her iron studies send hemoglobin once or twice a year going forward.  If there is evidence of iron deficiency which is transferrin saturation of less than 18% or ferritin less than 50 and anemia then would recommend starting with oral iron and getting GI workup.  If she is not responding to oral iron then we will consider IV iron infusion.         Cancer Staging   No matching staging information was found for the patient.      ECOG Performance    0 - Independent         Problem List    Patient Active Problem List   Diagnosis     Essential hypertension with goal blood pressure less than 140/90     Esophageal reflux     Hypothyroidism     Mild intermittent asthma, uncomplicated     Abnormal glucose     Nephrolithiasis     Elevated serum alkaline phosphatase level     S/P hysterectomy     Hyperlipidemia LDL goal <130     Colon stricture (H)     Carpal tunnel syndrome     Cervical radiculopathy     Advanced directives, counseling/discussion     Scoliosis     Systolic murmur     Varicose veins of both lower extremities     Other idiopathic scoliosis     Essential tremor     Elevated blood sugar     Chronic pain of right knee     Primary osteoarthritis of right knee     Other iron deficiency anemia     Poor iron absorption      Status post total knee replacement     Chronic left-sided low back pain without sciatica     Chronic neck pain     Bilateral leg edema     Restless legs syndrome     Chronic heart failure with preserved ejection fraction (HFpEF) (H)     Prediabetes         Interval History   Yudelka Ledesma is an 83 year old female with history of iron deficiency anemia status post IV iron infusions who is here for follow-up.    Doing well.  Denies any blood in stools.  No black stools.  Currently not on any iron.  Here with repeat labs.    Review of Systems  A comprehensive review of systems was negative except for what is noted in the interval history    Current Outpatient Medications   Medication     albuterol (PROAIR HFA/PROVENTIL HFA/VENTOLIN HFA) 108 (90 Base) MCG/ACT inhaler     atorvastatin (LIPITOR) 40 MG tablet     cefdinir (OMNICEF) 300 MG capsule     clarithromycin (BIAXIN) 500 MG tablet     diltiazem ER (DILT-XR) 240 MG 24 hr ER beaded capsule     docusate sodium (COLACE) 100 MG capsule     esomeprazole (NEXIUM) 20 MG DR capsule     fluticasone (FLONASE) 50 MCG/ACT nasal spray     ipratropium (ATROVENT) 0.06 % nasal spray     levothyroxine (SYNTHROID/LEVOTHROID) 100 MCG tablet     lisinopril (ZESTRIL) 20 MG tablet     oxyCODONE (ROXICODONE) 5 MG tablet     acetaminophen (TYLENOL) 500 MG tablet     No current facility-administered medications for this visit.        Physical Exam    Failed to redirect to the Timeline version of the [facility].    General: alert and cooperative  Chest: CTA bilaterally  Extremities: atraumatic, no peripheral edema  Skin: No skin rashes  CNS: Alert and oriented x3, neurologic exam grossly normal.      Lab Results    Recent Results (from the past 168 hour(s))   Respiratory Aerobic Bacterial Culture with Gram Stain    Specimen: Sinus, Ethmoid, Left; Swab   Result Value Ref Range    Culture 3+ Citrobacter koseri (A)     Culture 1+ Klebsiella oxytoca (A)     Culture 2+ Normal  hubert     Gram Stain Result 3+ Acid fast bacilli (A)     Gram Stain Result 1+ Gram negative bacilli (A)     Gram Stain Result 4+ WBC seen (A)        Susceptibility    Citrobacter koseri - GITA     Ampicillin*  Resistant       * Intrinsically Resistant     Piperacillin/Tazobactam <=4 Susceptible ug/mL     Ceftazidime <=1 Susceptible ug/mL     Ceftriaxone <=1 Susceptible ug/mL     Cefepime <=1 Susceptible ug/mL     Meropenem <=0.25 Susceptible ug/mL     Gentamicin <=1 Susceptible ug/mL     Tobramycin <=1 Susceptible ug/mL     Ciprofloxacin <=0.25 Susceptible ug/mL     Levofloxacin <=0.12 Susceptible ug/mL     Trimethoprim/Sulfamethoxazole <=1/19 Susceptible ug/mL    Klebsiella oxytoca - GITA     Ampicillin*  Resistant       * Intrinsically Resistant     Ampicillin/ Sulbactam 16 Intermediate ug/mL     Piperacillin/Tazobactam <=4 Susceptible ug/mL     Ceftazidime <=1 Susceptible ug/mL     Ceftriaxone <=1 Susceptible ug/mL     Cefepime <=1 Susceptible ug/mL     Meropenem <=0.25 Susceptible ug/mL     Gentamicin <=1 Susceptible ug/mL     Tobramycin <=1 Susceptible ug/mL     Ciprofloxacin <=0.25 Susceptible ug/mL     Levofloxacin <=0.12 Susceptible ug/mL     Trimethoprim/Sulfamethoxazole <=1/19 Susceptible ug/mL         Imaging          Signed by: Missy Quijano MD      Again, thank you for allowing me to participate in the care of your patient.        Sincerely,        Missy Quijano MD

## 2024-03-12 NOTE — PROGRESS NOTES
[facility]ematology [facility] Oncology Progress Note    Patient: Yudelka Ledesma  MRN: [id number]  3/12/24        Reason for Visit    Chief Complaint   Patient presents with    Oncology Clinic Visit     Normocytic anemia - Provider visit only         Problem List Items Addressed This Visit          Hematologic    Other iron deficiency anemia - Primary             Assessment and Plan  Anemia, iron deficiency  She is status post IV Venofer.  She received first series of infusions in July 2022.  And had a second course late 2022.      Lab studies reviewed today.  Iron studies are within normal limits.  Transferrin saturation is 26%.  Total iron is normal.  Ferritin is 275.  Hemoglobin was 11.7 in early January.  No clinical signs of bleeding.  Again reviewed etiologies.  Iron deficiency.  There is always concern for chronic GI blood loss.  She is currently not on any oral iron.    Takes ibuprofen daily. I asked to consider alternative meds for arthritis.    Plan is to recheck her labs only in 6 months.  If iron studies and hemoglobin are within normal limits then no need for further hematology follow-ups.  Would recommend checking her iron studies send hemoglobin once or twice a year going forward.  If there is evidence of iron deficiency which is transferrin saturation of less than 18% or ferritin less than 50 and anemia then would recommend starting with oral iron and getting GI workup.  If she is not responding to oral iron then we will consider IV iron infusion.         Cancer Staging   No matching staging information was found for the patient.      ECOG Performance    0 - Independent         Problem List    Patient Active Problem List   Diagnosis    Essential hypertension with goal blood pressure less than 140/90    Esophageal reflux    Hypothyroidism    Mild intermittent asthma, uncomplicated    Abnormal glucose    Nephrolithiasis    Elevated serum alkaline phosphatase level    S/P hysterectomy     Hyperlipidemia LDL goal <130    Colon stricture (H)    Carpal tunnel syndrome    Cervical radiculopathy    Advanced directives, counseling/discussion    Scoliosis    Systolic murmur    Varicose veins of both lower extremities    Other idiopathic scoliosis    Essential tremor    Elevated blood sugar    Chronic pain of right knee    Primary osteoarthritis of right knee    Other iron deficiency anemia    Poor iron absorption    Status post total knee replacement    Chronic left-sided low back pain without sciatica    Chronic neck pain    Bilateral leg edema    Restless legs syndrome    Chronic heart failure with preserved ejection fraction (HFpEF) (H)    Prediabetes         Interval History   Yudelka Ledesma is an 83 year old female with history of iron deficiency anemia status post IV iron infusions who is here for follow-up.    Doing well.  Denies any blood in stools.  No black stools.  Currently not on any iron.  Here with repeat labs.    Review of Systems  A comprehensive review of systems was negative except for what is noted in the interval history    Current Outpatient Medications   Medication    albuterol (PROAIR HFA/PROVENTIL HFA/VENTOLIN HFA) 108 (90 Base) MCG/ACT inhaler    atorvastatin (LIPITOR) 40 MG tablet    cefdinir (OMNICEF) 300 MG capsule    clarithromycin (BIAXIN) 500 MG tablet    diltiazem ER (DILT-XR) 240 MG 24 hr ER beaded capsule    docusate sodium (COLACE) 100 MG capsule    esomeprazole (NEXIUM) 20 MG DR capsule    fluticasone (FLONASE) 50 MCG/ACT nasal spray    ipratropium (ATROVENT) 0.06 % nasal spray    levothyroxine (SYNTHROID/LEVOTHROID) 100 MCG tablet    lisinopril (ZESTRIL) 20 MG tablet    oxyCODONE (ROXICODONE) 5 MG tablet    acetaminophen (TYLENOL) 500 MG tablet     No current facility-administered medications for this visit.        Physical Exam    Failed to redirect to the Timeline version of the REVFS SmartLink.    General: alert and cooperative  Chest: CTA  bilaterally  Extremities: atraumatic, no peripheral edema  Skin: No skin rashes  CNS: Alert and oriented x3, neurologic exam grossly normal.      Lab Results    Recent Results (from the past 168 hour(s))   Respiratory Aerobic Bacterial Culture with Gram Stain    Specimen: Sinus, Ethmoid, Left; Swab   Result Value Ref Range    Culture 3+ Citrobacter koseri (A)     Culture 1+ Klebsiella oxytoca (A)     Culture 2+ Normal hubert     Gram Stain Result 3+ Acid fast bacilli (A)     Gram Stain Result 1+ Gram negative bacilli (A)     Gram Stain Result 4+ WBC seen (A)        Susceptibility    Citrobacter koseri - GITA     Ampicillin*  Resistant       * Intrinsically Resistant     Piperacillin/Tazobactam <=4 Susceptible ug/mL     Ceftazidime <=1 Susceptible ug/mL     Ceftriaxone <=1 Susceptible ug/mL     Cefepime <=1 Susceptible ug/mL     Meropenem <=0.25 Susceptible ug/mL     Gentamicin <=1 Susceptible ug/mL     Tobramycin <=1 Susceptible ug/mL     Ciprofloxacin <=0.25 Susceptible ug/mL     Levofloxacin <=0.12 Susceptible ug/mL     Trimethoprim/Sulfamethoxazole <=1/19 Susceptible ug/mL    Klebsiella oxytoca - GITA     Ampicillin*  Resistant       * Intrinsically Resistant     Ampicillin/ Sulbactam 16 Intermediate ug/mL     Piperacillin/Tazobactam <=4 Susceptible ug/mL     Ceftazidime <=1 Susceptible ug/mL     Ceftriaxone <=1 Susceptible ug/mL     Cefepime <=1 Susceptible ug/mL     Meropenem <=0.25 Susceptible ug/mL     Gentamicin <=1 Susceptible ug/mL     Tobramycin <=1 Susceptible ug/mL     Ciprofloxacin <=0.25 Susceptible ug/mL     Levofloxacin <=0.12 Susceptible ug/mL     Trimethoprim/Sulfamethoxazole <=1/19 Susceptible ug/mL         Imaging          Signed by: Missy Quijano MD

## 2024-03-12 NOTE — PROGRESS NOTES
"Oncology Rooming Note    March 12, 2024 2:28 PM   Yudelka Ledesma is a 83 year old female who presents for:    Chief Complaint   Patient presents with    Oncology Clinic Visit     Normocytic anemia - Provider visit only     Initial Vitals: BP (!) 152/88 (BP Location: Right arm, Patient Position: Sitting, Cuff Size: Adult Regular)   Pulse 82   Temp 98  F (36.7  C) (Tympanic)   Resp 16   Ht 1.6 m (5' 3\")   Wt 75.3 kg (166 lb)   LMP 11/16/1975   SpO2 98%   BMI 29.41 kg/m   Estimated body mass index is 29.41 kg/m  as calculated from the following:    Height as of this encounter: 1.6 m (5' 3\").    Weight as of this encounter: 75.3 kg (166 lb). Body surface area is 1.83 meters squared.  No Pain (0) Comment: Data Unavailable   Patient's last menstrual period was 11/16/1975.  Allergies reviewed: Yes  Medications reviewed: Yes    Medications: Medication refills not needed today.  Pharmacy name entered into [facility]:    [facility] - [place], [place] - [address] [facility] [place], [place] - [address] [place] [address]    Frailty Screening:   Is the patient here for a new oncology consult visit in cancer care? 2. No      Clinical concerns:  None      Elizabeth Boyer CMA            "

## 2024-03-13 NOTE — TELEPHONE ENCOUNTER
Writer reviewed with Dr. Arroyo who said it was ok to cancel the CT and follow up appointment with repeat cultures if her symptoms clears up before then. Patient updated. Patient also said she's improved a little but not close to resolution of symptoms.    Yelitza Reynaga RN on 3/13/2024 at 10:28 AM

## 2024-03-21 ENCOUNTER — HOSPITAL ENCOUNTER (OUTPATIENT)
Dept: CT IMAGING | Facility: CLINIC | Age: 84
Discharge: HOME | End: 2024-03-21
Attending: OTOLARYNGOLOGY | Admitting: OTOLARYNGOLOGY
Payer: COMMERCIAL

## 2024-03-21 DIAGNOSIS — J32.8 OTHER CHRONIC SINUSITIS: ICD-10-CM

## 2024-03-21 PROCEDURE — 70486 CT MAXILLOFACIAL W/O DYE: CPT

## 2024-03-27 NOTE — PROGRESS NOTES
"History of Present Illness - Yudelka Ledesma is a 83 year old female presenting in clinic today for a recheck on Patient presents with:  Sinusitis  Follow Up    Patient presents with symptoms of chronic sinusitis a lot of facial congestion more on the left than the right side.  At that time patient who previously had functional endoscopic sinus surgery on the left side years ago felt \"worse than right.  Patient was started on the antibiotics total 4 weeks.  She was switched to fluticasone.  Clinically she feels much much better now.  Can breathe better through the nose on both sides.  The pressure is pressure on the left has largely resolved.  Denies any discharge.  Previous culture of purulent material in the sinuses grew out unusual hubert.    Culture 3+ Citrobacter koseri Abnormal       1+ Klebsiella oxytoca Abnormal       2+ Normal hubert         If concerned for possible AFB infection, please collect an aspirate and send for Acid Fast Bacilli culture and stain via AR. [facility] does not accept swabbed specimens for AFB Culture and stain.            Gram Stain Result  Abnormal   3+ Acid fast bacilli   Organism stained Acid Fast Positive by Auramine O-Rhodamine stain, however this result was unable to be confirmed by Routine Kinyouns stain.   1+ Gram negative bacilli      4+ WBC seen   Predominantly PMNs           CT SCAN OF THE PARANASAL SINUSES AND FACE  3/21/2024 2:11 PM      HISTORY: Other chronic sinusitis.     TECHNIQUE: Noncontrast axial scans of the sinuses with coronal and  sagittal reformations were completed. Radiation dose for this scan was  reduced using automated exposure control, adjustment of the mA and/or  kV according to patient size, or iterative reconstruction technique.       COMPARISON: None.     FINDINGS:   Frontal sinuses: Mild to moderate mucosal thickening in the relatively  small left frontal sinus. Minimal mucosal thickening in the inferior  aspect of the right frontal sinus. Trace " secretions in the left  frontal sinus.     Ethmoid sinuses: Moderate to severe mucosal thickening in the anterior  left ethmoid air cells. Mild mucosal thickening in the right ethmoid  air cells. No air-fluid levels or aerated secretions.     Sphenoid sinuses: Trace aerated secretions and minimal mucosal  thickening in the left sigmoid sinus. Probable tiny polyp or retention  cyst in the posterior aspect of the right sphenoid sinus.     Maxillary sinuses: There are changes of left-sided maxillary  antrostomy/uncinectomy. There is moderate to severe mucosal thickening  with layering fluid/secretions in the left maxillary sinus. There is a  calcified lesion seen centrally in the left maxillary sinus that  measures 19 mm x 16 mm x 20 mm (series 4 image 29). This is not  entirely specific, but it may represent a mycetoma. There is  hyperostosis of the walls of the left maxillary sinus, suggesting an  element of chronic sinusitis. Mild mucosal thickening in the right  maxillary sinus with small amount of aerated secretions in the  alveolar recess. There is a focal area of bony dehiscence of the floor  of the right maxillary sinus measuring up to approximately 5-6 mm  (series 2 image 50, series 4 image 34).     Nasal cavity/skull base: Mild polypoid soft tissue noted within the  left nasal cavity is nonspecific, but may be related to mucosal  thickening or polyps. Minimal polypoid soft tissue in the right nasal  cavity. Rightward nasal septal bowing. The anterior skull base appears  intact and is relatively symmetric. The anterior clinoid processes are  not pneumatized.     Paranasal sinus drainage pathways: Focal mucosal coaptation of the  left sphenoid ostium. The right sphenoid ostium and bilateral  sphenoethmoidal recesses are patent. There is soft tissue obstruction  of the left frontal sinus drainage pathway. There also appears to be  soft tissue opacification of the right frontal sinus recess. The  bilateral  maxillary sinus drainage pathways are patent.     Other findings: Changes of previous bilateral cataract surgery are  noted. Limited low-dose images through the intracranial contents  demonstrate mild generalized brain parenchymal volume loss and  presumed chronic small vessel ischemic changes as well as  atherosclerotic calcifications of the intracranial vasculature,  particularly in the region of the carotid siphons. Bilateral carotid  bifurcation atherosclerotic calcifications are noted in the neck.  There are multiple calcified palatine tonsilliths. Torus palatinus.  Multilevel degenerative changes in the visualized upper cervical  spine. This includes relatively advanced atlantoaxial joint  arthropathy.                                                                      IMPRESSION:  1. Moderate to severe mucosal thickening in the left anterior ethmoid  and left maxillary sinuses. Relatively lesser degrees of scattered  mucosal thickening elsewhere in the paranasal sinuses. Layering  secretions/fluid in the left greater than right maxillary sinuses and  to a lesser degree in the left frontal and left sphenoid sinuses.  Please correlate clinically for possible symptoms/signs of acute on  chronic sinusitis.  2. Calcified lesion in the left maxillary sinus is nonspecific, but  may represent a mycetoma.  3. Changes of previous left maxillary antrostomy/uncinectomy.  4. Soft tissue opacification of the left greater than right frontal  sinus drainage pathways. Focal soft tissue opacification of the left  sphenoid ostium. Otherwise, the paranasal sinus drainage pathways are  patent.                      BP Readings from Last 1 Encounters:   04/08/24 126/66       BP noted to be well controlled today in office.     Yudelka IS NOT a smoker/uses chewing tobacco.  Yudelka already quit      Past Medical History -   Past Medical History:   Diagnosis Date    Basal cell carcinoma     Esophageal reflux     Other and unspecified  "hyperlipidemia     Unspecified essential hypertension     Unspecified hypothyroidism        Current Medications -   Current Outpatient Medications:     albuterol (PROAIR HFA/PROVENTIL HFA/VENTOLIN HFA) 108 (90 Base) MCG/ACT inhaler, INHALE 1 TO 2 PUFFS BY MOUTH EVERY 4 HOURS AS NEEDED FOR SHORTNESS OF BREATH /  DYSPNEA  OR  WHEEZING, Disp: 9 g, Rfl: 3    atorvastatin (LIPITOR) 40 MG tablet, Take 1 tablet (40 mg) by mouth daily, Disp: 90 tablet, Rfl: 3    diltiazem ER (DILT-XR) 240 MG 24 hr ER beaded capsule, TAKE 1  BY MOUTH ONCE DAILY, Disp: 90 capsule, Rfl: 3    docusate sodium (COLACE) 100 MG capsule, Take 100 mg by mouth 2 times daily, Disp: , Rfl:     esomeprazole (NEXIUM) 20 MG DR capsule, Take 1 capsule (20 mg) by mouth every morning (before breakfast) One hour before meals fill when needed, Disp: , Rfl:     ipratropium (ATROVENT) 0.06 % nasal spray, Spray 1 spray into both nostrils 3 times daily, Disp: 15 mL, Rfl: 1    levothyroxine (SYNTHROID/LEVOTHROID) 100 MCG tablet, Take 1 tablet (100 mcg) by mouth daily, Disp: 90 tablet, Rfl: 1    lisinopril (ZESTRIL) 20 MG tablet, Take 1 tablet (20 mg) by mouth daily, Disp: 90 tablet, Rfl: 3    oxyCODONE (ROXICODONE) 5 MG tablet, 1/2 tab in am, one in pm, and one at night, Disp: 75 tablet, Rfl: 0    acetaminophen (TYLENOL) 500 MG tablet, Take 2 tablets (1,000 mg) by mouth every 4 hours as needed for mild pain (Patient not taking: Reported on 3/7/2024), Disp: , Rfl:     Allergies -   Allergies   Allergen Reactions    Epinephrine Palpitations     Gets \"cold, jittery, feels weird\"      (Had an injection of local anesthetic mixed with epi at dentist)    Augmentin [Aspartame]     Doxycycline     Gabapentin      Gave nightmares    Levaquin [Levofloxacin Hemihydrate]     Prednisolone     Seasonal Allergies     Sulfa Antibiotics Hives       Social History -   Social History     Socioeconomic History    Marital status:    Tobacco Use    Smoking status: Former     Years: " 15     Types: Cigarettes     Quit date: 1985     Years since quittin.2    Smokeless tobacco: Never   Vaping Use    Vaping Use: Never used   Substance and Sexual Activity    Alcohol use: No    Drug use: No    Sexual activity: Not Currently   Other Topics Concern     Service No    Blood Transfusions No    Caffeine Concern No    Occupational Exposure No    Hobby Hazards No    Sleep Concern No    Stress Concern Yes     Comment: just todays visit    Weight Concern No    Special Diet No    Back Care No    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 No     Social Determinants of Health     Financial Resource Strain: Low Risk  (2024)    Financial Resource Strain     Within the past 12 months, have you or your family members you live with been unable to get utilities (heat, electricity) when it was really needed?: No   Food Insecurity: Low Risk  (2024)    Food Insecurity     Within the past 12 months, did you worry that your food would run out before you got money to buy more?: No     Within the past 12 months, did the food you bought just not last and you didn t have money to get more?: No   Transportation Needs: Low Risk  (2024)    Transportation Needs     Within the past 12 months, has lack of transportation kept you from medical appointments, getting your medicines, non-medical meetings or appointments, work, or from getting things that you need?: No   Interpersonal Safety: Low Risk  (2023)    Interpersonal Safety     Do you feel physically and emotionally safe where you currently live?: Yes     Within the past 12 months, have you been hit, slapped, kicked or otherwise physically hurt by someone?: No     Within the past 12 months, have you been humiliated or emotionally abused in other ways by your partner or ex-partner?: No   Housing  "Stability: Low Risk  (1/18/2024)    Housing Stability     Do you have housing? : Yes     Are you worried about losing your housing?: No       Family History -   Family History   Problem Relation Age of Onset    Cerebrovascular Disease Mother     Heart Disease Father     Cancer Maternal Grandmother     Diabetes Paternal Grandmother        Review of Systems - As per HPI and PMHx, otherwise review of system review of the head and neck negative. Otherwise 10+ review of system is negative    Physical Exam  /66   Temp 98  F (36.7  C) (Temporal)   Ht 1.6 m (5' 3\")   Wt 75.3 kg (166 lb)   LMP 11/16/1975   BMI 29.41 kg/m    BMI: Body mass index is 29.41 kg/m .    General - The patient is well nourished and well developed, and appears to have good nutritional status.  Alert and oriented to person and place, answers questions and cooperates with examination appropriately.    SKIN - No suspicious lesions or rashes.  Respiration - No respiratory distress.  Head and Face - Normocephalic and atraumatic, with no gross asymmetry noted of the contour of the facial features.  The facial nerve is intact, with strong symmetric movements.    Voice and Breathing - The patient was breathing comfortably without the use of accessory muscles. The patients voice was clear and strong, and had appropriate pitch and quality.    Ears - Bilateral pinna and EACs with normal appearing overlying skin. Tympanic membrane intact with good mobility on pneumatic otoscopy bilaterally. Bony landmarks of the ossicular chain are normal. The tympanic membranes are normal in appearance. No retraction, perforation, or masses.  No fluid or purulence was seen in the external canal or the middle ear.     Eyes - Extraocular movements intact.  Sclera were not icteric or injected, conjunctiva were pink and moist.    Mouth - Examination of the oral cavity showed pink, healthy oral mucosa. No lesions or ulcerations noted.  The tongue was mobile and midline, " and the dentition were in good condition.      Throat - The walls of the oropharynx were smooth, pink, moist, symmetric, and had no lesions or ulcerations.  The tonsillar pillars and soft palate were symmetric. . The uvula was midline on elevation.    Neck - Normal midline excursion of the laryngotracheal complex during swallowing.  Full range of motion on passive movement.  Palpation of the occipital, submental, submandibular, internal jugular chain, and supraclavicular nodes did not demonstrate any abnormal lymph nodes or masses.  The carotid pulse was palpable bilaterally.  Palpation of the thyroid was soft and smooth, with no nodules or goiter appreciated.  The trachea was mobile and midline.    Nose - External contour is symmetric, no gross deflection or scars.  Nasal mucosa is pink and moist with no abnormal mucus.  The septum was midline and non-obstructive, turbinates of normal size and position.  No polyps, masses, or purulence noted on examination.    Neuro - Nonfocal neuro exam is normal, CN 2 through 12 intact, normal gait and muscle tone.      Performed in clinic today:  To further evaluate the nasal cavity, I performed rigid nasal endoscopy.  I first sprayed the nasal cavity bilaterally with a mix of lidocaine and neosynephrine.  I then began on the left side using a 2.7mm, 30 degree rigid nasal endoscope.  The septum was straight and the nasal airway was open.  No abnormal secretions, purulence, or polyps were noted. The left middle turbinate and middle meatus were clearly visualized and normal in appearance.  Looking up, the olfactory cleft was unobstructed.  Going further back, the sphenoethmoid recess was normal in appearance, with healthy appearing mucosa on the face of the sphenoid.  The nasopharynx was unremarkable, and the eustachian tube opening on this side was unobstructed.  Indeed this is somewhat mucosa and scar tissue filling middle meatus some of the ethmoid air cells without obvious  evidence of fungal disease or significant inflammatory disease as noted on the previous scan.  Also maxillary ostium is open and the mucosa within the sinus is thickened but without evidence of infection or fungal disease.    I then turned my attention to the right side.  Once again, the septum was straight, and the airway was open.  No abnormal secretions, purulence, polyps were noted.  The right middle turbinate and middle meatus were clearly visualized and normal in appearance.  Looking up, the olfactory cleft was unobstructed.  Going further back the right sphenoethmoid recess was normal in appearance, and eustachian tube opening was unobstructed.   Yellow - [id number] [employer]      A/P - Yudelka YOUNG Dada Ledesma is a 83 year old female Patient presents with:  Sinusitis  Follow Up    Patient without previous evidence of fungal sinusitis chronic sinusitis involving both ethmoid and maxillary sinuses but more prominent on the left side.  Currently she is asymptomatic and endoscopic exam does not show any active pathology such as fungal sinusitis.  And even though her cultures were rather unusual suspect for acid-fast bacilli her asymptomatic presentation currently in the significant improved endoscopic exam merits more conservative intervention.  Patient obviously has responded to antibiotic therapy.  Therefore patient should continue nasal steroid spray use preceded by nasal saline irrigations daily.    Yudelka should follow up in 3 months.            Sebastian Arroyo MD

## 2024-04-08 ENCOUNTER — OFFICE VISIT (OUTPATIENT)
Dept: OTOLARYNGOLOGY | Facility: CLINIC | Age: 84
End: 2024-04-08
Payer: COMMERCIAL

## 2024-04-08 VITALS
TEMPERATURE: 98 F | DIASTOLIC BLOOD PRESSURE: 66 MMHG | BODY MASS INDEX: 29.41 KG/M2 | WEIGHT: 166 LBS | HEIGHT: 63 IN | SYSTOLIC BLOOD PRESSURE: 126 MMHG

## 2024-04-08 DIAGNOSIS — J32.9 NONINVASIVE FUNGAL SINUSITIS: ICD-10-CM

## 2024-04-08 DIAGNOSIS — B49 NONINVASIVE FUNGAL SINUSITIS: ICD-10-CM

## 2024-04-08 DIAGNOSIS — J01.90 ACUTE SINUSITIS WITH SYMPTOMS > 10 DAYS: ICD-10-CM

## 2024-04-08 DIAGNOSIS — J32.0 CHRONIC SINUSITIS OF BOTH MAXILLARY SINUSES: Primary | ICD-10-CM

## 2024-04-08 PROCEDURE — 99213 OFFICE O/P EST LOW 20 MIN: CPT | Mod: 25 | Performed by: OTOLARYNGOLOGY

## 2024-04-08 PROCEDURE — 31231 NASAL ENDOSCOPY DX: CPT | Performed by: OTOLARYNGOLOGY

## 2024-04-08 ASSESSMENT — PAIN SCALES - GENERAL: PAINLEVEL: NO PAIN (0)

## 2024-04-08 NOTE — LETTER
"    4/8/2024         RE: Yudelka Ledesma  [address] [facility] [id number]        Dear Colleague,    Thank you for referring your patient, Yudelka Ledesma, to the [facility]. Please see a copy of my visit note below.    History of Present Illness - Yudelka Ledesma is a 83 year old female presenting in clinic today for a recheck on Patient presents with:  Sinusitis  Follow Up    Patient presents with symptoms of chronic sinusitis a lot of facial congestion more on the left than the right side.  At that time patient who previously had functional endoscopic sinus surgery on the left side years ago felt \"worse than right.  Patient was started on the antibiotics total 4 weeks.  She was switched to fluticasone.  Clinically she feels much much better now.  Can breathe better through the nose on both sides.  The pressure is pressure on the left has largely resolved.  Denies any discharge.  Previous culture of purulent material in the sinuses grew out unusual hubert.    Culture 3+ Citrobacter koseri Abnormal       1+ Klebsiella oxytoca Abnormal       2+ Normal hubert         If concerned for possible AFB infection, please collect an aspirate and send for Acid Fast Bacilli culture and stain via AR. [facility] does not accept swabbed specimens for AFB Culture and stain.            Gram Stain Result  Abnormal   3+ Acid fast bacilli   Organism stained Acid Fast Positive by Auramine O-Rhodamine stain, however this result was unable to be confirmed by Routine Kinyouns stain.   1+ Gram negative bacilli      4+ WBC seen   Predominantly PMNs           CT SCAN OF THE PARANASAL SINUSES AND FACE  3/21/2024 2:11 PM      HISTORY: Other chronic sinusitis.     TECHNIQUE: Noncontrast axial scans of the sinuses with coronal and  sagittal reformations were completed. Radiation dose for this scan was  reduced using automated exposure control, adjustment of the mA and/or  kV according to patient " size, or iterative reconstruction technique.       COMPARISON: None.     FINDINGS:   Frontal sinuses: Mild to moderate mucosal thickening in the relatively  small left frontal sinus. Minimal mucosal thickening in the inferior  aspect of the right frontal sinus. Trace secretions in the left  frontal sinus.     Ethmoid sinuses: Moderate to severe mucosal thickening in the anterior  left ethmoid air cells. Mild mucosal thickening in the right ethmoid  air cells. No air-fluid levels or aerated secretions.     Sphenoid sinuses: Trace aerated secretions and minimal mucosal  thickening in the left sigmoid sinus. Probable tiny polyp or retention  cyst in the posterior aspect of the right sphenoid sinus.     Maxillary sinuses: There are changes of left-sided maxillary  antrostomy/uncinectomy. There is moderate to severe mucosal thickening  with layering fluid/secretions in the left maxillary sinus. There is a  calcified lesion seen centrally in the left maxillary sinus that  measures 19 mm x 16 mm x 20 mm (series 4 image 29). This is not  entirely specific, but it may represent a mycetoma. There is  hyperostosis of the walls of the left maxillary sinus, suggesting an  element of chronic sinusitis. Mild mucosal thickening in the right  maxillary sinus with small amount of aerated secretions in the  alveolar recess. There is a focal area of bony dehiscence of the floor  of the right maxillary sinus measuring up to approximately 5-6 mm  (series 2 image 50, series 4 image 34).     Nasal cavity/skull base: Mild polypoid soft tissue noted within the  left nasal cavity is nonspecific, but may be related to mucosal  thickening or polyps. Minimal polypoid soft tissue in the right nasal  cavity. Rightward nasal septal bowing. The anterior skull base appears  intact and is relatively symmetric. The anterior clinoid processes are  not pneumatized.     Paranasal sinus drainage pathways: Focal mucosal coaptation of the  left sphenoid  ostium. The right sphenoid ostium and bilateral  sphenoethmoidal recesses are patent. There is soft tissue obstruction  of the left frontal sinus drainage pathway. There also appears to be  soft tissue opacification of the right frontal sinus recess. The  bilateral maxillary sinus drainage pathways are patent.     Other findings: Changes of previous bilateral cataract surgery are  noted. Limited low-dose images through the intracranial contents  demonstrate mild generalized brain parenchymal volume loss and  presumed chronic small vessel ischemic changes as well as  atherosclerotic calcifications of the intracranial vasculature,  particularly in the region of the carotid siphons. Bilateral carotid  bifurcation atherosclerotic calcifications are noted in the neck.  There are multiple calcified palatine tonsilliths. Torus palatinus.  Multilevel degenerative changes in the visualized upper cervical  spine. This includes relatively advanced atlantoaxial joint  arthropathy.                                                                      IMPRESSION:  1. Moderate to severe mucosal thickening in the left anterior ethmoid  and left maxillary sinuses. Relatively lesser degrees of scattered  mucosal thickening elsewhere in the paranasal sinuses. Layering  secretions/fluid in the left greater than right maxillary sinuses and  to a lesser degree in the left frontal and left sphenoid sinuses.  Please correlate clinically for possible symptoms/signs of acute on  chronic sinusitis.  2. Calcified lesion in the left maxillary sinus is nonspecific, but  may represent a mycetoma.  3. Changes of previous left maxillary antrostomy/uncinectomy.  4. Soft tissue opacification of the left greater than right frontal  sinus drainage pathways. Focal soft tissue opacification of the left  sphenoid ostium. Otherwise, the paranasal sinus drainage pathways are  patent.                      BP Readings from Last 1 Encounters:   04/08/24 126/66  "      BP noted to be well controlled today in office.     Yudelka IS NOT a smoker/uses chewing tobacco.  Yudelka already quit      Past Medical History -   Past Medical History:   Diagnosis Date     Basal cell carcinoma      Esophageal reflux      Other and unspecified hyperlipidemia      Unspecified essential hypertension      Unspecified hypothyroidism        Current Medications -   Current Outpatient Medications:      albuterol (PROAIR HFA/PROVENTIL HFA/VENTOLIN HFA) 108 (90 Base) MCG/ACT inhaler, INHALE 1 TO 2 PUFFS BY MOUTH EVERY 4 HOURS AS NEEDED FOR SHORTNESS OF BREATH /  DYSPNEA  OR  WHEEZING, Disp: 9 g, Rfl: 3     atorvastatin (LIPITOR) 40 MG tablet, Take 1 tablet (40 mg) by mouth daily, Disp: 90 tablet, Rfl: 3     diltiazem ER (DILT-XR) 240 MG 24 hr ER beaded capsule, TAKE 1  BY MOUTH ONCE DAILY, Disp: 90 capsule, Rfl: 3     docusate sodium (COLACE) 100 MG capsule, Take 100 mg by mouth 2 times daily, Disp: , Rfl:      esomeprazole (NEXIUM) 20 MG DR capsule, Take 1 capsule (20 mg) by mouth every morning (before breakfast) One hour before meals fill when needed, Disp: , Rfl:      ipratropium (ATROVENT) 0.06 % nasal spray, Spray 1 spray into both nostrils 3 times daily, Disp: 15 mL, Rfl: 1     levothyroxine (SYNTHROID/LEVOTHROID) 100 MCG tablet, Take 1 tablet (100 mcg) by mouth daily, Disp: 90 tablet, Rfl: 1     lisinopril (ZESTRIL) 20 MG tablet, Take 1 tablet (20 mg) by mouth daily, Disp: 90 tablet, Rfl: 3     oxyCODONE (ROXICODONE) 5 MG tablet, 1/2 tab in am, one in pm, and one at night, Disp: 75 tablet, Rfl: 0     acetaminophen (TYLENOL) 500 MG tablet, Take 2 tablets (1,000 mg) by mouth every 4 hours as needed for mild pain (Patient not taking: Reported on 3/7/2024), Disp: , Rfl:     Allergies -   Allergies   Allergen Reactions     Epinephrine Palpitations     Gets \"cold, jittery, feels weird\"      (Had an injection of local anesthetic mixed with epi at dentist)     Augmentin [Aspartame]      Doxycycline  "     Gabapentin      Gave nightmares     Levaquin [Levofloxacin Hemihydrate]      Prednisolone      Seasonal Allergies      Sulfa Antibiotics Hives       Social History -   Social History     Socioeconomic History     Marital status:    Tobacco Use     Smoking status: Former     Years: 15     Types: Cigarettes     Quit date: 1985     Years since quittin.2     Smokeless tobacco: Never   Vaping Use     Vaping Use: Never used   Substance and Sexual Activity     Alcohol use: No     Drug use: No     Sexual activity: Not Currently   Other Topics Concern      Service No     Blood Transfusions No     Caffeine Concern No     Occupational Exposure No     Hobby Hazards No     Sleep Concern No     Stress Concern Yes     Comment: just todays visit     Weight Concern No     Special Diet No     Back Care No     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 No     Social Determinants of Health     Financial Resource Strain: Low Risk  (2024)    Financial Resource Strain      Within the past 12 months, have you or your family members you live with been unable to get utilities (heat, electricity) when it was really needed?: No   Food Insecurity: Low Risk  (2024)    Food Insecurity      Within the past 12 months, did you worry that your food would run out before you got money to buy more?: No      Within the past 12 months, did the food you bought just not last and you didn t have money to get more?: No   Transportation Needs: Low Risk  (2024)    Transportation Needs      Within the past 12 months, has lack of transportation kept you from medical appointments, getting your medicines, non-medical meetings or appointments, work, or from getting things that you need?: No   Interpersonal Safety: Low Risk  (2023)    Interpersonal Safety      Do  "you feel physically and emotionally safe where you currently live?: Yes      Within the past 12 months, have you been hit, slapped, kicked or otherwise physically hurt by someone?: No      Within the past 12 months, have you been humiliated or emotionally abused in other ways by your partner or ex-partner?: No   Housing Stability: Low Risk  (1/18/2024)    Housing Stability      Do you have housing? : Yes      Are you worried about losing your housing?: No       Family History -   Family History   Problem Relation Age of Onset     Cerebrovascular Disease Mother      Heart Disease Father      Cancer Maternal Grandmother      Diabetes Paternal Grandmother        Review of Systems - As per HPI and PMHx, otherwise review of system review of the head and neck negative. Otherwise 10+ review of system is negative    Physical Exam  /66   Temp 98  F (36.7  C) (Temporal)   Ht 1.6 m (5' 3\")   Wt 75.3 kg (166 lb)   LMP 11/16/1975   BMI 29.41 kg/m    BMI: Body mass index is 29.41 kg/m .    General - The patient is well nourished and well developed, and appears to have good nutritional status.  Alert and oriented to person and place, answers questions and cooperates with examination appropriately.    SKIN - No suspicious lesions or rashes.  Respiration - No respiratory distress.  Head and Face - Normocephalic and atraumatic, with no gross asymmetry noted of the contour of the facial features.  The facial nerve is intact, with strong symmetric movements.    Voice and Breathing - The patient was breathing comfortably without the use of accessory muscles. The patients voice was clear and strong, and had appropriate pitch and quality.    Ears - Bilateral pinna and EACs with normal appearing overlying skin. Tympanic membrane intact with good mobility on pneumatic otoscopy bilaterally. Bony landmarks of the ossicular chain are normal. The tympanic membranes are normal in appearance. No retraction, perforation, or masses.  No " fluid or purulence was seen in the external canal or the middle ear.     Eyes - Extraocular movements intact.  Sclera were not icteric or injected, conjunctiva were pink and moist.    Mouth - Examination of the oral cavity showed pink, healthy oral mucosa. No lesions or ulcerations noted.  The tongue was mobile and midline, and the dentition were in good condition.      Throat - The walls of the oropharynx were smooth, pink, moist, symmetric, and had no lesions or ulcerations.  The tonsillar pillars and soft palate were symmetric. . The uvula was midline on elevation.    Neck - Normal midline excursion of the laryngotracheal complex during swallowing.  Full range of motion on passive movement.  Palpation of the occipital, submental, submandibular, internal jugular chain, and supraclavicular nodes did not demonstrate any abnormal lymph nodes or masses.  The carotid pulse was palpable bilaterally.  Palpation of the thyroid was soft and smooth, with no nodules or goiter appreciated.  The trachea was mobile and midline.    Nose - External contour is symmetric, no gross deflection or scars.  Nasal mucosa is pink and moist with no abnormal mucus.  The septum was midline and non-obstructive, turbinates of normal size and position.  No polyps, masses, or purulence noted on examination.    Neuro - Nonfocal neuro exam is normal, CN 2 through 12 intact, normal gait and muscle tone.      Performed in clinic today:  To further evaluate the nasal cavity, I performed rigid nasal endoscopy.  I first sprayed the nasal cavity bilaterally with a mix of lidocaine and neosynephrine.  I then began on the left side using a 2.7mm, 30 degree rigid nasal endoscope.  The septum was straight and the nasal airway was open.  No abnormal secretions, purulence, or polyps were noted. The left middle turbinate and middle meatus were clearly visualized and normal in appearance.  Looking up, the olfactory cleft was unobstructed.  Going further back,  the sphenoethmoid recess was normal in appearance, with healthy appearing mucosa on the face of the sphenoid.  The nasopharynx was unremarkable, and the eustachian tube opening on this side was unobstructed.  Indeed this is somewhat mucosa and scar tissue filling middle meatus some of the ethmoid air cells without obvious evidence of fungal disease or significant inflammatory disease as noted on the previous scan.  Also maxillary ostium is open and the mucosa within the sinus is thickened but without evidence of infection or fungal disease.    I then turned my attention to the right side.  Once again, the septum was straight, and the airway was open.  No abnormal secretions, purulence, polyps were noted.  The right middle turbinate and middle meatus were clearly visualized and normal in appearance.  Looking up, the olfactory cleft was unobstructed.  Going further back the right sphenoethmoid recess was normal in appearance, and eustachian tube opening was unobstructed.   Yellow - [id number] [facility]      A/P - Yudelka Ledesma is a 83 year old female Patient presents with:  Sinusitis  Follow Up    Patient without previous evidence of fungal sinusitis chronic sinusitis involving both ethmoid and maxillary sinuses but more prominent on the left side.  Currently she is asymptomatic and endoscopic exam does not show any active pathology such as fungal sinusitis.  And even though her cultures were rather unusual suspect for acid-fast bacilli her asymptomatic presentation currently in the significant improved endoscopic exam merits more conservative intervention.  Patient obviously has responded to antibiotic therapy.  Therefore patient should continue nasal steroid spray use preceded by nasal saline irrigations daily.    Yudelka should follow up in 3 months.            Sebastian Arroyo MD          Again, thank you for allowing me to participate in the care of your patient.        Sincerely,        Sebastian Arroyo MD,  MD

## 2024-04-23 ENCOUNTER — PATIENT OUTREACH (OUTPATIENT)
Dept: CARE COORDINATION | Facility: CLINIC | Age: 84
End: 2024-04-23
Payer: COMMERCIAL

## 2024-04-30 ENCOUNTER — OFFICE VISIT (OUTPATIENT)
Dept: FAMILY MEDICINE | Facility: CLINIC | Age: 84
End: 2024-04-30
Payer: COMMERCIAL

## 2024-04-30 VITALS
HEIGHT: 63 IN | TEMPERATURE: 98.3 F | OXYGEN SATURATION: 97 % | WEIGHT: 166 LBS | RESPIRATION RATE: 20 BRPM | BODY MASS INDEX: 29.41 KG/M2 | HEART RATE: 84 BPM | SYSTOLIC BLOOD PRESSURE: 134 MMHG | DIASTOLIC BLOOD PRESSURE: 80 MMHG

## 2024-04-30 DIAGNOSIS — G89.29 CHRONIC LEFT-SIDED LOW BACK PAIN WITHOUT SCIATICA: ICD-10-CM

## 2024-04-30 DIAGNOSIS — I10 ESSENTIAL HYPERTENSION WITH GOAL BLOOD PRESSURE LESS THAN 140/90: ICD-10-CM

## 2024-04-30 DIAGNOSIS — M54.50 CHRONIC LEFT-SIDED LOW BACK PAIN WITHOUT SCIATICA: ICD-10-CM

## 2024-04-30 DIAGNOSIS — E03.9 ACQUIRED HYPOTHYROIDISM: ICD-10-CM

## 2024-04-30 DIAGNOSIS — D50.8 OTHER IRON DEFICIENCY ANEMIA: ICD-10-CM

## 2024-04-30 DIAGNOSIS — R26.0 ATAXIC GAIT: ICD-10-CM

## 2024-04-30 DIAGNOSIS — E78.5 HYPERLIPIDEMIA LDL GOAL <130: Primary | ICD-10-CM

## 2024-04-30 DIAGNOSIS — R73.03 PREDIABETES: ICD-10-CM

## 2024-04-30 DIAGNOSIS — G25.81 RESTLESS LEGS SYNDROME: ICD-10-CM

## 2024-04-30 DIAGNOSIS — E83.52 SERUM CALCIUM ELEVATED: ICD-10-CM

## 2024-04-30 LAB
ALBUMIN SERPL BCG-MCNC: 4.4 G/DL (ref 3.5–5.2)
ALP SERPL-CCNC: 152 U/L (ref 40–150)
ALT SERPL W P-5'-P-CCNC: 12 U/L (ref 0–50)
ANION GAP SERPL CALCULATED.3IONS-SCNC: 10 MMOL/L (ref 7–15)
AST SERPL W P-5'-P-CCNC: 16 U/L (ref 0–45)
BILIRUB SERPL-MCNC: 0.3 MG/DL
BUN SERPL-MCNC: 22 MG/DL (ref 8–23)
CALCIUM SERPL-MCNC: 10 MG/DL (ref 8.8–10.2)
CHLORIDE SERPL-SCNC: 106 MMOL/L (ref 98–107)
CHOLEST SERPL-MCNC: 190 MG/DL
CREAT SERPL-MCNC: 0.94 MG/DL (ref 0.51–0.95)
DEPRECATED HCO3 PLAS-SCNC: 25 MMOL/L (ref 22–29)
EGFRCR SERPLBLD CKD-EPI 2021: 60 ML/MIN/1.73M2
FASTING STATUS PATIENT QL REPORTED: NO
GLUCOSE SERPL-MCNC: 98 MG/DL (ref 70–99)
HBA1C MFR BLD: 5.9 % (ref 0–5.6)
HDLC SERPL-MCNC: 45 MG/DL
LDLC SERPL CALC-MCNC: 106 MG/DL
NONHDLC SERPL-MCNC: 145 MG/DL
POTASSIUM SERPL-SCNC: 4.6 MMOL/L (ref 3.4–5.3)
PROT SERPL-MCNC: 7.5 G/DL (ref 6.4–8.3)
PTH-INTACT SERPL-MCNC: 46 PG/ML (ref 15–65)
SODIUM SERPL-SCNC: 141 MMOL/L (ref 135–145)
T4 FREE SERPL-MCNC: 1.39 NG/DL (ref 0.9–1.7)
TRIGL SERPL-MCNC: 193 MG/DL
TSH SERPL DL<=0.005 MIU/L-ACNC: 6.42 UIU/ML (ref 0.3–4.2)

## 2024-04-30 PROCEDURE — 84443 ASSAY THYROID STIM HORMONE: CPT | Performed by: FAMILY MEDICINE

## 2024-04-30 PROCEDURE — 80061 LIPID PANEL: CPT | Performed by: FAMILY MEDICINE

## 2024-04-30 PROCEDURE — 99215 OFFICE O/P EST HI 40 MIN: CPT | Performed by: FAMILY MEDICINE

## 2024-04-30 PROCEDURE — 83036 HEMOGLOBIN GLYCOSYLATED A1C: CPT | Performed by: FAMILY MEDICINE

## 2024-04-30 PROCEDURE — G2211 COMPLEX E/M VISIT ADD ON: HCPCS | Performed by: FAMILY MEDICINE

## 2024-04-30 PROCEDURE — 83970 ASSAY OF PARATHORMONE: CPT | Performed by: FAMILY MEDICINE

## 2024-04-30 PROCEDURE — 84439 ASSAY OF FREE THYROXINE: CPT | Performed by: FAMILY MEDICINE

## 2024-04-30 PROCEDURE — 36415 COLL VENOUS BLD VENIPUNCTURE: CPT | Performed by: FAMILY MEDICINE

## 2024-04-30 PROCEDURE — 80053 COMPREHEN METABOLIC PANEL: CPT | Performed by: FAMILY MEDICINE

## 2024-04-30 RX ORDER — OXYCODONE HYDROCHLORIDE 5 MG/1
TABLET ORAL
Qty: 45 TABLET | Refills: 0 | Status: SHIPPED | OUTPATIENT
Start: 2024-04-30 | End: 2024-08-16

## 2024-04-30 RX ORDER — RESPIRATORY SYNCYTIAL VIRUS VACCINE 120MCG/0.5
0.5 KIT INTRAMUSCULAR ONCE
Qty: 1 EACH | Refills: 0 | Status: CANCELLED | OUTPATIENT
Start: 2024-04-30 | End: 2024-04-30

## 2024-04-30 ASSESSMENT — ANXIETY QUESTIONNAIRES
1. FEELING NERVOUS, ANXIOUS, OR ON EDGE: NOT AT ALL
8. IF YOU CHECKED OFF ANY PROBLEMS, HOW DIFFICULT HAVE THESE MADE IT FOR YOU TO DO YOUR WORK, TAKE CARE OF THINGS AT HOME, OR GET ALONG WITH OTHER PEOPLE?: NOT DIFFICULT AT ALL
2. NOT BEING ABLE TO STOP OR CONTROL WORRYING: NEARLY EVERY DAY
GAD7 TOTAL SCORE: 5
GAD7 TOTAL SCORE: 5
7. FEELING AFRAID AS IF SOMETHING AWFUL MIGHT HAPPEN: NOT AT ALL
6. BECOMING EASILY ANNOYED OR IRRITABLE: SEVERAL DAYS
7. FEELING AFRAID AS IF SOMETHING AWFUL MIGHT HAPPEN: NOT AT ALL
3. WORRYING TOO MUCH ABOUT DIFFERENT THINGS: SEVERAL DAYS
4. TROUBLE RELAXING: NOT AT ALL
5. BEING SO RESTLESS THAT IT IS HARD TO SIT STILL: NOT AT ALL
IF YOU CHECKED OFF ANY PROBLEMS ON THIS QUESTIONNAIRE, HOW DIFFICULT HAVE THESE PROBLEMS MADE IT FOR YOU TO DO YOUR WORK, TAKE CARE OF THINGS AT HOME, OR GET ALONG WITH OTHER PEOPLE: NOT DIFFICULT AT ALL

## 2024-04-30 ASSESSMENT — PAIN SCALES - GENERAL: PAINLEVEL: NO PAIN (0)

## 2024-04-30 NOTE — PROGRESS NOTES
Assessment & Plan     Hyperlipidemia LDL goal <130  Continue statin  - Lipid panel reflex to direct LDL Non-fasting; Future  - Lipid panel reflex to direct LDL Non-fasting    Chronic left-sided low back pain without sciatica  Not sure about her gait, is very odd.   - Adult Neurology  Referral; Future  - oxyCODONE (ROXICODONE) 5 MG tablet; 1/2 tab in am, 1/2 in pm, and 1/2 at night    Acquired hypothyroidism  Adjust levothyroxine as needed  - TSH with free T4 reflex; Future  - TSH with free T4 reflex  - T4 free    Essential hypertension with goal blood pressure less than 140/90  Well controlled  Continue diltiazem and lisinopril  - Comprehensive metabolic panel (BMP + Alb, Alk Phos, ALT, AST, Total. Bili, TP); Future  - Comprehensive metabolic panel (BMP + Alb, Alk Phos, ALT, AST, Total. Bili, TP)    Prediabetes  Diet and exercise  - Hemoglobin A1c; Future  - Comprehensive metabolic panel (BMP + Alb, Alk Phos, ALT, AST, Total. Bili, TP); Future  - Hemoglobin A1c  - Comprehensive metabolic panel (BMP + Alb, Alk Phos, ALT, AST, Total. Bili, TP)    Restless legs syndrome  When she had her knee replaced we used the oxycodone which helped her pain as well as restless legs. Now less pain. Recommended she discuss using something like Mirapex or Requip with Dr Hutchinson  For now continue with using less oxycodone, no more than 1/2 tid  - oxyCODONE (ROXICODONE) 5 MG tablet; 1/2 tab in am, 1/2 in pm, and 1/2 at night    Other iron deficiency anemia  Followed by hematology    Ataxic gait  Recommend she make neurology apt to get in place as takes so long to see them  First should see Dr Peters   - Adult Neurology  Referral; Future    Serum calcium elevated  Has been persistent   - Parathyroid Hormone Intact; Future  - Parathyroid Hormone Intact      Patient Instructions   Make apt with neurologist to get the apt    See Dr Peters for your leg    See Sandra Gibson RN for your nails    Living with  "Dementia  You have to have the patience of a saint  Agree, never argue  Divert, never reason  Distract, never shame  Reassure, never lecture  Reminisce, never say \"remember\"  Repeat, never say \"I told you\"  Do what they can do, never say \"you can't\"  Ask, never demand  Encourage, never condescend  Reinforce, never force             I spent a total of 48 minutes on the day of the visit.   Time spent by me doing chart review, history and exam, documentation and further activities per the note       Has apt with Dr Hutchinson in 3 weeks    Subjective   Yudelka is a 83 year old, presenting for the following health issues:  Back Pain and Depression        4/30/2024     2:56 PM   Additional Questions   Roomed by Yamilet     History of Present Illness       Back Pain:  She presents for follow up of back pain. Patient's back pain is a chronic problem.  Location of back pain:  Right lower back, left lower back, right side of neck, left side of neck, left shoulder and right buttock  Description of back pain: other  Back pain spreads: right buttocks    Since patient first noticed back pain, pain is: always present, but gets better and worse  Does back pain interfere with her job:  No       Mental Health Follow-up:  Patient presents to follow-up on Depression & Anxiety.Patient's depression since last visit has been:  Better  The patient is not having other symptoms associated with depression.  Patient's anxiety since last visit has been:  Better  The patient is not having other symptoms associated with anxiety.  Any significant life events: relationship concerns, financial concerns, grief or loss and health concerns  Patient is not feeling anxious or having panic attacks.  Patient has no concerns about alcohol or drug use.    Reason for visit:  Pain meds    She eats 0-1 servings of fruits and vegetables daily.She consumes 1 sweetened beverage(s) daily.She exercises with enough effort to increase her heart rate 10 to 19 minutes per " "day.  She exercises with enough effort to increase her heart rate 3 or less days per week.   She is taking medications regularly.     Will see Dr Hutchinson in 3 weeks  She has been cutting back on her oxycodone, just has not needed them as much.   She started having \"black out periods\" in the evening. She feels like she is doing things but not aware of what she was doing, would last a few minutes. Not fainting, or falling. But seems like she had done things and then would \"come to\" and realize she wasn't cognizant of what she was doing. Had happened in evening daily 3 or 4 days in a row.  She cut down further on her use and has not had any more for the last 2 weeks.   Now is taking oxycodone 1/2 late pm early evening, 1/2 at bedtime, another 1/2 if wakes up with restless legs, or in am if didn't need it during the night as her sciatica is back. So has been taking no more than 1.5/day.   [place] prescription monitoring system accessed, last filled oxycodone 3/5/24 #75. She has her pills with her, has 20 left.   Is taking more ibuprofen 2 at a time, tid with meals. In on a PPI.     Low back pain  Right leg is weak or something. Has to almost fling it. Ever since her knee surgery. Feels unstable. But walks quite well with her walker.     hypothyroidism   On levothyroxine   Last TSH was 4.51    hypertension   On lisinopril, diltiazem  BP Readings from Last 6 Encounters:   04/30/24 134/80   04/08/24 126/66   03/12/24 (!) 152/88   03/07/24 130/70   01/18/24 128/78   12/07/23 (!) 144/75      hyperlipidemia   On atorvastatin  Due for lipids    Mood is ok  She is frustrated with her  who has some early dementia. She has no patience with him    ROS: 5 point ROS negative except as noted above in HPI, including Gen., Resp., CV, GI &  system review.       Objective    /80 (BP Location: Right arm)   Pulse 84   Temp 98.3  F (36.8  C) (Tympanic)   Resp 20   Ht 1.6 m (5' 3\")   Wt 75.3 kg (166 lb)   LMP 11/16/1975   " SpO2 97%   BMI 29.41 kg/m    Body mass index is 29.41 kg/m .  Physical Exam   GENERAL: alert and no distress  NECK: no adenopathy, no asymmetry, masses, or scars  RESP: lungs clear to auscultation - no rales, rhonchi or wheezes  CV: regular rate and rhythm, normal S1 S2, no S3 or S4, no murmur, click or rub, no peripheral edema  ABDOMEN: soft, nontender, no hepatosplenomegaly, no masses and bowel sounds normal  MS: walks with a normal gait with a walker. Without the walker she has great difficulty with gait, throws right leg forward at the hip, then steps on it  Back exam:   mild tenderness to palpation of low back, good range of motion at waist, lower extremities with good strength and sensation  PSYCH: mentation appears normal, affect normal/bright          Signed Electronically by: Azalea Meehan MD

## 2024-04-30 NOTE — PATIENT INSTRUCTIONS
"Make apt with neurologist to get the apt    See Dr Peters for your leg    See Sandra Gibson RN for your nails    Living with Dementia  You have to have the patience of a saint  Agree, never argue  Divert, never reason  Distract, never shame  Reassure, never lecture  Reminisce, never say \"remember\"  Repeat, never say \"I told you\"  Do what they can do, never say \"you can't\"  Ask, never demand  Encourage, never condescend  Reinforce, never force            "

## 2024-05-01 RX ORDER — LEVOTHYROXINE SODIUM 125 UG/1
125 TABLET ORAL DAILY
Qty: 90 TABLET | Refills: 0 | Status: SHIPPED | OUTPATIENT
Start: 2024-05-01 | End: 2024-05-01

## 2024-05-01 RX ORDER — LEVOTHYROXINE SODIUM 100 UG/1
TABLET ORAL
Qty: 100 TABLET | Refills: 0 | Status: SHIPPED | OUTPATIENT
Start: 2024-05-01 | End: 2024-08-19

## 2024-05-07 ENCOUNTER — PATIENT OUTREACH (OUTPATIENT)
Dept: CARE COORDINATION | Facility: CLINIC | Age: 84
End: 2024-05-07
Payer: COMMERCIAL

## 2024-05-13 ENCOUNTER — ALLIED HEALTH/NURSE VISIT (OUTPATIENT)
Dept: FAMILY MEDICINE | Facility: CLINIC | Age: 84
End: 2024-05-13
Payer: COMMERCIAL

## 2024-05-13 DIAGNOSIS — R73.03 PREDIABETES: Primary | ICD-10-CM

## 2024-05-13 PROCEDURE — 99207 PR NO CHARGE NURSE ONLY: CPT

## 2024-05-20 ENCOUNTER — OFFICE VISIT (OUTPATIENT)
Dept: FAMILY MEDICINE | Facility: CLINIC | Age: 84
End: 2024-05-20
Payer: COMMERCIAL

## 2024-05-20 VITALS
OXYGEN SATURATION: 98 % | RESPIRATION RATE: 14 BRPM | WEIGHT: 167 LBS | HEART RATE: 82 BPM | SYSTOLIC BLOOD PRESSURE: 132 MMHG | DIASTOLIC BLOOD PRESSURE: 68 MMHG | BODY MASS INDEX: 29.59 KG/M2 | HEIGHT: 63 IN

## 2024-05-20 DIAGNOSIS — R29.898 RIGHT LEG WEAKNESS: ICD-10-CM

## 2024-05-20 DIAGNOSIS — Z96.651 S/P TOTAL KNEE ARTHROPLASTY, RIGHT: ICD-10-CM

## 2024-05-20 DIAGNOSIS — E03.9 ACQUIRED HYPOTHYROIDISM: ICD-10-CM

## 2024-05-20 DIAGNOSIS — G89.29 CHRONIC LEFT-SIDED LOW BACK PAIN WITHOUT SCIATICA: ICD-10-CM

## 2024-05-20 DIAGNOSIS — G89.4 PAIN SYNDROME, CHRONIC: Primary | ICD-10-CM

## 2024-05-20 DIAGNOSIS — M25.512 ACUTE PAIN OF LEFT SHOULDER: ICD-10-CM

## 2024-05-20 DIAGNOSIS — G25.81 RESTLESS LEGS SYNDROME: ICD-10-CM

## 2024-05-20 DIAGNOSIS — M54.50 CHRONIC LEFT-SIDED LOW BACK PAIN WITHOUT SCIATICA: ICD-10-CM

## 2024-05-20 PROCEDURE — 99215 OFFICE O/P EST HI 40 MIN: CPT | Performed by: STUDENT IN AN ORGANIZED HEALTH CARE EDUCATION/TRAINING PROGRAM

## 2024-05-20 PROCEDURE — G2211 COMPLEX E/M VISIT ADD ON: HCPCS | Performed by: STUDENT IN AN ORGANIZED HEALTH CARE EDUCATION/TRAINING PROGRAM

## 2024-05-20 RX ORDER — OXYCODONE HYDROCHLORIDE 5 MG/1
2.5 TABLET ORAL 3 TIMES DAILY PRN
Qty: 45 TABLET | Refills: 0 | Status: SHIPPED | OUTPATIENT
Start: 2024-07-19 | End: 2024-07-08

## 2024-05-20 RX ORDER — OXYCODONE HYDROCHLORIDE 5 MG/1
2.5 TABLET ORAL 3 TIMES DAILY PRN
Qty: 45 TABLET | Refills: 0 | Status: SHIPPED | OUTPATIENT
Start: 2024-05-20 | End: 2024-07-08

## 2024-05-20 RX ORDER — OXYCODONE HYDROCHLORIDE 5 MG/1
2.5 TABLET ORAL 3 TIMES DAILY PRN
Qty: 45 TABLET | Refills: 0 | Status: SHIPPED | OUTPATIENT
Start: 2024-06-19 | End: 2024-07-08

## 2024-05-20 ASSESSMENT — PAIN SCALES - GENERAL: PAINLEVEL: MODERATE PAIN (4)

## 2024-05-20 ASSESSMENT — ASTHMA QUESTIONNAIRES
ACT_TOTALSCORE: 16
QUESTION_1 LAST FOUR WEEKS HOW MUCH OF THE TIME DID YOUR ASTHMA KEEP YOU FROM GETTING AS MUCH DONE AT WORK, SCHOOL OR AT HOME: NONE OF THE TIME
QUESTION_2 LAST FOUR WEEKS HOW OFTEN HAVE YOU HAD SHORTNESS OF BREATH: MORE THAN ONCE A DAY
QUESTION_4 LAST FOUR WEEKS HOW OFTEN HAVE YOU USED YOUR RESCUE INHALER OR NEBULIZER MEDICATION (SUCH AS ALBUTEROL): THREE OR MORE TIMES PER DAY
QUESTION_3 LAST FOUR WEEKS HOW OFTEN DID YOUR ASTHMA SYMPTOMS (WHEEZING, COUGHING, SHORTNESS OF BREATH, CHEST TIGHTNESS OR PAIN) WAKE YOU UP AT NIGHT OR EARLIER THAN USUAL IN THE MORNING: NOT AT ALL
ACT_TOTALSCORE: 16
QUESTION_5 LAST FOUR WEEKS HOW WOULD YOU RATE YOUR ASTHMA CONTROL: WELL CONTROLLED

## 2024-05-20 NOTE — PROGRESS NOTES
Assessment & Plan     Chronic left-sided low back pain without sciatica  Restless legs syndrome  Pain syndrome, chronic  Patient is an 83-year-old female who presents today to establish care, obtain refills of chronic medications.  Patient has chronic pain syndrome, significant osteoarthritis of the spine.  She has been stable on oxycodone for quite some time.  She actually was able to decrease on the dosage of oxycodone down from 12.5 daily to 7.5 daily.  Refill given today    We also discussed alternative options for pain management and especially in the context of restless leg syndrome, as it seems like this is the most common reason for her to use oxycodone.  We discussed that other medications are much better at managing RLS.  In particular, we discussed pregabalin (previously had nightmares with gabapentin), ropinirole versus pramipexole.  We opted not to make any adjustments today, however will review this again in the future.  - [id number] - Urine Drug Confirmation Panel (Comprehensive); Future  - oxyCODONE (ROXICODONE) 5 MG tablet; Take 0.5 tablets (2.5 mg) by mouth 3 times daily as needed for pain  - oxyCODONE (ROXICODONE) 5 MG tablet; Take 0.5 tablets (2.5 mg) by mouth 3 times daily as needed for pain  - oxyCODONE (ROXICODONE) 5 MG tablet; Take 0.5 tablets (2.5 mg) by mouth 3 times daily as needed for pain  - PRIMARY CARE FOLLOW-UP SCHEDULING; Future  - Drug Confirmation Panel Urine with Creat; Future    S/P total knee arthroplasty, right  Right leg weakness  Patient has been having right leg ambulatory difficulties after her right total knee.  She describes it as her knee giving out, especially medially.  We discussed that likely this indicates muscle weakness and would most recommend starting with physical therapy.  If she is having difficulty completing physical therapy, not making progress, consider low back etiology and imaging at that time.  Low concern for acute cauda equina syndrome.  - Physical  Therapy  Referral; Future    Acquired hypothyroidism  Patient has had difficulties with managing thyroid.  Most recently decreased to 100 mcg all days of the week with the exception of 150 mcg on Sundays.  Will need recheck TSH in 1 to 2 months.  - TSH with free T4 reflex; Future    Acute left shoulder pain  Over the past few days, patient has been having increasing left shoulder pain, difficulty with pinpointing the exact location of her pain.  Range of motion appears maintained.  Mild tenderness at the anterior joint line.  Does have improvement with ibuprofen.  Plan to continue ibuprofen, add Tylenol, monitor symptoms.  If not improving by next week, consider imaging.    The longitudinal plan of care for the diagnosis(es)/condition(s) as documented were addressed during this visit. Due to the added complexity in care, I will continue to support Yudelka in the subsequent management and with ongoing continuity of care.    I spent a total of 45 minutes on the day of the visit.   Time spent by me doing chart review, history and exam, documentation and further activities per the note      Subjective   Yudelka is a 83 year old, presenting for the following health issues:  Establish Care and Shoulder Pain (Left shoulder hurts more than usually the past few days)        5/20/2024     2:30 PM   Additional Questions   Roomed by Shelly GRIER   Accompanied by Self     History of Present Illness       Reason for visit:  Shoulder pain    She eats 0-1 servings of fruits and vegetables daily.She consumes 2 sweetened beverage(s) daily.She exercises with enough effort to increase her heart rate 9 or less minutes per day.  She exercises with enough effort to increase her heart rate 3 or less days per week.   She is taking medications regularly.     Left shoulder not new. Was told it was rotator cuff in the past.   Has had issues for a long time, but never hurt ths much. The past 2-3 days is worse.   Nothing new or different.  "    Pain consistent.   Worsens with any movement.   Uses the walker, though, so always putting pressure on it.     Upper lateral, some radiation down the arm to at least the elbow as well. This is brand new.     Only left sided.     No new CP. Some SOB, but has asthma. Sometimes better and sometimes worse.   Right now bad with the amount of pollent.     Shoulder always hurts but last few days hurting more.   Pain about 3-4 after pain meds    No reinjury.     Uses oxycodone daily. Along with the using the ibuprofen  This is helping some.     Increased neck pain recently as well.   Sometimes left, sometimes right side.   Will feels like it is moving from neck up into the head.     Had been cutting back on oxycodone, not really strict on it. For a few nights had some \"blacking out\" when she was awake.   Would \"wake up\"     lymphedema    Weakness with the right leg since total knee replacement.        Review of Systems  Constitutional, HEENT, cardiovascular, pulmonary, gi and gu systems are negative, except as otherwise noted.      Objective    /68 (BP Location: Right arm, Patient Position: Sitting, Cuff Size: Adult Regular)   Pulse 82   Resp 14   Ht 1.6 m (5' 3\")   Wt 75.8 kg (167 lb)   LMP 11/16/1975   SpO2 98%   BMI 29.58 kg/m    Body mass index is 29.58 kg/m .  Physical Exam  Constitutional:       Appearance: Normal appearance.   HENT:      Head: Normocephalic.   Eyes:      General: No scleral icterus.     Extraocular Movements: Extraocular movements intact.      Conjunctiva/sclera: Conjunctivae normal.   Cardiovascular:      Rate and Rhythm: Normal rate.   Pulmonary:      Effort: Pulmonary effort is normal.   Musculoskeletal:      Left shoulder: Tenderness (anterior joint line) present. No deformity or effusion. Normal range of motion (normal compared to right).   Neurological:      General: No focal deficit present.      Mental Status: She is alert and oriented to person, place, and time.           " Results from this visit  Results for orders placed or performed in visit on 05/20/24   [id number] - Urine Drug Confirmation Panel (Comprehensive) *Canceled*     Status: None ()    Narrative    The following orders were created for panel order [id number] - Urine Drug Confirmation Panel (Comprehensive).  Procedure                               Abnormality         Status                     ---------                               -----------         ------                     Urine Drug Confirmation ...[[id number]]                                                 Urine Creatinine for Darren...[[id number]]                                                   Please view results for these tests on the individual orders.             Signed Electronically by: Wendy Hutchinson MD

## 2024-05-20 NOTE — PATIENT INSTRUCTIONS
Acetaminophen (Tylenol):   Every 4 hour dosing:   - take 1-2 tablets of the 325 mg over the counter tablets (325 to 650 mg) every 4 hours as needed for fevers or pain.  OR  - take 1 tablet of the 500 mg over the counter tablet every 4 hours as needed for fevers or pain.     Every 6 hour dosing:   - take 2-3 tablets of the 325 mg over the counter tablets (650 to 975 mg) every 6 hours as needed for fevers or pain.   OR  - take 2 tablets of the 500 mg over the counter tablets (1000 mg) every 6 hours as needed for fevers or pain.     CAUTION: Do not go over a total of 4000 mg of acetaminophen in a 24 hour time frame. There are many other medications that have acetaminophen as a component, including Excedrin, some over the counter cough medications, and Percocet.         Ibuprofen (Advil; Motrin): take 400 mg (2 of the over the counter 200 mg tablets) every 6 hours as needed for pain or fevers.      Call me next week if you're not feeling improved.       Consider Lyrica (pregabalin) in the future for nerve pain and restless leg symptoms.     For restless leg symptoms alone, we can consider ropinirole or pramipexole or something similar so you aren't using the oxycodone for restless leg symptoms.

## 2024-05-28 ENCOUNTER — MYC MEDICAL ADVICE (OUTPATIENT)
Dept: FAMILY MEDICINE | Facility: CLINIC | Age: 84
End: 2024-05-28
Payer: COMMERCIAL

## 2024-05-28 DIAGNOSIS — J45.20 MILD INTERMITTENT ASTHMA, UNCOMPLICATED: Primary | ICD-10-CM

## 2024-06-25 NOTE — PROGRESS NOTES
History of Present Illness - Yudelka Ledesma is a 83 year old female presenting in clinic today for a recheck on Patient presents with:  Follow Up: Chronic sinusitis of both maxillary sinuses    Patient with history of some chronic sinusitis related issues.  She previously had sinus surgery.  Currently is completely asymptomatic.  She had some unusual culture results from prior cultures but has been asymptomatic.  She gets occasional nasal congestion and more importantly some clear rhinorrhea off-and-on that responds well to ipratropium bromide.  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        BP Readings from Last 1 Encounters:   07/08/24 118/72       BP noted to be well controlled today in office.     Yudelka IS NOT a smoker/uses chewing tobacco.        Past Medical History -   Past Medical History:   Diagnosis Date    Basal cell carcinoma     Esophageal reflux     Other and unspecified hyperlipidemia     Unspecified essential hypertension     Unspecified hypothyroidism        Current Medications -   Current Outpatient Medications:     albuterol (PROAIR HFA/PROVENTIL HFA/VENTOLIN HFA) 108 (90 Base) MCG/ACT inhaler, INHALE 1 TO 2 PUFFS BY MOUTH EVERY 4 HOURS AS NEEDED FOR SHORTNESS OF BREATH /  DYSPNEA  OR  WHEEZING, Disp: 9 g, Rfl: 3    atorvastatin (LIPITOR) 40 MG tablet, Take 1 tablet (40 mg) by mouth daily, Disp: 90 tablet, Rfl: 3    diltiazem ER (DILT-XR) 240 MG 24 hr ER beaded capsule, TAKE 1  BY MOUTH ONCE DAILY, Disp: 90 capsule, Rfl: 3    docusate sodium (COLACE) 100 MG capsule, Take 100 mg by mouth 2 times daily, Disp: , Rfl:     esomeprazole (NEXIUM) 20 MG DR capsule, Take 1 capsule (20 mg) by mouth every morning (before breakfast) One hour before meals fill when needed, Disp: , Rfl:     ipratropium (ATROVENT HFA) 17 MCG/ACT inhaler, Inhale 2 puffs into the lungs 4 times daily as needed for wheezing, Disp: 12.9 g, Rfl: 3    ipratropium (ATROVENT) 0.06 % nasal spray, Spray 1 spray  "into both nostrils 3 times daily, Disp: 15 mL, Rfl: 1    levothyroxine (SYNTHROID/LEVOTHROID) 100 MCG tablet, Take one tab daily except  take 1.5 tab, Disp: 100 tablet, Rfl: 0    lisinopril (ZESTRIL) 20 MG tablet, Take 1 tablet (20 mg) by mouth daily, Disp: 90 tablet, Rfl: 3    oxyCODONE (ROXICODONE) 5 MG tablet, 1/2 tab in am, 1/2 in pm, and 1/2 at night, Disp: 45 tablet, Rfl: 0    Allergies -   Allergies   Allergen Reactions    Epinephrine Palpitations     Gets \"cold, jittery, feels weird\"      (Had an injection of local anesthetic mixed with epi at dentist)    Augmentin [Aspartame]     Doxycycline     Gabapentin      Gave nightmares    Levaquin [Levofloxacin Hemihydrate]     Prednisolone     Seasonal Allergies     Sulfa Antibiotics Hives       Social History -   Social History     Socioeconomic History    Marital status:    Tobacco Use    Smoking status: Former     Current packs/day: 0.00     Types: Cigarettes     Start date: 1970     Quit date: 1985     Years since quittin.5    Smokeless tobacco: Never   Vaping Use    Vaping status: Never Used   Substance and Sexual Activity    Alcohol use: No    Drug use: No    Sexual activity: Not Currently   Other Topics Concern     Service No    Blood Transfusions No    Caffeine Concern No    Occupational Exposure No    Hobby Hazards No    Sleep Concern No    Stress Concern Yes     Comment: just todays visit    Weight Concern No    Special Diet No    Back Care No    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 No     Social Determinants of Health     Financial Resource Strain: Low Risk  (2024)    Financial Resource Strain     Within the past 12 months, have you or your family members you live with been unable to get utilities (heat, electricity) when it was really needed?: No   Food " "Insecurity: Low Risk  (1/18/2024)    Food Insecurity     Within the past 12 months, did you worry that your food would run out before you got money to buy more?: No     Within the past 12 months, did the food you bought just not last and you didn t have money to get more?: No   Transportation Needs: Low Risk  (1/18/2024)    Transportation Needs     Within the past 12 months, has lack of transportation kept you from medical appointments, getting your medicines, non-medical meetings or appointments, work, or from getting things that you need?: No   Interpersonal Safety: Low Risk  (11/16/2023)    Interpersonal Safety     Do you feel physically and emotionally safe where you currently live?: Yes     Within the past 12 months, have you been hit, slapped, kicked or otherwise physically hurt by someone?: No     Within the past 12 months, have you been humiliated or emotionally abused in other ways by your partner or ex-partner?: No   Housing Stability: Low Risk  (1/18/2024)    Housing Stability     Do you have housing? : Yes     Are you worried about losing your housing?: No       Family History -   Family History   Problem Relation Age of Onset    Cerebrovascular Disease Mother     Heart Disease Father     Cancer Maternal Grandmother     Diabetes Paternal Grandmother        Review of Systems - As per HPI and PMHx, otherwise review of system review of the head and neck negative. Otherwise 10+ review of system is negative    Physical Exam  /72 (BP Location: Left arm, Patient Position: Sitting, Cuff Size: Adult Regular)   Temp 97.9  F (36.6  C) (Temporal)   Ht 1.6 m (5' 3\")   Wt 75.3 kg (166 lb)   LMP 11/16/1975   BMI 29.41 kg/m    BMI: Body mass index is 29.41 kg/m .    General - The patient is well nourished and well developed, and appears to have good nutritional status.  Alert and oriented to person and place, answers questions and cooperates with examination appropriately.    SKIN - No suspicious lesions or " rashes.  Respiration - No respiratory distress.  Head and Face - Normocephalic and atraumatic, with no gross asymmetry noted of the contour of the facial features.  The facial nerve is intact, with strong symmetric movements.    Voice and Breathing - The patient was breathing comfortably without the use of accessory muscles. The patients voice was clear and strong, and had appropriate pitch and quality.    Ears - Bilateral pinna and EACs with normal appearing overlying skin. Tympanic membrane intact with good mobility on pneumatic otoscopy bilaterally. Bony landmarks of the ossicular chain are normal. The tympanic membranes are normal in appearance. No retraction, perforation, or masses.  No fluid or purulence was seen in the external canal or the middle ear.     Eyes - Extraocular movements intact.  Sclera were not icteric or injected, conjunctiva were pink and moist.    Mouth - Examination of the oral cavity showed pink, healthy oral mucosa. No lesions or ulcerations noted.  The tongue was mobile and midline, and the dentition were in good condition.      Throat - The walls of the oropharynx were smooth, pink, moist, symmetric, and had no lesions or ulcerations.  The tonsillar pillars and soft palate were symmetric. Tonsils are symmetric. The uvula was midline on elevation.    Neck - Normal midline excursion of the laryngotracheal complex during swallowing.  Full range of motion on passive movement.  Palpation of the occipital, submental, submandibular, internal jugular chain, and supraclavicular nodes did not demonstrate any abnormal lymph nodes or masses.  The carotid pulse was palpable bilaterally.  Palpation of the thyroid was soft and smooth, with no nodules or goiter appreciated.  The trachea was mobile and midline.    Nose - External contour is symmetric, no gross deflection or scars.  Nasal mucosa is pink and moist with no abnormal mucus.  The septum was midline and non-obstructive, turbinates of normal  size and position.  No polyps, masses, or purulence noted on examination.    Neuro - Nonfocal neuro exam is normal, CN 2 through 12 intact, normal gait and muscle tone.      Performed in clinic today:  To further evaluate the nasal cavity, I performed rigid nasal endoscopy.  I first sprayed the nasal cavity bilaterally with a mix of lidocaine and neosynephrine.  I then began on the left side using a 2.7mm, 30 degree rigid nasal endoscope.  The septum was straight and the nasal airway was open.  No abnormal secretions, purulence, or polyps were noted. The left middle turbinate and middle meatus were clearly visualized and normal in appearance.  Looking up, the olfactory cleft was unobstructed.  Middle meatus is patent with anterior ethmoid air cells and maxillary ostium clear with mucosa well-healed.  Going further back, the sphenoethmoid recess was normal in appearance, with healthy appearing mucosa on the face of the sphenoid.  The nasopharynx was unremarkable, and the eustachian tube opening on this side was unobstructed.    I then turned my attention to the right side.  Once again, the septum was straight, and the airway was open.  No abnormal secretions, purulence, polyps were noted.  The right middle turbinate and middle meatus were clearly visualized and normal in appearance.  Middle meatus does appear to be patent with clear maxillary ostium and antral mucosa as well as anterior ethmoid air cells looking up, the olfactory cleft was unobstructed.  Going further back the right sphenoethmoid recess was normal in appearance, and eustachian tube opening was unobstructed.   Purple - [id number] [facility]      A/P - Yudelka Garland Callie is a 83 year old female Patient presents with:  Follow Up: Chronic sinusitis of both maxillary sinuses    Patient with a mostly chronic vasomotor rhinitis and chronic sinusitis that has been under control of the sinus surgery.  No evidence of acute infection.  Patient should continue  ipratropium bromide nasal spray as needed.    Yudelka should follow up in 1 year.            Sebastian Arroyo MD

## 2024-07-01 ENCOUNTER — LAB (OUTPATIENT)
Dept: LAB | Facility: CLINIC | Age: 84
End: 2024-07-01
Payer: COMMERCIAL

## 2024-07-01 DIAGNOSIS — D50.8 OTHER IRON DEFICIENCY ANEMIA: ICD-10-CM

## 2024-07-01 DIAGNOSIS — G89.4 PAIN SYNDROME, CHRONIC: ICD-10-CM

## 2024-07-01 DIAGNOSIS — E03.9 ACQUIRED HYPOTHYROIDISM: ICD-10-CM

## 2024-07-01 LAB
CREAT UR-MCNC: 69 MG/DL
FERRITIN SERPL-MCNC: 258 NG/ML (ref 11–328)
IRON BINDING CAPACITY (ROCHE): 290 UG/DL (ref 240–430)
IRON SATN MFR SERPL: 21 % (ref 15–46)
IRON SERPL-MCNC: 62 UG/DL (ref 37–145)
T4 FREE SERPL-MCNC: 1.33 NG/DL (ref 0.9–1.7)
TSH SERPL DL<=0.005 MIU/L-ACNC: 6.22 UIU/ML (ref 0.3–4.2)

## 2024-07-01 PROCEDURE — 82728 ASSAY OF FERRITIN: CPT

## 2024-07-01 PROCEDURE — 84443 ASSAY THYROID STIM HORMONE: CPT

## 2024-07-01 PROCEDURE — G0481 DRUG TEST DEF 8-14 CLASSES: HCPCS

## 2024-07-01 PROCEDURE — 83550 IRON BINDING TEST: CPT

## 2024-07-01 PROCEDURE — 84439 ASSAY OF FREE THYROXINE: CPT

## 2024-07-01 PROCEDURE — 83540 ASSAY OF IRON: CPT

## 2024-07-01 PROCEDURE — 36415 COLL VENOUS BLD VENIPUNCTURE: CPT

## 2024-07-03 LAB
OXYCODONE UR CFM-MCNC: 295 NG/ML
OXYCODONE/CREAT UR: 428 NG/MG {CREAT}
OXYMORPHONE UR CFM-MCNC: 929 NG/ML
OXYMORPHONE/CREAT UR: 1346 NG/MG {CREAT}

## 2024-07-05 ENCOUNTER — MYC MEDICAL ADVICE (OUTPATIENT)
Dept: FAMILY MEDICINE | Facility: CLINIC | Age: 84
End: 2024-07-05
Payer: COMMERCIAL

## 2024-07-08 ENCOUNTER — OFFICE VISIT (OUTPATIENT)
Dept: OTOLARYNGOLOGY | Facility: CLINIC | Age: 84
End: 2024-07-08
Payer: COMMERCIAL

## 2024-07-08 VITALS
SYSTOLIC BLOOD PRESSURE: 118 MMHG | TEMPERATURE: 97.9 F | DIASTOLIC BLOOD PRESSURE: 72 MMHG | HEIGHT: 63 IN | BODY MASS INDEX: 29.41 KG/M2 | WEIGHT: 166 LBS

## 2024-07-08 DIAGNOSIS — J32.8 OTHER CHRONIC SINUSITIS: Primary | ICD-10-CM

## 2024-07-08 DIAGNOSIS — J31.0 CHRONIC RHINITIS: ICD-10-CM

## 2024-07-08 PROCEDURE — 99213 OFFICE O/P EST LOW 20 MIN: CPT | Mod: 25 | Performed by: OTOLARYNGOLOGY

## 2024-07-08 PROCEDURE — 31231 NASAL ENDOSCOPY DX: CPT | Performed by: OTOLARYNGOLOGY

## 2024-07-08 RX ORDER — IPRATROPIUM BROMIDE 42 UG/1
1 SPRAY, METERED NASAL 3 TIMES DAILY
Qty: 15 ML | Refills: 11 | Status: SHIPPED | OUTPATIENT
Start: 2024-07-08

## 2024-07-08 ASSESSMENT — PAIN SCALES - GENERAL: PAINLEVEL: NO PAIN (0)

## 2024-07-08 NOTE — LETTER
7/8/2024      Yudelka Ledesma  [address] [facility] [address]      Dear Colleague,    Thank you for referring your patient, Yudelka Ledesma, to the [facility]. Please see a copy of my visit note below.    History of Present Illness - Yudelka Ledesma is a 83 year old female presenting in clinic today for a recheck on Patient presents with:  Follow Up: Chronic sinusitis of both maxillary sinuses    Patient with history of some chronic sinusitis related issues.  She previously had sinus surgery.  Currently is completely asymptomatic.  She had some unusual culture results from prior cultures but has been asymptomatic.  She gets occasional nasal congestion and more importantly some clear rhinorrhea off-and-on that responds well to ipratropium bromide.  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        BP Readings from Last 1 Encounters:   07/08/24 118/72       BP noted to be well controlled today in office.     Yudelka IS NOT a smoker/uses chewing tobacco.        Past Medical History -   Past Medical History:   Diagnosis Date     Basal cell carcinoma      Esophageal reflux      Other and unspecified hyperlipidemia      Unspecified essential hypertension      Unspecified hypothyroidism        Current Medications -   Current Outpatient Medications:      albuterol (PROAIR HFA/PROVENTIL HFA/VENTOLIN HFA) 108 (90 Base) MCG/ACT inhaler, INHALE 1 TO 2 PUFFS BY MOUTH EVERY 4 HOURS AS NEEDED FOR SHORTNESS OF BREATH /  DYSPNEA  OR  WHEEZING, Disp: 9 g, Rfl: 3     atorvastatin (LIPITOR) 40 MG tablet, Take 1 tablet (40 mg) by mouth daily, Disp: 90 tablet, Rfl: 3     diltiazem ER (DILT-XR) 240 MG 24 hr ER beaded capsule, TAKE 1  BY MOUTH ONCE DAILY, Disp: 90 capsule, Rfl: 3     docusate sodium (COLACE) 100 MG capsule, Take 100 mg by mouth 2 times daily, Disp: , Rfl:      esomeprazole (NEXIUM) 20 MG DR capsule, Take 1 capsule (20 mg) by mouth every morning (before  "breakfast) One hour before meals fill when needed, Disp: , Rfl:      ipratropium (ATROVENT HFA) 17 MCG/ACT inhaler, Inhale 2 puffs into the lungs 4 times daily as needed for wheezing, Disp: 12.9 g, Rfl: 3     ipratropium (ATROVENT) 0.06 % nasal spray, Spray 1 spray into both nostrils 3 times daily, Disp: 15 mL, Rfl: 1     levothyroxine (SYNTHROID/LEVOTHROID) 100 MCG tablet, Take one tab daily except  take 1.5 tab, Disp: 100 tablet, Rfl: 0     lisinopril (ZESTRIL) 20 MG tablet, Take 1 tablet (20 mg) by mouth daily, Disp: 90 tablet, Rfl: 3     oxyCODONE (ROXICODONE) 5 MG tablet, 1/2 tab in am, 1/2 in pm, and 1/2 at night, Disp: 45 tablet, Rfl: 0    Allergies -   Allergies   Allergen Reactions     Epinephrine Palpitations     Gets \"cold, jittery, feels weird\"      (Had an injection of local anesthetic mixed with epi at dentist)     Augmentin [Aspartame]      Doxycycline      Gabapentin      Gave nightmares     Levaquin [Levofloxacin Hemihydrate]      Prednisolone      Seasonal Allergies      Sulfa Antibiotics Hives       Social History -   Social History     Socioeconomic History     Marital status:    Tobacco Use     Smoking status: Former     Current packs/day: 0.00     Types: Cigarettes     Start date: 1970     Quit date: 1985     Years since quittin.5     Smokeless tobacco: Never   Vaping Use     Vaping status: Never Used   Substance and Sexual Activity     Alcohol use: No     Drug use: No     Sexual activity: Not Currently   Other Topics Concern      Service No     Blood Transfusions No     Caffeine Concern No     Occupational Exposure No     Hobby Hazards No     Sleep Concern No     Stress Concern Yes     Comment: just todays visit     Weight Concern No     Special Diet No     Back Care No     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 No     Social Determinants of Health     Financial Resource Strain: Low Risk  (1/18/2024)    Financial Resource Strain      Within the past 12 months, have you or your family members you live with been unable to get utilities (heat, electricity) when it was really needed?: No   Food Insecurity: Low Risk  (1/18/2024)    Food Insecurity      Within the past 12 months, did you worry that your food would run out before you got money to buy more?: No      Within the past 12 months, did the food you bought just not last and you didn t have money to get more?: No   Transportation Needs: Low Risk  (1/18/2024)    Transportation Needs      Within the past 12 months, has lack of transportation kept you from medical appointments, getting your medicines, non-medical meetings or appointments, work, or from getting things that you need?: No   Interpersonal Safety: Low Risk  (11/16/2023)    Interpersonal Safety      Do you feel physically and emotionally safe where you currently live?: Yes      Within the past 12 months, have you been hit, slapped, kicked or otherwise physically hurt by someone?: No      Within the past 12 months, have you been humiliated or emotionally abused in other ways by your partner or ex-partner?: No   Housing Stability: Low Risk  (1/18/2024)    Housing Stability      Do you have housing? : Yes      Are you worried about losing your housing?: No       Family History -   Family History   Problem Relation Age of Onset     Cerebrovascular Disease Mother      Heart Disease Father      Cancer Maternal Grandmother      Diabetes Paternal Grandmother        Review of Systems - As per HPI and PMHx, otherwise review of system review of the head and neck negative. Otherwise 10+ review of system is negative    Physical Exam  /72 (BP Location: Left arm, Patient Position: Sitting, Cuff Size: Adult Regular)   Temp 97.9  F (36.6  C) (Temporal)   Ht 1.6 m (5' 3\")   Wt 75.3 kg (166 lb)   LMP " 11/16/1975   BMI 29.41 kg/m    BMI: Body mass index is 29.41 kg/m .    General - The patient is well nourished and well developed, and appears to have good nutritional status.  Alert and oriented to person and place, answers questions and cooperates with examination appropriately.    SKIN - No suspicious lesions or rashes.  Respiration - No respiratory distress.  Head and Face - Normocephalic and atraumatic, with no gross asymmetry noted of the contour of the facial features.  The facial nerve is intact, with strong symmetric movements.    Voice and Breathing - The patient was breathing comfortably without the use of accessory muscles. The patients voice was clear and strong, and had appropriate pitch and quality.    Ears - Bilateral pinna and EACs with normal appearing overlying skin. Tympanic membrane intact with good mobility on pneumatic otoscopy bilaterally. Bony landmarks of the ossicular chain are normal. The tympanic membranes are normal in appearance. No retraction, perforation, or masses.  No fluid or purulence was seen in the external canal or the middle ear.     Eyes - Extraocular movements intact.  Sclera were not icteric or injected, conjunctiva were pink and moist.    Mouth - Examination of the oral cavity showed pink, healthy oral mucosa. No lesions or ulcerations noted.  The tongue was mobile and midline, and the dentition were in good condition.      Throat - The walls of the oropharynx were smooth, pink, moist, symmetric, and had no lesions or ulcerations.  The tonsillar pillars and soft palate were symmetric. Tonsils are symmetric. The uvula was midline on elevation.    Neck - Normal midline excursion of the laryngotracheal complex during swallowing.  Full range of motion on passive movement.  Palpation of the occipital, submental, submandibular, internal jugular chain, and supraclavicular nodes did not demonstrate any abnormal lymph nodes or masses.  The carotid pulse was palpable bilaterally.   Palpation of the thyroid was soft and smooth, with no nodules or goiter appreciated.  The trachea was mobile and midline.    Nose - External contour is symmetric, no gross deflection or scars.  Nasal mucosa is pink and moist with no abnormal mucus.  The septum was midline and non-obstructive, turbinates of normal size and position.  No polyps, masses, or purulence noted on examination.    Neuro - Nonfocal neuro exam is normal, CN 2 through 12 intact, normal gait and muscle tone.      Performed in clinic today:  To further evaluate the nasal cavity, I performed rigid nasal endoscopy.  I first sprayed the nasal cavity bilaterally with a mix of lidocaine and neosynephrine.  I then began on the left side using a 2.7mm, 30 degree rigid nasal endoscope.  The septum was straight and the nasal airway was open.  No abnormal secretions, purulence, or polyps were noted. The left middle turbinate and middle meatus were clearly visualized and normal in appearance.  Looking up, the olfactory cleft was unobstructed.  Middle meatus is patent with anterior ethmoid air cells and maxillary ostium clear with mucosa well-healed.  Going further back, the sphenoethmoid recess was normal in appearance, with healthy appearing mucosa on the face of the sphenoid.  The nasopharynx was unremarkable, and the eustachian tube opening on this side was unobstructed.    I then turned my attention to the right side.  Once again, the septum was straight, and the airway was open.  No abnormal secretions, purulence, polyps were noted.  The right middle turbinate and middle meatus were clearly visualized and normal in appearance.  Middle meatus does appear to be patent with clear maxillary ostium and antral mucosa as well as anterior ethmoid air cells looking up, the olfactory cleft was unobstructed.  Going further back the right sphenoethmoid recess was normal in appearance, and eustachian tube opening was unobstructed.   Gkjjnd - [id number]  Alannah YOUNG/RENATA - Yudelka HANNAH Ledesma is a 83 year old female Patient presents with:  Follow Up: Chronic sinusitis of both maxillary sinuses    Patient with a mostly chronic vasomotor rhinitis and chronic sinusitis that has been under control of the sinus surgery.  No evidence of acute infection.  Patient should continue ipratropium bromide nasal spray as needed.    Yudelka should follow up in 1 year.            Sebastian Arroyo MD           Again, thank you for allowing me to participate in the care of your patient.        Sincerely,        Sebastian Arroyo MD, MD

## 2024-07-18 ENCOUNTER — OFFICE VISIT (OUTPATIENT)
Dept: FAMILY MEDICINE | Facility: CLINIC | Age: 84
End: 2024-07-18
Payer: COMMERCIAL

## 2024-07-18 DIAGNOSIS — I50.32 CHRONIC DIASTOLIC HEART FAILURE (H): ICD-10-CM

## 2024-07-18 DIAGNOSIS — R22.32 MASS OF SKIN OF LEFT SHOULDER: ICD-10-CM

## 2024-07-18 DIAGNOSIS — M25.512 ACUTE PAIN OF LEFT SHOULDER: Primary | ICD-10-CM

## 2024-07-18 PROCEDURE — 99214 OFFICE O/P EST MOD 30 MIN: CPT | Performed by: STUDENT IN AN ORGANIZED HEALTH CARE EDUCATION/TRAINING PROGRAM

## 2024-07-18 ASSESSMENT — ANXIETY QUESTIONNAIRES
3. WORRYING TOO MUCH ABOUT DIFFERENT THINGS: NEARLY EVERY DAY
7. FEELING AFRAID AS IF SOMETHING AWFUL MIGHT HAPPEN: NOT AT ALL
IF YOU CHECKED OFF ANY PROBLEMS ON THIS QUESTIONNAIRE, HOW DIFFICULT HAVE THESE PROBLEMS MADE IT FOR YOU TO DO YOUR WORK, TAKE CARE OF THINGS AT HOME, OR GET ALONG WITH OTHER PEOPLE: NOT DIFFICULT AT ALL
GAD7 TOTAL SCORE: 8
5. BEING SO RESTLESS THAT IT IS HARD TO SIT STILL: NOT AT ALL
6. BECOMING EASILY ANNOYED OR IRRITABLE: MORE THAN HALF THE DAYS
2. NOT BEING ABLE TO STOP OR CONTROL WORRYING: NEARLY EVERY DAY
1. FEELING NERVOUS, ANXIOUS, OR ON EDGE: NOT AT ALL
GAD7 TOTAL SCORE: 8

## 2024-07-18 ASSESSMENT — PATIENT HEALTH QUESTIONNAIRE - PHQ9
SUM OF ALL RESPONSES TO PHQ QUESTIONS 1-9: 4
5. POOR APPETITE OR OVEREATING: NOT AT ALL

## 2024-07-18 ASSESSMENT — PAIN SCALES - GENERAL: PAINLEVEL: NO PAIN (0)

## 2024-07-18 ASSESSMENT — ASTHMA QUESTIONNAIRES: ACT_TOTALSCORE: 13

## 2024-07-18 NOTE — PROGRESS NOTES
Assessment & Plan     Acute pain of left shoulder  Mass of skin of left shoulder  Patient is a very pleasant 83-year-old female who presents today for acute left shoulder pain.  Patient started having left shoulder pain about 3 to 4 weeks ago, has abruptly improved in the past couple days.  About a week ago, felt a very severe pain, felt like something was ripping, and it has improved since then.  She notes pain is typically on the lateral aspect of the arm.  On exam today, minimal tenderness in the area of reported pain.  No joint line tenderness, no AC joint tenderness.  Does have a palpable mass of the left anterior shoulder, she reports she was told it was a ganglion cyst, however given the large, soft nature with notable edges, I am most concerned for lipoma.  Have minimal concern that that is affecting the left shoulder pain she is experiencing.  Possibility that she experienced a rotator cuff injury, however as it would not change our management, we did not pursue imaging today.  As there is no bony tenderness, do not suspect that x-rays would have been helpful.  Overall, she continues to improve, continue the chronic pain medication she takes ibuprofen as needed, ice.  If it is worsening again, we can certainly consider physical therapy as the next step for management.  With regards to the mass, if she starts to have bothersome effects from that, plan to obtain ultrasound imaging followed by referral to general surgery.    Will follow up in 1 month as previously planned for chronic pain follow up to review how she is doing from shoulder perspective.     Chronic diastolic heart failure (H)  Longstanding history of chronic diastolic heart failure, noncontributory to our visit today.  HCC.  Stable.      I spent a total of 30 minutes on the day of the visit.   Time spent by me doing chart review, history and exam, documentation and further activities per the note    Subjective   Yudelka is a 83 year old,  presenting for the following health issues:  Shoulder Pain (left)        7/18/2024     1:42 PM   Additional Questions   Roomed by williams vincent cma     HPI     Pain History:  When did you first notice your pain? Low level pain  for years   Have you seen this provider for your pain in the past? No   Where in your body do your have pain? Left shoulder   Are you seeing anyone else for your pain? No  What makes your pain better? rest   What makes your pain worse? Movement   How has pain affected your ability to work? Not applicable    Ganglion  cyst history , concerned that's what's causing her pain.     Previous low ROM, some dull achiness  Now what hurts the worst is pulling her pants up. Just really hurts to get arm around to be able to pull pants up  Not doing any shoulder activity, nothing. Washing dishes is fine. Hand movement is fine, but anything where using or turning at the shoulder is painful.     A little better the past few days,    Was concerned because of a ganglion cyst there for years and hadn't bothered her in the past.     No new injuries.   No change in activity level.     Pain worsened about a month ago, maybe 3-4 weeks ago. Scheduled 3 weeks ago. Had it for about a week before called.   Really hurting when called to make the appointment.     Oxycodone and ibuprofen seem to help pain somewhat.           6/30/2022     1:19 PM 10/12/2023     1:24 PM 7/18/2024     1:51 PM   PHQ-9 SCORE   PHQ-9 Total Score 0 0 4           10/12/2023     1:24 PM 4/30/2024     2:38 PM 7/18/2024     1:51 PM   NANCY-7 SCORE   Total Score  5 (mild anxiety)    Total Score 0 5 8               6/30/2022     1:19 PM 10/12/2023     1:24 PM 7/18/2024     1:51 PM   PHQ-9 SCORE   PHQ-9 Total Score 0 0 4           10/12/2023     1:24 PM 4/30/2024     2:38 PM 7/18/2024     1:51 PM   NANCY-7 SCORE   Total Score  5 (mild anxiety)    Total Score 0 5 8           2/10/2022     2:22 PM 2/10/2022     2:28 PM 7/18/2024     1:51 PM   PEG Score   PEG  "Total Score 8 8 2.67       Chronic Pain - Initial Assessment:    How would you describe your pain? Sharp   Have you had any recent changes to the severity or character of your pain? Has gotten worse   Is there an underlying cause that has been identified? Pt has cyst         Review of Systems  Constitutional, HEENT, cardiovascular, pulmonary, gi and gu systems are negative, except as otherwise noted.      Objective    /75 (BP Location: Right arm, Patient Position: Sitting, Cuff Size: Adult Regular)   Pulse 85   Resp 16   Ht 1.6 m (5' 2.99\")   Wt 75.3 kg (166 lb)   LMP 11/16/1975   SpO2 98%   BMI 29.41 kg/m    Body mass index is 29.41 kg/m .  Physical Exam  Constitutional:       Appearance: Normal appearance.   HENT:      Head: Normocephalic.   Eyes:      General: No scleral icterus.     Extraocular Movements: Extraocular movements intact.      Conjunctiva/sclera: Conjunctivae normal.   Cardiovascular:      Rate and Rhythm: Normal rate.   Pulmonary:      Effort: Pulmonary effort is normal.   Musculoskeletal:      Left shoulder: Tenderness present. No bony tenderness. Decreased range of motion.        Arms:       Comments: Mass overlying anterior left shoulder, just lateral to the AC joint. Estimated 3 x 1.7 cm, nontender   Neurological:      General: No focal deficit present.      Mental Status: She is alert and oriented to person, place, and time.           Results from this visitNo results found for any visits on 07/18/24.          Signed Electronically by: Wendy Hutchinson MD    "

## 2024-07-18 NOTE — NURSING NOTE
"Chief Complaint   Patient presents with    Shoulder Pain     left       Initial /75 (BP Location: Right arm, Patient Position: Sitting, Cuff Size: Adult Regular)   Pulse 85   Resp 16   Ht 1.6 m (5' 2.99\")   Wt 75.3 kg (166 lb)   LMP 11/16/1975   SpO2 98%   BMI 29.41 kg/m   Estimated body mass index is 29.41 kg/m  as calculated from the following:    Height as of this encounter: 1.6 m (5' 2.99\").    Weight as of this encounter: 75.3 kg (166 lb).    Patient presents to the clinic using Walker    Is there anyone who you would like to be able to receive your results? No  If yes have patient fill out LIGIA      "

## 2024-07-19 VITALS
HEIGHT: 63 IN | DIASTOLIC BLOOD PRESSURE: 75 MMHG | RESPIRATION RATE: 16 BRPM | TEMPERATURE: 98.1 F | OXYGEN SATURATION: 98 % | SYSTOLIC BLOOD PRESSURE: 133 MMHG | HEART RATE: 85 BPM | BODY MASS INDEX: 29.41 KG/M2 | WEIGHT: 166 LBS

## 2024-08-16 DIAGNOSIS — J45.20 MILD INTERMITTENT ASTHMA, UNCOMPLICATED: ICD-10-CM

## 2024-08-16 DIAGNOSIS — G25.81 RESTLESS LEGS SYNDROME: ICD-10-CM

## 2024-08-16 DIAGNOSIS — G89.29 CHRONIC LEFT-SIDED LOW BACK PAIN WITHOUT SCIATICA: ICD-10-CM

## 2024-08-16 DIAGNOSIS — M54.50 CHRONIC LEFT-SIDED LOW BACK PAIN WITHOUT SCIATICA: ICD-10-CM

## 2024-08-16 RX ORDER — OXYCODONE HYDROCHLORIDE 5 MG/1
2.5 TABLET ORAL 3 TIMES DAILY PRN
Qty: 6 TABLET | Refills: 0 | Status: SHIPPED | OUTPATIENT
Start: 2024-08-16 | End: 2024-08-20

## 2024-08-16 RX ORDER — ALBUTEROL SULFATE 90 UG/1
AEROSOL, METERED RESPIRATORY (INHALATION)
Qty: 9 G | Refills: 0 | Status: SHIPPED | OUTPATIENT
Start: 2024-08-16 | End: 2024-09-23

## 2024-08-16 NOTE — TELEPHONE ENCOUNTER
Patient calling to check refill status. Patient has appointment on 8/19. Only has 4 tablets of oxycodone left. Asking for small bridge refill

## 2024-08-18 ENCOUNTER — HEALTH MAINTENANCE LETTER (OUTPATIENT)
Age: 84
End: 2024-08-18

## 2024-08-19 ENCOUNTER — OFFICE VISIT (OUTPATIENT)
Dept: FAMILY MEDICINE | Facility: CLINIC | Age: 84
End: 2024-08-19
Payer: COMMERCIAL

## 2024-08-19 VITALS
TEMPERATURE: 97.8 F | BODY MASS INDEX: 29.41 KG/M2 | SYSTOLIC BLOOD PRESSURE: 138 MMHG | RESPIRATION RATE: 16 BRPM | WEIGHT: 166 LBS | HEART RATE: 76 BPM | OXYGEN SATURATION: 97 % | HEIGHT: 63 IN | DIASTOLIC BLOOD PRESSURE: 66 MMHG

## 2024-08-19 DIAGNOSIS — M54.50 CHRONIC LEFT-SIDED LOW BACK PAIN WITHOUT SCIATICA: ICD-10-CM

## 2024-08-19 DIAGNOSIS — E03.9 ACQUIRED HYPOTHYROIDISM: ICD-10-CM

## 2024-08-19 DIAGNOSIS — G89.29 CHRONIC LEFT-SIDED LOW BACK PAIN WITHOUT SCIATICA: ICD-10-CM

## 2024-08-19 DIAGNOSIS — G25.81 RESTLESS LEGS SYNDROME: ICD-10-CM

## 2024-08-19 DIAGNOSIS — F11.20 CONTINUOUS OPIOID DEPENDENCE (H): Primary | ICD-10-CM

## 2024-08-19 PROCEDURE — G2211 COMPLEX E/M VISIT ADD ON: HCPCS | Performed by: STUDENT IN AN ORGANIZED HEALTH CARE EDUCATION/TRAINING PROGRAM

## 2024-08-19 PROCEDURE — 99214 OFFICE O/P EST MOD 30 MIN: CPT | Performed by: STUDENT IN AN ORGANIZED HEALTH CARE EDUCATION/TRAINING PROGRAM

## 2024-08-19 RX ORDER — OXYCODONE HYDROCHLORIDE 5 MG/1
2.5 TABLET ORAL 3 TIMES DAILY PRN
Qty: 55 TABLET | Refills: 0 | Status: SHIPPED | OUTPATIENT
Start: 2024-10-18

## 2024-08-19 RX ORDER — LEVOTHYROXINE SODIUM 100 UG/1
TABLET ORAL
Qty: 100 TABLET | Refills: 1 | Status: SHIPPED | OUTPATIENT
Start: 2024-08-19

## 2024-08-19 RX ORDER — OXYCODONE HYDROCHLORIDE 5 MG/1
2.5 TABLET ORAL 3 TIMES DAILY PRN
Qty: 55 TABLET | Refills: 0 | Status: SHIPPED | OUTPATIENT
Start: 2024-08-19

## 2024-08-19 RX ORDER — DILTIAZEM HYDROCHLORIDE 240 MG/1
240 CAPSULE, COATED, EXTENDED RELEASE ORAL DAILY
COMMUNITY
Start: 2024-07-23

## 2024-08-19 RX ORDER — OXYCODONE HYDROCHLORIDE 5 MG/1
2.5 TABLET ORAL 3 TIMES DAILY PRN
Qty: 55 TABLET | Refills: 0 | Status: SHIPPED | OUTPATIENT
Start: 2024-09-18

## 2024-08-19 ASSESSMENT — PAIN SCALES - GENERAL: PAINLEVEL: NO PAIN (1)

## 2024-08-19 NOTE — PROGRESS NOTES
Assessment & Plan     Continuous opioid dependence (H)  Chronic left-sided low back pain without sciatica  Patient is a pleasant 83-year-old female with history of chronic left-sided low back pain and restless leg symptoms on oxycodone.  She has been on oxycodone for quite some time, was able to decrease the dose herself.  She is doing well on 2.5 mg 3 times daily, with an occasional additional fourth dose in the middle of the night with restless leg symptoms worsen.  - oxyCODONE (ROXICODONE) 5 MG tablet  Dispense: 55 tablet; Refill: 0  - oxyCODONE (ROXICODONE) 5 MG tablet  Dispense: 55 tablet; Refill: 0  - oxyCODONE (ROXICODONE) 5 MG tablet  Dispense: 55 tablet; Refill: 0  - PRIMARY CARE FOLLOW-UP SCHEDULING    Restless legs syndrome  With regards to restless leg symptoms, we again discussed options for alternative therapy to decrease opioid use, and she wishes to hold off on this at this time.  She was concerned about the potential side effects from pregabalin, ropinirole, and pramipexole.  - oxyCODONE (ROXICODONE) 5 MG tablet  Dispense: 55 tablet; Refill: 0  - oxyCODONE (ROXICODONE) 5 MG tablet  Dispense: 55 tablet; Refill: 0  - oxyCODONE (ROXICODONE) 5 MG tablet  Dispense: 55 tablet; Refill: 0  - PRIMARY CARE FOLLOW-UP SCHEDULING    Acquired hypothyroidism  For history of  hypothyroidism, did have some labs drawn at her last visit, noted to have continued mild elevation of the TSH.  Suggested that patient add an additional half dose of levothyroxine middle of the week, however she is hesitant to do so.  Ultimately, patient would prefer to continue at current dosing, and we can recheck thyroid in another 3 months or so.  We discussed my goal for try to get this TSH under 6, however with the TSH being just over 6, it is reasonable to hold off on further dose adjustment at this time, particularly given her age.  - levothyroxine (SYNTHROID/LEVOTHROID) 100 MCG tablet  Dispense: 100 tablet; Refill: 1    The  "longitudinal plan of care for the diagnosis(es)/condition(s) as documented were addressed during this visit. Due to the added complexity in care, I will continue to support Yudelka in the subsequent management and with ongoing continuity of care.    Subjective   Yudelka is a 83 year old, presenting for the following health issues:  Shoulder Pain        8/19/2024     2:03 PM   Additional Questions   Roomed by Shelly GRIER   Accompanied by Self     HPI     General Follow Up  Concern: Left shoulder pain follow up  Problem started: 2 months ago  Progression of symptoms: better  I  2-3 in the morning when RLS come up most often.     Occasional 1/2 tablet in the middle of the night for that with ibuprofen and after a while (still takes a while for that to help) then able to go back to sleep.     Legs, back, neck     Two nights ago, took oxycodone 1/2 tablet at 3 am, then didn't take any more again until when she went to bed.     Some mornings hurts so bad and can hardly get up and walk, but some days are better.     Review of Systems  Constitutional, HEENT, cardiovascular, pulmonary, gi and gu systems are negative, except as otherwise noted.      Objective    /66 (BP Location: Right arm, Patient Position: Sitting, Cuff Size: Adult Regular)   Pulse 76   Temp 97.8  F (36.6  C) (Tympanic)   Resp 16   Ht 1.6 m (5' 3\")   Wt 75.3 kg (166 lb)   LMP 11/16/1975   SpO2 97%   BMI 29.41 kg/m    Body mass index is 29.41 kg/m .  Physical Exam  Constitutional:       Appearance: Normal appearance.   HENT:      Head: Normocephalic.   Eyes:      General: No scleral icterus.     Extraocular Movements: Extraocular movements intact.      Conjunctiva/sclera: Conjunctivae normal.   Cardiovascular:      Rate and Rhythm: Normal rate.   Pulmonary:      Effort: Pulmonary effort is normal.   Neurological:      General: No focal deficit present.      Mental Status: She is alert and oriented to person, place, and time.              Signed " Electronically by: Wendy Hutchinson MD

## 2024-09-01 ENCOUNTER — DOCUMENTATION ONLY (OUTPATIENT)
Dept: ONCOLOGY | Facility: CLINIC | Age: 84
End: 2024-09-01
Payer: COMMERCIAL

## 2024-09-01 DIAGNOSIS — D50.8 OTHER IRON DEFICIENCY ANEMIA: Primary | ICD-10-CM

## 2024-09-01 NOTE — PROGRESS NOTES
Your patient has a lab appointment on 9.12.24 for lab work for Dr Quijano. At this time there are no orders placed for there lab appointment. Please review and sign orders prior to there appointment time. Thank you.      Cheyenne Evans CMA (Lab)

## 2024-09-10 ENCOUNTER — PATIENT OUTREACH (OUTPATIENT)
Dept: CARE COORDINATION | Facility: CLINIC | Age: 84
End: 2024-09-10
Payer: COMMERCIAL

## 2024-09-10 ENCOUNTER — THERAPY VISIT (OUTPATIENT)
Dept: PHYSICAL THERAPY | Facility: CLINIC | Age: 84
End: 2024-09-10
Attending: STUDENT IN AN ORGANIZED HEALTH CARE EDUCATION/TRAINING PROGRAM
Payer: COMMERCIAL

## 2024-09-10 DIAGNOSIS — Z96.651 S/P TOTAL KNEE ARTHROPLASTY, RIGHT: ICD-10-CM

## 2024-09-10 DIAGNOSIS — R29.898 RIGHT LEG WEAKNESS: ICD-10-CM

## 2024-09-10 PROCEDURE — 97110 THERAPEUTIC EXERCISES: CPT | Mod: GP | Performed by: PHYSICAL THERAPIST

## 2024-09-10 PROCEDURE — 97162 PT EVAL MOD COMPLEX 30 MIN: CPT | Mod: GP | Performed by: PHYSICAL THERAPIST

## 2024-09-10 NOTE — PROGRESS NOTES
PHYSICAL THERAPY EVALUATION  Type of Visit: Evaluation       Fall Risk Screen:  Fall screen completed by: PT  Have you fallen 2 or more times in the past year?: No  Have you fallen and had an injury in the past year?: No  Is patient a fall risk?: Yes; Department fall risk interventions implemented  Fall screen comments: TUG 32 sec    Subjective       Presenting condition or subjective complaint:  Reports that she has noticed difficulty walking ever since she had her R knee replaced in .  Reports has back pain and numbness in her Right foot.  Reports feel weakness in her right leg.  Felt like her right leg never worked the same after her knee replacement.  Date of onset: 24 (Date of referral)    Relevant medical history:    Past Medical History:   Diagnosis Date    Basal cell carcinoma     Esophageal reflux     Other and unspecified hyperlipidemia     Unspecified essential hypertension     Unspecified hypothyroidism       Dates & types of surgery:  2023  Release carpal tunnel (Right) Procedure: Carpal tunnel release, Right;  Surgeon: Sheldon Peters MD;  Location: [facility]  2022  Arthroplasty knee (Right) Procedure: TOTAL Knee Arthroplasty, right;  Surgeon: Sheldon Peters MD;  Location: [facility]  2011  Colonoscopy COLONOSCOPY performed by BHARTI DUNHAM at [facility]    Prior diagnostic imaging/testing results:     see epic  Prior therapy history for the same diagnosis, illness or injury:        Living Environment  Social support:     Type of home:   house  Stairs to enter the home:       4 [address]  Ramp:     Stairs inside the home:       flight of stair to upstairs  Help at home:    Equipment owned:   SEC and Wheeled Walker    Employment:    Retired  Hobbies/Interests:  Taking care of animals (dog, cats, and pony), [employer]     Patient goals for therapy:  Walk better, maybe without walker    Pain assessment: Location: Neck, low back, knees /Ratin-2/10     Objective    KNEE EVALUATION  PAIN: Pain Level at Rest: 2/10  Pain Level with Use: 2/10  Pain Location: knee  Pain Quality: Aching, Numb, and Weakness  Pain Frequency: constant  Pain is Worst: daytime  Pain is Exacerbated By: walking  Pain is Relieved By: otc medications and pain medications  Pain Progression: Unchanged  INTEGUMENTARY (edema, incisions):  B LE edema  POSTURE:  Forward flexed posture with standing, rounded shoulders and forward head with sitting  GAIT:  Weightbearing Status: WBAT  Assistive Device(s): Walker (four wheeled)  Gait Deviations:  R trendelenburg gait, decreased gait speed, WBOS, decreased step cadance  BALANCE/PROPRIOCEPTION:  Unable to perform SLS; TU sec; CTSIB: 30 sec; 0s; 0s; 0s    ROM:   (Degrees) Left AROM Left PROM  Right AROM Right PROM   Knee Flexion 110*  100*    Knee Extension restricted  restricted      STRENGTH:   Pain: - none + mild ++ moderate +++ severe  Strength Scale: 0-5/5 Left Right   Knee Flexion 3 3+   Knee Extension 4- 4-   Quad Set     Hip Flexion: L: 3+/5; R: 3/5  Hip abduction: L: 3+/5; R: 3/5    FLEXIBILITY:  HS restricted B LE  SPECIAL TESTS:  not assessed due to hx of R TKA  FUNCTIONAL TESTS: Step Test: unable to perform  SLS: unable to perform  Squats: unable to perform  PALPATION: WNL; Reports numbness to R LE and foot  JOINT MOBILITY: WNL    Assessment & Plan   CLINICAL IMPRESSIONS  Medical Diagnosis: S/P total knee arthroplasty, right (Z96.651)    Right leg weakness (R29.898)    Treatment Diagnosis: Decreased mobility, B LE weakness, balance deficits   Impression/Assessment: Patient is a 83 year old female with R leg weakness complaints.  The following significant findings have been identified: Pain, Decreased ROM/flexibility, Decreased strength, Impaired balance, Impaired sensation, and Impaired gait. These impairments interfere with their ability to perform self care tasks, recreational activities, household chores, driving , household mobility, and community  mobility as compared to previous level of function.     Clinical Decision Making (Complexity):  Clinical Presentation: Evolving/Changing  Clinical Presentation Rationale: based on medical and personal factors listed in PT evaluation  Clinical Decision Making (Complexity): Moderate complexity    PLAN OF CARE  Treatment Interventions:  Modalities: Cryotherapy, E-stim, Hot Pack, Mechanical Traction, Ultrasound  Interventions: Gait Training, Manual Therapy, Neuromuscular Re-education, Therapeutic Activity, Therapeutic Exercise, Self-Care/Home Management    Long Term Goals     PT Goal 1  Goal Identifier: Sit to stand  Goal Description: Pt will demonstrate ability to perform sit to stand on first attempt in order to have increased safety with transfers.  Target Date: 10/08/24  PT Goal 2  Goal Identifier: TUG  Goal Description: Pt will demonstrate a 25% reduction on speed with TUG in order to have improved safety with gait.  Target Date: 11/19/24  PT Goal 3  Goal Identifier: Hip Flexion  Goal Description: Pt will demosntrate hip flexion strength of 4/5 in order to have increased ease of ambluation.  Target Date: 11/19/24      Frequency of Treatment: 1 x a week  Duration of Treatment: 10 weeks    Recommended Referrals to Other Professionals:   Education Assessment:   Learner/Method: Patient  Education Comments: instruction on need for strengthening exercises    Risks and benefits of evaluation/treatment have been explained.   Patient/Family/caregiver agrees with Plan of Care.     Evaluation Time:     PT Eval, Moderate Complexity Minutes (43814): 35       Signing Clinician: Arabella Enriquez, PT        [facility]                                                                                   OUTPATIENT PHYSICAL THERAPY      PLAN OF TREATMENT FOR OUTPATIENT REHABILITATION   Patient's Last Name, First Name, Yudelka Reynolds YOB: 1940   Provider's Name   [facility]  [facility]   Medical Record No.  [id number]     Onset Date: 05/20/24 (Date of referral)  Start of Care Date: 09/10/24     Medical Diagnosis:  S/P total knee arthroplasty, right (Z96.651)    Right leg weakness (R29.898)      PT Treatment Diagnosis:  Decreased mobility, B LE weakness, balance deficits Plan of Treatment  Frequency/Duration: 1 x a week/ 10 weeks    Certification date from 09/10/24 to 11/19/24         See note for plan of treatment details and functional goals     Arabella Enriquez, PT                         I CERTIFY THE NEED FOR THESE SERVICES FURNISHED UNDER        THIS PLAN OF TREATMENT AND WHILE UNDER MY CARE     (Physician attestation of this document indicates review and certification of the therapy plan).              Referring Provider:  Wendy Hutchinson    Initial Assessment  See Epic Evaluation- Start of Care Date: 09/10/24

## 2024-09-17 ENCOUNTER — LAB (OUTPATIENT)
Dept: LAB | Facility: CLINIC | Age: 84
End: 2024-09-17
Payer: COMMERCIAL

## 2024-09-17 ENCOUNTER — THERAPY VISIT (OUTPATIENT)
Dept: PHYSICAL THERAPY | Facility: CLINIC | Age: 84
End: 2024-09-17
Attending: STUDENT IN AN ORGANIZED HEALTH CARE EDUCATION/TRAINING PROGRAM
Payer: COMMERCIAL

## 2024-09-17 DIAGNOSIS — D50.8 OTHER IRON DEFICIENCY ANEMIA: ICD-10-CM

## 2024-09-17 DIAGNOSIS — R29.898 RIGHT LEG WEAKNESS: Primary | ICD-10-CM

## 2024-09-17 LAB
BASOPHILS # BLD AUTO: 0.1 10E3/UL (ref 0–0.2)
BASOPHILS NFR BLD AUTO: 1 %
EOSINOPHIL # BLD AUTO: 0.5 10E3/UL (ref 0–0.7)
EOSINOPHIL NFR BLD AUTO: 6 %
ERYTHROCYTE [DISTWIDTH] IN BLOOD BY AUTOMATED COUNT: 12.6 % (ref 10–15)
FERRITIN SERPL-MCNC: 260 NG/ML (ref 11–328)
HCT VFR BLD AUTO: 35.1 % (ref 35–47)
HGB BLD-MCNC: 11.2 G/DL (ref 11.7–15.7)
IMM GRANULOCYTES # BLD: 0 10E3/UL
IMM GRANULOCYTES NFR BLD: 0 %
IRON BINDING CAPACITY (ROCHE): 293 UG/DL (ref 240–430)
IRON SATN MFR SERPL: 24 % (ref 15–46)
IRON SERPL-MCNC: 71 UG/DL (ref 37–145)
LYMPHOCYTES # BLD AUTO: 2 10E3/UL (ref 0.8–5.3)
LYMPHOCYTES NFR BLD AUTO: 24 %
MCH RBC QN AUTO: 29.6 PG (ref 26.5–33)
MCHC RBC AUTO-ENTMCNC: 31.9 G/DL (ref 31.5–36.5)
MCV RBC AUTO: 93 FL (ref 78–100)
MONOCYTES # BLD AUTO: 0.8 10E3/UL (ref 0–1.3)
MONOCYTES NFR BLD AUTO: 10 %
NEUTROPHILS # BLD AUTO: 4.8 10E3/UL (ref 1.6–8.3)
NEUTROPHILS NFR BLD AUTO: 59 %
PLATELET # BLD AUTO: 297 10E3/UL (ref 150–450)
RBC # BLD AUTO: 3.79 10E6/UL (ref 3.8–5.2)
WBC # BLD AUTO: 8.1 10E3/UL (ref 4–11)

## 2024-09-17 PROCEDURE — 83550 IRON BINDING TEST: CPT

## 2024-09-17 PROCEDURE — 82728 ASSAY OF FERRITIN: CPT

## 2024-09-17 PROCEDURE — 85025 COMPLETE CBC W/AUTO DIFF WBC: CPT

## 2024-09-17 PROCEDURE — 83540 ASSAY OF IRON: CPT

## 2024-09-17 PROCEDURE — 36415 COLL VENOUS BLD VENIPUNCTURE: CPT

## 2024-09-17 PROCEDURE — 97110 THERAPEUTIC EXERCISES: CPT | Mod: GP | Performed by: PHYSICAL THERAPIST

## 2024-09-23 DIAGNOSIS — J45.20 MILD INTERMITTENT ASTHMA, UNCOMPLICATED: ICD-10-CM

## 2024-09-23 RX ORDER — ALBUTEROL SULFATE 90 UG/1
AEROSOL, METERED RESPIRATORY (INHALATION)
Qty: 9 G | Refills: 0 | Status: SHIPPED | OUTPATIENT
Start: 2024-09-23

## 2024-09-24 ENCOUNTER — THERAPY VISIT (OUTPATIENT)
Dept: PHYSICAL THERAPY | Facility: CLINIC | Age: 84
End: 2024-09-24
Attending: STUDENT IN AN ORGANIZED HEALTH CARE EDUCATION/TRAINING PROGRAM
Payer: COMMERCIAL

## 2024-09-24 DIAGNOSIS — R29.898 RIGHT LEG WEAKNESS: Primary | ICD-10-CM

## 2024-09-24 PROCEDURE — 97110 THERAPEUTIC EXERCISES: CPT | Mod: GP | Performed by: PHYSICAL THERAPIST

## 2024-10-17 ENCOUNTER — THERAPY VISIT (OUTPATIENT)
Dept: PHYSICAL THERAPY | Facility: CLINIC | Age: 84
End: 2024-10-17
Attending: STUDENT IN AN ORGANIZED HEALTH CARE EDUCATION/TRAINING PROGRAM
Payer: COMMERCIAL

## 2024-10-17 DIAGNOSIS — R29.898 RIGHT LEG WEAKNESS: Primary | ICD-10-CM

## 2024-10-17 PROCEDURE — 97535 SELF CARE MNGMENT TRAINING: CPT | Mod: GP | Performed by: PHYSICAL THERAPIST

## 2024-10-17 NOTE — PROGRESS NOTES
Physical Therapy Discharge    DISCHARGE  Reason for Discharge: Patient chooses to discontinue therapy, minimal change with current course of physical therapy.    Equipment Issued: none    Discharge Plan: follow up with referring provider    Referring Provider:  Wendy Hutchinson       10/17/24 0500   Appointment Info   Signing clinician's name / credentials Arabella Enriquez PT DPT CLT   Visits Used 4   Medical Diagnosis S/P total knee arthroplasty, right (Z96.651)    Right leg weakness (R29.898)   PT Tx Diagnosis Decreased mobility, B LE weakness, balance deficits   Progress Note/Certification   Start of Care Date 09/10/24   Onset of illness/injury or Date of Surgery 05/20/24  (Date of referral)   Therapy Frequency 1 x a week   Predicted Duration 10 weeks   Certification date from 09/10/24   Certification date to 11/19/24   Progress Note Due Date 11/19/24   GOALS   PT Goals 2;3   PT Goal 1   Goal Identifier Sit to stand   Goal Description Pt will demonstrate ability to perform sit to stand on first attempt in order to have increased safety with transfers.   Target Date 10/08/24   PT Goal 2   Goal Identifier TUG   Goal Description Pt will demonstrate a 25% reduction on speed with TUG in order to have improved safety with gait.   Target Date 11/19/24   PT Goal 3   Goal Identifier Hip Flexion   Goal Description Pt will demosntrate hip flexion strength of 4/5 in order to have increased ease of ambluation.   Target Date 11/19/24   Subjective Report   Subjective Report Reports minimal change in symptoms since last session.  Does not feel exercises are helping her symptoms.   Objective Measures   Objective Measures Objective Measure 1   Treatment Interventions (PT)   Interventions Therapeutic Procedure/Exercise;Neuromuscular Re-education;Self Care/Home Management   Self Care/home Management   ADL/Home Mgmt Training (93013) 15   Self Care 1 Discharge instructions   Self Care 1 - Details Pt instructed to follow up with  referring provider due to minimal change in symptoms with exercises and therapy.   Plan   Home program PTRx   Plan for next session discharge   Total Session Time   Timed Code Treatment Minutes 15   Total Treatment Time (sum of timed and untimed services) 15

## 2024-11-10 ENCOUNTER — DOCUMENTATION ONLY (OUTPATIENT)
Dept: FAMILY MEDICINE | Facility: CLINIC | Age: 84
End: 2024-11-10
Payer: COMMERCIAL

## 2024-11-10 DIAGNOSIS — E03.9 ACQUIRED HYPOTHYROIDISM: Primary | ICD-10-CM

## 2024-11-10 NOTE — PROGRESS NOTES
Patient has lab appointment on 11.12.24 and I put the BMP in there via Health Maintenance but Im wondering if you want to recheck TSH or anything else so Im just sending this to you just incase. If you do not want any other labs feel free to close the encounter. Thank you.    Cheyenne Evans on 11/10/2024 at 12:40 PM

## 2024-11-12 ENCOUNTER — LAB (OUTPATIENT)
Dept: LAB | Facility: CLINIC | Age: 84
End: 2024-11-12
Payer: COMMERCIAL

## 2024-11-12 DIAGNOSIS — J45.20 MILD INTERMITTENT ASTHMA, UNCOMPLICATED: ICD-10-CM

## 2024-11-12 DIAGNOSIS — I10 ESSENTIAL HYPERTENSION WITH GOAL BLOOD PRESSURE LESS THAN 140/90: Primary | ICD-10-CM

## 2024-11-12 DIAGNOSIS — E03.9 ACQUIRED HYPOTHYROIDISM: ICD-10-CM

## 2024-11-12 LAB
ANION GAP SERPL CALCULATED.3IONS-SCNC: 9 MMOL/L (ref 7–15)
BUN SERPL-MCNC: 24.1 MG/DL (ref 8–23)
CALCIUM SERPL-MCNC: 10.3 MG/DL (ref 8.8–10.4)
CHLORIDE SERPL-SCNC: 104 MMOL/L (ref 98–107)
CREAT SERPL-MCNC: 0.88 MG/DL (ref 0.51–0.95)
EGFRCR SERPLBLD CKD-EPI 2021: 65 ML/MIN/1.73M2
GLUCOSE SERPL-MCNC: 123 MG/DL (ref 70–99)
HCO3 SERPL-SCNC: 26 MMOL/L (ref 22–29)
HOLD SPECIMEN: NORMAL
POTASSIUM SERPL-SCNC: 4.6 MMOL/L (ref 3.4–5.3)
SODIUM SERPL-SCNC: 139 MMOL/L (ref 135–145)
TSH SERPL DL<=0.005 MIU/L-ACNC: 6.48 UIU/ML (ref 0.3–4.2)

## 2024-11-12 PROCEDURE — 80048 BASIC METABOLIC PNL TOTAL CA: CPT

## 2024-11-12 PROCEDURE — 84443 ASSAY THYROID STIM HORMONE: CPT

## 2024-11-12 PROCEDURE — 36415 COLL VENOUS BLD VENIPUNCTURE: CPT

## 2024-11-12 RX ORDER — ALBUTEROL SULFATE 90 UG/1
INHALANT RESPIRATORY (INHALATION)
Qty: 9 G | Refills: 0 | Status: SHIPPED | OUTPATIENT
Start: 2024-11-12

## 2024-11-19 ENCOUNTER — PATIENT OUTREACH (OUTPATIENT)
Dept: CARE COORDINATION | Facility: CLINIC | Age: 84
End: 2024-11-19

## 2024-11-19 ENCOUNTER — OFFICE VISIT (OUTPATIENT)
Dept: FAMILY MEDICINE | Facility: CLINIC | Age: 84
End: 2024-11-19
Attending: STUDENT IN AN ORGANIZED HEALTH CARE EDUCATION/TRAINING PROGRAM
Payer: COMMERCIAL

## 2024-11-19 VITALS
DIASTOLIC BLOOD PRESSURE: 80 MMHG | HEART RATE: 82 BPM | BODY MASS INDEX: 30.12 KG/M2 | HEIGHT: 63 IN | WEIGHT: 170 LBS | OXYGEN SATURATION: 97 % | RESPIRATION RATE: 16 BRPM | SYSTOLIC BLOOD PRESSURE: 136 MMHG

## 2024-11-19 DIAGNOSIS — M54.50 CHRONIC LEFT-SIDED LOW BACK PAIN WITHOUT SCIATICA: Primary | ICD-10-CM

## 2024-11-19 DIAGNOSIS — M25.521 RIGHT ELBOW PAIN: ICD-10-CM

## 2024-11-19 DIAGNOSIS — G89.29 CHRONIC LEFT-SIDED LOW BACK PAIN WITHOUT SCIATICA: Primary | ICD-10-CM

## 2024-11-19 DIAGNOSIS — I89.0 LYMPHEDEMA: ICD-10-CM

## 2024-11-19 DIAGNOSIS — E03.9 ACQUIRED HYPOTHYROIDISM: ICD-10-CM

## 2024-11-19 DIAGNOSIS — G25.81 RESTLESS LEGS SYNDROME: ICD-10-CM

## 2024-11-19 PROCEDURE — G2211 COMPLEX E/M VISIT ADD ON: HCPCS | Performed by: STUDENT IN AN ORGANIZED HEALTH CARE EDUCATION/TRAINING PROGRAM

## 2024-11-19 PROCEDURE — 99214 OFFICE O/P EST MOD 30 MIN: CPT | Performed by: STUDENT IN AN ORGANIZED HEALTH CARE EDUCATION/TRAINING PROGRAM

## 2024-11-19 RX ORDER — OXYCODONE HYDROCHLORIDE 5 MG/1
2.5 TABLET ORAL 3 TIMES DAILY PRN
Qty: 55 TABLET | Refills: 0 | Status: SHIPPED | OUTPATIENT
Start: 2024-12-19

## 2024-11-19 RX ORDER — LEVOTHYROXINE SODIUM 100 UG/1
TABLET ORAL
Qty: 102 TABLET | Refills: 1 | Status: SHIPPED | OUTPATIENT
Start: 2024-11-19 | End: 2024-11-19

## 2024-11-19 RX ORDER — OXYCODONE HYDROCHLORIDE 5 MG/1
2.5 TABLET ORAL 3 TIMES DAILY PRN
Qty: 55 TABLET | Refills: 0 | Status: SHIPPED | OUTPATIENT
Start: 2024-11-19

## 2024-11-19 RX ORDER — OXYCODONE HYDROCHLORIDE 5 MG/1
2.5 TABLET ORAL 3 TIMES DAILY PRN
Qty: 55 TABLET | Refills: 0 | Status: SHIPPED | OUTPATIENT
Start: 2025-01-19

## 2024-11-19 RX ORDER — LEVOTHYROXINE SODIUM 100 UG/1
TABLET ORAL
Qty: 114 TABLET | Refills: 1 | Status: SHIPPED | OUTPATIENT
Start: 2024-11-19

## 2024-11-19 ASSESSMENT — ANXIETY QUESTIONNAIRES
6. BECOMING EASILY ANNOYED OR IRRITABLE: SEVERAL DAYS
2. NOT BEING ABLE TO STOP OR CONTROL WORRYING: NEARLY EVERY DAY
3. WORRYING TOO MUCH ABOUT DIFFERENT THINGS: NEARLY EVERY DAY
GAD7 TOTAL SCORE: 7
1. FEELING NERVOUS, ANXIOUS, OR ON EDGE: NOT AT ALL
5. BEING SO RESTLESS THAT IT IS HARD TO SIT STILL: NOT AT ALL
7. FEELING AFRAID AS IF SOMETHING AWFUL MIGHT HAPPEN: NOT AT ALL
GAD7 TOTAL SCORE: 7

## 2024-11-19 ASSESSMENT — PATIENT HEALTH QUESTIONNAIRE - PHQ9
5. POOR APPETITE OR OVEREATING: NOT AT ALL
SUM OF ALL RESPONSES TO PHQ QUESTIONS 1-9: 6

## 2024-11-19 ASSESSMENT — PAIN SCALES - GENERAL: PAINLEVEL_OUTOF10: MODERATE PAIN (4)

## 2024-11-19 NOTE — LETTER

## 2024-11-19 NOTE — PATIENT INSTRUCTIONS
Let me know if the voltaren (diclofenac) gel and the brace on the elbow isn't helping within the next 2-3 weeks and we will plan to get an x-ray

## 2024-11-19 NOTE — PROGRESS NOTES
Assessment & Plan     Chronic left-sided low back pain without sciatica  Restless legs syndrome  Patient is a very pleasant 84-year-old female who presents today for follow-up on chronic pain.  From a chronic pain perspective, refilled the dose that seems to have been working well for her recently.  Follow-up in 3 months.    We also did discuss some balance issues today which is likely in part due to pain.  On evaluation, she does have some favoring of the left lower extremity.  She was working with physical therapy when not having significant improvement.  Given some additional exercises to try at home primarily when supine.  Continue to work on slow increase mobility and balance at home.  - PRIMARY CARE FOLLOW-UP SCHEDULING  - oxyCODONE (ROXICODONE) 5 MG tablet  Dispense: 55 tablet; Refill: 0  - oxyCODONE (ROXICODONE) 5 MG tablet  Dispense: 55 tablet; Refill: 0  - oxyCODONE (ROXICODONE) 5 MG tablet  Dispense: 55 tablet; Refill: 0    Right elbow pain  Patient is also noting some ongoing right elbow pain that started after she hit her elbow multiple times on a box while reaching her arm backwards, also hit it on a door frame.  Does have some tenderness over the olecranon.  We discussed possible options including bone bruise versus fracture versus bursitis.  Will try topical treatments first.  Patient will also  an over-the-counter elbow compression sleeve.  If not improving within the next couple weeks, recommended getting an x-ray.  - diclofenac (VOLTAREN) 1 % topical gel  Dispense: 150 g; Refill: 1    Lymphedema  Patient has lower extremity lymphedema.  She would benefit from compression stockings.  Order placed today.  Has previously been to lymphedema therapy.  - Compression Sleeve/Stocking Order    Acquired hypothyroidism  History of hypothyroidism.  Recommended again that she increase levothyroxine to 1-1/2 tablets on Sunday and since Wednesday (previously just doing Sundays). She was open to this and  increased dose is sent to the pharmacy for her.   - levothyroxine (SYNTHROID/LEVOTHROID) 100 MCG tablet  Dispense: 114 tablet; Refill: 1  - TSH with free T4 reflex    The longitudinal plan of care for the diagnosis(es)/condition(s) as documented were addressed during this visit. Due to the added complexity in care, I will continue to support Yudelka in the subsequent management and with ongoing continuity of care.    Subjective   Yudelka is a 84 year old, presenting for the following health issues:  Pain        11/19/2024     1:43 PM   Additional Questions   Roomed by Shelly GRIER   Accompanied by self     HPI   New elbow pain: Managed to hit right elbow a few times, maybe once on the door, and since then elbow started hurting.   Reached back and hit elbow on a box a few times.   Not feeling too swollen.       Chronic pain:   Stable.    Pain History:  When did you first notice your pain? years   Have you seen this provider for your pain in the past? Yes   Where in your body do you have pain? Neck, shoulders  Are you seeing anyone else for your pain? No        10/12/2023     1:24 PM 7/18/2024     1:51 PM 11/19/2024     1:48 PM   PHQ-9 SCORE   PHQ-9 Total Score 0 4 6           4/30/2024     2:38 PM 7/18/2024     1:51 PM 11/19/2024     1:48 PM   NANCY-7 SCORE   Total Score 5 (mild anxiety)     Total Score 5 8 7     Easily aggitated, more with her .             2/10/2022     2:28 PM 7/18/2024     1:51 PM 11/19/2024     1:44 PM   PEG Score   PEG Total Score 8 2.67 2.33         PDMP Review         Value Time User    State PDMP site checked  Yes 11/19/2024  1:59 PM Wendy Hutchinson MD          Last CSA Agreement:   CSA -- Patient Level:     [Media Unavailable] Controlled Substance Agreement - Opioid - Scan on 11/19/2024  2:57 PM   [Media Unavailable] Controlled Substance Agreement - Opioid - Scan on 5/23/2023  4:25 PM   [Media Unavailable] Controlled Substance Agreement - Opioid - Scan on 6/30/2022  2:35 PM       Last  "UDS: 7/3/2024    Seems weaker on the left when standing.       Review of Systems  Constitutional, HEENT, cardiovascular, pulmonary, gi and gu systems are negative, except as otherwise noted.      Objective    /80 (BP Location: Right arm, Patient Position: Sitting, Cuff Size: Adult Regular)   Pulse 82   Resp 16   Ht 1.6 m (5' 3\")   Wt 77.1 kg (170 lb)   LMP 11/16/1975   SpO2 97%   BMI 30.11 kg/m    Body mass index is 30.11 kg/m .  Physical Exam  Constitutional:       Appearance: Normal appearance.   HENT:      Head: Normocephalic.   Eyes:      General: No scleral icterus.     Extraocular Movements: Extraocular movements intact.      Conjunctiva/sclera: Conjunctivae normal.   Cardiovascular:      Rate and Rhythm: Normal rate.   Pulmonary:      Effort: Pulmonary effort is normal.   Musculoskeletal:      Comments: Favors left leg when walking. When attempts to balance on left leg, she juts her hips towards the left.    Neurological:      General: No focal deficit present.      Mental Status: She is alert and oriented to person, place, and time.           Results from this visitNo results found for any visits on 11/19/24.          Signed Electronically by: Wendy Hutchinson MD  DME (Durable Medical Equipment) Orders and Documentation  Orders Placed This Encounter   Procedures    Compression Sleeve/Stocking Order        The patient was assessed and it was determined the patient is in need of the following listed DME Supplies/Equipment. Please complete supporting documentation below to demonstrate medical necessity.         "

## 2024-12-11 DIAGNOSIS — J45.20 MILD INTERMITTENT ASTHMA, UNCOMPLICATED: ICD-10-CM

## 2024-12-11 NOTE — TELEPHONE ENCOUNTER
"Has appointment scheduled for 2/20/25.      Requested Prescriptions   Pending Prescriptions Disp Refills    albuterol (PROAIR HFA/PROVENTIL HFA/VENTOLIN HFA) 108 (90 Base) MCG/ACT inhaler [Pharmacy Med Name: Albuterol Sulfate  (90 Base) MCG/ACT Inhalation Aerosol Solution] 9 g 0     Sig: INHALE 1 TO 2 PUFFS BY MOUTH EVERY 4 HOURS AS NEEDED FOR SHORTNESS OF BREATH DYSPNEA  OR  WHEEZING       Short-Acting Beta Agonist Inhalers Protocol  Failed - 12/11/2024  4:29 PM        Failed - Asthma control assessment score within normal limits in last 6 months     Please review ACT score.           Passed - Patient is age 12 or older        Passed - Medication is active on med list        Passed - Recent (6 mo) or future (30 days) visit within the authorizing provider's specialty     Patient had office visit in the last 6 months or has a visit in the next 30 days with authorizing provider or within the authorizing provider's specialty.  See \"Patient Info\" tab in inbasket, or \"Choose Columns\" in Meds & Orders section of the refill encounter.                 "

## 2024-12-12 RX ORDER — ALBUTEROL SULFATE 90 UG/1
INHALANT RESPIRATORY (INHALATION)
Qty: 9 G | Refills: 3 | Status: SHIPPED | OUTPATIENT
Start: 2024-12-12

## 2025-01-14 DIAGNOSIS — E78.5 HYPERLIPIDEMIA LDL GOAL <130: ICD-10-CM

## 2025-01-14 DIAGNOSIS — I10 ESSENTIAL HYPERTENSION WITH GOAL BLOOD PRESSURE LESS THAN 140/90: ICD-10-CM

## 2025-01-14 RX ORDER — ATORVASTATIN CALCIUM 40 MG/1
40 TABLET, FILM COATED ORAL DAILY
Qty: 90 TABLET | Refills: 0 | Status: SHIPPED | OUTPATIENT
Start: 2025-01-14

## 2025-01-14 RX ORDER — LISINOPRIL 20 MG/1
20 TABLET ORAL DAILY
Qty: 90 TABLET | Refills: 0 | Status: SHIPPED | OUTPATIENT
Start: 2025-01-14

## 2025-01-14 RX ORDER — DILTIAZEM HYDROCHLORIDE 240 MG/1
CAPSULE, COATED, EXTENDED RELEASE ORAL
Qty: 90 CAPSULE | Refills: 3 | Status: SHIPPED | OUTPATIENT
Start: 2025-01-14

## 2025-01-14 NOTE — TELEPHONE ENCOUNTER
Routing refill request to provider for review/approval because:  Drug not active on patient's medication list      Last office visit: 11/19/2024 ; last virtual visit: Visit date not found with prescribing provider:     Future Office Visit:   Next 5 appointments (look out 90 days)      Feb 20, 2025 2:30 PM  (Arrive by 2:10 PM)  Provider Visit with Wendy Hutchinson MD  [facility] [address] [place] [phone]  [phone]           Julie Behrendt RN

## 2025-02-13 ENCOUNTER — LAB (OUTPATIENT)
Dept: LAB | Facility: CLINIC | Age: 85
End: 2025-02-13
Payer: COMMERCIAL

## 2025-02-13 DIAGNOSIS — E03.9 ACQUIRED HYPOTHYROIDISM: ICD-10-CM

## 2025-02-13 LAB
HOLD SPECIMEN: NORMAL
HOLD SPECIMEN: NORMAL
TSH SERPL DL<=0.005 MIU/L-ACNC: 1.55 UIU/ML (ref 0.3–4.2)

## 2025-02-13 RX ORDER — LEVOTHYROXINE SODIUM 100 UG/1
TABLET ORAL
Qty: 114 TABLET | Refills: 3 | Status: SHIPPED | OUTPATIENT
Start: 2025-02-13

## 2025-02-20 ENCOUNTER — TELEPHONE (OUTPATIENT)
Dept: FAMILY MEDICINE | Facility: CLINIC | Age: 85
End: 2025-02-20

## 2025-02-20 ENCOUNTER — OFFICE VISIT (OUTPATIENT)
Dept: FAMILY MEDICINE | Facility: CLINIC | Age: 85
End: 2025-02-20
Payer: COMMERCIAL

## 2025-02-20 VITALS
TEMPERATURE: 98.6 F | SYSTOLIC BLOOD PRESSURE: 138 MMHG | RESPIRATION RATE: 18 BRPM | HEIGHT: 63 IN | WEIGHT: 169 LBS | DIASTOLIC BLOOD PRESSURE: 64 MMHG | HEART RATE: 85 BPM | OXYGEN SATURATION: 98 % | BODY MASS INDEX: 29.95 KG/M2

## 2025-02-20 DIAGNOSIS — M47.812 CERVICAL SPINE ARTHRITIS WITH NERVE PAIN: ICD-10-CM

## 2025-02-20 DIAGNOSIS — K21.9 GASTROESOPHAGEAL REFLUX DISEASE WITHOUT ESOPHAGITIS: ICD-10-CM

## 2025-02-20 DIAGNOSIS — G89.29 CHRONIC LEFT SHOULDER PAIN: ICD-10-CM

## 2025-02-20 DIAGNOSIS — M25.512 CHRONIC LEFT SHOULDER PAIN: ICD-10-CM

## 2025-02-20 DIAGNOSIS — M79.2 CERVICAL SPINE ARTHRITIS WITH NERVE PAIN: ICD-10-CM

## 2025-02-20 DIAGNOSIS — J45.20 MILD INTERMITTENT ASTHMA, UNCOMPLICATED: ICD-10-CM

## 2025-02-20 DIAGNOSIS — M54.50 CHRONIC LEFT-SIDED LOW BACK PAIN WITHOUT SCIATICA: Primary | ICD-10-CM

## 2025-02-20 DIAGNOSIS — G89.29 CHRONIC LEFT-SIDED LOW BACK PAIN WITHOUT SCIATICA: Primary | ICD-10-CM

## 2025-02-20 DIAGNOSIS — G25.81 RESTLESS LEGS SYNDROME: ICD-10-CM

## 2025-02-20 RX ORDER — FAMOTIDINE 20 MG/1
20 TABLET, FILM COATED ORAL 2 TIMES DAILY PRN
Qty: 30 TABLET | Refills: 0 | Status: SHIPPED | OUTPATIENT
Start: 2025-02-20

## 2025-02-20 RX ORDER — OXYCODONE HYDROCHLORIDE 5 MG/1
2.5 TABLET ORAL 3 TIMES DAILY PRN
Qty: 55 TABLET | Refills: 0 | Status: SHIPPED | OUTPATIENT
Start: 2025-02-20

## 2025-02-20 RX ORDER — OXYCODONE HYDROCHLORIDE 5 MG/1
2.5 TABLET ORAL 3 TIMES DAILY PRN
Qty: 55 TABLET | Refills: 0 | Status: SHIPPED | OUTPATIENT
Start: 2025-03-20

## 2025-02-20 RX ORDER — OXYCODONE HYDROCHLORIDE 5 MG/1
2.5 TABLET ORAL 3 TIMES DAILY PRN
Qty: 55 TABLET | Refills: 0 | Status: SHIPPED | OUTPATIENT
Start: 2025-04-20

## 2025-02-20 ASSESSMENT — PAIN SCALES - PAIN ENJOYMENT GENERAL ACTIVITY SCALE (PEG)
INTERFERED_ENJOYMENT_LIFE: 4
AVG_PAIN_PASTWEEK: 4
PEG_TOTALSCORE: 4
INTERFERED_GENERAL_ACTIVITY: 4
INTERFERED_ENJOYMENT_LIFE: 4
PEG_TOTALSCORE: 4
AVG_PAIN_PASTWEEK: 4
INTERFERED_GENERAL_ACTIVITY: 4

## 2025-02-20 ASSESSMENT — ANXIETY QUESTIONNAIRES
3. WORRYING TOO MUCH ABOUT DIFFERENT THINGS: NOT AT ALL
2. NOT BEING ABLE TO STOP OR CONTROL WORRYING: NOT AT ALL
8. IF YOU CHECKED OFF ANY PROBLEMS, HOW DIFFICULT HAVE THESE MADE IT FOR YOU TO DO YOUR WORK, TAKE CARE OF THINGS AT HOME, OR GET ALONG WITH OTHER PEOPLE?: NOT DIFFICULT AT ALL
GAD7 TOTAL SCORE: 0
GAD7 TOTAL SCORE: 0
5. BEING SO RESTLESS THAT IT IS HARD TO SIT STILL: NOT AT ALL
1. FEELING NERVOUS, ANXIOUS, OR ON EDGE: NOT AT ALL
6. BECOMING EASILY ANNOYED OR IRRITABLE: NOT AT ALL
4. TROUBLE RELAXING: NOT AT ALL
IF YOU CHECKED OFF ANY PROBLEMS ON THIS QUESTIONNAIRE, HOW DIFFICULT HAVE THESE PROBLEMS MADE IT FOR YOU TO DO YOUR WORK, TAKE CARE OF THINGS AT HOME, OR GET ALONG WITH OTHER PEOPLE: NOT DIFFICULT AT ALL
7. FEELING AFRAID AS IF SOMETHING AWFUL MIGHT HAPPEN: NOT AT ALL
7. FEELING AFRAID AS IF SOMETHING AWFUL MIGHT HAPPEN: NOT AT ALL
GAD7 TOTAL SCORE: 0

## 2025-02-20 ASSESSMENT — PATIENT HEALTH QUESTIONNAIRE - PHQ9
SUM OF ALL RESPONSES TO PHQ QUESTIONS 1-9: 0
SUM OF ALL RESPONSES TO PHQ QUESTIONS 1-9: 0
10. IF YOU CHECKED OFF ANY PROBLEMS, HOW DIFFICULT HAVE THESE PROBLEMS MADE IT FOR YOU TO DO YOUR WORK, TAKE CARE OF THINGS AT HOME, OR GET ALONG WITH OTHER PEOPLE: NOT DIFFICULT AT ALL

## 2025-02-20 ASSESSMENT — PAIN SCALES - GENERAL: PAINLEVEL_OUTOF10: NO PAIN (0)

## 2025-02-20 NOTE — TELEPHONE ENCOUNTER
Can you contact the patient's pharmacy and see if insurance requires a prior authorization for atrovent inhaler? Then route back to me with answer.     Wendy Hutchinson MD

## 2025-02-20 NOTE — PATIENT INSTRUCTIONS
Respiratory syncytial virus (RSV) - once in a lifetime vaccination. You need to get it at the pharmacy.

## 2025-02-20 NOTE — PROGRESS NOTES
Assessment & Plan     Chronic left-sided low back pain without sciatica  Restless legs syndrome  Patient is a pleasant 84 year old who presents today for follow up on chronic pain as well as discussion of below issues. For chronic pain, refilled oxycodone, follow up 3 months. Stable.   - oxyCODONE (ROXICODONE) 5 MG tablet  Dispense: 55 tablet; Refill: 0  - oxyCODONE (ROXICODONE) 5 MG tablet  Dispense: 55 tablet; Refill: 0  - oxyCODONE (ROXICODONE) 5 MG tablet  Dispense: 55 tablet; Refill: 0  - PRIMARY CARE FOLLOW-UP SCHEDULING    Mild intermittent asthma, uncomplicated  She is doing better with ipratropium. Will see if MTM is able to assist in cost mitigation. Consider combivent if covered better. Alternatively, could try Symbicort if covered better. She has seen ENT in the past for chronic sinus symptoms.   - Med Therapy Management Referral    Gastroesophageal reflux disease without esophagitis  Has reported some chest burning. Will try famotidine PRN for symptoms. If not improving, consider further respiratory and/or cardiac workup. Low concern for cardiac cause at this time.   - famotidine (PEPCID) 20 MG tablet  Dispense: 30 tablet; Refill: 0    Cervical spine arthritis with nerve pain  We spent the most time on this issue today. She has been having an odd sensation of the left temporal/parietal region of her scalp. Occasionally will then have pain that shoots from that area anteriorly. When she bends her neck forward, the sensation lightens up, worsens with neck extension. I have concerns that this is either arthritis related and/or neck instability. Will obtain imaging as below as initial step. Consider spine referral pending results. Ideally would get MRI but she has been unable to tolerate MRI in the past.   - XR Cervical Spine w Flex/Ext G/E 4 Views    Chronic left shoulder pain  For left shoulder pain, she has a distant history of reported rotator cuff injury. That pain seemed to improved. Recently had  "some lateral left shoulder region bruising as well as increased pain with shoulder movement and decreased ROM 2/2 pain primarily. Pain with resisted abduction. Concern for repeat rotator cuff injury. For now, ice, rest, and monitor. If worsening, consider additional evaluation.     BMI  Estimated body mass index is 29.94 kg/m  as calculated from the following:    Height as of this encounter: 1.6 m (5' 3\").    Weight as of this encounter: 76.7 kg (169 lb).       The longitudinal plan of care for the diagnosis(es)/condition(s) as documented were addressed during this visit. Due to the added complexity in care, I will continue to support Yudelka in the subsequent management and with ongoing continuity of care.  I spent a total of 55 minutes on the day of the visit.   Time spent by me today doing chart review, history and exam, documentation and further activities per the note    Subjective   Yudelka is a 84 year old, presenting for the following health issues:  Pain        2/20/2025     2:10 PM   Additional Questions   Roomed by Rizwana Hernandez CMA   Accompanied by Self     History of Present Illness       Reason for visit:  Pain    She eats 0-1 servings of fruits and vegetables daily.She consumes 0 sweetened beverage(s) daily.She exercises with enough effort to increase her heart rate 9 or less minutes per day.  She exercises with enough effort to increase her heart rate 3 or less days per week.   She is taking medications regularly.       Head:   Mainly left sided, to her it is \"weird\"   Thinks it maybe caused from arthritis in neck.   Not really pain.   But sometimes does get pain with head movements, will feel a sharp pain that shoots through from neck.   Otherwise the temporal/parietal region feels almost like a pressure and/or tingling.   Sometimes down on the left side of face and ear.     Hasn't had CT neck   Last xray of the neck was 11/2022.     If head bends forward, it lessens up. But as soon as she puts " head back up or looks up it gets worse.     Laying on the left side, as waking up, can feel the strange feeling in the head when just laying there not moving.       Arm:   Told in the past she has an issue with rotator cuff.   Had pain in the beginning with that, stuck around, but did improve some.   Wasn't noticing much discomfort, but some limited ROM.   But lately it is starting in again with a lot of pain in that area. No swelling, but sometimes on the outside on the skin will get red and feel warm to the touch. This red/warm happens infrequently, kind of comes and goes. That only started recently.     About a week or so ago had two small bruises on there. Doesn't remember doing anything to sustain those.     But is now improving again in pain. Stll very limited ROM. When lifting the arm, the whole arm hurts       Got pneumonia vaccine at [facility].     Atrovent is working really well for her.     Tried albuterol inhaler last night had some breathing discomfort.   Better today.   Off and on feels heaviness with breathing.     Pain History:  When did you first notice your pain? years   Have you seen this provider for your pain in the past? Yes   Where in your body do you have pain? Back, neck  Are you seeing anyone else for your pain? No        7/18/2024     1:51 PM 11/19/2024     1:48 PM 2/20/2025     2:28 PM   PHQ-9 SCORE   PHQ-9 Total Score MyChart   0   PHQ-9 Total Score 4 6 0        Proxy-reported           7/18/2024     1:51 PM 11/19/2024     1:48 PM 2/20/2025     2:28 PM   NANCY-7 SCORE   Total Score   0 (minimal anxiety)   Total Score 8 7 0        Proxy-reported           7/18/2024     1:51 PM 11/19/2024     1:44 PM 2/20/2025     2:20 PM   PEG Score   PEG Total Score 2.67 2.33 4           7/18/2024     1:51 PM 11/19/2024     1:44 PM 2/20/2025     2:20 PM   PEG Score   PEG Total Score 2.67 2.33 4         PDMP Review         Value Time User    State PDMP site checked  Yes 2/20/2025  3:05 PM Wendy Hutchinson  "MD          Last CSA Agreement:   CSA -- Patient Level:     [Media Unavailable] Controlled Substance Agreement - Opioid - Scan on 11/19/2024  2:57 PM   [Media Unavailable] Controlled Substance Agreement - Opioid - Scan on 5/23/2023  4:25 PM   [Media Unavailable] Controlled Substance Agreement - Opioid - Scan on 6/30/2022  2:35 PM       Last UDS: 7/3/2024        Review of Systems  Constitutional, HEENT, cardiovascular, pulmonary, gi and gu systems are negative, except as otherwise noted.      Objective    /64   Pulse 85   Temp 98.6  F (37  C) (Tympanic)   Resp 18   Ht 1.6 m (5' 3\")   Wt 76.7 kg (169 lb)   LMP 11/16/1975   SpO2 98%   BMI 29.94 kg/m    Body mass index is 29.94 kg/m .  Physical Exam  Constitutional:       Appearance: Normal appearance.   HENT:      Head: Normocephalic.   Eyes:      General: No scleral icterus.     Extraocular Movements: Extraocular movements intact.      Conjunctiva/sclera: Conjunctivae normal.   Cardiovascular:      Rate and Rhythm: Normal rate.   Pulmonary:      Effort: Pulmonary effort is normal.   Musculoskeletal:      Comments: Decreased ROM left shoulder 2/2 pain. No joint line tenderness. No biceps tendon tenderness. Pain with resisted abduction.    Neurological:      General: No focal deficit present.      Mental Status: She is alert and oriented to person, place, and time.             Results from this visitNo results found for any visits on 02/20/25.          Signed Electronically by: Wendy Hutchinson MD    "

## 2025-02-21 NOTE — TELEPHONE ENCOUNTER
Called and spoke with pharmacy. PA is not required, insurance is covering medication but pt would still have to pay $293 out of pocket for medication. Insurance only covering ~ $80 worth.

## 2025-02-24 ENCOUNTER — TELEPHONE (OUTPATIENT)
Dept: FAMILY MEDICINE | Facility: CLINIC | Age: 85
End: 2025-02-24
Payer: COMMERCIAL

## 2025-02-24 RX ORDER — BUDESONIDE AND FORMOTEROL FUMARATE DIHYDRATE 160; 4.5 UG/1; UG/1
2 AEROSOL RESPIRATORY (INHALATION) 2 TIMES DAILY
Status: CANCELLED | OUTPATIENT
Start: 2025-02-24

## 2025-02-24 RX ORDER — BUDESONIDE AND FORMOTEROL FUMARATE DIHYDRATE 80; 4.5 UG/1; UG/1
AEROSOL RESPIRATORY (INHALATION)
Qty: 20.4 G | Refills: 11 | Status: SHIPPED | OUTPATIENT
Start: 2025-02-24

## 2025-02-24 NOTE — TELEPHONE ENCOUNTER
MTM referral from: [facility] visit (referral by provider)    MTM referral outreach attempt #2 on February 24, 2025 at 1:52 PM      Outcome: Patient not reachable after several attempts, sent [facility] message    Use   medica part d, MAP    for the carrier/Plan on the flowsheet      MyChart Message Sent    KLAUDIA Simpson   [phone]

## 2025-02-24 NOTE — TELEPHONE ENCOUNTER
Need for clarification fax from pharmacy: Please send alternatives options so they can compare.  Medication Prescribed: Atrovent HFA 17MCG ELLIOTT Webb/ Patient       No

## 2025-02-24 NOTE — TELEPHONE ENCOUNTER
Sent combivent and symbicort. Instructed them only to fill one inhaler: combivent vs symbicort vs atrovent    Wendy Hutchinson MD

## 2025-02-24 NOTE — TELEPHONE ENCOUNTER
Pls see telephone encounter below.     Pharmacy is asking for an alternative so they can compare prices.     Enedelia Webb  Cuelmh12:02 AM       Need for clarification fax from pharmacy: Please send alternatives options so they can compare.  Medication Prescribed: Atrovent HFA 17MCG AER     Enedelia Webb/ Patient                Lizz Shepherd  Signed2/21/2025       Called and spoke with pharmacy. PA is not required, insurance is covering medication but pt would still have to pay $293 out of pocket for medication. Insurance only covering ~ $80 worth.        Rx pended and message routed to Dr. Hutchinson to advise.     Julie Behrendt RN

## 2025-02-25 ENCOUNTER — TELEPHONE (OUTPATIENT)
Dept: FAMILY MEDICINE | Facility: CLINIC | Age: 85
End: 2025-02-25
Payer: COMMERCIAL

## 2025-02-25 NOTE — TELEPHONE ENCOUNTER
Prior Authorization Retail Medication Request    Medication/Dose: Atrovent inhaler  Diagnosis and ICD code (if different than what is on RX):    Mild intermittent asthma, uncomplicated [J45.20]  - Primary        New/renewal/insurance change PA/secondary ins. PA:  Previously Tried and Failed:  See chart  Rationale:      Insurance   Primary: Primary Visit Coverage    Payer Plan Sponsor Code Group Number Group Name   MEDICA [employer] [id number]       Primary Visit Coverage Subscriber    ID Name N Address   [id number]        Insurance ID:      Secondary (if applicable):  Insurance ID:      Pharmacy Information (if different than what is on RX)  Name:  [facility]  Phone:  [phone]  Fax:[phone]    Clinic Information  Preferred routing pool for dept communication: P [id number]    Julie Behrendt RN

## 2025-02-25 NOTE — TELEPHONE ENCOUNTER
Medication Question or Refill    Contacts       Contact Date/Time Type Contact Phone/Fax    02/25/2025 11:59 AM CST Fax (Incoming) [facility] [address] [place] [address] (Pharmacy) [phone]            What medication are you calling about (include dose and sig)?: Combivent Respimat AER    Preferred Pharmacy:   [facility] [address] [place] [address]  [address] [facility] [address]  Phone: [phone] Fax: [phone]      Controlled Substance Agreement on file:   CSA -- Patient Level:     [Media Unavailable] Controlled Substance Agreement - Opioid - Scan on 11/19/2024  2:57 PM   [Media Unavailable] Controlled Substance Agreement - Opioid - Scan on 5/23/2023  4:25 PM   [Media Unavailable] Controlled Substance Agreement - Opioid - Scan on 6/30/2022  2:35 PM       Who prescribed the medication?: PCP:   Wendy Hutchinson MD     Do you need a refill? Yes    Do you have any questions or concerns?  Yes: Note from pharmacy: Pt talked to insurance and requesting a tier exception All inhalers are $300+ and pt cannot afford any      Could we send this information to you in [facility] or would you prefer to receive a phone call?:   Patient would prefer a phone call   Okay to leave a detailed message?: Yes at Home number on file [phone] (home)    Enedelia Webb/ Patient

## 2025-02-25 NOTE — TELEPHONE ENCOUNTER
Per pharmacy:    1) Combivent is $331.      2) Symbicort would not be covered because plan limits are being exceeded for the up to 12 puffs per day which would be an 11 day supply. Only 30 day supply is covered which equals a total of 120 puffs per month.     3) Atrovent would be $292.46 and prior auth pending.     Yudelka most likely has a deductible to meet and then the inhalers would most likely be covered.   Patient notified. She states when she talked to the people at [facility] yesterday, it was decided Yudelka would reach out to them again if the prior auth is denied. They told her they have another thing they can try if that is the case. Yudelka will contact care team as needed.   Sun DORAN RN

## 2025-02-27 NOTE — TELEPHONE ENCOUNTER
Central Prior Authorization Team   Phone: [phone]    PA Initiation    Medication: Atrovent inhaler  Insurance Company: MEDICA - Phone [phone] Fax [phone]  Pharmacy Filling the Rx: [facility] [address] [place] [address]  Filling Pharmacy Phone: [phone]  Filling Pharmacy Fax:    Start Date: 2/27/2025    [facility] KEY: [id number]     Albendazole Counseling:  I discussed with the patient the risks of albendazole including but not limited to cytopenia, kidney damage, nausea/vomiting and severe allergy.  The patient understands that this medication is being used in an off-label manner.

## 2025-02-27 NOTE — TELEPHONE ENCOUNTER
Prior Authorization Not Needed per Insurance    Medication: Atrovent inhaler  Insurance Company: MEDICA - Phone [phone] Fax [phone]  Expected CoPay:      Pharmacy Filling the Rx: [facility] [place] [address]   Pharmacy Notified:  Yes, pharmacy states they were aware medication is covered without PA      Medication is covered.  Patient has a deductible.  Until that deductible is met she need to pay the majority of the cost of her medications.  There is nothing that can be done to lower the cost until the deductible is met.  If patient has questions about how much she has remaining on her deductible she should reach out to her insurance directly.

## 2025-03-31 ENCOUNTER — OFFICE VISIT (OUTPATIENT)
Dept: FAMILY MEDICINE | Facility: CLINIC | Age: 85
End: 2025-03-31
Payer: COMMERCIAL

## 2025-03-31 VITALS
WEIGHT: 168 LBS | BODY MASS INDEX: 29.77 KG/M2 | RESPIRATION RATE: 16 BRPM | OXYGEN SATURATION: 98 % | DIASTOLIC BLOOD PRESSURE: 72 MMHG | HEIGHT: 63 IN | SYSTOLIC BLOOD PRESSURE: 130 MMHG | HEART RATE: 82 BPM

## 2025-03-31 DIAGNOSIS — Z23 NEED FOR MMR VACCINE: ICD-10-CM

## 2025-03-31 DIAGNOSIS — G89.29 CHRONIC NECK PAIN WITH OSTEOARTHRITIS DETERMINED BY X-RAY: Primary | ICD-10-CM

## 2025-03-31 DIAGNOSIS — M47.812 CHRONIC NECK PAIN WITH OSTEOARTHRITIS DETERMINED BY X-RAY: Primary | ICD-10-CM

## 2025-03-31 PROCEDURE — 3078F DIAST BP <80 MM HG: CPT | Performed by: STUDENT IN AN ORGANIZED HEALTH CARE EDUCATION/TRAINING PROGRAM

## 2025-03-31 PROCEDURE — 1126F AMNT PAIN NOTED NONE PRSNT: CPT | Performed by: STUDENT IN AN ORGANIZED HEALTH CARE EDUCATION/TRAINING PROGRAM

## 2025-03-31 PROCEDURE — 99214 OFFICE O/P EST MOD 30 MIN: CPT | Performed by: STUDENT IN AN ORGANIZED HEALTH CARE EDUCATION/TRAINING PROGRAM

## 2025-03-31 PROCEDURE — 3075F SYST BP GE 130 - 139MM HG: CPT | Performed by: STUDENT IN AN ORGANIZED HEALTH CARE EDUCATION/TRAINING PROGRAM

## 2025-03-31 ASSESSMENT — PAIN SCALES - GENERAL: PAINLEVEL_OUTOF10: NO PAIN (0)

## 2025-03-31 NOTE — PROGRESS NOTES
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 At our last visit, we did obtain an XR c spine with flexion and extension which was reassuring from that perspective, but definitely showed arthritic changes. She does not tolerate MRIs, but is open to having CT scan done. At this point, I'm certainly concerned about radiculopathy causing these symptoms. She does feel the opioids help some to dampen the pain. Neck musculature is quite tight today and recommended that she get scheduled for PT, trial dicofenac, and obtain CT. Also recommended she get scheduled with spine specialist to discuss possible interventional options for management of symptoms.   - CT Cervical Spine w/o Contrast  - Physical Therapy  Referral  - Spine  Referral  - diclofenac (VOLTAREN) 1 % topical gel  Dispense: 150 g; Refill: 1    Need for MMR vaccine  Patient inquires about the measles vaccine in the context of increasing cases across the [place]. Born before 1956, so reasonable to vaccinate. Will have her obtain at the pharmacy based on insurance coverage.   - Measles, Mumps & Rubella Vac (MMR) injection  Dispense: 1 each; Refill: 0      I spent a total of 32 minutes on the day of the visit.   Time spent by me today doing chart review, history and exam, documentation and further activities per the note    BMI  Estimated body mass index is 29.76 kg/m  as calculated from the following:    Height as of this encounter: 1.6 m (5' 3\").    Weight as of this encounter: 76.2 kg (168 lb).       Bacilio Jha is a 84 year old, presenting for the following health issues:  Pain        3/31/2025     1:19 PM   Additional Questions   Roomed by Shelly GRIER   Accompanied by Self     HPI  " "    Neck issues, frequently, with certain movements will get a severe shot of pain.   Had reached up, jerked head back a little more, pain through back of head and down her face. Cried out, just had to stand there for a minute.   Just on the left side.     Since that one episodes does have it happen more often. Always in the back of the head and then the side of the face. And ear.   And even started going down into the throat area.     Sometimes barely noticeable, sometimes really strong, and then varies in between. There all the times. This is more the back of the head, not so much the face, but when the back of the head is worse, the face gets it worse too.   Almost feels like \"creepy crawly\"     Worse first thing in the morning.     Wondering if a nerve is getting pinched.     Another example, will turn head a little to the left the pain will just shoot through there.     Almost like when something causes arm or leg to fall asleep and it is coming out of that fallen asleep stage, that's \"kind of\" the way it feels.     Looking down at the floor relieves the symptoms. Looking up and back is worse, or turning to the left side.   Turning to the right is okay.    Oxycodone does help some with this issue as well.     Pain History:  When did you first notice your pain? Years ago   Have you seen this provider for your pain in the past? Yes   Where in your body do you have pain?neck, and head  Are you seeing anyone else for your pain? No        7/18/2024     1:51 PM 11/19/2024     1:48 PM 2/20/2025     2:28 PM   PHQ-9 SCORE   PHQ-9 Total Score MyChart   0   PHQ-9 Total Score 4 6 0        Proxy-reported           7/18/2024     1:51 PM 11/19/2024     1:48 PM 2/20/2025     2:28 PM   NANCY-7 SCORE   Total Score   0 (minimal anxiety)   Total Score 8 7 0        Proxy-reported               11/19/2024     1:44 PM 2/20/2025     2:20 PM 3/31/2025     1:20 PM   PEG Score   PEG Total Score 2.33 4 1.67       PDMP Review         Value " "Time User    State PDMP site checked  Yes 2/20/2025  3:05 PM Wendy Hutchinson MD          Last CSA Agreement:   CSA -- Patient Level:     [Media Unavailable] Controlled Substance Agreement - Opioid - Scan on 11/19/2024  2:57 PM   [Media Unavailable] Controlled Substance Agreement - Opioid - Scan on 5/23/2023  4:25 PM   [Media Unavailable] Controlled Substance Agreement - Opioid - Scan on 6/30/2022  2:35 PM       Last UDS: 7/3/2024            Review of Systems  Constitutional, HEENT, cardiovascular, pulmonary, gi and gu systems are negative, except as otherwise noted.      Objective    /72 (BP Location: Right arm, Patient Position: Sitting, Cuff Size: Adult Regular)   Pulse 82   Resp 16   Ht 1.6 m (5' 3\")   Wt 76.2 kg (168 lb)   LMP 11/16/1975   SpO2 98%   BMI 29.76 kg/m    Body mass index is 29.76 kg/m .  Physical Exam  Constitutional:       Appearance: Normal appearance.   HENT:      Head: Normocephalic.   Eyes:      General: No scleral icterus.     Extraocular Movements: Extraocular movements intact.      Conjunctiva/sclera: Conjunctivae normal.   Neck:      Comments: Palpable muscle spasm left neck musculature. No spinous process tenderness.   Cardiovascular:      Rate and Rhythm: Normal rate.   Pulmonary:      Effort: Pulmonary effort is normal.   Neurological:      General: No focal deficit present.      Mental Status: She is alert and oriented to person, place, and time.           Results from this visitNo results found for any visits on 03/31/25.            Signed Electronically by: Wendy Hutchinson MD    "

## 2025-04-13 DIAGNOSIS — I10 ESSENTIAL HYPERTENSION WITH GOAL BLOOD PRESSURE LESS THAN 140/90: ICD-10-CM

## 2025-04-13 DIAGNOSIS — E78.5 HYPERLIPIDEMIA LDL GOAL <130: ICD-10-CM

## 2025-04-14 RX ORDER — LISINOPRIL 20 MG/1
20 TABLET ORAL DAILY
Qty: 90 TABLET | Refills: 3 | Status: SHIPPED | OUTPATIENT
Start: 2025-04-14

## 2025-04-14 RX ORDER — ATORVASTATIN CALCIUM 40 MG/1
40 TABLET, FILM COATED ORAL DAILY
Qty: 90 TABLET | Refills: 3 | Status: SHIPPED | OUTPATIENT
Start: 2025-04-14

## 2025-04-21 ENCOUNTER — PATIENT OUTREACH (OUTPATIENT)
Dept: CARE COORDINATION | Facility: CLINIC | Age: 85
End: 2025-04-21
Payer: COMMERCIAL

## 2025-04-23 ENCOUNTER — HOSPITAL ENCOUNTER (OUTPATIENT)
Dept: CT IMAGING | Facility: CLINIC | Age: 85
Discharge: HOME | End: 2025-04-23
Attending: STUDENT IN AN ORGANIZED HEALTH CARE EDUCATION/TRAINING PROGRAM
Payer: COMMERCIAL

## 2025-04-23 DIAGNOSIS — G89.29 CHRONIC NECK PAIN WITH OSTEOARTHRITIS DETERMINED BY X-RAY: ICD-10-CM

## 2025-04-23 DIAGNOSIS — M47.812 CHRONIC NECK PAIN WITH OSTEOARTHRITIS DETERMINED BY X-RAY: ICD-10-CM

## 2025-04-23 PROCEDURE — 72125 CT NECK SPINE W/O DYE: CPT

## 2025-05-22 ENCOUNTER — OFFICE VISIT (OUTPATIENT)
Dept: FAMILY MEDICINE | Facility: CLINIC | Age: 85
End: 2025-05-22
Payer: COMMERCIAL

## 2025-05-22 VITALS
RESPIRATION RATE: 20 BRPM | WEIGHT: 168 LBS | HEIGHT: 63 IN | HEART RATE: 82 BPM | TEMPERATURE: 98.7 F | BODY MASS INDEX: 29.77 KG/M2 | DIASTOLIC BLOOD PRESSURE: 64 MMHG | OXYGEN SATURATION: 97 % | SYSTOLIC BLOOD PRESSURE: 142 MMHG

## 2025-05-22 DIAGNOSIS — G89.29 CHRONIC LEFT-SIDED LOW BACK PAIN WITHOUT SCIATICA: Primary | ICD-10-CM

## 2025-05-22 DIAGNOSIS — G25.81 RESTLESS LEGS SYNDROME: ICD-10-CM

## 2025-05-22 DIAGNOSIS — M54.50 CHRONIC LEFT-SIDED LOW BACK PAIN WITHOUT SCIATICA: Primary | ICD-10-CM

## 2025-05-22 DIAGNOSIS — I10 ESSENTIAL HYPERTENSION WITH GOAL BLOOD PRESSURE LESS THAN 140/90: ICD-10-CM

## 2025-05-22 RX ORDER — OXYCODONE HYDROCHLORIDE 5 MG/1
2.5 TABLET ORAL 3 TIMES DAILY PRN
Qty: 55 TABLET | Refills: 0 | Status: SHIPPED | OUTPATIENT
Start: 2025-06-22

## 2025-05-22 RX ORDER — OXYCODONE HYDROCHLORIDE 5 MG/1
2.5 TABLET ORAL 3 TIMES DAILY PRN
Qty: 55 TABLET | Refills: 0 | Status: SHIPPED | OUTPATIENT
Start: 2025-07-22

## 2025-05-22 RX ORDER — OXYCODONE HYDROCHLORIDE 5 MG/1
2.5 TABLET ORAL 3 TIMES DAILY PRN
Qty: 55 TABLET | Refills: 0 | Status: SHIPPED | OUTPATIENT
Start: 2025-05-22

## 2025-05-22 ASSESSMENT — PATIENT HEALTH QUESTIONNAIRE - PHQ9
SUM OF ALL RESPONSES TO PHQ QUESTIONS 1-9: 5
SUM OF ALL RESPONSES TO PHQ QUESTIONS 1-9: 5

## 2025-05-22 ASSESSMENT — ANXIETY QUESTIONNAIRES
7. FEELING AFRAID AS IF SOMETHING AWFUL MIGHT HAPPEN: NOT AT ALL
1. FEELING NERVOUS, ANXIOUS, OR ON EDGE: NOT AT ALL
7. FEELING AFRAID AS IF SOMETHING AWFUL MIGHT HAPPEN: NOT AT ALL
2. NOT BEING ABLE TO STOP OR CONTROL WORRYING: NOT AT ALL
IF YOU CHECKED OFF ANY PROBLEMS ON THIS QUESTIONNAIRE, HOW DIFFICULT HAVE THESE PROBLEMS MADE IT FOR YOU TO DO YOUR WORK, TAKE CARE OF THINGS AT HOME, OR GET ALONG WITH OTHER PEOPLE: NOT DIFFICULT AT ALL
6. BECOMING EASILY ANNOYED OR IRRITABLE: SEVERAL DAYS
8. IF YOU CHECKED OFF ANY PROBLEMS, HOW DIFFICULT HAVE THESE MADE IT FOR YOU TO DO YOUR WORK, TAKE CARE OF THINGS AT HOME, OR GET ALONG WITH OTHER PEOPLE?: NOT DIFFICULT AT ALL
5. BEING SO RESTLESS THAT IT IS HARD TO SIT STILL: NOT AT ALL
GAD7 TOTAL SCORE: 1
4. TROUBLE RELAXING: NOT AT ALL
3. WORRYING TOO MUCH ABOUT DIFFERENT THINGS: NOT AT ALL
GAD7 TOTAL SCORE: 1
GAD7 TOTAL SCORE: 1

## 2025-05-22 ASSESSMENT — ASTHMA QUESTIONNAIRES
ACT_TOTALSCORE: 17
QUESTION_3 LAST FOUR WEEKS HOW OFTEN DID YOUR ASTHMA SYMPTOMS (WHEEZING, COUGHING, SHORTNESS OF BREATH, CHEST TIGHTNESS OR PAIN) WAKE YOU UP AT NIGHT OR EARLIER THAN USUAL IN THE MORNING: NOT AT ALL
QUESTION_5 LAST FOUR WEEKS HOW WOULD YOU RATE YOUR ASTHMA CONTROL: WELL CONTROLLED
QUESTION_1 LAST FOUR WEEKS HOW MUCH OF THE TIME DID YOUR ASTHMA KEEP YOU FROM GETTING AS MUCH DONE AT WORK, SCHOOL OR AT HOME: NONE OF THE TIME
QUESTION_2 LAST FOUR WEEKS HOW OFTEN HAVE YOU HAD SHORTNESS OF BREATH: ONCE A DAY
QUESTION_4 LAST FOUR WEEKS HOW OFTEN HAVE YOU USED YOUR RESCUE INHALER OR NEBULIZER MEDICATION (SUCH AS ALBUTEROL): THREE OR MORE TIMES PER DAY

## 2025-05-22 ASSESSMENT — PAIN SCALES - GENERAL: PAINLEVEL_OUTOF10: NO PAIN (0)

## 2025-05-22 NOTE — PROGRESS NOTES
"  Assessment & Plan     Chronic left-sided low back pain without sciatica  Restless legs syndrome  Patient is a very pleasant 84-year-old female who presents today for follow-up on chronic pain.  Refilled oxycodone today.  Continues to take appropriately.  Follow-up in 3 to 6 months.  Also experiences some chronic neck pain, was seen by the spine surgeon recently.  We opted to hold off on any further evaluation or treatment at this time with the exception of the ongoing oxycodone and intermittent ibuprofen use as it seems to be working well for her.  If things worsen at any time, recommended that she follow-up with a spine specialist.  - oxyCODONE (ROXICODONE) 5 MG tablet  Dispense: 55 tablet; Refill: 0  - oxyCODONE (ROXICODONE) 5 MG tablet  Dispense: 55 tablet; Refill: 0  - oxyCODONE (ROXICODONE) 5 MG tablet  Dispense: 55 tablet; Refill: 0  - PRIMARY CARE FOLLOW-UP SCHEDULING    Essential hypertension with goal blood pressure less than 140/90  Blood pressure is mildly elevated today.  She notes that she has been under some increased stress.  Had to put one of her pets down recently.  Had a very long discussion today regarding grief and stress related to that.      BMI  Estimated body mass index is 29.76 kg/m  as calculated from the following:    Height as of this encounter: 1.6 m (5' 3\").    Weight as of this encounter: 76.2 kg (168 lb).     The longitudinal plan of care for the diagnosis(es)/condition(s) as documented were addressed during this visit. Due to the added complexity in care, I will continue to support Yudelka in the subsequent management and with ongoing continuity of care.    I spent a total of 34 minutes on the day of the visit.   Time spent by me today doing chart review, history and exam, documentation and further activities per the note    Subjective   Yudelka is a 84 year old, presenting for the following health issues:  Pain (Follow up )        5/22/2025     3:43 PM   Additional Questions "   Roomed by Enedelia YOUNG   Accompanied by self         5/22/2025     3:43 PM   Patient Reported Additional Medications   Patient reports taking the following new medications no new meds     Musculoskeletal Problem    History of Present Illness       Reason for visit:  Arthritis    She eats 0-1 servings of fruits and vegetables daily.She consumes 2 sweetened beverage(s) daily.She exercises with enough effort to increase her heart rate 9 or less minutes per day.  She exercises with enough effort to increase her heart rate 3 or less days per week.   She is taking medications regularly.      Pain History:  When did you first notice your pain? Chronic    Have you seen this provider for your pain in the past? Yes   Where in your body do you have pain? arthritis  Are you seeing anyone else for your pain? No        11/19/2024     1:48 PM 2/20/2025     2:28 PM 5/22/2025     3:39 PM   PHQ-9 SCORE   PHQ-9 Total Score MyChart  0 5 (Mild depression)   PHQ-9 Total Score 6 0  5        Patient-reported    Proxy-reported           11/19/2024     1:48 PM 2/20/2025     2:28 PM 5/22/2025     3:41 PM   NANCY-7 SCORE   Total Score  0 (minimal anxiety) 1 (minimal anxiety)   Total Score 7 0  1        Patient-reported    Proxy-reported     Saw someone through [facility]  In [place].   More about injection in the neck.   Not planing on doing that.     The whole area, though pain ongoing, not quite as bad as before.   Not sure why this has changed.     Seems like a lot of stuff for her does this waxing and waning.     Oxycodone:   Don't think she's always needign 3 times a day or even 4 times a day.   Hasn't had any since 1 am today.   Iburofen this afternoon has been helping.     Looking at fill history, days between since August fill:   46  43  36  44  46  41     Middle of night is the worst with restless legs:   Then gets up and does ibuprofen and oxycodone and that seems to work.   Not always taking oxycodone at a specific time. Sometimes works out to  "be close to bedtime. 11-12 pm.   Really variable whether or not that keeps her from waking up.     When wakes up at night with pain, starts with non-opioid treatment, even just a neck wrap/pressure on the neck seems to help sometimes, so doesn't always need the oxycodone.       PDMP Review         Value Time User    State PDMP site checked  Yes 5/22/2025  4:35 PM Wendy Hutchinson MD          Last CSA Agreement:   CSA -- Patient Level:     [Media Unavailable] Controlled Substance Agreement - Opioid - Scan on 11/19/2024  2:57 PM   [Media Unavailable] Controlled Substance Agreement - Opioid - Scan on 5/23/2023  4:25 PM   [Media Unavailable] Controlled Substance Agreement - Opioid - Scan on 6/30/2022  2:35 PM       Last UDS: 7/3/2024        Review of Systems  Constitutional, HEENT, cardiovascular, pulmonary, gi and gu systems are negative, except as otherwise noted.      Objective    BP (!) 142/64   Pulse 82   Temp 98.7  F (37.1  C) (Tympanic)   Resp 20   Ht 1.6 m (5' 3\")   Wt 76.2 kg (168 lb)   LMP 11/16/1975   SpO2 97%   BMI 29.76 kg/m    Body mass index is 29.76 kg/m .  Physical Exam  Constitutional:       Appearance: Normal appearance.   HENT:      Head: Normocephalic.   Eyes:      General: No scleral icterus.     Extraocular Movements: Extraocular movements intact.      Conjunctiva/sclera: Conjunctivae normal.   Cardiovascular:      Rate and Rhythm: Normal rate.   Pulmonary:      Effort: Pulmonary effort is normal.   Musculoskeletal:      Comments: Antalgic gait   Neurological:      General: No focal deficit present.      Mental Status: She is alert and oriented to person, place, and time.             Results from this visitNo results found for any visits on 05/22/25.           Signed Electronically by: Wendy Hutchinson MD    "

## 2025-05-22 NOTE — PATIENT INSTRUCTIONS
When you need to see someoen fro med refills, I recommend following up with one of my colleagues:   - BIGG Purvis  - Farzaneh Valentin NP  - Karissa Escobar MD      Update me in a few weeks if the physical therapy is causing worsening pain and you're needing to use higher doses of oxycodone so we can be prepared for future refills.

## 2025-05-27 ENCOUNTER — THERAPY VISIT (OUTPATIENT)
Dept: PHYSICAL THERAPY | Facility: CLINIC | Age: 85
End: 2025-05-27
Attending: STUDENT IN AN ORGANIZED HEALTH CARE EDUCATION/TRAINING PROGRAM
Payer: COMMERCIAL

## 2025-05-27 DIAGNOSIS — G89.29 CHRONIC NECK PAIN WITH OSTEOARTHRITIS DETERMINED BY X-RAY: ICD-10-CM

## 2025-05-27 DIAGNOSIS — M47.812 CHRONIC NECK PAIN WITH OSTEOARTHRITIS DETERMINED BY X-RAY: ICD-10-CM

## 2025-05-27 PROCEDURE — 97110 THERAPEUTIC EXERCISES: CPT | Mod: GP | Performed by: PHYSICAL THERAPIST

## 2025-05-27 PROCEDURE — 97162 PT EVAL MOD COMPLEX 30 MIN: CPT | Mod: GP | Performed by: PHYSICAL THERAPIST

## 2025-05-27 PROCEDURE — 97140 MANUAL THERAPY 1/> REGIONS: CPT | Mod: GP | Performed by: PHYSICAL THERAPIST

## 2025-05-27 NOTE — PROGRESS NOTES
"PHYSICAL THERAPY EVALUATION  Type of Visit: Evaluation       Fall Risk Screen:  Have you fallen 2 or more times in the past year?: No  Have you fallen and had an injury in the past year?: No    Subjective         Presenting condition or subjective complaint:  Reports that she has arthritis in her neck and has had imaging done.  Reports that she has neck pain very a long time, however has had a \"weird\" sensation to the the left side of her head.  Reports also gets a sharp intense sensation with turning and rotating her head towards the left and reports and numb shooting sensation to her left ear, jaw, face and head.  Saw an ortho specialist and was told she could get an injection.  Pt has questions about compression garments and swelling in her legs.  She is wondering if she needs to see lymphedema therapy again.    Date of onset: 03/31/25    Relevant medical history:     Past Medical History:   Diagnosis Date    Basal cell carcinoma     Esophageal reflux     Other and unspecified hyperlipidemia     Unspecified essential hypertension     Unspecified hypothyroidism      Dates & types of surgery:    Past Surgical History:   Procedure Laterality Date    ARTHROPLASTY KNEE Right 8/4/2022    Procedure: TOTAL Knee Arthroplasty, right;  Surgeon: Sheldon Peters MD;  Location: [facility]    COLONOSCOPY  2/7/2011    COLONOSCOPY performed by BHARTI DUNHAM at [facility]    RELEASE CARPAL TUNNEL Right 12/7/2023    Procedure: Carpal tunnel release, Right;  Surgeon: Sheldon Peters MD;  Location: [facility]     Prior diagnostic imaging/testing results: CT scan; X-ray     Prior therapy history for the same diagnosis, illness or injury: No      Prior Level of Function  Transfers: Assistive equipment  Ambulation: Assistive equipment  ADL: Assistive equipment  IADL: Finances, Housekeeping, Laundry    Living Environment  Social support: With a significant other or spouse   Type of home: House   Stairs to enter the home: Yes   Is " there a railing: Yes     Ramp: No   Stairs inside the home: No       Help at home: Self Cares (home health aide/personal care attendant, family, etc)  Equipment owned:       Employment: No    Hobbies/Interests:      Patient goals for therapy:  decrease pain    Pain assessment: Location: neck/Rating: reports minimal pain currently, however gets sharp pains with certain movements     Objective   CERVICAL SPINE EVALUATION  INTEGUMENTARY (edema, incisions): Refer to girth measurement sheet; increased edema in B LE compared to 2/22/2024  POSTURE: Sitting Posture: Rounded shoulders, Forward head, Thoracic kyphosis increased  GAIT: independent with 4 wheeled walking, trendelenburg gait pattern and decreased stride length  Balance/Proprioception: poor  ROM:   (Degrees) Left AROM Right AROM    Cervical Flexion 40*    Cervical Extension 35*    Cervical Side bend      Cervical Rotation 25* 50*    Cervical Protrusion     Cervical Retraction     Thoracic Flexion     Thoracic Extension     Thoracic Rotation       Left AROM Left PROM Right AROM Right PROM   Shoulder Flexion 85* - pain  140*    Shoulder Extension 60*  140*    Shoulder Abduction       Shoulder Adduction       Shoulder IR       Shoulder ER       Shoulder Horiz Abduction       Shoulder Horiz Adduction         MYOTOMES:    Left Right   C1-2 (Neck Flexion)     C3 (Neck Side Bend)      C4 (Shrug) 5 5   C5 (Deltoid) 3 3-   C6 (Biceps) 4 4   C7 (Triceps) 4 4   C8 (Thumb Ext) 3 3   T1 (Intrinsics) 3 3     DTR S:   CORD SIGNS:   DERMATOMES:    Left Right   C1     C2     C3     C4     C5 Normal (light touch) Normal (light touch)   C6 Normal (light touch) Normal (light touch)   C7 Hypo (light touch) Normal (light touch)   C8 Hypo (light touch) Normal (light touch)   T1 Normal (light touch) Normal (light touch)     NEURAL TENSION:   FLEXIBILITY: B Levator scapula; UT, suboccipitals   Special Tests:    Left Right   Alar Ligament Negative    Cervical Flexion-Rotation     Cervical  Rot/Lateral Flex Decreased movement due to reduced spinal mobility Decreased movement due to reduced spinal mobility   Compression Positive Positive   Distraction Negative  Negative    Spurling s     Thoracic Outlet Screen (Azra, Adson)     Transverse Ligament     Vertebral Artery     Cotton Roll Test     Craniocervical Flexor Endurance Test     Mannheimer Test            PALPATION: TTP L suboccipitals and L transverse process C2-C7  SPINAL SEGMENTAL MOBILITY: Hypomobility C2-T12; L 1st rib elevated    Assessment & Plan   CLINICAL IMPRESSIONS  Medical Diagnosis: 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  The following significant findings have been identified: Pain, Decreased ROM/flexibility, Decreased joint mobility, Decreased strength, Impaired balance, Impaired sensation, Edema, Impaired gait, Impaired muscle performance, Decreased activity tolerance, and Impaired posture. These impairments interfere with their ability to perform self care tasks, recreational activities, household chores, driving , household mobility, and community mobility as compared to previous level of function.     Clinical Decision Making (Complexity):  Clinical Presentation: Evolving/Changing  Clinical Presentation Rationale: based on medical and personal factors listed in PT evaluation  Clinical Decision Making (Complexity): Moderate complexity    PLAN OF CARE  Treatment Interventions:  Modalities: Cryotherapy, Cupping, Dry Needling, E-stim, Hot Pack, Ultrasound  Interventions: Gait Training, Manual Therapy, Neuromuscular Re-education, Therapeutic Activity, Therapeutic Exercise, Self-Care/Home Management    Long Term Goals     PT Goal 1  Goal Identifier: Cervical ROM  Goal Description: Pt will demonstrate a 10* improvement in cervical rotation ROM in order to have improved tolerance to looking over her  shoulder while walking.  Rationale: to maximize safety and independence within the community  Target Date: 07/08/25  PT Goal 2  Goal Identifier: Posture  Goal Description: Pt will demonstrate ability to hold improved posture for 10 minute in order to have reduced neck pain.  Rationale: to maximize safety and independence with performance of ADLs and functional tasks  Target Date: 07/22/25  PT Goal 3  Goal Identifier: Sharp pains  Goal Description: Pt will report 50% reduction in sharp pains with looking left in order to have improved tolerance to looking over her shoulder.  Rationale: to maximize safety and independence within the community  Target Date: 07/22/25      Frequency of Treatment: 1 x a week  Duration of Treatment: 8 weeks    Recommended Referrals to Other Professionals: Lymphedema therapy if edema worsens  Education Assessment:   Learner/Method: Patient  Education Comments: Extensive education on increased girth of B LE and if she notices a further increase in edema she needs to be seen by a lymphedema therapist.  Also instructed on possible need for new compression garments and seeing a lymphedema therapist could help her get a new pair.  Currently she is having a hard time donning garments, however she has the easiest to don garments.  Pt also instructed reducing edema would help with her mobility.    Risks and benefits of evaluation/treatment have been explained.   Patient/Family/caregiver agrees with Plan of Care.     Evaluation Time:     PT Eval, Moderate Complexity Minutes (31257): 40 (Increased duration due to amount of education required)       Signing Clinician: Arabella Enriquez PT        [facility]                                                                                   OUTPATIENT PHYSICAL THERAPY      PLAN OF TREATMENT FOR OUTPATIENT REHABILITATION   Patient's Last Name, First Name, M.I.  Yudelka Hendrix YOB: 1940    Provider's Name   [facility]   Medical Record No.  [id number]     Onset Date: 03/31/25  Start of Care Date: 05/27/25     Medical Diagnosis:  Chronic neck pain with osteoarthritis determined by x-ray      PT Treatment Diagnosis:  Neck pain Plan of Treatment  Frequency/Duration: 1 x a week/ 8 weeks    Certification date from 05/27/25 to 07/22/25         See note for plan of treatment details and functional goals     Arabella Enriquez, PT                         I CERTIFY THE NEED FOR THESE SERVICES FURNISHED UNDER        THIS PLAN OF TREATMENT AND WHILE UNDER MY CARE     (Physician attestation of this document indicates review and certification of the therapy plan).              Referring Provider:  Wendy Hutchinson    Initial Assessment  See Epic Evaluation- Start of Care Date: 05/27/25

## 2025-08-07 ENCOUNTER — OFFICE VISIT (OUTPATIENT)
Dept: URGENT CARE | Facility: URGENT CARE | Age: 85
End: 2025-08-07
Payer: COMMERCIAL

## 2025-08-07 VITALS
SYSTOLIC BLOOD PRESSURE: 150 MMHG | DIASTOLIC BLOOD PRESSURE: 75 MMHG | WEIGHT: 160 LBS | TEMPERATURE: 98.5 F | HEART RATE: 83 BPM | BODY MASS INDEX: 28.35 KG/M2 | OXYGEN SATURATION: 100 % | RESPIRATION RATE: 15 BRPM | HEIGHT: 63 IN

## 2025-08-07 DIAGNOSIS — R21 RASH AND NONSPECIFIC SKIN ERUPTION: Primary | ICD-10-CM

## 2025-08-07 ASSESSMENT — ENCOUNTER SYMPTOMS
CARDIOVASCULAR NEGATIVE: 1
NECK PAIN: 0
COUGH: 0
SORE THROAT: 0
DIARRHEA: 0
DIZZINESS: 0
FEVER: 0
ARTHRALGIAS: 0
CHILLS: 0
NECK STIFFNESS: 0
HEADACHES: 0
PALPITATIONS: 0
JOINT SWELLING: 0
WEAKNESS: 0
EYES NEGATIVE: 1
NAUSEA: 0
RHINORRHEA: 0
WOUND: 0
SHORTNESS OF BREATH: 0
ENDOCRINE NEGATIVE: 1
MUSCULOSKELETAL NEGATIVE: 1
MYALGIAS: 0
VOMITING: 0
BACK PAIN: 0
ALLERGIC/IMMUNOLOGIC NEGATIVE: 1
RESPIRATORY NEGATIVE: 1
LIGHT-HEADEDNESS: 0

## 2025-08-18 DIAGNOSIS — J45.20 MILD INTERMITTENT ASTHMA, UNCOMPLICATED: ICD-10-CM

## 2025-08-18 RX ORDER — ALBUTEROL SULFATE 90 UG/1
INHALANT RESPIRATORY (INHALATION)
Qty: 8.5 G | Refills: 5 | Status: SHIPPED | OUTPATIENT
Start: 2025-08-18

## (undated) DEVICE — GLOVE PROTEXIS W/NEU-THERA 8.0  2D73TE80

## (undated) DEVICE — SOL WATER IRRIG 1000ML BOTTLE 07139-09

## (undated) DEVICE — BONE CLEANING TIP INTERPULSE  0210-010-000

## (undated) DEVICE — SUCTION MANIFOLD NEPTUNE 2 SYS 4 PORT 0702-020-000

## (undated) DEVICE — SU ETHILON 3-0 PS-2 18" 1669H

## (undated) DEVICE — SUCTION IRR SYSTEM W/TIP INTERPULSE

## (undated) DEVICE — BNDG ELASTIC 3"X5YDS UNSTERILE 6611-30

## (undated) DEVICE — GLOVE BIOGEL PI MICRO SZ 6.5 48565

## (undated) DEVICE — GLOVE PROTEXIS BLUE W/NEU-THERA 6.5  2D73EB65

## (undated) DEVICE — PACK HAND

## (undated) DEVICE — GOWN IMPERVIOUS SPECIALTY XLG/XLONG 32474

## (undated) DEVICE — SU STRATAFIX MONOCRYL 3-0 SPIRAL PS-2 30CM SXMP1B106

## (undated) DEVICE — DRAPE SHEET REV FOLD 3/4 9349

## (undated) DEVICE — BONE CEMENT KIT BOWL AND SPATULA STRK 6201-3-410

## (undated) DEVICE — Device

## (undated) DEVICE — GLOVE BIOGEL PI MICRO INDICATOR UNDERGLOVE SZ 6.5 48965

## (undated) DEVICE — DECANTER VIAL 2006S

## (undated) DEVICE — DRSG AQUACEL AG 3.5X9.75" HYDROFIBER 412011

## (undated) DEVICE — BLADE SAW OSCIL/SAG STRK 11X90X1.19MM 4/2000 4111-119-090

## (undated) DEVICE — NDL SPINAL 18GA 3.5" 405184

## (undated) DEVICE — ESU PENCIL SMOKE EVAC W/ROCKER SWITCH 0703-047-000

## (undated) DEVICE — ESU PENCIL W/COATED BLADE E2450H

## (undated) DEVICE — GOWN LG DISP 9515

## (undated) DEVICE — PREP CHLORAPREP 26ML TINTED ORANGE  260815

## (undated) DEVICE — DRSG ADAPTIC 3X3" 6112

## (undated) DEVICE — SOL NACL 0.9% IRRIG 3000ML BAG 07972-08

## (undated) DEVICE — BNDG COBAN 6"X5YDS STERILE

## (undated) DEVICE — TOURNIQUET SGL  BLADDER 30" DL PORT BLUE 5921-030-235

## (undated) DEVICE — GLOVE BIOGEL PI MICRO INDICATOR UNDERGLOVE SZ 8.0 48980

## (undated) DEVICE — SU PDO 1 STRATAFIX 36X36CM CTX TAPERPOINT SXPD2B405

## (undated) DEVICE — GOWN XLG DISP 9545

## (undated) DEVICE — SU MONOCRYL 2-0 CT-1 36" UND Y945H

## (undated) DEVICE — GLOVE BIOGEL PI MICRO SZ 8.0 48580

## (undated) DEVICE — SU VICRYL 1 CT-1 36" UND J947H

## (undated) DEVICE — SYR 20ML LL W/O NDL

## (undated) DEVICE — GLOVE PROTEXIS W/NEU-THERA 6.5  2D73TE65

## (undated) DEVICE — BLADE SAW SAGITTAL STRK 21X90X1.19MM HD SYS 6 6221-119-090

## (undated) RX ORDER — LIDOCAINE HCL/EPINEPHRINE/PF 2%-1:200K
VIAL (ML) INJECTION
Status: DISPENSED
Start: 2022-08-04

## (undated) RX ORDER — DEXAMETHASONE SODIUM PHOSPHATE 10 MG/ML
INJECTION, SOLUTION INTRAMUSCULAR; INTRAVENOUS
Status: DISPENSED
Start: 2022-08-04

## (undated) RX ORDER — TRANEXAMIC ACID 650 MG/1
TABLET ORAL
Status: DISPENSED
Start: 2022-08-04

## (undated) RX ORDER — HYDROXYZINE HYDROCHLORIDE 25 MG/1
TABLET, FILM COATED ORAL
Status: DISPENSED
Start: 2022-08-04

## (undated) RX ORDER — ACETAMINOPHEN 325 MG/1
TABLET ORAL
Status: DISPENSED
Start: 2022-08-04

## (undated) RX ORDER — HYDROMORPHONE HCL IN WATER/PF 6 MG/30 ML
PATIENT CONTROLLED ANALGESIA SYRINGE INTRAVENOUS
Status: DISPENSED
Start: 2022-08-04

## (undated) RX ORDER — FENTANYL CITRATE-0.9 % NACL/PF 10 MCG/ML
PLASTIC BAG, INJECTION (ML) INTRAVENOUS
Status: DISPENSED
Start: 2022-08-04

## (undated) RX ORDER — MAGNESIUM SULFATE HEPTAHYDRATE 40 MG/ML
INJECTION, SOLUTION INTRAVENOUS
Status: DISPENSED
Start: 2022-08-04

## (undated) RX ORDER — EPINEPHRINE 1 MG/ML
INJECTION, SOLUTION, CONCENTRATE INTRAVENOUS
Status: DISPENSED
Start: 2022-08-04

## (undated) RX ORDER — ONDANSETRON 2 MG/ML
INJECTION INTRAMUSCULAR; INTRAVENOUS
Status: DISPENSED
Start: 2022-08-04

## (undated) RX ORDER — BUPIVACAINE HYDROCHLORIDE 5 MG/ML
INJECTION, SOLUTION EPIDURAL; INTRACAUDAL
Status: DISPENSED
Start: 2022-08-04

## (undated) RX ORDER — DEXAMETHASONE SODIUM PHOSPHATE 4 MG/ML
INJECTION, SOLUTION INTRA-ARTICULAR; INTRALESIONAL; INTRAMUSCULAR; INTRAVENOUS; SOFT TISSUE
Status: DISPENSED
Start: 2022-08-04

## (undated) RX ORDER — CEFAZOLIN SODIUM/WATER 2 G/20 ML
SYRINGE (ML) INTRAVENOUS
Status: DISPENSED
Start: 2022-08-04

## (undated) RX ORDER — BUPIVACAINE HYDROCHLORIDE AND EPINEPHRINE 5; 5 MG/ML; UG/ML
INJECTION, SOLUTION EPIDURAL; INTRACAUDAL; PERINEURAL
Status: DISPENSED
Start: 2023-12-07

## (undated) RX ORDER — ROPIVACAINE HYDROCHLORIDE 5 MG/ML
INJECTION, SOLUTION EPIDURAL; INFILTRATION; PERINEURAL
Status: DISPENSED
Start: 2022-08-04

## (undated) RX ORDER — PROPOFOL 10 MG/ML
INJECTION, EMULSION INTRAVENOUS
Status: DISPENSED
Start: 2022-08-04

## (undated) RX ORDER — OXYCODONE HYDROCHLORIDE 5 MG/1
TABLET ORAL
Status: DISPENSED
Start: 2022-08-04

## (undated) RX ORDER — FENTANYL CITRATE 50 UG/ML
INJECTION, SOLUTION INTRAMUSCULAR; INTRAVENOUS
Status: DISPENSED
Start: 2022-08-04

## (undated) RX ORDER — ACETAMINOPHEN 325 MG/1
TABLET ORAL
Status: DISPENSED
Start: 2023-12-07

## (undated) RX ORDER — LIDOCAINE HYDROCHLORIDE 10 MG/ML
INJECTION, SOLUTION INFILTRATION; PERINEURAL
Status: DISPENSED
Start: 2023-12-07